# Patient Record
Sex: MALE | Race: BLACK OR AFRICAN AMERICAN | NOT HISPANIC OR LATINO | URBAN - METROPOLITAN AREA
[De-identification: names, ages, dates, MRNs, and addresses within clinical notes are randomized per-mention and may not be internally consistent; named-entity substitution may affect disease eponyms.]

---

## 2015-05-11 RX ORDER — AMLODIPINE BESYLATE 2.5 MG/1
1 TABLET ORAL
Qty: 0 | Refills: 0 | DISCHARGE
Start: 2015-05-11

## 2017-02-13 ENCOUNTER — INPATIENT (INPATIENT)
Facility: HOSPITAL | Age: 82
LOS: 2 days | Discharge: HOME HEALTH | End: 2017-02-16
Attending: HOSPITALIST | Admitting: HOSPITALIST
Payer: MEDICARE

## 2017-02-13 VITALS
OXYGEN SATURATION: 99 % | HEART RATE: 110 BPM | RESPIRATION RATE: 16 BRPM | DIASTOLIC BLOOD PRESSURE: 82 MMHG | TEMPERATURE: 98 F | SYSTOLIC BLOOD PRESSURE: 186 MMHG

## 2017-02-13 DIAGNOSIS — E16.2 HYPOGLYCEMIA, UNSPECIFIED: ICD-10-CM

## 2017-02-13 DIAGNOSIS — N17.9 ACUTE KIDNEY FAILURE, UNSPECIFIED: ICD-10-CM

## 2017-02-13 DIAGNOSIS — Z98.89 OTHER SPECIFIED POSTPROCEDURAL STATES: Chronic | ICD-10-CM

## 2017-02-13 DIAGNOSIS — I10 ESSENTIAL (PRIMARY) HYPERTENSION: ICD-10-CM

## 2017-02-13 DIAGNOSIS — E11.9 TYPE 2 DIABETES MELLITUS WITHOUT COMPLICATIONS: ICD-10-CM

## 2017-02-13 DIAGNOSIS — E78.00 PURE HYPERCHOLESTEROLEMIA, UNSPECIFIED: ICD-10-CM

## 2017-02-13 DIAGNOSIS — I25.10 ATHEROSCLEROTIC HEART DISEASE OF NATIVE CORONARY ARTERY WITHOUT ANGINA PECTORIS: ICD-10-CM

## 2017-02-13 DIAGNOSIS — C61 MALIGNANT NEOPLASM OF PROSTATE: ICD-10-CM

## 2017-02-13 DIAGNOSIS — Z41.8 ENCOUNTER FOR OTHER PROCEDURES FOR PURPOSES OTHER THAN REMEDYING HEALTH STATE: ICD-10-CM

## 2017-02-13 DIAGNOSIS — N18.3 CHRONIC KIDNEY DISEASE, STAGE 3 (MODERATE): ICD-10-CM

## 2017-02-13 LAB
ALBUMIN SERPL ELPH-MCNC: 4.4 G/DL — SIGNIFICANT CHANGE UP (ref 3.3–5)
ALP SERPL-CCNC: 76 U/L — SIGNIFICANT CHANGE UP (ref 40–120)
ALT FLD-CCNC: 18 U/L — SIGNIFICANT CHANGE UP (ref 4–41)
AST SERPL-CCNC: 31 U/L — SIGNIFICANT CHANGE UP (ref 4–40)
B-OH-BUTYR SERPL-SCNC: 0.1 MMOL/L — SIGNIFICANT CHANGE UP (ref 0–0.4)
BASE EXCESS BLDV CALC-SCNC: 0.6 MMOL/L — SIGNIFICANT CHANGE UP
BASOPHILS # BLD AUTO: 0.01 K/UL — SIGNIFICANT CHANGE UP (ref 0–0.2)
BASOPHILS NFR BLD AUTO: 0.3 % — SIGNIFICANT CHANGE UP (ref 0–2)
BILIRUB SERPL-MCNC: 0.7 MG/DL — SIGNIFICANT CHANGE UP (ref 0.2–1.2)
BLOOD GAS VENOUS - CREATININE: 1.5 MG/DL — HIGH (ref 0.5–1.3)
BUN SERPL-MCNC: 20 MG/DL — SIGNIFICANT CHANGE UP (ref 7–23)
CALCIUM SERPL-MCNC: 9.5 MG/DL — SIGNIFICANT CHANGE UP (ref 8.4–10.5)
CHLORIDE BLDV-SCNC: 107 MMOL/L — SIGNIFICANT CHANGE UP (ref 96–108)
CHLORIDE SERPL-SCNC: 101 MMOL/L — SIGNIFICANT CHANGE UP (ref 98–107)
CO2 SERPL-SCNC: 22 MMOL/L — SIGNIFICANT CHANGE UP (ref 22–31)
CREAT SERPL-MCNC: 1.57 MG/DL — HIGH (ref 0.5–1.3)
EOSINOPHIL # BLD AUTO: 0.03 K/UL — SIGNIFICANT CHANGE UP (ref 0–0.5)
EOSINOPHIL NFR BLD AUTO: 0.8 % — SIGNIFICANT CHANGE UP (ref 0–6)
GAS PNL BLDV: 136 MMOL/L — SIGNIFICANT CHANGE UP (ref 136–146)
GLUCOSE BLDV-MCNC: 117 — HIGH (ref 70–99)
GLUCOSE SERPL-MCNC: 103 MG/DL — HIGH (ref 70–99)
HCO3 BLDV-SCNC: 23 MMOL/L — SIGNIFICANT CHANGE UP (ref 20–27)
HCT VFR BLD CALC: 40 % — SIGNIFICANT CHANGE UP (ref 39–50)
HCT VFR BLDV CALC: 42.4 % — SIGNIFICANT CHANGE UP (ref 39–51)
HGB BLD-MCNC: 13.7 G/DL — SIGNIFICANT CHANGE UP (ref 13–17)
HGB BLDV-MCNC: 13.8 G/DL — SIGNIFICANT CHANGE UP (ref 13–17)
IMM GRANULOCYTES NFR BLD AUTO: 0.3 % — SIGNIFICANT CHANGE UP (ref 0–1.5)
LACTATE BLDV-MCNC: 2.3 MMOL/L — HIGH (ref 0.5–2)
LYMPHOCYTES # BLD AUTO: 0.56 K/UL — LOW (ref 1–3.3)
LYMPHOCYTES # BLD AUTO: 14 % — SIGNIFICANT CHANGE UP (ref 13–44)
MCHC RBC-ENTMCNC: 32.5 PG — SIGNIFICANT CHANGE UP (ref 27–34)
MCHC RBC-ENTMCNC: 34.3 % — SIGNIFICANT CHANGE UP (ref 32–36)
MCV RBC AUTO: 95 FL — SIGNIFICANT CHANGE UP (ref 80–100)
MONOCYTES # BLD AUTO: 0.34 K/UL — SIGNIFICANT CHANGE UP (ref 0–0.9)
MONOCYTES NFR BLD AUTO: 8.5 % — SIGNIFICANT CHANGE UP (ref 2–14)
NEUTROPHILS # BLD AUTO: 3.05 K/UL — SIGNIFICANT CHANGE UP (ref 1.8–7.4)
NEUTROPHILS NFR BLD AUTO: 76.1 % — SIGNIFICANT CHANGE UP (ref 43–77)
PCO2 BLDV: 48 MMHG — SIGNIFICANT CHANGE UP (ref 41–51)
PH BLDV: 7.34 PH — SIGNIFICANT CHANGE UP (ref 7.32–7.43)
PLATELET # BLD AUTO: 171 K/UL — SIGNIFICANT CHANGE UP (ref 150–400)
PMV BLD: 9.8 FL — SIGNIFICANT CHANGE UP (ref 7–13)
PO2 BLDV: 33 MMHG — LOW (ref 35–40)
POTASSIUM BLDV-SCNC: 4.6 MMOL/L — HIGH (ref 3.4–4.5)
POTASSIUM SERPL-MCNC: 4.2 MMOL/L — SIGNIFICANT CHANGE UP (ref 3.5–5.3)
POTASSIUM SERPL-SCNC: 4.2 MMOL/L — SIGNIFICANT CHANGE UP (ref 3.5–5.3)
PROT SERPL-MCNC: 8 G/DL — SIGNIFICANT CHANGE UP (ref 6–8.3)
RBC # BLD: 4.21 M/UL — SIGNIFICANT CHANGE UP (ref 4.2–5.8)
RBC # FLD: 13.5 % — SIGNIFICANT CHANGE UP (ref 10.3–14.5)
SAO2 % BLDV: 57.8 % — LOW (ref 60–85)
SODIUM SERPL-SCNC: 140 MMOL/L — SIGNIFICANT CHANGE UP (ref 135–145)
WBC # BLD: 4 K/UL — SIGNIFICANT CHANGE UP (ref 3.8–10.5)
WBC # FLD AUTO: 4 K/UL — SIGNIFICANT CHANGE UP (ref 3.8–10.5)

## 2017-02-13 PROCEDURE — 99223 1ST HOSP IP/OBS HIGH 75: CPT | Mod: AI,GC

## 2017-02-13 RX ORDER — METOPROLOL TARTRATE 50 MG
25 TABLET ORAL ONCE
Qty: 0 | Refills: 0 | Status: COMPLETED | OUTPATIENT
Start: 2017-02-13 | End: 2017-02-13

## 2017-02-13 RX ORDER — GABAPENTIN 400 MG/1
100 CAPSULE ORAL DAILY
Qty: 0 | Refills: 0 | Status: DISCONTINUED | OUTPATIENT
Start: 2017-02-13 | End: 2017-02-16

## 2017-02-13 RX ORDER — TAMSULOSIN HYDROCHLORIDE 0.4 MG/1
0.4 CAPSULE ORAL AT BEDTIME
Qty: 0 | Refills: 0 | Status: DISCONTINUED | OUTPATIENT
Start: 2017-02-13 | End: 2017-02-16

## 2017-02-13 RX ORDER — GLUCAGON INJECTION, SOLUTION 0.5 MG/.1ML
1 INJECTION, SOLUTION SUBCUTANEOUS ONCE
Qty: 0 | Refills: 0 | Status: DISCONTINUED | OUTPATIENT
Start: 2017-02-13 | End: 2017-02-16

## 2017-02-13 RX ORDER — METOPROLOL TARTRATE 50 MG
50 TABLET ORAL
Qty: 0 | Refills: 0 | Status: DISCONTINUED | OUTPATIENT
Start: 2017-02-13 | End: 2017-02-16

## 2017-02-13 RX ORDER — INSULIN GLARGINE 100 [IU]/ML
30 INJECTION, SOLUTION SUBCUTANEOUS AT BEDTIME
Qty: 0 | Refills: 0 | Status: DISCONTINUED | OUTPATIENT
Start: 2017-02-13 | End: 2017-02-13

## 2017-02-13 RX ORDER — FINASTERIDE 5 MG/1
5 TABLET, FILM COATED ORAL DAILY
Qty: 0 | Refills: 0 | Status: DISCONTINUED | OUTPATIENT
Start: 2017-02-13 | End: 2017-02-16

## 2017-02-13 RX ORDER — SIMVASTATIN 20 MG/1
20 TABLET, FILM COATED ORAL AT BEDTIME
Qty: 0 | Refills: 0 | Status: DISCONTINUED | OUTPATIENT
Start: 2017-02-13 | End: 2017-02-16

## 2017-02-13 RX ORDER — SODIUM CHLORIDE 9 MG/ML
500 INJECTION INTRAMUSCULAR; INTRAVENOUS; SUBCUTANEOUS ONCE
Qty: 0 | Refills: 0 | Status: COMPLETED | OUTPATIENT
Start: 2017-02-13 | End: 2017-02-13

## 2017-02-13 RX ORDER — DEXTROSE 50 % IN WATER 50 %
25 SYRINGE (ML) INTRAVENOUS ONCE
Qty: 0 | Refills: 0 | Status: DISCONTINUED | OUTPATIENT
Start: 2017-02-13 | End: 2017-02-16

## 2017-02-13 RX ORDER — METOPROLOL TARTRATE 50 MG
25 TABLET ORAL
Qty: 0 | Refills: 0 | Status: DISCONTINUED | OUTPATIENT
Start: 2017-02-13 | End: 2017-02-13

## 2017-02-13 RX ORDER — HEPARIN SODIUM 5000 [USP'U]/ML
5000 INJECTION INTRAVENOUS; SUBCUTANEOUS EVERY 8 HOURS
Qty: 0 | Refills: 0 | Status: DISCONTINUED | OUTPATIENT
Start: 2017-02-13 | End: 2017-02-16

## 2017-02-13 RX ORDER — SODIUM CHLORIDE 9 MG/ML
1000 INJECTION INTRAMUSCULAR; INTRAVENOUS; SUBCUTANEOUS
Qty: 0 | Refills: 0 | Status: DISCONTINUED | OUTPATIENT
Start: 2017-02-13 | End: 2017-02-16

## 2017-02-13 RX ORDER — CLOPIDOGREL BISULFATE 75 MG/1
75 TABLET, FILM COATED ORAL DAILY
Qty: 0 | Refills: 0 | Status: DISCONTINUED | OUTPATIENT
Start: 2017-02-13 | End: 2017-02-16

## 2017-02-13 RX ORDER — DEXTROSE 50 % IN WATER 50 %
12.5 SYRINGE (ML) INTRAVENOUS ONCE
Qty: 0 | Refills: 0 | Status: DISCONTINUED | OUTPATIENT
Start: 2017-02-13 | End: 2017-02-16

## 2017-02-13 RX ORDER — AMLODIPINE BESYLATE 2.5 MG/1
10 TABLET ORAL DAILY
Qty: 0 | Refills: 0 | Status: DISCONTINUED | OUTPATIENT
Start: 2017-02-13 | End: 2017-02-16

## 2017-02-13 RX ORDER — DEXTROSE 50 % IN WATER 50 %
1 SYRINGE (ML) INTRAVENOUS ONCE
Qty: 0 | Refills: 0 | Status: DISCONTINUED | OUTPATIENT
Start: 2017-02-13 | End: 2017-02-16

## 2017-02-13 RX ORDER — INSULIN LISPRO 100/ML
VIAL (ML) SUBCUTANEOUS
Qty: 0 | Refills: 0 | Status: DISCONTINUED | OUTPATIENT
Start: 2017-02-13 | End: 2017-02-16

## 2017-02-13 RX ORDER — INSULIN LISPRO 100/ML
VIAL (ML) SUBCUTANEOUS AT BEDTIME
Qty: 0 | Refills: 0 | Status: DISCONTINUED | OUTPATIENT
Start: 2017-02-13 | End: 2017-02-16

## 2017-02-13 RX ORDER — METOPROLOL TARTRATE 50 MG
50 TABLET ORAL
Qty: 0 | Refills: 0 | Status: DISCONTINUED | OUTPATIENT
Start: 2017-02-13 | End: 2017-02-13

## 2017-02-13 RX ORDER — SODIUM CHLORIDE 9 MG/ML
1000 INJECTION, SOLUTION INTRAVENOUS
Qty: 0 | Refills: 0 | Status: DISCONTINUED | OUTPATIENT
Start: 2017-02-13 | End: 2017-02-16

## 2017-02-13 RX ORDER — ASPIRIN/CALCIUM CARB/MAGNESIUM 324 MG
81 TABLET ORAL DAILY
Qty: 0 | Refills: 0 | Status: DISCONTINUED | OUTPATIENT
Start: 2017-02-13 | End: 2017-02-16

## 2017-02-13 RX ADMIN — Medication 2: at 17:19

## 2017-02-13 RX ADMIN — AMLODIPINE BESYLATE 10 MILLIGRAM(S): 2.5 TABLET ORAL at 14:44

## 2017-02-13 RX ADMIN — TAMSULOSIN HYDROCHLORIDE 0.4 MILLIGRAM(S): 0.4 CAPSULE ORAL at 21:21

## 2017-02-13 RX ADMIN — HEPARIN SODIUM 5000 UNIT(S): 5000 INJECTION INTRAVENOUS; SUBCUTANEOUS at 21:21

## 2017-02-13 RX ADMIN — Medication 25 MILLIGRAM(S): at 16:38

## 2017-02-13 RX ADMIN — SODIUM CHLORIDE 50 MILLILITER(S): 9 INJECTION INTRAMUSCULAR; INTRAVENOUS; SUBCUTANEOUS at 21:21

## 2017-02-13 RX ADMIN — SODIUM CHLORIDE 500 MILLILITER(S): 9 INJECTION INTRAMUSCULAR; INTRAVENOUS; SUBCUTANEOUS at 09:48

## 2017-02-13 RX ADMIN — Medication 50 MILLIGRAM(S): at 21:21

## 2017-02-13 RX ADMIN — SODIUM CHLORIDE 50 MILLILITER(S): 9 INJECTION INTRAMUSCULAR; INTRAVENOUS; SUBCUTANEOUS at 17:15

## 2017-02-13 RX ADMIN — SIMVASTATIN 20 MILLIGRAM(S): 20 TABLET, FILM COATED ORAL at 21:21

## 2017-02-13 NOTE — H&P ADULT. - ASSESSMENT
84M PMH CAD w/ stents x2 (circumflex and RCA), HTN, DM2, HLD, prostate CA s/p TURP/radiation p/w unresponsiveness as per pt's daughter 2/2 hypoglycemia in the setting of recent changes in diabetic medications.

## 2017-02-13 NOTE — ED PROVIDER NOTE - PMH
CAD (Coronary Artery Disease)    DM Type 2 (Diabetes Mellitus,    History of PTCA  7/09, stents to Cx and RCA  Hypercholesteremia    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  PC (Prostate Cancer)  treated surgically  Personal History of Hypertensi

## 2017-02-13 NOTE — H&P ADULT. - PROBLEM SELECTOR PLAN 1
Likely 2/2 polypharmacy as diabetic regimen recently altered as per patient's daughter.   - patient on home dose of Humalog pen 75/25 mix with 20U in AM and 30U in PM. Confirmed conversion with pharmacy, will start lantus 30U at bedtime while inpatient.  - check A1c in AM  - monitor FS, on ISS   - will contact pt's endocrinologist Dr. Soila Thornton [phone] with regards to recent changes in medication  - obtain diabetes nurse educator to clarify medications and proper methods of administration   - social work to identify HHA needs  - consistent carbohydrate diet Likely 2/2 polypharmacy as diabetic regimen recently altered as per patient's daughter and underlying CKD  - patient on home dose of Humalog pen 75/25 mix with 20U in AM and 30U in PM. Confirmed conversion with pharmacy, will hold off Long acting insulin, ISS and trend FS q4 for now  - check A1c in AM  - monitor FS, on ISS   - will contact pt's endocrinologist Dr. Soila Thornton [phone] with regards to recent changes in medication  - obtain diabetes nurse educator ensure proper methods of administration   - social work to identify HHA needs  - consistent carbohydrate diet

## 2017-02-13 NOTE — ED PROVIDER NOTE - ATTENDING CONTRIBUTION TO CARE
84m, pmxh htn, bph, dm2. on glimepride. last dose 36 h ago. recently started on insulin (does not have it with him). has had recurrent hypoglycemia since, has been found by family several times unconscious due to low FS. today, found unconscious, ems fs 20, given d50 and brought to ED. fs 101 in triage, pt feels back to baseline. notes not eating well the last few days. no c/p, sob, infectious symptoms, n/v/d. PMD Serge Alerte, endocrinologist Dr. JEANNIE Thornton. exam, vs wnl, nad. hs and lungs normal, abdo benign, legs normal, gcs 15, neuro non-focal. Given recurrent episodes of hypoglycemia, should be admitted for change in meds, close fs monitoring. labs sent.

## 2017-02-13 NOTE — ED PROVIDER NOTE - OBJECTIVE STATEMENT
85yo M with a hx of CAD, DM2, HTN, HLD p/w multiple episodes of hypoglycemia over the past 4d. Pt takes insulin (doesn't recall type/doses) and was started on a new regimen 4d ago, BID. He also takes glimepiride but last dose was yesterday morning. His wife states since starting the insulin he has been having frequent episodes of LOC or decreased consciousness 2/2 hypoglycemia. Sugars have ranged from  after juice. Today pt was found unconscious in bed in the AM. FS was 20. Received 1 amp D50 with EMS. Pt has not eaten yet. No recent illnesses, CP/SOB, abd pain, n/v/d. His wife reports some decreased PO intake since starting the insulin as well. No falls or trauma.

## 2017-02-13 NOTE — PROVIDER CONTACT NOTE (MEDICATION) - RECOMMENDATIONS
After reviewing pt's medications and PMH, I recommend considering an alternative regimen for this diabetic pharmacotherapy in order to future episodes of hypoglycemia

## 2017-02-13 NOTE — ED SUB INTERN NOTE - OBJECTIVE STATEMENT FT
83 yo man PMH DM c/b diabetic retinopathy), HTN, PAD, prostate cancer (sp XRT), CAD (s/p 2 stents 7/09), complex renal cyst, p/w unresponsiveness with glucose 20 this am. Pt was started on new insulin on Thursday and has been having recurrent hypoglycemia to 40s. He has not been eating well the last week but the glucose improved to 100s after taking orange juice and snacks. Pt last took glimepiride on 2/11/17. On Sunday morning (2/12), glucose was in the 230s and pt took insulin, causing sugars to drop to 40s by the evening. He drank some OJ to increase sugars close to 100 before going to bed. Morning of admission, he was unresponsive and glucose was 20, pt was given D50 and glucose increased to 106 in triage. Pt currently denies any dizziness, N/V, SOB, CP, vision changes. 85 yo man PMH DM c/b diabetic retinopathy), HTN, PAD, prostate cancer (sp XRT), CAD (s/p 2 stents 7/09), complex renal cyst, p/w unresponsiveness with glucose 20 this am. Pt was started on new insulin on Tuesday (Humalog 30U in am, 20 U in pm) and has been having recurrent hypoglycemia to 40s. He has not been eating well the last week but the glucose improved to 100s after taking orange juice and snacks. Pt last took glimepiride on 2/11/17. On Sunday morning (2/12), glucose was in the 230s and pt took insulin, causing sugars to drop to 40s by the evening. He drank some OJ to increase sugars close to 100 before going to bed. Morning of admission, he was unresponsive and glucose was 20, pt was given D50 and glucose increased to 106 in triage. Pt currently denies any dizziness, N/V, SOB, CP, vision changes.

## 2017-02-13 NOTE — H&P ADULT. - HISTORY OF PRESENT ILLNESS
84M PMH CAD w/ stents x2 (circumflex and RCA), HTN, DM2, HLD, prostate CA s/p TURP/radiation p/w unresponsiveness as per pt's daughter. Patient is poor historian and deferred questions to daughter Shavonne and wife at bedside. Daughter found patient unresponsive this AM laying in bed. Fingerstick was checked showing 20 s/p apple juice with minimal improvement prompting ED visit. As per daughter, patient has been having labile FS during the past week as his diabetes medication regimen has been changed recently by endocrinologist (Dr. Soila Thornotn). 84M PMH CAD w/ stents x2 (circumflex and RCA), HTN, DM2, HLD, prostate CA s/p TURP/radiation p/w unresponsiveness as per pt's daughter. Patient is poor historian and deferred questions to daughter Shavonne and wife at bedside. Daughter found patient unresponsive this AM laying in bed. Fingerstick was checked showing 20 s/p apple juice with minimal improvement prompting ED visit. As per daughter, patient has been having labile FS during the past week as his diabetes medication regimen has been changed recently by endocrinologist (Dr. Soila Thornton). Currently on Humalog 75/25 mix with 20U/30U and glimiperide. No fevers/chills/nausea/vomiting/sick contacts/lightheadedness or change in PO intake.     In ED:   T 98.4 HR 78 /80 RR 16 Sa 100 RA  Cr 1.57 (baseline ~1.4)   <-- 144 <-- 90   lactate 2.3    bolus 84M PMH CAD w/ stents x2 (circumflex and RCA), HTN, DM2, HLD, prostate CA s/p TURP/radiation p/w unresponsiveness as per pt's daughter. Patient is poor historian and deferred questions to daughter Shavonne and wife at bedside. Daughter found patient unresponsive this AM laying in bed. Fingerstick was checked showing 20 s/p apple juice with minimal improvement prompting ED visit. As per daughter, patient has been having labile FS during the past week as his diabetes medication regimen has been changed recently by endocrinologist (Dr. Soila Thornton). Currently on Humalog 75/25 mix with 20U/30U and glimiperide. No fevers/chills/nausea/vomiting/sick contacts/lightheadedness. Reported decrease in PO intake over the past week.     In ED:   T 98.4 HR 78 /80 RR 16 Sa 100 RA  Cr 1.57 (baseline ~1.4)   <-- 144 <-- 90   lactate 2.3    bolus 84M PMH CAD w/ stents x2 (circumflex and RCA), HTN, DM2, HLD, prostate CA s/p TURP/radiation p/w unresponsiveness as per pt's daughter. Patient is poor historian and deferred questions to daughter Shavonne and wife at bedside. Daughter found patient unresponsive this AM laying in bed. Fingerstick was checked showing 20 s/p apple juice with minimal improvement prompting ED visit. As per daughter, patient has been having labile FS during the past week as his diabetes medication regimen has been changed recently by endocrinologist (Dr. Soila Thornton). Patient was previously on Januvia 50mg daily and levemir 20U in AM/20U in PM. Currently on Humalog 75/25 mix with 20U/30U and glimiperide. No fevers/chills/nausea/vomiting/sick contacts/lightheadedness. Reported decrease in PO intake over the past week.     In ED:   T 98.4 HR 78 /80 RR 16 Sa 100 RA  Cr 1.57 (baseline ~1.4)   <-- 144 <-- 90   lactate 2.3    bolus

## 2017-02-13 NOTE — ED SUB INTERN NOTE - MEDICAL DECISION MAKING DETAILS
83 yo man p/w unresponsiveness 83 yo man with long standing DM (on insulin, new regimen started Tuesday)  p/w unresponsiveness and hypoglycemia to 20. This is likely due to continuing glimepiride, decreased PO intake, and new insulin regimen. Due to recurrent hypoglycemic episodes with 85 yo man with long standing DM (on insulin, new regimen started Tuesday)  p/w unresponsiveness and hypoglycemia to 20. This is likely due to continuing glimepiride, decreased PO intake, and new insulin regimen. Due to recurrent hypoglycemic episodes with unresponsiveness and pt and pt's wife difficulty managing symptoms, we have concern about regulating insulin regimen outpt. Pt will be admitted for glucose and establishing a 83 yo man with long standing DM (on insulin, new regimen started Tuesday)  p/w unresponsiveness and hypoglycemia to 20. This is likely due to continuing glimepiride, decreased PO intake, and new insulin regimen. Due to recurrent hypoglycemic episodes with unresponsiveness and pt and pt's wife difficulty managing symptoms, we have concern about regulating insulin regimen outpt. Pt will be admitted for glucose monitoring and regulation of insulin regimen.

## 2017-02-13 NOTE — H&P ADULT. - PROBLEM SELECTOR PLAN 2
- will check A1c in AM  - lantus 30U at bedtime, ISS  - monitor FS  - continue with gabapentin 100 daily for DM neuropathy, will renally dose as appropriate - will check A1c in AM  - ISS and trend FS  - continue with gabapentin 100 daily for DM neuropathy, will renally dose as appropriate

## 2017-02-13 NOTE — ED ADULT NURSE NOTE - OBJECTIVE STATEMENT
Patient received into room 22 AA&Ox3 (Brazilian-Creole is the primary language; however, pt. able to make needs known in English and primary RN speaks Brazilian-Creole) for hypoglycemia - FSBS 20 at home and pt. was 'not waking up' per family. Wife states that glass of orange juice was given and patient became less lethargic at that time. Pt. states that there was a recent change in his medication for diabetes and that his daughter - who has more information regarding the care for diabetes - will be arriving shortly to provide clarification. FSBS 101 in triage and most recent FSBS 90 - MD aware. VSS on RA. Patient denies chest pain, N/V, SOB, fever, chills, lethargy, AMS, dizziness, weakness at this time. 20g PIV in place to left hand (placed by EMS), labs drawn, attending MD to evaluate - will continue to monitor.    Pt arrives to ED from home via EMS.  Pt fs at home was 20.  Pt recently prescribed insulin on Thursday and since then has had 3 instances of low blood sugar.  Pt did not take any insulin yesterday.  Pt has Left hand 20g iv to Left hand placed by EMS.  Pt given 1 amp of D50.  fs in triage =101

## 2017-02-13 NOTE — ED ADULT NURSE NOTE - CHIEF COMPLAINT QUOTE
Pt arrives to ED from home via EMS.  Pt fs at home was 20.  Pt recently prescribed insulin on Thursday and since then has had 3 instances of low blood sugar.  Pt did not take any insulin yesterday.  Pt has Left hand 20g iv to Left hand placed by EMS.  Pt given 1 amp of D50.  fs in triage =101

## 2017-02-13 NOTE — H&P ADULT. - PROBLEM SELECTOR PLAN 3
- baseline Cr ~1.4 as per records  - reported history of decreased PO intake.   - will encourage PO hydration and supplement with IVF PRN  - will monitor Cr - baseline Cr ~1.4 as per records  - reported history of decreased PO intake.   - will encourage PO hydration and supplement with IVF PRN  - Avoid nephrotoxics and trend Cr

## 2017-02-13 NOTE — PROVIDER CONTACT NOTE (MEDICATION) - ACTION/TREATMENT ORDERED:
Spoke to provider who will address the issue and will work with endocrinologist to manipulate therapy.

## 2017-02-13 NOTE — PROVIDER CONTACT NOTE (MEDICATION) - BACKGROUND
High risk patient as determined by comorbidities (CAD, DM2, HTN, HLD), to be admitted to [facility] on high risk medications.

## 2017-02-13 NOTE — ED PROVIDER NOTE - MEDICAL DECISION MAKING DETAILS
Pt p/w multiple episodes of LOC 2/2 hypoglycemia after starting a new insulin regimen. Pt also takes sulfonylurea but denies taking it since yesterday. Currently at baseline after D50. Will speak to PMD, but given pt's cardiac hx, age and trauma risk will obtain basic labs and admit for DM management.

## 2017-02-13 NOTE — H&P ADULT. - PROBLEM SELECTOR PLAN 5
- s/p TURP and radiation   - no further interventions at this point - s/p TURP and radiation   - no further interventions at this point  - c/w flomax for BPH

## 2017-02-13 NOTE — ED PROVIDER NOTE - PSH
CAD (Coronary Artery Disease)    Diabetes Mellitus    Inferior MI    S/P TURP (status post transurethral resection of prostate)

## 2017-02-13 NOTE — ED PROVIDER NOTE - PROGRESS NOTE DETAILS
left message on Dr. Tsang's answering machine. left message x2 for Dr Tsang Called x3 with no answer. Will admit to hospitalist.

## 2017-02-13 NOTE — PROVIDER CONTACT NOTE (MEDICATION) - ASSESSMENT
As per the H&P, this is an 83 y/o M who p/w multiple episodes of hypoglycemia in the setting of presumed recent insulin changes. Interview with pt. and family reveals that he is compliant with his medications and OMR updated to reflect most recent changes in medications. Of note, the pt. is currently taking high dose amaryl (8mg total daily) in addition to humalog 75/25 (20 units in the morning and 30 units in the evening) which can account for his multiple episodes of hypoglycemia. After speaking with family, they state that he has tried metformin in the past but "didn't work". Given the risk of hypoglycemia with amaryl, alternative agents may need to be considered in addition to altering his insulin therapy to prevent future instances of hypoglycemia.

## 2017-02-13 NOTE — ED ADULT TRIAGE NOTE - CHIEF COMPLAINT QUOTE
Pt arrives to ED from home via EMS.  Pt fs at home was 20.  Pt recently prescribed insulin on Thursday and since then has had 3 instances of low blood sugar.  Pt did not take any insulin yesterday.  Pt has Left hand 20g iv to Left hand placed by EMS.  Pt given 1 amp of D50.  fs in triage = Pt arrives to ED from home via EMS.  Pt fs at home was 20.  Pt recently prescribed insulin on Thursday and since then has had 3 instances of low blood sugar.  Pt did not take any insulin yesterday.  Pt has Left hand 20g iv to Left hand placed by EMS.  Pt given 1 amp of D50.  fs in triage =101

## 2017-02-14 LAB
BUN SERPL-MCNC: 17 MG/DL — SIGNIFICANT CHANGE UP (ref 7–23)
CALCIUM SERPL-MCNC: 8.7 MG/DL — SIGNIFICANT CHANGE UP (ref 8.4–10.5)
CHLORIDE SERPL-SCNC: 100 MMOL/L — SIGNIFICANT CHANGE UP (ref 98–107)
CO2 SERPL-SCNC: 22 MMOL/L — SIGNIFICANT CHANGE UP (ref 22–31)
CREAT SERPL-MCNC: 1.26 MG/DL — SIGNIFICANT CHANGE UP (ref 0.5–1.3)
GLUCOSE SERPL-MCNC: 133 MG/DL — HIGH (ref 70–99)
HBA1C BLD-MCNC: 8.5 % — HIGH (ref 4–5.6)
MAGNESIUM SERPL-MCNC: 3.1 MG/DL — HIGH (ref 1.6–2.6)
PHOSPHATE SERPL-MCNC: 2.6 MG/DL — SIGNIFICANT CHANGE UP (ref 2.5–4.5)
POTASSIUM SERPL-MCNC: 4.4 MMOL/L — SIGNIFICANT CHANGE UP (ref 3.5–5.3)
POTASSIUM SERPL-SCNC: 4.4 MMOL/L — SIGNIFICANT CHANGE UP (ref 3.5–5.3)
SODIUM SERPL-SCNC: 137 MMOL/L — SIGNIFICANT CHANGE UP (ref 135–145)

## 2017-02-14 PROCEDURE — 99232 SBSQ HOSP IP/OBS MODERATE 35: CPT

## 2017-02-14 RX ORDER — INSULIN GLARGINE 100 [IU]/ML
10 INJECTION, SOLUTION SUBCUTANEOUS AT BEDTIME
Qty: 0 | Refills: 0 | Status: DISCONTINUED | OUTPATIENT
Start: 2017-02-14 | End: 2017-02-16

## 2017-02-14 RX ADMIN — Medication 50 MILLIGRAM(S): at 06:18

## 2017-02-14 RX ADMIN — INSULIN GLARGINE 10 UNIT(S): 100 INJECTION, SOLUTION SUBCUTANEOUS at 21:56

## 2017-02-14 RX ADMIN — CLOPIDOGREL BISULFATE 75 MILLIGRAM(S): 75 TABLET, FILM COATED ORAL at 12:05

## 2017-02-14 RX ADMIN — FINASTERIDE 5 MILLIGRAM(S): 5 TABLET, FILM COATED ORAL at 12:08

## 2017-02-14 RX ADMIN — HEPARIN SODIUM 5000 UNIT(S): 5000 INJECTION INTRAVENOUS; SUBCUTANEOUS at 06:18

## 2017-02-14 RX ADMIN — Medication 81 MILLIGRAM(S): at 12:05

## 2017-02-14 RX ADMIN — GABAPENTIN 100 MILLIGRAM(S): 400 CAPSULE ORAL at 12:05

## 2017-02-14 RX ADMIN — HEPARIN SODIUM 5000 UNIT(S): 5000 INJECTION INTRAVENOUS; SUBCUTANEOUS at 13:20

## 2017-02-14 RX ADMIN — Medication 50 MILLIGRAM(S): at 17:22

## 2017-02-14 RX ADMIN — SIMVASTATIN 20 MILLIGRAM(S): 20 TABLET, FILM COATED ORAL at 21:56

## 2017-02-14 RX ADMIN — Medication 1: at 12:05

## 2017-02-14 RX ADMIN — HEPARIN SODIUM 5000 UNIT(S): 5000 INJECTION INTRAVENOUS; SUBCUTANEOUS at 21:56

## 2017-02-14 RX ADMIN — AMLODIPINE BESYLATE 10 MILLIGRAM(S): 2.5 TABLET ORAL at 06:18

## 2017-02-14 RX ADMIN — TAMSULOSIN HYDROCHLORIDE 0.4 MILLIGRAM(S): 0.4 CAPSULE ORAL at 21:56

## 2017-02-14 RX ADMIN — Medication 3: at 17:22

## 2017-02-14 NOTE — PHYSICAL THERAPY INITIAL EVALUATION ADULT - PERTINENT HX OF CURRENT PROBLEM, REHAB EVAL
84M PMH CAD w/ stents x2 (circumflex and RCA), HTN, DM2, HLD, prostate CA s/p TURP/radiation p/w unresponsiveness as per pt's daughter. Patient is poor historian and deferred questions to daughter Shavonne and wife at bedside. Daughter found patient unresponsive this AM laying in bed. Fingerstick was checked showing 20 s/p apple juice with minimal improvement prompting ED visit.

## 2017-02-15 ENCOUNTER — TRANSCRIPTION ENCOUNTER (OUTPATIENT)
Age: 82
End: 2017-02-15

## 2017-02-15 LAB
BUN SERPL-MCNC: 25 MG/DL — HIGH (ref 7–23)
CALCIUM SERPL-MCNC: 8.8 MG/DL — SIGNIFICANT CHANGE UP (ref 8.4–10.5)
CHLORIDE SERPL-SCNC: 99 MMOL/L — SIGNIFICANT CHANGE UP (ref 98–107)
CO2 SERPL-SCNC: 22 MMOL/L — SIGNIFICANT CHANGE UP (ref 22–31)
CREAT SERPL-MCNC: 1.44 MG/DL — HIGH (ref 0.5–1.3)
GLUCOSE SERPL-MCNC: 83 MG/DL — SIGNIFICANT CHANGE UP (ref 70–99)
MAGNESIUM SERPL-MCNC: 1.9 MG/DL — SIGNIFICANT CHANGE UP (ref 1.6–2.6)
PHOSPHATE SERPL-MCNC: 3.3 MG/DL — SIGNIFICANT CHANGE UP (ref 2.5–4.5)
POTASSIUM SERPL-MCNC: 4.1 MMOL/L — SIGNIFICANT CHANGE UP (ref 3.5–5.3)
POTASSIUM SERPL-SCNC: 4.1 MMOL/L — SIGNIFICANT CHANGE UP (ref 3.5–5.3)
SODIUM SERPL-SCNC: 136 MMOL/L — SIGNIFICANT CHANGE UP (ref 135–145)

## 2017-02-15 PROCEDURE — 99232 SBSQ HOSP IP/OBS MODERATE 35: CPT | Mod: GC

## 2017-02-15 RX ORDER — INSULIN LISPRO 100 [IU]/ML
0 INJECTION, SUSPENSION SUBCUTANEOUS
Qty: 0 | Refills: 0 | COMMUNITY

## 2017-02-15 RX ADMIN — INSULIN GLARGINE 10 UNIT(S): 100 INJECTION, SOLUTION SUBCUTANEOUS at 21:00

## 2017-02-15 RX ADMIN — HEPARIN SODIUM 5000 UNIT(S): 5000 INJECTION INTRAVENOUS; SUBCUTANEOUS at 05:42

## 2017-02-15 RX ADMIN — GABAPENTIN 100 MILLIGRAM(S): 400 CAPSULE ORAL at 12:43

## 2017-02-15 RX ADMIN — AMLODIPINE BESYLATE 10 MILLIGRAM(S): 2.5 TABLET ORAL at 05:42

## 2017-02-15 RX ADMIN — CLOPIDOGREL BISULFATE 75 MILLIGRAM(S): 75 TABLET, FILM COATED ORAL at 12:43

## 2017-02-15 RX ADMIN — HEPARIN SODIUM 5000 UNIT(S): 5000 INJECTION INTRAVENOUS; SUBCUTANEOUS at 21:00

## 2017-02-15 RX ADMIN — HEPARIN SODIUM 5000 UNIT(S): 5000 INJECTION INTRAVENOUS; SUBCUTANEOUS at 14:18

## 2017-02-15 RX ADMIN — Medication 50 MILLIGRAM(S): at 05:42

## 2017-02-15 RX ADMIN — Medication: at 11:43

## 2017-02-15 RX ADMIN — Medication 81 MILLIGRAM(S): at 12:42

## 2017-02-15 RX ADMIN — TAMSULOSIN HYDROCHLORIDE 0.4 MILLIGRAM(S): 0.4 CAPSULE ORAL at 21:00

## 2017-02-15 RX ADMIN — Medication 50 MILLIGRAM(S): at 17:32

## 2017-02-15 RX ADMIN — Medication 2: at 17:10

## 2017-02-15 RX ADMIN — SIMVASTATIN 20 MILLIGRAM(S): 20 TABLET, FILM COATED ORAL at 21:00

## 2017-02-15 RX ADMIN — FINASTERIDE 5 MILLIGRAM(S): 5 TABLET, FILM COATED ORAL at 12:43

## 2017-02-15 NOTE — DISCHARGE NOTE ADULT - CARE PROVIDER_API CALL
Soila Thornton), EndocrinologyMetabDiabetes; Internal Medicine  [address] [place] [address]  Phone: [phone]  Fax: [phone]    Chet Tsang), Internal Medicine  [address] [place] [address]  Phone: [phone]  Fax: [phone]

## 2017-02-15 NOTE — DISCHARGE NOTE ADULT - MEDICATION SUMMARY - MEDICATIONS TO STOP TAKING
I will STOP taking the medications listed below when I get home from the hospital:    HumaLOG Mix 75/25 KwikPen subcutaneous suspension  --  20 units subcutaneous in the morning and 30 units in the evening

## 2017-02-15 NOTE — DISCHARGE NOTE ADULT - CARE PLAN
Principal Discharge DX:	Hypoglycemia  Goal:	Resolution  Instructions for follow-up, activity and diet:	Please continue Glimepiride 6mg in the morning and 2mg at bedtime only and follow-up with Dr. Thornton on 2/17/17 @ 8AM at the [place] office location ([address] [place] [address]). Please keep a fingerstick log until you see Dr. Thornton and call her directly if FS > 180 for further instructions.  Secondary Diagnosis:	DM2 (diabetes mellitus, type 2)  Instructions for follow-up, activity and diet:	Please continue glimepiride only upon discharge and follow-up with Dr. Thornton on Friday 2/17/17 at 8AM.  Secondary Diagnosis:	CAD (Coronary Artery Disease)  Instructions for follow-up, activity and diet:	Please continue with ASA and plavix and follow-up with your PCP/cardiologist within 2-4 weeks after discharge.  Secondary Diagnosis:	Essential hypertension  Instructions for follow-up, activity and diet:	Please continue with amlodipine 10mg and metoprolol 25 BID and follow-up with your PCP within 2-4 weeks after discharge.  Secondary Diagnosis:	Hypercholesteremia  Instructions for follow-up, activity and diet:	Please continue statin and follow-up with your PCP within 2-4 weeks after discharge.  Secondary Diagnosis:	PC (Prostate Cancer)  Instructions for follow-up, activity and diet:	Please continue with finasteride and flomax for BPH and follow-up with your urologist at next appointment. Principal Discharge DX:	Hypoglycemia  Goal:	Resolution  Instructions for follow-up, activity and diet:	Please continue Glimepiride 6mg in the morning and 2mg at bedtime only and follow-up with Dr. Thornton on 2/17/17 @ 8AM at the [place] office location ([address] [place] [address]). Please keep a fingerstick log until you see Dr. Thornton and call her directly if FS > 180 for further instructions.  Secondary Diagnosis:	DM2 (diabetes mellitus, type 2)  Instructions for follow-up, activity and diet:	Please continue glimepiride only upon discharge and follow-up with Dr. Thornton on Friday 2/17/17 at 8AM.  Secondary Diagnosis:	CAD (Coronary Artery Disease)  Instructions for follow-up, activity and diet:	Please continue with ASA and plavix and follow-up with your PCP/cardiologist within 2-4 weeks after discharge.  Secondary Diagnosis:	Essential hypertension  Instructions for follow-up, activity and diet:	Please continue with amlodipine 10mg and metoprolol 25 BID and follow-up with your PCP within 2-4 weeks after discharge.  Secondary Diagnosis:	Hypercholesteremia  Instructions for follow-up, activity and diet:	Please continue statin and follow-up with your PCP within 2-4 weeks after discharge.  Secondary Diagnosis:	PC (Prostate Cancer)  Instructions for follow-up, activity and diet:	Please continue with finasteride and flomax for BPH and follow-up with your urologist at next appointment. Principal Discharge DX:	Hypoglycemia  Goal:	Resolution  Instructions for follow-up, activity and diet:	Please continue Glimepiride 6mg in the morning and 2mg at bedtime only and follow-up with Dr. Thornton on 2/17/17 @ 8AM at the [place] office location ([address] [place] [address]). Please keep a fingerstick log until you see Dr. Thornton and call her directly if FS > 180 for further instructions.  Secondary Diagnosis:	DM2 (diabetes mellitus, type 2)  Instructions for follow-up, activity and diet:	Please continue glimepiride only upon discharge and follow-up with Dr. Thornton on Friday 2/17/17 at 8AM.  Secondary Diagnosis:	CAD (Coronary Artery Disease)  Instructions for follow-up, activity and diet:	Please continue with ASA and plavix and follow-up with your PCP/cardiologist within 2-4 weeks after discharge.  Secondary Diagnosis:	Essential hypertension  Instructions for follow-up, activity and diet:	Please continue with amlodipine 10mg and metoprolol 25 BID and follow-up with your PCP within 2-4 weeks after discharge.  Secondary Diagnosis:	Hypercholesteremia  Instructions for follow-up, activity and diet:	Please continue statin and follow-up with your PCP within 2-4 weeks after discharge.  Secondary Diagnosis:	PC (Prostate Cancer)  Instructions for follow-up, activity and diet:	Please continue with finasteride and flomax for BPH and follow-up with your urologist at next appointment. Principal Discharge DX:	Hypoglycemia  Goal:	Resolution  Instructions for follow-up, activity and diet:	Please continue Glimepiride 6mg in the morning and 2mg at bedtime only and follow-up with Dr. Thornton on 2/17/17 @ 8AM at the [place] office location ([address] [place] [address]). Please keep a fingerstick log until you see Dr. Thornton and call her directly if FS > 180 for further instructions.  Secondary Diagnosis:	DM2 (diabetes mellitus, type 2)  Instructions for follow-up, activity and diet:	Please continue glimepiride only upon discharge and follow-up with Dr. Thornton on Friday 2/17/17 at 8AM.  Secondary Diagnosis:	CAD (Coronary Artery Disease)  Instructions for follow-up, activity and diet:	Please continue with ASA and plavix and follow-up with your PCP/cardiologist within 2-4 weeks after discharge.  Secondary Diagnosis:	Essential hypertension  Instructions for follow-up, activity and diet:	Please continue with amlodipine 10mg and metoprolol 25 BID and follow-up with your PCP within 2-4 weeks after discharge.  Secondary Diagnosis:	Hypercholesteremia  Instructions for follow-up, activity and diet:	Please continue statin and follow-up with your PCP within 2-4 weeks after discharge.  Secondary Diagnosis:	PC (Prostate Cancer)  Instructions for follow-up, activity and diet:	Please continue with finasteride and flomax for BPH and follow-up with your urologist at next appointment.

## 2017-02-15 NOTE — DISCHARGE NOTE ADULT - CARE PROVIDERS DIRECT ADDRESSES
[email or web address]

## 2017-02-15 NOTE — DISCHARGE NOTE ADULT - PLAN OF CARE
Resolution Please continue Glimepiride 6mg in the morning and 2mg at bedtime only and follow-up with Dr. Thornton on 2/17/17 @ 8AM at the [place] office location ([address] [place] [address]). Please keep a fingerstick log until you see Dr. Thornton and call her directly if FS > 180 for further instructions. Please continue glimepiride only upon discharge and follow-up with Dr. Thornton on Friday 2/17/17 at 8AM. Please continue with ASA and plavix and follow-up with your PCP/cardiologist within 2-4 weeks after discharge. Please continue with amlodipine 10mg and metoprolol 25 BID and follow-up with your PCP within 2-4 weeks after discharge. Please continue statin and follow-up with your PCP within 2-4 weeks after discharge. Please continue with finasteride and flomax for BPH and follow-up with your urologist at next appointment.

## 2017-02-15 NOTE — DISCHARGE NOTE ADULT - HOSPITAL COURSE
84M PMH CAD w/ stents x2 (circumflex and RCA), HTN, DM2, HLD, prostate CA s/p TURP/radiation p/w unresponsiveness as per pt's daughter. Patient is poor historian and deferred questions to daughter Shavonne and wife at bedside. Daughter found patient unresponsive this AM laying in bed. Fingerstick was checked showing 20 s/p apple juice with minimal improvement prompting ED visit. As per daughter, patient has been having labile FS during the past week as his diabetes medication regimen has been changed recently by endocrinologist (Dr. Soila Thornton). Patient was previously on Januvia 50mg daily and levemir 20U in AM/20U in PM. Currently on Humalog 75/25 mix with 20U/30U and glimiperide. No fevers/chills/nausea/vomiting/sick contacts/lightheadedness. Reported decrease in PO intake over the past week.     In ED:   T 98.4 HR 78 /80 RR 16 Sa 100 RA  Cr 1.57 (baseline ~1.4)   <-- 144 <-- 90   lactate 2.3    bolus    Hospital course: Patient was admitted to general medicine floors for further management. Hypoglycemia improved on admission requiring 6U on insulin sliding scale with FS ranging from 100-240s. Patient's endocrinologist Dr. Thornton was contacted to update on hospitalization. 84M PMH CAD w/ stents x2 (circumflex and RCA), HTN, DM2, HLD, prostate CA s/p TURP/radiation p/w unresponsiveness as per pt's daughter. Patient is poor historian and deferred questions to daughter Shavonne and wife at bedside. Daughter found patient unresponsive this AM laying in bed. Fingerstick was checked showing 20 s/p apple juice with minimal improvement prompting ED visit. As per daughter, patient has been having labile FS during the past week as his diabetes medication regimen has been changed recently by endocrinologist (Dr. Soila Thornton). Patient was previously on Januvia 50mg daily and levemir 20U in AM/20U in PM. Currently on Humalog 75/25 mix with 20U/30U and glimiperide. No fevers/chills/nausea/vomiting/sick contacts/lightheadedness. Reported decrease in PO intake over the past week. In ED,  <-- 144 <-- 90.    Hospital course: Patient was admitted to general medicine floors for further management. Hypoglycemia improved on admission requiring a total of 8U on insulin sliding scale with FS ranging from 70-280s. Lantus 10U was given at bedtime on 2/14/17 with AM FS 71. Patient's endocrinologist Dr. Thornton was contacted during hospitalization and provided updates. t endocrinologist aware we will be discharging patient on glimepiride 6mg in AM and 2mg PM and will be holding off on injectables given labile response to lantus 10U at bedtime on 2/14. Patient and daughter (Shavonne) instructed to follow-up with Dr. Thornton on 2/17/17 @ 8AM at [facility] and to call Dr. Thornton with FS > 180. Patient medically stable upon discharge.    Fingerstick log while inpatient (2017):    Feb 14 @ 12AM: 143  Feb 14 @ 5AM: 133  Feb 14 @ 7AM: 122  Feb 14 @ noon: 192  Feb 14 @ 4PM: 289  Feb 14 @ 9PM:146, 2/14 lantus 10U at bedtime  Feb 15 @ 7AM: 71  Feb 15 @ 11AM: 226

## 2017-02-15 NOTE — DISCHARGE NOTE ADULT - HOME CARE AGENCY
[facility] [phone].  Nurse to visit on the day after discharge.  Other appropriate services to be arranged thereafter.

## 2017-02-15 NOTE — DISCHARGE NOTE ADULT - MEDICATION SUMMARY - MEDICATIONS TO TAKE
I will START or STAY ON the medications listed below when I get home from the hospital:    finasteride 5 mg oral tablet  -- 1 tab(s) by mouth once a day  -- Indication: For PC (Prostate Cancer)    Aspir 81 oral delayed release tablet  -- 1 tab(s) by mouth once a day  -- Indication: For CAD (Coronary Artery Disease)    Flomax 0.4 mg oral capsule  -- 1 cap(s) by mouth once a day  -- Indication: For BPH    gabapentin 100 mg oral capsule  -- 1 cap(s) by mouth once a day  -- Indication: For DM neuropathy    glimepiride 2 mg oral tablet  -- take 3 tablets in the morning and 1 tablet in the evening   -- Indication: For DM2 (diabetes mellitus, type 2)    simvastatin 20 mg oral tablet  -- 1 tab(s) by mouth once a day (at bedtime)  -- Indication: For HLD    Plavix 75 mg oral tablet  -- 1 tab(s) by mouth once a day  -- Indication: For CAD (Coronary Artery Disease)    metoprolol tartrate 25 mg oral tablet  -- 1 tab(s) by mouth 2 times a day  -- Indication: For CAD (Coronary Artery Disease)    Norvasc 10 mg oral tablet  -- 1 tab(s) by mouth once a day  -- Indication: For HTN    cilostazol 100 mg oral tablet  -- 1 tab(s) by mouth 2 times a day  -- Indication: For vascular dz I will START or STAY ON the medications listed below when I get home from the hospital:    finasteride 5 mg oral tablet  -- 1 tab(s) by mouth once a day  -- Indication: For PC (Prostate Cancer)    Aspir 81 oral delayed release tablet  -- 1 tab(s) by mouth once a day  -- Indication: For CAD (Coronary Artery Disease)    Flomax 0.4 mg oral capsule  -- 1 cap(s) by mouth once a day  -- Indication: For BPH    gabapentin 100 mg oral capsule  -- 1 cap(s) by mouth once a day  -- Indication: For DM neuropathy    glimepiride 2 mg oral tablet  -- take 3 tablets in the morning and 1 tablet in the evening   -- Indication: For DM2 (diabetes mellitus, type 2)    simvastatin 20 mg oral tablet  -- 1 tab(s) by mouth once a day (at bedtime)  -- Indication: For HLD    Plavix 75 mg oral tablet  -- 1 tab(s) by mouth once a day  -- Indication: For CAD (Coronary Artery Disease)    metoprolol tartrate 25 mg oral tablet  -- 1 tab(s) by mouth 2 times a day  -- Indication: For HTN    Norvasc 10 mg oral tablet  -- 1 tab(s) by mouth once a day  -- Indication: For HTN    cilostazol 100 mg oral tablet  -- 1 tab(s) by mouth 2 times a day  -- Indication: For CAD (Coronary Artery Disease)

## 2017-02-15 NOTE — DISCHARGE NOTE ADULT - SECONDARY DIAGNOSIS.
DM2 (diabetes mellitus, type 2) CAD (Coronary Artery Disease) Essential hypertension Hypercholesteremia PC (Prostate Cancer)

## 2017-02-15 NOTE — DISCHARGE NOTE ADULT - PATIENT PORTAL LINK FT
“You can access the FollowHealth Patient Portal, offered by [facility], by registering with the following website: [email or web address]”

## 2017-02-16 VITALS
SYSTOLIC BLOOD PRESSURE: 146 MMHG | DIASTOLIC BLOOD PRESSURE: 79 MMHG | HEART RATE: 70 BPM | TEMPERATURE: 98 F | OXYGEN SATURATION: 99 % | RESPIRATION RATE: 18 BRPM

## 2017-02-16 PROCEDURE — 99239 HOSP IP/OBS DSCHRG MGMT >30: CPT

## 2017-02-16 RX ADMIN — Medication 50 MILLIGRAM(S): at 05:24

## 2017-02-16 RX ADMIN — HEPARIN SODIUM 5000 UNIT(S): 5000 INJECTION INTRAVENOUS; SUBCUTANEOUS at 05:24

## 2017-02-16 RX ADMIN — AMLODIPINE BESYLATE 10 MILLIGRAM(S): 2.5 TABLET ORAL at 05:24

## 2017-07-23 ENCOUNTER — EMERGENCY (EMERGENCY)
Facility: HOSPITAL | Age: 82
LOS: 1 days | Discharge: HOME | End: 2017-07-23
Attending: EMERGENCY MEDICINE | Admitting: EMERGENCY MEDICINE
Payer: MEDICARE

## 2017-07-23 VITALS
RESPIRATION RATE: 20 BRPM | HEART RATE: 96 BPM | SYSTOLIC BLOOD PRESSURE: 183 MMHG | OXYGEN SATURATION: 99 % | TEMPERATURE: 98 F | DIASTOLIC BLOOD PRESSURE: 106 MMHG

## 2017-07-23 VITALS
RESPIRATION RATE: 16 BRPM | DIASTOLIC BLOOD PRESSURE: 90 MMHG | HEART RATE: 80 BPM | OXYGEN SATURATION: 100 % | TEMPERATURE: 99 F | SYSTOLIC BLOOD PRESSURE: 158 MMHG

## 2017-07-23 DIAGNOSIS — Z98.89 OTHER SPECIFIED POSTPROCEDURAL STATES: Chronic | ICD-10-CM

## 2017-07-23 LAB
ALBUMIN SERPL ELPH-MCNC: 4.7 G/DL — SIGNIFICANT CHANGE UP (ref 3.3–5)
ALP SERPL-CCNC: 79 U/L — SIGNIFICANT CHANGE UP (ref 40–120)
ALT FLD-CCNC: 23 U/L — SIGNIFICANT CHANGE UP (ref 4–41)
APPEARANCE UR: SIGNIFICANT CHANGE UP
AST SERPL-CCNC: 28 U/L — SIGNIFICANT CHANGE UP (ref 4–40)
BACTERIA # UR AUTO: SIGNIFICANT CHANGE UP
BASE EXCESS BLDV CALC-SCNC: 0 MMOL/L — SIGNIFICANT CHANGE UP
BASOPHILS # BLD AUTO: 0.02 K/UL — SIGNIFICANT CHANGE UP (ref 0–0.2)
BASOPHILS NFR BLD AUTO: 0.5 % — SIGNIFICANT CHANGE UP (ref 0–2)
BILIRUB SERPL-MCNC: 0.7 MG/DL — SIGNIFICANT CHANGE UP (ref 0.2–1.2)
BILIRUB UR-MCNC: NEGATIVE — SIGNIFICANT CHANGE UP
BLOOD GAS VENOUS - CREATININE: 1.98 MG/DL — HIGH (ref 0.5–1.3)
BLOOD UR QL VISUAL: HIGH
BUN SERPL-MCNC: 24 MG/DL — HIGH (ref 7–23)
CALCIUM SERPL-MCNC: 9.8 MG/DL — SIGNIFICANT CHANGE UP (ref 8.4–10.5)
CHLORIDE BLDV-SCNC: 107 MMOL/L — SIGNIFICANT CHANGE UP (ref 96–108)
CHLORIDE SERPL-SCNC: 100 MMOL/L — SIGNIFICANT CHANGE UP (ref 98–107)
CO2 SERPL-SCNC: 22 MMOL/L — SIGNIFICANT CHANGE UP (ref 22–31)
COLOR SPEC: YELLOW — SIGNIFICANT CHANGE UP
CREAT SERPL-MCNC: 1.96 MG/DL — HIGH (ref 0.5–1.3)
EOSINOPHIL # BLD AUTO: 0.05 K/UL — SIGNIFICANT CHANGE UP (ref 0–0.5)
EOSINOPHIL NFR BLD AUTO: 1.1 % — SIGNIFICANT CHANGE UP (ref 0–6)
GAS PNL BLDV: 140 MMOL/L — SIGNIFICANT CHANGE UP (ref 136–146)
GLUCOSE BLDV-MCNC: 180 — HIGH (ref 70–99)
GLUCOSE SERPL-MCNC: 172 MG/DL — HIGH (ref 70–99)
GLUCOSE UR-MCNC: 50 — SIGNIFICANT CHANGE UP
HCO3 BLDV-SCNC: 22 MMOL/L — SIGNIFICANT CHANGE UP (ref 20–27)
HCT VFR BLD CALC: 43.8 % — SIGNIFICANT CHANGE UP (ref 39–50)
HCT VFR BLDV CALC: 46.5 % — SIGNIFICANT CHANGE UP (ref 39–51)
HGB BLD-MCNC: 15 G/DL — SIGNIFICANT CHANGE UP (ref 13–17)
HGB BLDV-MCNC: 15.2 G/DL — SIGNIFICANT CHANGE UP (ref 13–17)
IMM GRANULOCYTES # BLD AUTO: 0.01 # — SIGNIFICANT CHANGE UP
IMM GRANULOCYTES NFR BLD AUTO: 0.2 % — SIGNIFICANT CHANGE UP (ref 0–1.5)
KETONES UR-MCNC: NEGATIVE — SIGNIFICANT CHANGE UP
LACTATE BLDV-MCNC: 3.3 MMOL/L — HIGH (ref 0.5–2)
LEUKOCYTE ESTERASE UR-ACNC: HIGH
LYMPHOCYTES # BLD AUTO: 0.68 K/UL — LOW (ref 1–3.3)
LYMPHOCYTES # BLD AUTO: 15.6 % — SIGNIFICANT CHANGE UP (ref 13–44)
MCHC RBC-ENTMCNC: 31.3 PG — SIGNIFICANT CHANGE UP (ref 27–34)
MCHC RBC-ENTMCNC: 34.2 % — SIGNIFICANT CHANGE UP (ref 32–36)
MCV RBC AUTO: 91.4 FL — SIGNIFICANT CHANGE UP (ref 80–100)
MONOCYTES # BLD AUTO: 0.26 K/UL — SIGNIFICANT CHANGE UP (ref 0–0.9)
MONOCYTES NFR BLD AUTO: 5.9 % — SIGNIFICANT CHANGE UP (ref 2–14)
MUCOUS THREADS # UR AUTO: SIGNIFICANT CHANGE UP
NEUTROPHILS # BLD AUTO: 3.35 K/UL — SIGNIFICANT CHANGE UP (ref 1.8–7.4)
NEUTROPHILS NFR BLD AUTO: 76.7 % — SIGNIFICANT CHANGE UP (ref 43–77)
NITRITE UR-MCNC: NEGATIVE — SIGNIFICANT CHANGE UP
NRBC # FLD: 0 — SIGNIFICANT CHANGE UP
PCO2 BLDV: 49 MMHG — SIGNIFICANT CHANGE UP (ref 41–51)
PH BLDV: 7.33 PH — SIGNIFICANT CHANGE UP (ref 7.32–7.43)
PH UR: 6.5 — SIGNIFICANT CHANGE UP (ref 4.6–8)
PLATELET # BLD AUTO: 169 K/UL — SIGNIFICANT CHANGE UP (ref 150–400)
PMV BLD: 9.3 FL — SIGNIFICANT CHANGE UP (ref 7–13)
PO2 BLDV: 24 MMHG — LOW (ref 35–40)
POTASSIUM BLDV-SCNC: 4.8 MMOL/L — HIGH (ref 3.4–4.5)
POTASSIUM SERPL-MCNC: 3.8 MMOL/L — SIGNIFICANT CHANGE UP (ref 3.5–5.3)
POTASSIUM SERPL-SCNC: 3.8 MMOL/L — SIGNIFICANT CHANGE UP (ref 3.5–5.3)
PROT SERPL-MCNC: 8.4 G/DL — HIGH (ref 6–8.3)
PROT UR-MCNC: 100 — SIGNIFICANT CHANGE UP
RBC # BLD: 4.79 M/UL — SIGNIFICANT CHANGE UP (ref 4.2–5.8)
RBC # FLD: 13 % — SIGNIFICANT CHANGE UP (ref 10.3–14.5)
RBC CASTS # UR COMP ASSIST: SIGNIFICANT CHANGE UP (ref 0–?)
SAO2 % BLDV: 33.9 % — LOW (ref 60–85)
SODIUM SERPL-SCNC: 140 MMOL/L — SIGNIFICANT CHANGE UP (ref 135–145)
SP GR SPEC: 1.01 — SIGNIFICANT CHANGE UP (ref 1–1.03)
UROBILINOGEN FLD QL: NORMAL E.U. — SIGNIFICANT CHANGE UP (ref 0.1–0.2)
WBC # BLD: 4.37 K/UL — SIGNIFICANT CHANGE UP (ref 3.8–10.5)
WBC # FLD AUTO: 4.37 K/UL — SIGNIFICANT CHANGE UP (ref 3.8–10.5)
WBC UR QL: >50 — HIGH (ref 0–?)

## 2017-07-23 PROCEDURE — 93010 ELECTROCARDIOGRAM REPORT: CPT | Mod: 59

## 2017-07-23 PROCEDURE — 71020: CPT | Mod: 26

## 2017-07-23 PROCEDURE — 99285 EMERGENCY DEPT VISIT HI MDM: CPT | Mod: 25,GC

## 2017-07-23 RX ORDER — CEPHALEXIN 500 MG
500 CAPSULE ORAL ONCE
Qty: 0 | Refills: 0 | Status: COMPLETED | OUTPATIENT
Start: 2017-07-23 | End: 2017-07-23

## 2017-07-23 RX ORDER — CEPHALEXIN 500 MG
1 CAPSULE ORAL
Qty: 14 | Refills: 0
Start: 2017-07-23 | End: 2017-07-30

## 2017-07-23 RX ORDER — SODIUM CHLORIDE 9 MG/ML
1000 INJECTION INTRAMUSCULAR; INTRAVENOUS; SUBCUTANEOUS ONCE
Qty: 0 | Refills: 0 | Status: COMPLETED | OUTPATIENT
Start: 2017-07-23 | End: 2017-07-23

## 2017-07-23 RX ADMIN — Medication 500 MILLIGRAM(S): at 22:56

## 2017-07-23 RX ADMIN — SODIUM CHLORIDE 1000 MILLILITER(S): 9 INJECTION INTRAMUSCULAR; INTRAVENOUS; SUBCUTANEOUS at 19:49

## 2017-07-23 NOTE — ED PROVIDER NOTE - CARE PLAN
Principal Discharge DX:	Hypoglycemia  Instructions for follow-up, activity and diet:	1) Follow up with your doctor in 1-2 days  2) Return to the ER for worsening or concerning symptoms, fevers, chills, low blood sugar, altered mental status or other concerns  3) Take keflex twice a day for 7 days  Secondary Diagnosis:	UTI (urinary tract infection)

## 2017-07-23 NOTE — ED ADULT TRIAGE NOTE - CHIEF COMPLAINT QUOTE
patient BIBEMS for AMS. as per daughter LNW was 230 PM. patient was found by EMS laying next to his bed with an AVPU of P. Unknown how long pt was down. no s/s of injury.  BGL from EMS was 64, was given one ampule of D50% with complete resolve of mental status. at triage pt is alert x 4, no arm drift no facial droop no slurred speech and no  weakness.  at triage.

## 2017-07-23 NOTE — ED PROVIDER NOTE - PHYSICAL EXAMINATION
VS:  unremarkable except hypertensive    GEN - NAD; well appearing; A+O x3   HEAD - NC/AT     ENT - PEERL, EOMI, mucous membranes dry , no discharge      NECK: Neck supple, non-tender without lymphadenopathy, no masses, no JVD. No C-spine ttp.  PULM - CTA b/l,  symmetric breath sounds  COR -  normal heart sounds    ABD - , ND, NT, soft, no guarding, no rebound, no masses    BACK - no CVA tenderness, nontender spine     EXTREMS - no edema, no deformity, warm and well perfused    SKIN - no rash or bruising      NEUROLOGIC - alert, CN 2-12 intact, sensation nl, motor 5/5 RUE/LUE/RLE/LLE.      IMP:  84M p/w hypoglycemia, likely as a result of sporadic PO intake while taking sulfonylurea and insulin.  Will check labs, urine, EKG, CXR to eval for occult causes, check labs to eval for renal dysfunction.  Rx IVF given dry and PO food.  If no abnormality found, would stop insulin for now, follow up with PMD.

## 2017-07-23 NOTE — ED ADULT NURSE NOTE - OBJECTIVE STATEMENT
BIBEMS due to AMS at home. DM type 2, does not remember eating after breakfast, last seen normal at 230pm by daughter. Went downstairs @630 to check on him and he was on the floor, responsive but speaking in word salad, no signs of injury noted, PERRLA. At present, patient alert/oriented x4, hungry, speaking clearly in full sentences. Denies all pain. Has no memory of the fall. Waiting to be seen by provider. 18g peripheral IV placed to R AC, 20g to L AC placed by EMS. Safety maintained, needs attended, will continue to monitor.

## 2017-07-23 NOTE — ED PROVIDER NOTE - MEDICAL DECISION MAKING DETAILS
83yo male with h/o DM with AMS in setting of hypoglycemia and decreased PO today, no signs of infection. will check labs, r/o UA, pna (though no resp or gu complaints), feed and dc with endo follow up

## 2017-07-23 NOTE — ED PROVIDER NOTE - ATTENDING CONTRIBUTION TO CARE
84M p/w found on the ground, incoherent at about 530pm and EMS was called, found to have FS of 63, given D50 and incoherence completely resolved by the time he arrived to ED.  USOH last 1-2 days, no recent medication changes.  Reports ate breakfast.  Denies dysphagia, reports hunger.  Pt has been on 15U humalog every AM 75/25.  Similar episode earlier this year following a medication change.  DA reports he eats sporadically, only ate breakfast.  Still takes glimepiride as well.  Currently no complaints  VS:  unremarkable except hypertensive    GEN - NAD; well appearing; A+O x3   HEAD - NC/AT     ENT - PEERL, EOMI, mucous membranes dry , no discharge      NECK: Neck supple, non-tender without lymphadenopathy, no masses, no JVD. No C-spine ttp.  PULM - CTA b/l,  symmetric breath sounds  COR -  normal heart sounds    ABD - , ND, NT, soft, no guarding, no rebound, no masses    BACK - no CVA tenderness, nontender spine     EXTREMS - no edema, no deformity, warm and well perfused    SKIN - no rash or bruising      NEUROLOGIC - alert, CN 2-12 intact, sensation nl, motor 5/5 RUE/LUE/RLE/LLE.      IMP:  84M p/w hypoglycemia, likely as a result of sporadic PO intake while taking sulfonylurea and insulin.  Will check labs, urine, EKG, CXR to eval for occult causes, check labs to eval for renal dysfunction.  Rx IVF given dry and PO food.  If no abnormality found, would stop insulin for now, follow up with PMD.

## 2017-07-23 NOTE — ED PROVIDER NOTE - PROGRESS NOTE DETAILS
Zeb: Pt still at baseline mental status, eating, FS 170s, +UA, will dc with 1 week keflex and pmd follow up

## 2017-07-23 NOTE — ED PROVIDER NOTE - PLAN OF CARE
1) Follow up with your doctor in 1-2 days  2) Return to the ER for worsening or concerning symptoms, fevers, chills, low blood sugar, altered mental status or other concerns  3) Take keflex twice a day for 7 days

## 2017-07-23 NOTE — ED PROVIDER NOTE - OBJECTIVE STATEMENT
84M p/w found on the ground, incoherent at about 530pm and EMS was called, found to have FS of 63, given D50 and incoherence completely resolved by the time he arrived to ED.  USOH last 1-2 days, no recent medication changes.  Reports ate breakfast.  Denies dysphagia, reports hunger.  Pt has been on 15U humalog every AM 75/25.  Similar episode earlier this year following a medication change.  DA reports he eats sporadically, only ate breakfast.  Still takes glimepiride as well.  Currently no complaints

## 2017-07-25 LAB
BACTERIA UR CULT: SIGNIFICANT CHANGE UP
SPECIMEN SOURCE: SIGNIFICANT CHANGE UP

## 2017-10-14 ENCOUNTER — EMERGENCY (EMERGENCY)
Facility: HOSPITAL | Age: 82
LOS: 1 days | Discharge: HOME | End: 2017-10-14
Attending: EMERGENCY MEDICINE | Admitting: EMERGENCY MEDICINE
Payer: MEDICARE

## 2017-10-14 VITALS
DIASTOLIC BLOOD PRESSURE: 84 MMHG | SYSTOLIC BLOOD PRESSURE: 141 MMHG | RESPIRATION RATE: 18 BRPM | TEMPERATURE: 98 F | HEART RATE: 88 BPM | OXYGEN SATURATION: 100 %

## 2017-10-14 DIAGNOSIS — Z98.89 OTHER SPECIFIED POSTPROCEDURAL STATES: Chronic | ICD-10-CM

## 2017-10-14 LAB
ALBUMIN SERPL ELPH-MCNC: 4.3 G/DL — SIGNIFICANT CHANGE UP (ref 3.3–5)
ALP SERPL-CCNC: 70 U/L — SIGNIFICANT CHANGE UP (ref 40–120)
ALT FLD-CCNC: 17 U/L — SIGNIFICANT CHANGE UP (ref 4–41)
AST SERPL-CCNC: 22 U/L — SIGNIFICANT CHANGE UP (ref 4–40)
BASOPHILS # BLD AUTO: 0.02 K/UL — SIGNIFICANT CHANGE UP (ref 0–0.2)
BASOPHILS NFR BLD AUTO: 0.4 % — SIGNIFICANT CHANGE UP (ref 0–2)
BILIRUB SERPL-MCNC: 0.7 MG/DL — SIGNIFICANT CHANGE UP (ref 0.2–1.2)
BUN SERPL-MCNC: 28 MG/DL — HIGH (ref 7–23)
CALCIUM SERPL-MCNC: 9.1 MG/DL — SIGNIFICANT CHANGE UP (ref 8.4–10.5)
CHLORIDE SERPL-SCNC: 108 MMOL/L — HIGH (ref 98–107)
CK MB BLD-MCNC: 3.24 NG/ML — SIGNIFICANT CHANGE UP (ref 1–6.6)
CK MB BLD-MCNC: SIGNIFICANT CHANGE UP (ref 0–2.5)
CK SERPL-CCNC: 99 U/L — SIGNIFICANT CHANGE UP (ref 30–200)
CO2 SERPL-SCNC: 24 MMOL/L — SIGNIFICANT CHANGE UP (ref 22–31)
CREAT SERPL-MCNC: 1.92 MG/DL — HIGH (ref 0.5–1.3)
EOSINOPHIL # BLD AUTO: 0.03 K/UL — SIGNIFICANT CHANGE UP (ref 0–0.5)
EOSINOPHIL NFR BLD AUTO: 0.6 % — SIGNIFICANT CHANGE UP (ref 0–6)
GLUCOSE SERPL-MCNC: 98 MG/DL — SIGNIFICANT CHANGE UP (ref 70–99)
HCT VFR BLD CALC: 39.5 % — SIGNIFICANT CHANGE UP (ref 39–50)
HGB BLD-MCNC: 13.8 G/DL — SIGNIFICANT CHANGE UP (ref 13–17)
IMM GRANULOCYTES # BLD AUTO: 0.01 # — SIGNIFICANT CHANGE UP
IMM GRANULOCYTES NFR BLD AUTO: 0.2 % — SIGNIFICANT CHANGE UP (ref 0–1.5)
LYMPHOCYTES # BLD AUTO: 0.57 K/UL — LOW (ref 1–3.3)
LYMPHOCYTES # BLD AUTO: 12.2 % — LOW (ref 13–44)
MCHC RBC-ENTMCNC: 31.9 PG — SIGNIFICANT CHANGE UP (ref 27–34)
MCHC RBC-ENTMCNC: 34.9 % — SIGNIFICANT CHANGE UP (ref 32–36)
MCV RBC AUTO: 91.4 FL — SIGNIFICANT CHANGE UP (ref 80–100)
MONOCYTES # BLD AUTO: 0.28 K/UL — SIGNIFICANT CHANGE UP (ref 0–0.9)
MONOCYTES NFR BLD AUTO: 6 % — SIGNIFICANT CHANGE UP (ref 2–14)
NEUTROPHILS # BLD AUTO: 3.75 K/UL — SIGNIFICANT CHANGE UP (ref 1.8–7.4)
NEUTROPHILS NFR BLD AUTO: 80.6 % — HIGH (ref 43–77)
NRBC # FLD: 0 — SIGNIFICANT CHANGE UP
PLATELET # BLD AUTO: 197 K/UL — SIGNIFICANT CHANGE UP (ref 150–400)
PMV BLD: 9.6 FL — SIGNIFICANT CHANGE UP (ref 7–13)
POTASSIUM SERPL-MCNC: 4.3 MMOL/L — SIGNIFICANT CHANGE UP (ref 3.5–5.3)
POTASSIUM SERPL-SCNC: 4.3 MMOL/L — SIGNIFICANT CHANGE UP (ref 3.5–5.3)
PROT SERPL-MCNC: 7.5 G/DL — SIGNIFICANT CHANGE UP (ref 6–8.3)
RBC # BLD: 4.32 M/UL — SIGNIFICANT CHANGE UP (ref 4.2–5.8)
RBC # FLD: 12.9 % — SIGNIFICANT CHANGE UP (ref 10.3–14.5)
SODIUM SERPL-SCNC: 147 MMOL/L — HIGH (ref 135–145)
TROPONIN T SERPL-MCNC: < 0.06 NG/ML — SIGNIFICANT CHANGE UP (ref 0–0.06)
WBC # BLD: 4.66 K/UL — SIGNIFICANT CHANGE UP (ref 3.8–10.5)
WBC # FLD AUTO: 4.66 K/UL — SIGNIFICANT CHANGE UP (ref 3.8–10.5)

## 2017-10-14 PROCEDURE — 99220: CPT | Mod: 25,GC

## 2017-10-14 RX ORDER — SODIUM CHLORIDE 9 MG/ML
1000 INJECTION, SOLUTION INTRAVENOUS
Qty: 0 | Refills: 0 | Status: DISCONTINUED | OUTPATIENT
Start: 2017-10-14 | End: 2017-10-18

## 2017-10-14 RX ORDER — DEXTROSE 50 % IN WATER 50 %
12.5 SYRINGE (ML) INTRAVENOUS ONCE
Qty: 0 | Refills: 0 | Status: DISCONTINUED | OUTPATIENT
Start: 2017-10-14 | End: 2017-10-18

## 2017-10-14 RX ORDER — DEXTROSE 50 % IN WATER 50 %
1 SYRINGE (ML) INTRAVENOUS ONCE
Qty: 0 | Refills: 0 | Status: DISCONTINUED | OUTPATIENT
Start: 2017-10-14 | End: 2017-10-18

## 2017-10-14 RX ORDER — GLUCAGON INJECTION, SOLUTION 0.5 MG/.1ML
1 INJECTION, SOLUTION SUBCUTANEOUS ONCE
Qty: 0 | Refills: 0 | Status: DISCONTINUED | OUTPATIENT
Start: 2017-10-14 | End: 2017-10-18

## 2017-10-14 RX ORDER — DEXTROSE 50 % IN WATER 50 %
25 SYRINGE (ML) INTRAVENOUS ONCE
Qty: 0 | Refills: 0 | Status: DISCONTINUED | OUTPATIENT
Start: 2017-10-14 | End: 2017-10-18

## 2017-10-14 NOTE — ED ADULT NURSE NOTE - OBJECTIVE STATEMENT
Received patient to room 3 alert and oriented with family at bedside. Pt has a 20 gauge IVL in place to left AV and was given D50 by EMS for BG of 20. Pt evaluated by MD. Labs drawn and sent and patient is waiting further orders, results and disposition.   JULIANNA Tenorio

## 2017-10-14 NOTE — ED ADULT TRIAGE NOTE - CHIEF COMPLAINT QUOTE
Pt found unresponsive by wife. Called EMS and FS 21. Pt given 1 amp d50 by EMS and repeat fs 184. Wife states that this happens often because pt takes insulin and then does not eat. . Pt A+Ox3 at this time.

## 2017-10-14 NOTE — ED PROVIDER NOTE - MEDICAL DECISION MAKING DETAILS
85M with episode of hypoglycemia, on sulfonylureas, now at baseline. check cbc, cmp, ekg. PO intake.

## 2017-10-14 NOTE — ED PROVIDER NOTE - OBJECTIVE STATEMENT
85M with hx of HTN and DM (on insulin and sulfonylurea) presents with hypoglycemia. patient  ws found on the floor at home by his wife, diaphoretic and unresponsive. EMS called, FS- 21. patient given an amp of D50 and became more responsive. rpt . Here, . Patient states he is feeling well. Felt fine this AM and took his morning meds. Denies any CP, SOB,.

## 2017-10-14 NOTE — ED PROVIDER NOTE - ATTENDING CONTRIBUTION TO CARE
Attending Note (Jason): patient brought in from home for episoide of unresponsiveness. hypoglycemia in field by ems, given glucose and patient's mental status improved. is on long acting medications.  asymptomatic at present. abd soft non tender. will check labs, likely cdu for serial FS given long acting medication.

## 2017-10-15 VITALS
RESPIRATION RATE: 16 BRPM | SYSTOLIC BLOOD PRESSURE: 118 MMHG | TEMPERATURE: 99 F | OXYGEN SATURATION: 97 % | HEART RATE: 64 BPM | DIASTOLIC BLOOD PRESSURE: 70 MMHG

## 2017-10-15 DIAGNOSIS — E11.649 TYPE 2 DIABETES MELLITUS WITH HYPOGLYCEMIA WITHOUT COMA: ICD-10-CM

## 2017-10-15 DIAGNOSIS — I10 ESSENTIAL (PRIMARY) HYPERTENSION: ICD-10-CM

## 2017-10-15 DIAGNOSIS — E78.00 PURE HYPERCHOLESTEROLEMIA, UNSPECIFIED: ICD-10-CM

## 2017-10-15 LAB
APPEARANCE UR: SIGNIFICANT CHANGE UP
BACTERIA # UR AUTO: HIGH
BILIRUB UR-MCNC: NEGATIVE — SIGNIFICANT CHANGE UP
BLOOD UR QL VISUAL: HIGH
CK MB BLD-MCNC: 3.27 NG/ML — SIGNIFICANT CHANGE UP (ref 1–6.6)
CK SERPL-CCNC: 162 U/L — SIGNIFICANT CHANGE UP (ref 30–200)
COLOR SPEC: YELLOW — SIGNIFICANT CHANGE UP
GLUCOSE UR-MCNC: 50 — SIGNIFICANT CHANGE UP
HBA1C BLD-MCNC: 8.9 % — HIGH (ref 4–5.6)
HYALINE CASTS # UR AUTO: SIGNIFICANT CHANGE UP (ref 0–?)
KETONES UR-MCNC: NEGATIVE — SIGNIFICANT CHANGE UP
LEUKOCYTE ESTERASE UR-ACNC: HIGH
MUCOUS THREADS # UR AUTO: SIGNIFICANT CHANGE UP
NITRITE UR-MCNC: NEGATIVE — SIGNIFICANT CHANGE UP
PH UR: 7 — SIGNIFICANT CHANGE UP (ref 4.6–8)
PROT UR-MCNC: 30 — SIGNIFICANT CHANGE UP
RBC CASTS # UR COMP ASSIST: SIGNIFICANT CHANGE UP (ref 0–?)
SP GR SPEC: 1.02 — SIGNIFICANT CHANGE UP (ref 1–1.03)
SQUAMOUS # UR AUTO: SIGNIFICANT CHANGE UP
TROPONIN T SERPL-MCNC: < 0.06 NG/ML — SIGNIFICANT CHANGE UP (ref 0–0.06)
UROBILINOGEN FLD QL: NORMAL E.U. — SIGNIFICANT CHANGE UP (ref 0.1–0.2)
WBC UR QL: >50 — HIGH (ref 0–?)

## 2017-10-15 PROCEDURE — 70450 CT HEAD/BRAIN W/O DYE: CPT | Mod: 26

## 2017-10-15 PROCEDURE — 93010 ELECTROCARDIOGRAM REPORT: CPT | Mod: 59

## 2017-10-15 PROCEDURE — 99284 EMERGENCY DEPT VISIT MOD MDM: CPT

## 2017-10-15 PROCEDURE — 99217: CPT | Mod: GC

## 2017-10-15 RX ORDER — CEFTRIAXONE 500 MG/1
INJECTION, POWDER, FOR SOLUTION INTRAMUSCULAR; INTRAVENOUS
Qty: 0 | Refills: 0 | Status: DISCONTINUED | OUTPATIENT
Start: 2017-10-15 | End: 2017-10-18

## 2017-10-15 RX ORDER — REPAGLINIDE 1 MG/1
1 TABLET ORAL
Qty: 84 | Refills: 0
Start: 2017-10-15 | End: 2017-11-12

## 2017-10-15 RX ORDER — METOPROLOL TARTRATE 50 MG
25 TABLET ORAL
Qty: 0 | Refills: 0 | Status: DISCONTINUED | OUTPATIENT
Start: 2017-10-15 | End: 2017-10-18

## 2017-10-15 RX ORDER — ENOXAPARIN SODIUM 100 MG/ML
10 INJECTION SUBCUTANEOUS
Qty: 3 | Refills: 0
Start: 2017-10-15

## 2017-10-15 RX ORDER — FINASTERIDE 5 MG/1
5 TABLET, FILM COATED ORAL DAILY
Qty: 0 | Refills: 0 | Status: DISCONTINUED | OUTPATIENT
Start: 2017-10-15 | End: 2017-10-18

## 2017-10-15 RX ORDER — AMLODIPINE BESYLATE 2.5 MG/1
10 TABLET ORAL DAILY
Qty: 0 | Refills: 0 | Status: DISCONTINUED | OUTPATIENT
Start: 2017-10-15 | End: 2017-10-18

## 2017-10-15 RX ORDER — GLUCAGON INJECTION, SOLUTION 0.5 MG/.1ML
1 INJECTION, SOLUTION SUBCUTANEOUS
Qty: 1 | Refills: 0
Start: 2017-10-15

## 2017-10-15 RX ORDER — CILOSTAZOL 100 MG/1
100 TABLET ORAL
Qty: 0 | Refills: 0 | Status: DISCONTINUED | OUTPATIENT
Start: 2017-10-15 | End: 2017-10-18

## 2017-10-15 RX ORDER — CEFTRIAXONE 500 MG/1
1 INJECTION, POWDER, FOR SOLUTION INTRAMUSCULAR; INTRAVENOUS ONCE
Qty: 0 | Refills: 0 | Status: COMPLETED | OUTPATIENT
Start: 2017-10-15 | End: 2017-10-15

## 2017-10-15 RX ORDER — CEFTRIAXONE 500 MG/1
1 INJECTION, POWDER, FOR SOLUTION INTRAMUSCULAR; INTRAVENOUS EVERY 24 HOURS
Qty: 0 | Refills: 0 | Status: DISCONTINUED | OUTPATIENT
Start: 2017-10-16 | End: 2017-10-18

## 2017-10-15 RX ORDER — CLOPIDOGREL BISULFATE 75 MG/1
75 TABLET, FILM COATED ORAL DAILY
Qty: 0 | Refills: 0 | Status: DISCONTINUED | OUTPATIENT
Start: 2017-10-15 | End: 2017-10-18

## 2017-10-15 RX ORDER — DEXTROSE 50 % IN WATER 50 %
25 SYRINGE (ML) INTRAVENOUS ONCE
Qty: 0 | Refills: 0 | Status: COMPLETED | OUTPATIENT
Start: 2017-10-15 | End: 2017-10-15

## 2017-10-15 RX ADMIN — Medication 25 MILLIGRAM(S): at 11:02

## 2017-10-15 RX ADMIN — CEFTRIAXONE 100 GRAM(S): 500 INJECTION, POWDER, FOR SOLUTION INTRAMUSCULAR; INTRAVENOUS at 11:01

## 2017-10-15 RX ADMIN — CLOPIDOGREL BISULFATE 75 MILLIGRAM(S): 75 TABLET, FILM COATED ORAL at 13:59

## 2017-10-15 RX ADMIN — AMLODIPINE BESYLATE 10 MILLIGRAM(S): 2.5 TABLET ORAL at 11:02

## 2017-10-15 RX ADMIN — Medication 25 MILLILITER(S): at 04:30

## 2017-10-15 RX ADMIN — FINASTERIDE 5 MILLIGRAM(S): 5 TABLET, FILM COATED ORAL at 13:59

## 2017-10-15 RX ADMIN — CILOSTAZOL 100 MILLIGRAM(S): 100 TABLET ORAL at 11:02

## 2017-10-15 NOTE — ED CDU PROVIDER NOTE - PLAN OF CARE
Follow up with your endocrinologist or you can follow up in the endocrine clinic [phone]. take lantus 10 units at bedtime, stop the glymiperide/amaryl and take prandin 1mg 3 times a day before a meal but only if you eat

## 2017-10-15 NOTE — ED CDU PROVIDER NOTE - MEDICAL DECISION MAKING DETAILS
Pt is an 84 y/o M nonsmoker PMhx HTN, DM, CAD s/p stents x 4, prostate ca (s/p TURP; no active ca) p/w hypoglycemia yesterday -- hypoglycemia 2/2 sulfonylurea, long acting insulin -- serial fingersticks, rpt cardiac enzyme

## 2017-10-15 NOTE — ED CDU PROVIDER NOTE - CHPI ED SYMPTOMS NEG
no vomiting/no chills/no decreased eating/drinking/no fever/no pain/no dizziness/no headache/no nausea/no back pain

## 2017-10-15 NOTE — ED CDU PROVIDER NOTE - OBJECTIVE STATEMENT
Pt is an 84 y/o M nonsmoker PMhx HTN, DM, CAD s/p stents x 4, prostate ca (s/p TURP; no active ca) p/w hypoglycemia yesterday.  Pt states yesterday at 2 PM his wife found him on the floor unresponsive.  Pt found pt to have FS of 21; pt was given amp of D50 after which pt became responsive and FS improved to 184.  Pt states he took is usual dose of basal insulin and glimepride in the morning, had breakfast, but pt did not have lunch.  Pt states he is not sure if he hit his head.  Pt denies any fevers, chills, nausea, vomiting, chest pain, SOB, palpitations, neck pain, back pain, chest pain, hip pain, difficulty walking, headache, dizziness.  Pt sent to CDU for serial fingersticks, observation, rpt cardiac enzyme.

## 2017-10-15 NOTE — CONSULT NOTE ADULT - SUBJECTIVE AND OBJECTIVE BOX
HPI: 85 y.o. male with h/o Type 2 DM, HTN, hyperlipidemia, CAD s/p stent X 4 , prostate ca s/p TURP presents to ER yesterday after being found by family unresponsive. Patient reports checks FS every morning and for the past week blood glucose levels have been low. FS have been 77 to 101. Does report hypoglycemia in the past. Patent states that he does skip meals sometimes. Yesterday he was in bed and found unresponsive at about 2 pm. Blood glucose was 21 and treated with 1 amp of D50.     Endocrine history:    Diagnosed with Type 2 DM about 15 to 20 years ago. Not aware of complications but does follow with optho and podiatry. At home patient takes ? Lantus 15 units every morning and glimepiride 2 mg 3 tabs in the am and 1 tab in the pm. Denies any complaints today. Reports feeling hungry. No polydipsia. Does get polyuria but attributes it to his prostate disease. No SOB or CP.       PAST MEDICAL & SURGICAL HISTORY:  History of PTCA: 7/09, stents to Cx and RCA  Old MI (Myocardial Infarction): 7/09  CAD (Coronary Artery Disease)  PC (Prostate Cancer): treated surgically  Hypercholesteremia  Hypertension  DM Type 2   S/P TURP (status post transurethral resection of prostate)  CAD (Coronary Artery Disease)  Inferior MI      FAMILY HISTORY:  Not aware of mother and father's medical history  Daughter has Type 2 DM      Social History:  No smoking  Admits to social ETOH use    Outpatient Medications:  glimepiride 2mg 3 tabs in the am and 1 tab in the pm  ? Lantus 15 units in the am  Norvasc  Metoprolol  Cilostazol  Simvastatin  Flomax  Plavix  gabapentin  finasteride      MEDICATIONS  (STANDING):  amLODIPine   Tablet 10 milliGRAM(s) Oral daily  cefTRIAXone   IVPB      cilostazol 100 milliGRAM(s) Oral two times a day  clopidogrel Tablet 75 milliGRAM(s) Oral daily  dextrose 5%. 1000 milliLiter(s) (50 mL/Hr) IV Continuous <Continuous>  dextrose 50% Injectable 12.5 Gram(s) IV Push once  dextrose 50% Injectable 25 Gram(s) IV Push once  dextrose 50% Injectable 25 Gram(s) IV Push once  finasteride 5 milliGRAM(s) Oral daily  metoprolol 25 milliGRAM(s) Oral two times a day    MEDICATIONS  (PRN):  dextrose Gel 1 Dose(s) Oral once PRN Blood Glucose LESS THAN 70 milliGRAM(s)/deciliter  glucagon  Injectable 1 milliGRAM(s) IntraMuscular once PRN Glucose LESS THAN 70 milligrams/deciliter      Allergies    No Known Allergies    Intolerances      Review of Systems:  Constitutional: No fever  Eyes: No blurry vision  Neuro: No tremors  HEENT: No pain  Cardiovascular: No chest pain, palpitations  Respiratory: No SOB, no cough  GI: No nausea, vomiting, abdominal pain  : No dysuria  Skin: no rash  Psych: no depression  Endocrine: some polyuria and no polydipsia  Hem/lymph: no swelling    ALL OTHER SYSTEMS REVIEWED AND NEGATIVE      PHYSICAL EXAM:  VITALS: T(C): 37.2 (10-15-17 @ 14:33)  T(F): 99 (10-15-17 @ 14:33), Max: 99 (10-15-17 @ 14:33)  HR: 64 (10-15-17 @ 14:33) (64 - 92)  BP: 118/70 (10-15-17 @ 14:33) (117/73 - 145/94)  RR:  (16 - 18)  SpO2:  (97% - 100%)  Wt(kg): --  GENERAL: NAD, well-groomed, well-developed  EYES: No proptosis, no lid lag, anicteric  HEENT:  Atraumatic, Normocephalic, moist mucous membranes  THYROID: Normal size, no palpable nodules  RESPIRATORY: Clear to auscultation bilaterally; No rales, rhonchi, wheezing  CARDIOVASCULAR: Regular rate and rhythm; No murmurs; no peripheral edema  GI: Soft, nontender, non distended, normal bowel sounds  SKIN: Dry, intact, No rashes or lesions  MUSCULOSKELETAL: Full range of motion, normal strength  PSYCH: Alert and oriented x 3, normal affect, normal mood  CUSHING'S SIGNS: no striae    CAPILLARY BLOOD GLUCOSE  129 (10-14 @ 17:23)                            13.8   4.66  )-----------( 197      ( 14 Oct 2017 18:20 )             39.5       10-14    147<H>  |  108<H>  |  28<H>  ----------------------------<  98  4.3   |  24  |  1.92<H>    EGFR if : 36  EGFR if non : 31    Ca    9.1      10-14    TPro  7.5  /  Alb  4.3  /  TBili  0.7  /  DBili  x   /  AST  22  /  ALT  17  /  AlkPhos  70  10-14      Thyroid Function Tests:      Hemoglobin A1C, Whole Blood: 8.9 % <H> [4.0 - 5.6] (10-14-17 @ 18:20)          Radiology:

## 2017-10-15 NOTE — ED CDU PROVIDER NOTE - ATTENDING CONTRIBUTION TO CARE
Attending Note (Jason): patient with hypoglycemia on long acting meds.  abd soft non tender. admit to cdu for serial fs.

## 2017-10-15 NOTE — ED CDU PROVIDER NOTE - PROGRESS NOTE DETAILS
CDU Att PN: Garret  Feeling well.  Tolerating PO.  Blood glucose dropped overnight, but now stable after D50.  Will continue to monitor, also to be seen by Endo to modify med regimen. I have personally performed a face to face evaluation of this patient including review of the history and focused physical exam.  I have discussed the case and reviewed the plan of care with the PA. erica cdu pa: endo consulted. lantus 10 units at bedtime, stop the glymiperide/amaryl and take prandin 1mg 3 times a day before a meal but only if you eat. and glucagon kit CDU Att DC Note: Garret  Pt feeling well, blood glucose stable. OUtpt f/u.   I have personally performed a face to face evaluation of this patient including review of the history and focused physical exam.  I have discussed the case and reviewed the plan of care with the PA.

## 2017-10-16 LAB — SPECIMEN SOURCE: SIGNIFICANT CHANGE UP

## 2017-10-17 LAB
-  AMIKACIN: SIGNIFICANT CHANGE UP
-  AMIKACIN: SIGNIFICANT CHANGE UP
-  AMPICILLIN/SULBACTAM: SIGNIFICANT CHANGE UP
-  AMPICILLIN/SULBACTAM: SIGNIFICANT CHANGE UP
-  AMPICILLIN: SIGNIFICANT CHANGE UP
-  AMPICILLIN: SIGNIFICANT CHANGE UP
-  AZTREONAM: SIGNIFICANT CHANGE UP
-  AZTREONAM: SIGNIFICANT CHANGE UP
-  CEFAZOLIN: SIGNIFICANT CHANGE UP
-  CEFAZOLIN: SIGNIFICANT CHANGE UP
-  CEFEPIME: SIGNIFICANT CHANGE UP
-  CEFEPIME: SIGNIFICANT CHANGE UP
-  CEFOXITIN: SIGNIFICANT CHANGE UP
-  CEFOXITIN: SIGNIFICANT CHANGE UP
-  CEFTAZIDIME: SIGNIFICANT CHANGE UP
-  CEFTAZIDIME: SIGNIFICANT CHANGE UP
-  CEFTRIAXONE: SIGNIFICANT CHANGE UP
-  CEFTRIAXONE: SIGNIFICANT CHANGE UP
-  CIPROFLOXACIN: SIGNIFICANT CHANGE UP
-  CIPROFLOXACIN: SIGNIFICANT CHANGE UP
-  ERTAPENEM: SIGNIFICANT CHANGE UP
-  ERTAPENEM: SIGNIFICANT CHANGE UP
-  GENTAMICIN: SIGNIFICANT CHANGE UP
-  GENTAMICIN: SIGNIFICANT CHANGE UP
-  IMIPENEM: SIGNIFICANT CHANGE UP
-  IMIPENEM: SIGNIFICANT CHANGE UP
-  LEVOFLOXACIN: SIGNIFICANT CHANGE UP
-  LEVOFLOXACIN: SIGNIFICANT CHANGE UP
-  MEROPENEM: SIGNIFICANT CHANGE UP
-  MEROPENEM: SIGNIFICANT CHANGE UP
-  NITROFURANTOIN: SIGNIFICANT CHANGE UP
-  NITROFURANTOIN: SIGNIFICANT CHANGE UP
-  PIPERACILLIN/TAZOBACTAM: SIGNIFICANT CHANGE UP
-  PIPERACILLIN/TAZOBACTAM: SIGNIFICANT CHANGE UP
-  TIGECYCLINE: SIGNIFICANT CHANGE UP
-  TIGECYCLINE: SIGNIFICANT CHANGE UP
-  TOBRAMYCIN: SIGNIFICANT CHANGE UP
-  TOBRAMYCIN: SIGNIFICANT CHANGE UP
-  TRIMETHOPRIM/SULFAMETHOXAZOLE: SIGNIFICANT CHANGE UP
-  TRIMETHOPRIM/SULFAMETHOXAZOLE: SIGNIFICANT CHANGE UP
BACTERIA UR CULT: SIGNIFICANT CHANGE UP
METHOD TYPE: SIGNIFICANT CHANGE UP
METHOD TYPE: SIGNIFICANT CHANGE UP
ORGANISM # SPEC MICROSCOPIC CNT: SIGNIFICANT CHANGE UP

## 2017-10-17 NOTE — ED POST DISCHARGE NOTE - RESULT SUMMARY
UCX: Gram negative akil > 100,000 prelim. Admin Team to follow results to final. No antibiotic listed in ED provider note or prescription writer at time of discharge.

## 2017-10-18 RX ORDER — MOXIFLOXACIN HYDROCHLORIDE TABLETS, 400 MG 400 MG/1
1 TABLET, FILM COATED ORAL
Qty: 20 | Refills: 0
Start: 2017-10-18 | End: 2017-10-28

## 2019-01-26 NOTE — ED ADULT NURSE NOTE - PMH
DISPLAY PLAN FREE TEXT CAD (Coronary Artery Disease)    DM Type 2 (Diabetes Mellitus,    History of PTCA  7/09, stents to Cx and RCA  Hypercholesteremia    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  PC (Prostate Cancer)  treated surgically  Personal History of Hypertensi

## 2019-12-20 NOTE — CONSULT NOTE ADULT - PROBLEM SELECTOR RECOMMENDATION 9
-Blood glucose levels are now stable. Recommend discontinuing glimepiride (long-acting sulfonylurea) given h/o CKD. Recommend starting Prandin 1 mg before each meal and can titrate up dose if needed based on blood glucose levels. Will decrease Lantus to 10 units every morning.   - Recommend close outpatient follow up and should have glucagon kit at home
Abdomen soft, non-tender, no guarding.

## 2020-12-21 NOTE — ED SUB INTERN NOTE - CROS ED GI ALL NEG
Results for RENÉ FLOREZ (MRN [id number]) as of 12/21/2020 17:53   Ref. Range 12/21/2020 16:50   POCT Glucose Latest Ref Range: 70 - 110 mg/dL 435 (H)       Pt continues to refuse the second blood glucose check for dinner.    - - -

## 2021-04-18 ENCOUNTER — EMERGENCY (EMERGENCY)
Facility: HOSPITAL | Age: 86
LOS: 1 days | Discharge: HOME | End: 2021-04-18
Attending: STUDENT IN AN ORGANIZED HEALTH CARE EDUCATION/TRAINING PROGRAM | Admitting: STUDENT IN AN ORGANIZED HEALTH CARE EDUCATION/TRAINING PROGRAM
Payer: MEDICARE

## 2021-04-18 VITALS
HEIGHT: 73 IN | TEMPERATURE: 97 F | SYSTOLIC BLOOD PRESSURE: 161 MMHG | DIASTOLIC BLOOD PRESSURE: 92 MMHG | RESPIRATION RATE: 16 BRPM | OXYGEN SATURATION: 100 % | HEART RATE: 90 BPM

## 2021-04-18 VITALS
HEART RATE: 97 BPM | DIASTOLIC BLOOD PRESSURE: 107 MMHG | SYSTOLIC BLOOD PRESSURE: 184 MMHG | RESPIRATION RATE: 16 BRPM | TEMPERATURE: 98 F | OXYGEN SATURATION: 100 %

## 2021-04-18 DIAGNOSIS — Z98.89 OTHER SPECIFIED POSTPROCEDURAL STATES: Chronic | ICD-10-CM

## 2021-04-18 LAB
ALBUMIN SERPL ELPH-MCNC: 4.3 G/DL — SIGNIFICANT CHANGE UP (ref 3.3–5)
ALP SERPL-CCNC: 107 U/L — SIGNIFICANT CHANGE UP (ref 40–120)
ALT FLD-CCNC: 12 U/L — SIGNIFICANT CHANGE UP (ref 4–41)
ANION GAP SERPL CALC-SCNC: 14 MMOL/L — SIGNIFICANT CHANGE UP (ref 7–14)
APPEARANCE UR: CLEAR — SIGNIFICANT CHANGE UP
AST SERPL-CCNC: 18 U/L — SIGNIFICANT CHANGE UP (ref 4–40)
B-OH-BUTYR SERPL-SCNC: 0.3 MMOL/L — SIGNIFICANT CHANGE UP (ref 0–0.4)
BASOPHILS # BLD AUTO: 0.02 K/UL — SIGNIFICANT CHANGE UP (ref 0–0.2)
BASOPHILS NFR BLD AUTO: 0.4 % — SIGNIFICANT CHANGE UP (ref 0–2)
BILIRUB SERPL-MCNC: 0.9 MG/DL — SIGNIFICANT CHANGE UP (ref 0.2–1.2)
BILIRUB UR-MCNC: NEGATIVE — SIGNIFICANT CHANGE UP
BLOOD GAS VENOUS COMPREHENSIVE RESULT: SIGNIFICANT CHANGE UP
BUN SERPL-MCNC: 25 MG/DL — HIGH (ref 7–23)
CALCIUM SERPL-MCNC: 10.1 MG/DL — SIGNIFICANT CHANGE UP (ref 8.4–10.5)
CHLORIDE SERPL-SCNC: 97 MMOL/L — LOW (ref 98–107)
CO2 SERPL-SCNC: 26 MMOL/L — SIGNIFICANT CHANGE UP (ref 22–31)
COLOR SPEC: SIGNIFICANT CHANGE UP
CREAT SERPL-MCNC: 1.89 MG/DL — HIGH (ref 0.5–1.3)
DIFF PNL FLD: ABNORMAL
EOSINOPHIL # BLD AUTO: 0.09 K/UL — SIGNIFICANT CHANGE UP (ref 0–0.5)
EOSINOPHIL NFR BLD AUTO: 1.8 % — SIGNIFICANT CHANGE UP (ref 0–6)
GLUCOSE SERPL-MCNC: 485 MG/DL — CRITICAL HIGH (ref 70–99)
GLUCOSE UR QL: ABNORMAL
HCT VFR BLD CALC: 38.8 % — LOW (ref 39–50)
HGB BLD-MCNC: 13 G/DL — SIGNIFICANT CHANGE UP (ref 13–17)
IANC: 2.93 K/UL — SIGNIFICANT CHANGE UP (ref 1.5–8.5)
IMM GRANULOCYTES NFR BLD AUTO: 0.4 % — SIGNIFICANT CHANGE UP (ref 0–1.5)
KETONES UR-MCNC: NEGATIVE — SIGNIFICANT CHANGE UP
LEUKOCYTE ESTERASE UR-ACNC: ABNORMAL
LYMPHOCYTES # BLD AUTO: 1.35 K/UL — SIGNIFICANT CHANGE UP (ref 1–3.3)
LYMPHOCYTES # BLD AUTO: 27.6 % — SIGNIFICANT CHANGE UP (ref 13–44)
MAGNESIUM SERPL-MCNC: 2 MG/DL — SIGNIFICANT CHANGE UP (ref 1.6–2.6)
MCHC RBC-ENTMCNC: 31.3 PG — SIGNIFICANT CHANGE UP (ref 27–34)
MCHC RBC-ENTMCNC: 33.5 GM/DL — SIGNIFICANT CHANGE UP (ref 32–36)
MCV RBC AUTO: 93.3 FL — SIGNIFICANT CHANGE UP (ref 80–100)
MONOCYTES # BLD AUTO: 0.48 K/UL — SIGNIFICANT CHANGE UP (ref 0–0.9)
MONOCYTES NFR BLD AUTO: 9.8 % — SIGNIFICANT CHANGE UP (ref 2–14)
NEUTROPHILS # BLD AUTO: 2.93 K/UL — SIGNIFICANT CHANGE UP (ref 1.8–7.4)
NEUTROPHILS NFR BLD AUTO: 60 % — SIGNIFICANT CHANGE UP (ref 43–77)
NITRITE UR-MCNC: POSITIVE
NRBC # BLD: 0 /100 WBCS — SIGNIFICANT CHANGE UP
NRBC # FLD: 0 K/UL — SIGNIFICANT CHANGE UP
PH UR: 6.5 — SIGNIFICANT CHANGE UP (ref 5–8)
PLATELET # BLD AUTO: 144 K/UL — LOW (ref 150–400)
POTASSIUM SERPL-MCNC: 4.2 MMOL/L — SIGNIFICANT CHANGE UP (ref 3.5–5.3)
POTASSIUM SERPL-SCNC: 4.2 MMOL/L — SIGNIFICANT CHANGE UP (ref 3.5–5.3)
PROT SERPL-MCNC: 7.6 G/DL — SIGNIFICANT CHANGE UP (ref 6–8.3)
PROT UR-MCNC: ABNORMAL
RBC # BLD: 4.16 M/UL — LOW (ref 4.2–5.8)
RBC # FLD: 11.8 % — SIGNIFICANT CHANGE UP (ref 10.3–14.5)
SARS-COV-2 RNA SPEC QL NAA+PROBE: SIGNIFICANT CHANGE UP
SODIUM SERPL-SCNC: 137 MMOL/L — SIGNIFICANT CHANGE UP (ref 135–145)
SP GR SPEC: 1.02 — SIGNIFICANT CHANGE UP (ref 1.01–1.02)
TSH SERPL-MCNC: 1.63 UIU/ML — SIGNIFICANT CHANGE UP (ref 0.27–4.2)
UROBILINOGEN FLD QL: SIGNIFICANT CHANGE UP
WBC # BLD: 4.89 K/UL — SIGNIFICANT CHANGE UP (ref 3.8–10.5)
WBC # FLD AUTO: 4.89 K/UL — SIGNIFICANT CHANGE UP (ref 3.8–10.5)

## 2021-04-18 PROCEDURE — 99284 EMERGENCY DEPT VISIT MOD MDM: CPT | Mod: CS,GC

## 2021-04-18 RX ORDER — CEFTRIAXONE 500 MG/1
1000 INJECTION, POWDER, FOR SOLUTION INTRAMUSCULAR; INTRAVENOUS ONCE
Refills: 0 | Status: COMPLETED | OUTPATIENT
Start: 2021-04-18 | End: 2021-04-18

## 2021-04-18 RX ORDER — SODIUM CHLORIDE 9 MG/ML
1000 INJECTION INTRAMUSCULAR; INTRAVENOUS; SUBCUTANEOUS ONCE
Refills: 0 | Status: COMPLETED | OUTPATIENT
Start: 2021-04-18 | End: 2021-04-18

## 2021-04-18 RX ORDER — CEPHALEXIN 500 MG
1 CAPSULE ORAL
Qty: 14 | Refills: 0
Start: 2021-04-18 | End: 2021-04-24

## 2021-04-18 RX ORDER — INSULIN HUMAN 100 [IU]/ML
4 INJECTION, SOLUTION SUBCUTANEOUS ONCE
Refills: 0 | Status: COMPLETED | OUTPATIENT
Start: 2021-04-18 | End: 2021-04-18

## 2021-04-18 RX ADMIN — SODIUM CHLORIDE 1000 MILLILITER(S): 9 INJECTION INTRAMUSCULAR; INTRAVENOUS; SUBCUTANEOUS at 12:35

## 2021-04-18 RX ADMIN — INSULIN HUMAN 4 UNIT(S): 100 INJECTION, SOLUTION SUBCUTANEOUS at 15:43

## 2021-04-18 RX ADMIN — CEFTRIAXONE 100 MILLIGRAM(S): 500 INJECTION, POWDER, FOR SOLUTION INTRAMUSCULAR; INTRAVENOUS at 16:58

## 2021-04-18 NOTE — ED PROVIDER NOTE - PATIENT PORTAL LINK FT
You can access the FollowMyHealth Patient Portal offered by [facility] by registering at the following website: [email or web address]. By joining [facility]’s FollowMyHealth portal, you will also be able to view your health information using other applications (apps) compatible with our system.

## 2021-04-18 NOTE — ED ADULT NURSE NOTE - NS ED NOTE  TALK SOMEONE YN
GERD DIET      Gastroesophageal Reflux disease, commonly referred to as GERD, occurs when the contents of the stomach come into contact with the lining of the esophagus. When this occurs, the acidic content of the stomach causes irritation and burning to the lining of the esophagus. This is commonly associated with heartburn, chronic cough, belching, bloating, or pain in the back of the breastbone.     Specific dietary modifications can be implemented to prevent these symptoms from occurring. Foods such as: garlic, onion, high fat foods, chocolate, caffeine, peppermint, spearmint, and alcohol should be avoided. These foods lower the pressure of the lower esophageal sphincter (LES), causing the acidic contents of the stomach to reflux into the esophagus.     Some foods can cause further irritation when heartburn currently exists. These foods, including: citrus fruits and juices, tomatoes, and peppers and should be avoided.     Additionally, it is important to limit the amount of food eaten at one time. Small, frequent meals are generally better tolerated than three large meals. Large meals tend to force the acidic contents of the stomach up into the esophagus, resulting in reflux. Smaller, lower fat meals are not in the stomach long enough to cause irritation.     Following these dietary modifications can:   Significantly decrease GERD symptoms.   Heal the esophagus from injury.   Manage and prevent complications of acid reflux.   Keep symptoms in remission.      Reference:    CURRY Lantigua (2002) Tell Me What To Eat If I Have Acid Reflux.  [place], [place]:  [employer] [facility] [employer]                                SAMPLE MENU FOR GERD DIET: 1600 calories/day    Breakfast Snack Lunch Snack Supper   Whole grain cereal--1 cup  Skim milk--1/2 cup  Banana--1   Whole wheat toast--2 slices  Margarine--1 tsp. Medium apple Vegetable Soup--1 cup  Saltines--4  Lean beef leigh--3 oz.  Whole wheat roll--1  Reduced calorie sim--1  tbsp.  Mustard--1 tbsp. Pretzels--2 oz. Grilled chicken breast--4 oz  Green salad--4 oz  Vinegar/Oil dressing--1 tbsp.  Herbed brown rice--1/2 cup  Broccoli--1/2 cup  Celery sticks--1/2 cup   Oatmeal--1 cup  Raisins--6  Whole grain English muffin--2 halves  Jam--1 tbsp.  Applesauce--1/2 cup  Decaffeinated coffee/tea--1 cup Andre crackers--4  Smoked turkey--3 oz.  Whole grain darlene--1  Low fat sim--1 tbsp.  Lettuce--1/2 cup  Low fat cottage cheese--1/2 cup  Peaches--1 cup Low-fat frozen yogurt--1/2 cup Broiled salmon--3 oz.  Boiled red potatoes--1/2 cup  Steamed cauliflower--1 cup  Apricots--1 cup  Skim milk--1 cup   Bagel--1  Low-fat cream cheese or Peanut butter--1 tbsp  Banana--1  Skim milk--1 cup Red or Green Grapes--20 Whole grain tortilla--1  Tuna salad--3 oz.  Cucumber slices--1/2 cup  Lettuce--1/2 cup  Low-fat mozzarella cheese--1 oz. Papaya--1 cup Baked pork chops--3 oz.  Applesauce--1/2 cup  Baked potato--1 med.  Margarine--1 tbsp.  Green beans--1/2 cup                 GERD DIET: 1800 calories/day    Breakfast Snack Lunch Snack Supper   Whole grain cereal--1 cup  Skim milk--1/2 cup  Banana--1   Whole wheat toast--2 slices  Margarine--1 tsp. Medium apple Vegetable Soup--1 cup  Saltines--4  Lean beef leigh--3 oz.  Whole wheat roll--1  Reduced calorie sim--1 tbsp.  Mustard--1 tbsp.  Baby carrots--1/2 cup Pretzels--4 oz. Grilled chicken breast--4 oz  Green salad--4 oz  Vinegar/Oil dressing--1 tbsp.  Brown rice--1/2 cup  Broccoli--1/2 cup  Celery sticks--1/2 cup   Oatmeal--1 cup  Raisins--6  Whole grain English muffin--2 halves  Jam--1 tbsp.  Applesauce--1/2 cup  Decaffeinated coffee/tea--1 cup Andre crackers--4  Skim milk--1 cup  Smoked turkey--3 oz.  Whole grain darlene--1  Low fat sim--1 tbsp.  Lettuce--1/2 cup  Low fat cottage cheese--1/2 cup  Peaches--1 cup Low-fat frozen yogurt--1/2 cup  Granola--1 oz. Broiled salmon--3 oz.  Boiled red potatoes--1/2 cup  Steamed cauliflower--1 cup  Apricots--1 cup  Skim  milk--1 cup  Strawberries--1/2 cup   Bagel--1  Low-fat cream cheese or Peanut butter--1 tbsp  Banana--1  Skim milk--1 cup Red or Green Grapes--20 Whole grain tortilla--1  Tuna salad--3 oz.  Cucumber slices--1/2 cup  Lettuce--1/2 cup  Low-fat mozzarella cheese--1 oz.  Papaya--1 cup Corn tortilla chips--2 oz. Baked pork chops--3 oz.  Applesauce--1/2 cup  Baked potato--1 medium  Margarine--1 tbsp.  Green beans--1/2 cup  Whole wheat roll--1           No

## 2021-04-18 NOTE — ED ADULT NURSE REASSESSMENT NOTE - NS ED NURSE REASSESS COMMENT FT1
Pt at baseline mental status, appears comfortable. Breathing even and unlabored. MD Boudreaux made aware of Pt BP. No further intervention at this time as per MD. Pt denies any physical complaints. IV Abx running as per eMAR. Pt to be discharged.

## 2021-04-18 NOTE — ED PROVIDER NOTE - PROGRESS NOTE DETAILS
Tonny Boudreaux MD: Saw pt with Dr. Echols. Pt lab not showing DKA. UA + for UTI. Pt well appearing and asymptomatic. Pt is ambulatory and tolerating PO. Spoke with pt about return precautions. Pt agrees to follow up with their PCP. Pt ready for discharge sent keflex to pharmacy

## 2021-04-18 NOTE — ED ADULT NURSE NOTE - CHIEF COMPLAINT QUOTE
Sent from MD for hyperglycemia. As per daughter, pt. was seen at his PCP a few days ago after "feeling off". Blood work showed glucose to be in the 700s. States he's compliant with meds.  in triage.   Shavonne (daughter) [phone]

## 2021-04-18 NOTE — ED PROVIDER NOTE - OBJECTIVE STATEMENT
89 yo male with PMH HTN, DM, CAD with stent, h/o prostate ca (s/p TURP; no active ca) presents to ED for evaluation of hyperglycemia. pt reports he went to see his PMD 2 days ago, had bloodwork done, results came back today and PMD found his blood glucose to be in the 700s and recommended that patient be brought directly to emergency room for evaluation. Pt reports that he is completely asymptomatic. Denies fevers, chills, nausea, abd pain, chest pain, shortnes of breath, cough, congestion, urinary symptoms.   I spoke with daughter Shavonne who confirms this is what happened. States patient has been well appearing and asymptomatic, including this morning but she decided to bring him to the ER on the recommendation of PMD. States that he did not take his morning medications today. States he has glucometer at home and has medications, does not need any prescriptions.

## 2021-04-18 NOTE — ED PROVIDER NOTE - NSFOLLOWUPINSTRUCTIONS_ED_ALL_ED_FT
1) Please follow-up with your primary care doctor in the next 2-3 days.  Please call tomorrow for an appointment.  If you cannot follow-up with your primary care doctor please return to the ED for any urgent issues.  2) You were given a copy of the tests performed today.  Please bring the results with you and review them with your primary care doctor.  3) If you have any worsening of symptoms or any other concerns please return to the ED immediately. Such as but not limited to uncontrolled pain, intractable vomiting, or fever > 100.4, blood in stool or black stools.  4) Please continue taking your home medications as directed.

## 2021-04-18 NOTE — ED PROVIDER NOTE - CARE PLAN
Principal Discharge DX:	Hyperglycemia   Principal Discharge DX:	Hyperglycemia  Secondary Diagnosis:	UTI (urinary tract infection)

## 2021-04-18 NOTE — ED PROVIDER NOTE - CLINICAL SUMMARY MEDICAL DECISION MAKING FREE TEXT BOX
87 yo male for evaluation of incidental finding of hyperglycemia on recent outpatient labs - labs, ekg, urinalysis, hydrate, reassess

## 2021-04-18 NOTE — ED ADULT NURSE NOTE - OBJECTIVE STATEMENT
Alvarado RN: Patient is a 88-year-old male, alert and oriented X2-3, ambulatory with a cane at baseline, sent in by his MD for further evaluation of hyperglycemia. Pt with a Phx of PTCA, MI, CAD, prostate CA, HLD, HTN, DM II. Pt is poor historian, unable to elaborate on medical history. Pt offers no medical complaints at present. Denies chest pain, shortness of breath, n/v/d, fevers, chills or any other symptoms. Pt . Pt says he lives with family, takes medications on his own, says he is compliant. Respirations are even and unlabored, chest rise equal b/l. Pt daughter Shavonne, phone # is [phone]. Skin intact. Red slip non-skid socks placed on patient for safety. Bed set in lowest position. Side rails X 2. Safety implemented. Call bell with in reach. 20 G to L AC. Labs drawn and sent. covid sent. MD Gift at bedside for evaluation. Report to primary RN Romy. Will continue to monitor.

## 2021-06-16 ENCOUNTER — APPOINTMENT (OUTPATIENT)
Dept: VASCULAR SURGERY | Facility: CLINIC | Age: 86
End: 2021-06-16
Payer: MEDICARE

## 2021-06-16 VITALS — SYSTOLIC BLOOD PRESSURE: 139 MMHG | HEART RATE: 59 BPM | DIASTOLIC BLOOD PRESSURE: 76 MMHG

## 2021-06-16 PROCEDURE — 93880 EXTRACRANIAL BILAT STUDY: CPT

## 2021-06-16 PROCEDURE — 93923 UPR/LXTR ART STDY 3+ LVLS: CPT

## 2021-06-16 PROCEDURE — 99203 OFFICE O/P NEW LOW 30 MIN: CPT

## 2021-06-16 NOTE — ASSESSMENT
[FreeTextEntry1] : 87 yo male with history of dm, cad s/p pci, htn, hld presents for evaluation of left lower extremity wounds since hitting his leg on a door about a month ago. \par \par manohar/pvr shows significant arterial disease of the left lower extremity with manohar of 0.51 and dampened distal waveforms \par \par carotid duplex shows 16-49% stenosis of the right ica and 50-60% stenosis of the left ica \par \par \par will arrange for angiogram of the left lower extremity given manohar of 0.5 in a diabetic with multiple lower extremity ulcers and rest pain

## 2021-06-16 NOTE — CONSULT LETTER
[Dear  ___] : Dear  [unfilled], [Consult Letter:] : I had the pleasure of evaluating your patient, [unfilled]. [Please see my note below.] : Please see my note below. [Sincerely,] : Sincerely, [FreeTextEntry3] : Shanti Kumar PA-C\par [facility] [place]\par

## 2021-06-16 NOTE — PHYSICAL EXAM
[JVD] : no jugular venous distention  [2+] : left 2+ [1+] : right 1+ [0] : left 0 [Ankle Swelling (On Exam)] : not present [Varicose Veins Of Lower Extremities] : not present [] : not present [Alert] : alert [Calm] : calm [de-identified] : appears well  [de-identified] : 1 ulcer with overlying eschar over the left lateral malleolus, left medial malleolus small dime sized ulcer all dry without any drainage

## 2021-06-16 NOTE — HISTORY OF PRESENT ILLNESS
[FreeTextEntry1] : 87 yo male with history of dm, cad s/p pci, htn, hld presents for evaluation of left lower extremity wounds since hitting his leg on a door about a month ago.  per discussion with pt daughter he was prescribed antibiotics with some improvement in erythema but no improvement in the wounds.  pt complaints of pain in the lower extremity and difficulty ambulating

## 2021-06-18 DIAGNOSIS — Z01.818 ENCOUNTER FOR OTHER PREPROCEDURAL EXAMINATION: ICD-10-CM

## 2021-06-22 ENCOUNTER — APPOINTMENT (OUTPATIENT)
Dept: DISASTER EMERGENCY | Facility: CLINIC | Age: 86
End: 2021-06-22

## 2021-06-22 LAB
ANION GAP SERPL CALC-SCNC: 12 MMOL/L
APTT BLD: 28.9 SEC
BASOPHILS # BLD AUTO: 0.02 K/UL
BASOPHILS NFR BLD AUTO: 0.4 %
BUN SERPL-MCNC: 28 MG/DL
CALCIUM SERPL-MCNC: 9.8 MG/DL
CHLORIDE SERPL-SCNC: 101 MMOL/L
CO2 SERPL-SCNC: 25 MMOL/L
CREAT SERPL-MCNC: 1.76 MG/DL
EOSINOPHIL # BLD AUTO: 0.13 K/UL
EOSINOPHIL NFR BLD AUTO: 2.5 %
GLUCOSE SERPL-MCNC: 306 MG/DL
HCT VFR BLD CALC: 37.8 %
HGB BLD-MCNC: 12.5 G/DL
IMM GRANULOCYTES NFR BLD AUTO: 0 %
INR PPP: 0.94 RATIO
LYMPHOCYTES # BLD AUTO: 1.82 K/UL
LYMPHOCYTES NFR BLD AUTO: 35.5 %
MAN DIFF?: NORMAL
MCHC RBC-ENTMCNC: 31.3 PG
MCHC RBC-ENTMCNC: 33.1 GM/DL
MCV RBC AUTO: 94.5 FL
MONOCYTES # BLD AUTO: 0.48 K/UL
MONOCYTES NFR BLD AUTO: 9.4 %
NEUTROPHILS # BLD AUTO: 2.67 K/UL
NEUTROPHILS NFR BLD AUTO: 52.2 %
PLATELET # BLD AUTO: 164 K/UL
POTASSIUM SERPL-SCNC: 4.6 MMOL/L
PT BLD: 11.1 SEC
RBC # BLD: 4 M/UL
RBC # FLD: 12.3 %
SODIUM SERPL-SCNC: 137 MMOL/L
WBC # FLD AUTO: 5.12 K/UL

## 2021-06-23 LAB — SARS-COV-2 N GENE NPH QL NAA+PROBE: NOT DETECTED

## 2021-06-24 PROBLEM — I73.9 PAD (PERIPHERAL ARTERY DISEASE): Status: ACTIVE | Noted: 2021-06-16

## 2021-06-25 ENCOUNTER — LABORATORY RESULT (OUTPATIENT)
Age: 86
End: 2021-06-25

## 2021-06-25 ENCOUNTER — RESULT REVIEW (OUTPATIENT)
Age: 86
End: 2021-06-25

## 2021-06-25 ENCOUNTER — APPOINTMENT (OUTPATIENT)
Dept: ENDOVASCULAR SURGERY | Facility: CLINIC | Age: 86
End: 2021-06-25
Payer: MEDICARE

## 2021-06-25 VITALS
SYSTOLIC BLOOD PRESSURE: 203 MMHG | DIASTOLIC BLOOD PRESSURE: 97 MMHG | RESPIRATION RATE: 16 BRPM | WEIGHT: 120 LBS | HEIGHT: 65 IN | OXYGEN SATURATION: 99 % | TEMPERATURE: 97.4 F | HEART RATE: 66 BPM | BODY MASS INDEX: 19.99 KG/M2

## 2021-06-25 DIAGNOSIS — I73.9 PERIPHERAL VASCULAR DISEASE, UNSPECIFIED: ICD-10-CM

## 2021-06-25 PROCEDURE — 37229Z: CUSTOM | Mod: 59,LT

## 2021-06-25 PROCEDURE — 37197Z: CUSTOM | Mod: 59,LT

## 2021-06-25 PROCEDURE — 75625 CONTRAST EXAM ABDOMINL AORTA: CPT | Mod: 59

## 2021-06-25 PROCEDURE — 37227Z: CUSTOM | Mod: LT

## 2021-06-25 RX ORDER — INSULIN LISPRO 100 [IU]/ML
100 INJECTION, SOLUTION INTRAVENOUS; SUBCUTANEOUS
Refills: 0 | Status: ACTIVE | COMMUNITY

## 2021-06-25 RX ORDER — GABAPENTIN 100 MG/1
100 CAPSULE ORAL
Refills: 0 | Status: ACTIVE | COMMUNITY

## 2021-06-25 RX ORDER — OMEPRAZOLE 20 MG/1
20 CAPSULE, DELAYED RELEASE ORAL
Refills: 0 | Status: ACTIVE | COMMUNITY

## 2021-06-25 RX ORDER — LEVOTHYROXINE SODIUM 0.05 MG/1
50 TABLET ORAL
Refills: 0 | Status: ACTIVE | COMMUNITY

## 2021-06-25 RX ORDER — REPAGLINIDE 2 MG/1
2 TABLET ORAL
Refills: 0 | Status: ACTIVE | COMMUNITY

## 2021-06-25 RX ORDER — BICALUTAMIDE 50 MG/1
50 TABLET ORAL
Refills: 0 | Status: ACTIVE | COMMUNITY

## 2021-06-25 NOTE — HISTORY OF PRESENT ILLNESS
[FreeTextEntry1] : accompanied by daughter Shavonne [phone]\par ambulates with a cane\par covid not detected 6/22/2021\par Cr 1.76 6/22/2021\par plavix/aspirin given at 810\par BS reports 305, no insulin this morning [FreeTextEntry5] : 7pm 6/24/2021 [FreeTextEntry6] : Dr. Denis

## 2021-06-25 NOTE — PROCEDURE
[D/C IV on discharge] : D/C IV on discharge [Resume diet] : resume diet [FreeTextEntry1] : aortogram, left leg angiogram/athrectomy/stents

## 2021-06-25 NOTE — ASSESSMENT
[FreeTextEntry1] : \par \par manohar/pvr shows significant arterial disease of the left lower extremity with manohar of 0.51 and dampened distal waveforms \par plan for angiogram of the left lower extremity given manohar of 0.5 in a diabetic with multiple lower extremity ulcers and rest pain

## 2021-06-25 NOTE — PHYSICAL EXAM
[2+] : left 2+ [1+] : right 1+ [0] : left 0 [Alert] : alert [Calm] : calm [JVD] : no jugular venous distention  [Ankle Swelling (On Exam)] : not present [Varicose Veins Of Lower Extremities] : not present [] : not present [de-identified] : appears well  [de-identified] : 1 ulcer with overlying eschar over the left lateral malleolus, left medial malleolus small dime sized ulcer all dry without any drainage

## 2021-07-13 ENCOUNTER — APPOINTMENT (OUTPATIENT)
Dept: VASCULAR SURGERY | Facility: CLINIC | Age: 86
End: 2021-07-13
Payer: MEDICARE

## 2021-07-13 PROCEDURE — 93926 LOWER EXTREMITY STUDY: CPT

## 2021-07-13 PROCEDURE — 99213 OFFICE O/P EST LOW 20 MIN: CPT

## 2021-07-15 NOTE — HISTORY OF PRESENT ILLNESS
[FreeTextEntry1] : 89 yo male with history of dm, cad s/p pci, htn, hld presents for evaluation of left lower extremity wounds since hitting his leg on a door about a month ago.  per discussion with pt daughter he was prescribed antibiotics with some improvement in erythema but no improvement in the wounds.  pt complaints of pain in the lower extremity and difficulty ambulating patient recently underwent left lower extremity angiogram with angioplasty and stenting.  He is here for follow-up.

## 2021-07-15 NOTE — PHYSICAL EXAM
[JVD] : no jugular venous distention  [2+] : left 2+ [1+] : right 1+ [0] : left 0 [Ankle Swelling (On Exam)] : not present [Varicose Veins Of Lower Extremities] : not present [] : not present [Alert] : alert [Calm] : calm [de-identified] : appears well  [de-identified] : 1 ulcer with overlying eschar over the left lateral malleolus, left medial malleolus small dime sized ulcer all dry without any drainage

## 2021-08-10 ENCOUNTER — APPOINTMENT (OUTPATIENT)
Dept: VASCULAR SURGERY | Facility: CLINIC | Age: 86
End: 2021-08-10
Payer: MEDICARE

## 2021-08-10 PROCEDURE — 93923 UPR/LXTR ART STDY 3+ LVLS: CPT

## 2021-08-10 PROCEDURE — 99213 OFFICE O/P EST LOW 20 MIN: CPT

## 2021-08-10 NOTE — PHYSICAL EXAM
[JVD] : no jugular venous distention  [2+] : left 2+ [1+] : right 1+ [0] : left 0 [Ankle Swelling (On Exam)] : not present [Varicose Veins Of Lower Extremities] : not present [] : not present [Alert] : alert [Calm] : calm [de-identified] : appears well  [de-identified] : 1 ulcer with overlying eschar over the left lateral malleolus, left medial malleolus small dime sized ulcer all dry without any drainage

## 2021-08-10 NOTE — ASSESSMENT
[FreeTextEntry1] : 87 yo male with history of dm, cad s/p pci, htn, hld presents for evaluation of left lower extremity wounds since hitting his leg on a door about a month ago. \par \par Patient is status post left leg angiogram with angioplasty and stenting of left superficial femoral artery with recanalization of the tibioperoneal trunk.  Duplex today shows patent stents.\par The patient underwent arterial Dopplers which show improvement in the tracings of the left lower extremity.  At this time no intervention is necessary.  Patient will follow-up in 3 months.

## 2021-11-02 NOTE — ED ADULT NURSE NOTE - NS ED NOTE ABUSE SUSPICION NEGLECT YN
Patient: Amarjit Khan    Procedure Summary     Date: 11/02/21 Room / Location: [place] [address] / SURGERY [facility]    Anesthesia Start: 0946 Anesthesia Stop: 1042    Procedure: REMOVAL NEUROSTIMULATOR, PERMANENT, SPINAL CORD (N/A Back) Diagnosis: (POST-LAMINECTOMY SYNDROME)    Surgeons: Margarito Goode M.D. Responsible Provider: Jay Gonzalez M.D.    Anesthesia Type: general ASA Status: 3          Final Anesthesia Type: general  Last vitals  BP   Blood Pressure : 131/71    Temp   36.5 °C (97.7 °F)    Pulse   67   Resp   18    SpO2   94 %      Anesthesia Post Evaluation    Patient location during evaluation: PACU  Patient participation: complete - patient participated  Level of consciousness: awake and alert  Pain score: 4    Airway patency: patent  Anesthetic complications: no  Cardiovascular status: hemodynamically stable  Respiratory status: acceptable  Hydration status: euvolemic    PONV: none          No complications documented.     Nurse Pain Score: 3 (NPRS)        
no

## 2021-11-09 ENCOUNTER — APPOINTMENT (OUTPATIENT)
Dept: VASCULAR SURGERY | Facility: CLINIC | Age: 86
End: 2021-11-09

## 2022-05-22 ENCOUNTER — INPATIENT (INPATIENT)
Facility: HOSPITAL | Age: 87
LOS: 4 days | Discharge: NURSING, REHAB OR LONG-TERM CARE | End: 2022-05-27
Attending: INTERNAL MEDICINE | Admitting: INTERNAL MEDICINE
Payer: MEDICARE

## 2022-05-22 VITALS
WEIGHT: 110.01 LBS | HEART RATE: 68 BPM | SYSTOLIC BLOOD PRESSURE: 118 MMHG | DIASTOLIC BLOOD PRESSURE: 79 MMHG | OXYGEN SATURATION: 99 % | RESPIRATION RATE: 18 BRPM | TEMPERATURE: 98 F

## 2022-05-22 DIAGNOSIS — Z98.89 OTHER SPECIFIED POSTPROCEDURAL STATES: Chronic | ICD-10-CM

## 2022-05-22 LAB
ALBUMIN SERPL ELPH-MCNC: 3.1 G/DL — LOW (ref 3.3–5)
ALP SERPL-CCNC: 76 U/L — SIGNIFICANT CHANGE UP (ref 40–120)
ALT FLD-CCNC: 18 U/L — SIGNIFICANT CHANGE UP (ref 12–78)
ANION GAP SERPL CALC-SCNC: 9 MMOL/L — SIGNIFICANT CHANGE UP (ref 5–17)
APPEARANCE UR: CLEAR — SIGNIFICANT CHANGE UP
APTT BLD: 28.3 SEC — SIGNIFICANT CHANGE UP (ref 27.5–35.5)
AST SERPL-CCNC: 15 U/L — SIGNIFICANT CHANGE UP (ref 15–37)
BACTERIA # UR AUTO: ABNORMAL
BASOPHILS # BLD AUTO: 0.02 K/UL — SIGNIFICANT CHANGE UP (ref 0–0.2)
BASOPHILS NFR BLD AUTO: 0.2 % — SIGNIFICANT CHANGE UP (ref 0–2)
BILIRUB SERPL-MCNC: 1.7 MG/DL — HIGH (ref 0.2–1.2)
BILIRUB UR-MCNC: NEGATIVE — SIGNIFICANT CHANGE UP
BUN SERPL-MCNC: 34 MG/DL — HIGH (ref 7–23)
CALCIUM SERPL-MCNC: 9 MG/DL — SIGNIFICANT CHANGE UP (ref 8.5–10.1)
CHLORIDE SERPL-SCNC: 102 MMOL/L — SIGNIFICANT CHANGE UP (ref 96–108)
CK MB BLD-MCNC: 5.7 % — HIGH (ref 0–3.5)
CK MB CFR SERPL CALC: 5 NG/ML — HIGH (ref 0.5–3.6)
CK SERPL-CCNC: 87 U/L — SIGNIFICANT CHANGE UP (ref 26–308)
CO2 SERPL-SCNC: 25 MMOL/L — SIGNIFICANT CHANGE UP (ref 22–31)
COLOR SPEC: YELLOW — SIGNIFICANT CHANGE UP
CREAT SERPL-MCNC: 3.25 MG/DL — HIGH (ref 0.5–1.3)
DIFF PNL FLD: ABNORMAL
EGFR: 17 ML/MIN/1.73M2 — LOW
EOSINOPHIL # BLD AUTO: 0 K/UL — SIGNIFICANT CHANGE UP (ref 0–0.5)
EOSINOPHIL NFR BLD AUTO: 0 % — SIGNIFICANT CHANGE UP (ref 0–6)
FLUAV AG NPH QL: SIGNIFICANT CHANGE UP
FLUBV AG NPH QL: SIGNIFICANT CHANGE UP
GLUCOSE BLDC GLUCOMTR-MCNC: 235 MG/DL — HIGH (ref 70–99)
GLUCOSE SERPL-MCNC: 295 MG/DL — HIGH (ref 70–99)
GLUCOSE UR QL: 100 MG/DL
HCT VFR BLD CALC: 38 % — LOW (ref 39–50)
HGB BLD-MCNC: 12.9 G/DL — LOW (ref 13–17)
IMM GRANULOCYTES NFR BLD AUTO: 0.5 % — SIGNIFICANT CHANGE UP (ref 0–1.5)
INR BLD: 1.12 RATIO — SIGNIFICANT CHANGE UP (ref 0.88–1.16)
KETONES UR-MCNC: NEGATIVE — SIGNIFICANT CHANGE UP
LACTATE SERPL-SCNC: 1.8 MMOL/L — SIGNIFICANT CHANGE UP (ref 0.7–2)
LEUKOCYTE ESTERASE UR-ACNC: ABNORMAL
LYMPHOCYTES # BLD AUTO: 0.87 K/UL — LOW (ref 1–3.3)
LYMPHOCYTES # BLD AUTO: 8.1 % — LOW (ref 13–44)
MCHC RBC-ENTMCNC: 31.2 PG — SIGNIFICANT CHANGE UP (ref 27–34)
MCHC RBC-ENTMCNC: 33.9 G/DL — SIGNIFICANT CHANGE UP (ref 32–36)
MCV RBC AUTO: 92 FL — SIGNIFICANT CHANGE UP (ref 80–100)
MONOCYTES # BLD AUTO: 0.67 K/UL — SIGNIFICANT CHANGE UP (ref 0–0.9)
MONOCYTES NFR BLD AUTO: 6.2 % — SIGNIFICANT CHANGE UP (ref 2–14)
NEUTROPHILS # BLD AUTO: 9.12 K/UL — HIGH (ref 1.8–7.4)
NEUTROPHILS NFR BLD AUTO: 85 % — HIGH (ref 43–77)
NITRITE UR-MCNC: POSITIVE
NRBC # BLD: 0 /100 WBCS — SIGNIFICANT CHANGE UP (ref 0–0)
PH UR: 6 — SIGNIFICANT CHANGE UP (ref 5–8)
PLATELET # BLD AUTO: 144 K/UL — LOW (ref 150–400)
POTASSIUM SERPL-MCNC: 4.7 MMOL/L — SIGNIFICANT CHANGE UP (ref 3.5–5.3)
POTASSIUM SERPL-SCNC: 4.7 MMOL/L — SIGNIFICANT CHANGE UP (ref 3.5–5.3)
PROT SERPL-MCNC: 6.6 GM/DL — SIGNIFICANT CHANGE UP (ref 6–8.3)
PROT UR-MCNC: 100 MG/DL
PROTHROM AB SERPL-ACNC: 13.3 SEC — SIGNIFICANT CHANGE UP (ref 10.5–13.4)
RBC # BLD: 4.13 M/UL — LOW (ref 4.2–5.8)
RBC # FLD: 13.6 % — SIGNIFICANT CHANGE UP (ref 10.3–14.5)
RBC CASTS # UR COMP ASSIST: ABNORMAL /HPF (ref 0–4)
SARS-COV-2 RNA SPEC QL NAA+PROBE: SIGNIFICANT CHANGE UP
SODIUM SERPL-SCNC: 136 MMOL/L — SIGNIFICANT CHANGE UP (ref 135–145)
SP GR SPEC: 1.01 — SIGNIFICANT CHANGE UP (ref 1.01–1.02)
TROPONIN I, HIGH SENSITIVITY RESULT: 26.5 NG/L — SIGNIFICANT CHANGE UP
UROBILINOGEN FLD QL: 1 MG/DL
WBC # BLD: 10.73 K/UL — HIGH (ref 3.8–10.5)
WBC # FLD AUTO: 10.73 K/UL — HIGH (ref 3.8–10.5)
WBC UR QL: ABNORMAL

## 2022-05-22 PROCEDURE — 93010 ELECTROCARDIOGRAM REPORT: CPT

## 2022-05-22 PROCEDURE — 99285 EMERGENCY DEPT VISIT HI MDM: CPT

## 2022-05-22 PROCEDURE — 99222 1ST HOSP IP/OBS MODERATE 55: CPT

## 2022-05-22 PROCEDURE — 71045 X-RAY EXAM CHEST 1 VIEW: CPT | Mod: 26

## 2022-05-22 PROCEDURE — 70450 CT HEAD/BRAIN W/O DYE: CPT | Mod: 26,MA

## 2022-05-22 RX ORDER — SODIUM CHLORIDE 9 MG/ML
1000 INJECTION INTRAMUSCULAR; INTRAVENOUS; SUBCUTANEOUS ONCE
Refills: 0 | Status: COMPLETED | OUTPATIENT
Start: 2022-05-22 | End: 2022-05-22

## 2022-05-22 RX ORDER — CEFTRIAXONE 500 MG/1
1000 INJECTION, POWDER, FOR SOLUTION INTRAMUSCULAR; INTRAVENOUS ONCE
Refills: 0 | Status: COMPLETED | OUTPATIENT
Start: 2022-05-22 | End: 2022-05-22

## 2022-05-22 RX ORDER — SODIUM CHLORIDE 9 MG/ML
1000 INJECTION INTRAMUSCULAR; INTRAVENOUS; SUBCUTANEOUS ONCE
Refills: 0 | Status: DISCONTINUED | OUTPATIENT
Start: 2022-05-22 | End: 2022-05-26

## 2022-05-22 RX ADMIN — SODIUM CHLORIDE 1000 MILLILITER(S): 9 INJECTION INTRAMUSCULAR; INTRAVENOUS; SUBCUTANEOUS at 23:18

## 2022-05-22 RX ADMIN — SODIUM CHLORIDE 1000 MILLILITER(S): 9 INJECTION INTRAMUSCULAR; INTRAVENOUS; SUBCUTANEOUS at 20:23

## 2022-05-22 RX ADMIN — CEFTRIAXONE 100 MILLIGRAM(S): 500 INJECTION, POWDER, FOR SOLUTION INTRAMUSCULAR; INTRAVENOUS at 22:56

## 2022-05-22 RX ADMIN — CEFTRIAXONE 1000 MILLIGRAM(S): 500 INJECTION, POWDER, FOR SOLUTION INTRAMUSCULAR; INTRAVENOUS at 23:18

## 2022-05-22 NOTE — ED ADULT NURSE NOTE - OBJECTIVE STATEMENT
pt is 89 y/o male c/c of AMS, onset last night, "woke up fine this morning" as per nicola Marvin at bedside. BIBA. as per daughter Shavonne states pt is mostly independent as baseline, ambulates with walker. as per EMS, on the field pt was SOB, desatting on RA, hypotensive and bradycardic to 30s HR. Pt presently AAOX3, received with NRB 15L on. fall with harm precautions maintained. PMH diabetes II, HTN, cardiac stents, prostate cancer, HDL, BPH.

## 2022-05-22 NOTE — ED PROVIDER NOTE - OBJECTIVE STATEMENT
89 year old male with h/o HTN, DM, prostate cancer, HLD and BPH presents today biba accompanied with his daughter who reports that he has not been himself all day, pt is slow at baseline and uses a walker, but today appears slower than usual, not getting out of bed, initially thought his blood sugar was normal, but found to be normal, pt did eat soup in the morning, when he attempted to eat lunch pt did vomit, appeared very weak and as if he was going to pass out, as per EMS pt was sob, found to have abnormal vitals in the field 80/50, HR-31, O2 sat- 34% (-) fevers (-) diarrhea (-) chest pain (-) sob +poor po intake (-) leg edema, in route pt did receive ivf and placed on NRB, vitals stable upon arrival to the ER

## 2022-05-22 NOTE — ED PROVIDER NOTE - PROGRESS NOTE DETAILS
MARYLU Tucker, Attending: sign out from Sumeet. In brief- plan for admission for cystitis/MARILYN (baseline Scr 1.7-1.8) and weakness/confusion. Rest of workup reassuring. Given abx/crystalloid. Stable for floor. Daughter aware of plan. Spoke to Dr. Mike.

## 2022-05-22 NOTE — ED ADULT TRIAGE NOTE - CHIEF COMPLAINT QUOTE
BIBA as per patients daughter, patient had episode of AMS< alert, but not responding, around 12-1. Found by EMS satting 34% on RA, 31HR and BP 80/50. Satting 99% on non-rebreather. Per daughter patient back to baseline. BIBA as per patients daughter, patient had episode of AMS< alert, but not responding, around 12-1. Found by EMS satting 34% on RA, 31HR and BP 80/50. In triage pt Satting 99% on non-rebreather. Per daughter patient back to baseline.

## 2022-05-22 NOTE — ED PROVIDER NOTE - ENMT, MLM
15 yo female brought in by paramedics after concern about alcohol intoxication. She was found passed out next to a building. She is with a woman who was not related to her, this woman became concerned when she saw her on the ground. The woman reports that she is former [employer] and that this patient is an [employer] girl currently on [employer]. Patient gives her name, admits to drinking alcohol, was not trying to hurt herself. Cannot provide significant history beyond that. We are reaching out to find to the patient's family, we do have a name and phone number and staff is try to make contact with them. This is a new problem, persistent, severe, unknown duration, associated with third alcohol consumption reportedly.                   No family history on file. No past surgical history on file. Review of Systems   Unable to perform ROS: Acuity of condition        Physical Exam  Constitutional:       General: She is not in acute distress. Appearance: She is well-developed. HENT:      Head: Normocephalic and atraumatic. Eyes:      Conjunctiva/sclera: Conjunctivae normal.      Pupils: Pupils are equal, round, and reactive to light. Neck:      Thyroid: No thyromegaly. Cardiovascular:      Rate and Rhythm: Normal rate and regular rhythm. Pulmonary:      Effort: Pulmonary effort is normal. No respiratory distress. Breath sounds: Normal breath sounds. Abdominal:      General: There is no distension. Palpations: Abdomen is soft. Tenderness: There is no abdominal tenderness. There is no guarding or rebound. Musculoskeletal:         General: No tenderness. Normal range of motion. Cervical back: Normal range of motion. Skin:     General: Skin is warm and dry. Findings: No erythema. Neurological:      Mental Status: She is alert. Cranial Nerves: No cranial nerve deficit.       Coordination: Coordination normal.   Psychiatric:      Comments: Intermittently agitated and then sleeping          Procedures     MDM     ED Course as of Jun 30 0308   Sun Jun 27, 2021 0249 Patient's father is now at the bedside, her sister is also at the bedside. Father would like to know what the alcohol level is and if there are any other drugs in her system. [SO]   [id number] Additional information obtained that the patient was at a party, she did not actually pass out or lose consciousness, is brought by someone who is known to the family as reported by the sister.    [SO]   3446 Patient father did not want imaging of the head. Alcohol level came back at 128. In no distress and has been resting comfortably.    [SO]      ED Course User Index  [SO] Sam Appiah DO        ED Course as of Jun 30 0309   Sun Jun 27, 2021 0249 Patient's father is now at the bedside, her sister is also at the bedside. Father would like to know what the alcohol level is and if there are any other drugs in her system. [SO]   [id number] Additional information obtained that the patient was at a party, she did not actually pass out or lose consciousness, is brought by someone who is known to the family as reported by the sister.    [SO]   0138 Patient father did not want imaging of the head. Alcohol level came back at 128. In no distress and has been resting comfortably.    [SO]      ED Course User Index  [SO] Sam Appiah, DO       --------------------------------------------- PAST HISTORY ---------------------------------------------  Past Medical History:  has no past medical history on file. Past Surgical History:  has no past surgical history on file. Social History:      Family History: family history is not on file. The patients home medications have been reviewed. Allergies: Patient has no known allergies.     -------------------------------------------------- RESULTS -------------------------------------------------  Labs:  Results for orders placed or performed during the hospital encounter of 06/27/21   CBC auto differential   Result Value Ref Range    WBC 8.6 4.5 - 11.5 E9/L    RBC 4.18 3.50 - 5.50 E12/L    Hemoglobin 12.7 11.5 - 15.5 g/dL    Hematocrit 36.6 34.0 - 48.0 %    MCV 87.6 80.0 - 99.9 fL    MCH 30.4 26.0 - 35.0 pg    MCHC 34.7 (H) 32.0 - 34.5 %    RDW 12.3 11.5 - 15.0 fL    Platelets 347 668 - 808 E9/L    MPV 10.6 7.0 - 12.0 fL    Neutrophils % 76.2 43.0 - 80.0 %    Immature Granulocytes % 0.3 0.0 - 5.0 %    Lymphocytes % 16.7 (L) 20.0 - 42.0 %    Monocytes % 6.1 2.0 - 12.0 %    Eosinophils % 0.2 0.0 - 6.0 %    Basophils % 0.5 0.0 - 2.0 %    Neutrophils Absolute 6.56 1.80 - 7.30 E9/L    Immature Granulocytes # 0.03 E9/L    Lymphocytes Absolute 1.44 (L) 1.50 - 4.00 E9/L    Monocytes Absolute 0.53 0.10 - 0.95 E9/L    Eosinophils Absolute 0.02 (L) 0.05 - 0.50 E9/L    Basophils Absolute 0.04 0.00 - 0.20 E9/L   Comprehensive metabolic panel   Result Value Ref Range    Sodium 141 132 - 146 mmol/L    Potassium 4.6 3.5 - 5.0 mmol/L    Chloride 104 98 - 107 mmol/L    CO2 25 22 - 29 mmol/L    Anion Gap 12 7 - 16 mmol/L    Glucose 110 55 - 110 mg/dL    BUN 11 5 - 18 mg/dL    CREATININE 0.8 0.4 - 1.2 mg/dL    GFR Non-African American >60 >=60 mL/min/1.73    GFR African American >60     Calcium 9.1 8.6 - 10.2 mg/dL    Total Protein 7.1 6.4 - 8.3 g/dL    Albumin 4.6 (H) 3.2 - 4.5 g/dL    Total Bilirubin 0.6 0.0 - 1.2 mg/dL    Alkaline Phosphatase 69 35 - 104 U/L    ALT 16 0 - 32 U/L    AST 20 0 - 31 U/L   HCG, Quantitative, Pregnancy   Result Value Ref Range    hCG Quant <0.1 <10 mIU/mL   Serum Drug Screen   Result Value Ref Range    Ethanol Lvl 128 (HH) mg/dL    Acetaminophen Level <5.0 (L) 10.0 - 83.0 mcg/mL    Salicylate, Serum <3.1 0.0 - 30.0 mg/dL    TCA Scrn NEGATIVE Cutoff:300 ng/mL   Urine Drug Screen   Result Value Ref Range    Amphetamine Screen, Urine NOT DETECTED Negative <1000 ng/mL    Barbiturate Screen, Ur NOT DETECTED Negative < 200 ng/mL    Benzodiazepine Screen, Urine NOT DETECTED Negative < 200 ng/mL Cannabinoid Scrn, Ur NOT DETECTED Negative < 50ng/mL    Cocaine Metabolite Screen, Urine NOT DETECTED Negative < 300 ng/mL    Opiate Scrn, Ur NOT DETECTED Negative < 300ng/mL    PCP Screen, Urine NOT DETECTED Negative < 25 ng/mL    Methadone Screen, Urine NOT DETECTED Negative <300 ng/mL    Oxycodone Urine NOT DETECTED Negative <100 ng/mL    FENTANYL SCREEN, URINE NOT DETECTED Negative <1 ng/mL    Drug Screen Comment: see below    POC Pregnancy Urine Qual   Result Value Ref Range    HCG, Urine, POC Negative Negative    Lot Number [id number]     Positive QC Pass/Fail Pass     Negative QC Pass/Fail Pass        Radiology:  No orders to display       ------------------------- NURSING NOTES AND VITALS REVIEWED ---------------------------  Date / Time Roomed:  6/27/2021 12:50 AM  ED Bed Assignment:  [place] [id number]    The nursing notes within the ED encounter and vital signs as below have been reviewed. /75   Pulse 85   Temp 97.4 °F (36.3 °C) (Oral)   Resp 20   SpO2 99%   Oxygen Saturation Interpretation: Normal      ------------------------------------------ PROGRESS NOTES ------------------------------------------  I have spoken with the father and discussed todays results, in addition to providing specific details for the plan of care and counseling regarding the diagnosis and prognosis. Their questions are answered at this time and they are agreeable with the plan. I discussed at length with them reasons for immediate return here for re evaluation. They will followup with primary care by calling their office tomorrow. --------------------------------- ADDITIONAL PROVIDER NOTES ---------------------------------  At this time the patient is without objective evidence of an acute process requiring hospitalization or inpatient management. They have remained hemodynamically stable throughout their entire ED visit and are stable for discharge with outpatient follow-up.      The plan has been discussed in detail and they are aware of the specific conditions for emergent return, as well as the importance of follow-up. There are no discharge medications for this patient. Diagnosis:  1. Acute alcoholic intoxication without complication ([facility]        Disposition:  Patient's disposition: Discharge to home  Patient's condition is stable.             Sandy Castañeda DO  06/30/21 1986 Airway patent

## 2022-05-22 NOTE — ED ADULT NURSE NOTE - CHIEF COMPLAINT QUOTE
BIBA as per patients daughter, patient had episode of AMS< alert, but not responding, around 12-1. Found by EMS satting 34% on RA, 31HR and BP 80/50. Satting 99% on non-rebreather. Per daughter patient back to baseline.

## 2022-05-22 NOTE — ED ADULT NURSE NOTE - NSICDXPASTMEDICALHX_GEN_ALL_CORE_FT
PAST MEDICAL HISTORY:  BPH without urinary obstruction     High cholesterol     HTN (hypertension)     Prostate CA     Type 2 diabetes mellitus

## 2022-05-22 NOTE — ED PROVIDER NOTE - CONSTITUTIONAL, MLM
normal... Well appearing, awake, alert, oriented to person, place, time/situation and in no apparent distress, able to follow commands, nonlethargic appearing, not on oxygen

## 2022-05-23 DIAGNOSIS — E78.5 HYPERLIPIDEMIA, UNSPECIFIED: ICD-10-CM

## 2022-05-23 DIAGNOSIS — G93.41 METABOLIC ENCEPHALOPATHY: ICD-10-CM

## 2022-05-23 DIAGNOSIS — E03.9 HYPOTHYROIDISM, UNSPECIFIED: ICD-10-CM

## 2022-05-23 DIAGNOSIS — E11.9 TYPE 2 DIABETES MELLITUS WITHOUT COMPLICATIONS: ICD-10-CM

## 2022-05-23 DIAGNOSIS — N40.0 BENIGN PROSTATIC HYPERPLASIA WITHOUT LOWER URINARY TRACT SYMPTOMS: ICD-10-CM

## 2022-05-23 DIAGNOSIS — N17.9 ACUTE KIDNEY FAILURE, UNSPECIFIED: ICD-10-CM

## 2022-05-23 DIAGNOSIS — I73.9 PERIPHERAL VASCULAR DISEASE, UNSPECIFIED: ICD-10-CM

## 2022-05-23 DIAGNOSIS — N39.0 URINARY TRACT INFECTION, SITE NOT SPECIFIED: ICD-10-CM

## 2022-05-23 DIAGNOSIS — Z29.9 ENCOUNTER FOR PROPHYLACTIC MEASURES, UNSPECIFIED: ICD-10-CM

## 2022-05-23 LAB
A1C WITH ESTIMATED AVERAGE GLUCOSE RESULT: 11 % — HIGH (ref 4–5.6)
ANION GAP SERPL CALC-SCNC: 8 MMOL/L — SIGNIFICANT CHANGE UP (ref 5–17)
BUN SERPL-MCNC: 33 MG/DL — HIGH (ref 7–23)
CALCIUM SERPL-MCNC: 9.1 MG/DL — SIGNIFICANT CHANGE UP (ref 8.5–10.1)
CHLORIDE SERPL-SCNC: 105 MMOL/L — SIGNIFICANT CHANGE UP (ref 96–108)
CO2 SERPL-SCNC: 24 MMOL/L — SIGNIFICANT CHANGE UP (ref 22–31)
CREAT SERPL-MCNC: 2.36 MG/DL — HIGH (ref 0.5–1.3)
EGFR: 26 ML/MIN/1.73M2 — LOW
ESTIMATED AVERAGE GLUCOSE: 269 MG/DL — HIGH (ref 68–114)
GLUCOSE BLDC GLUCOMTR-MCNC: 110 MG/DL — HIGH (ref 70–99)
GLUCOSE BLDC GLUCOMTR-MCNC: 135 MG/DL — HIGH (ref 70–99)
GLUCOSE BLDC GLUCOMTR-MCNC: 145 MG/DL — HIGH (ref 70–99)
GLUCOSE BLDC GLUCOMTR-MCNC: 176 MG/DL — HIGH (ref 70–99)
GLUCOSE BLDC GLUCOMTR-MCNC: 214 MG/DL — HIGH (ref 70–99)
GLUCOSE BLDC GLUCOMTR-MCNC: 52 MG/DL — CRITICAL LOW (ref 70–99)
GLUCOSE BLDC GLUCOMTR-MCNC: 58 MG/DL — LOW (ref 70–99)
GLUCOSE SERPL-MCNC: 64 MG/DL — LOW (ref 70–99)
POTASSIUM SERPL-MCNC: 4.1 MMOL/L — SIGNIFICANT CHANGE UP (ref 3.5–5.3)
POTASSIUM SERPL-SCNC: 4.1 MMOL/L — SIGNIFICANT CHANGE UP (ref 3.5–5.3)
PSA FLD-MCNC: 1.94 NG/ML — SIGNIFICANT CHANGE UP (ref 0–4)
SODIUM SERPL-SCNC: 137 MMOL/L — SIGNIFICANT CHANGE UP (ref 135–145)

## 2022-05-23 PROCEDURE — 99233 SBSQ HOSP IP/OBS HIGH 50: CPT

## 2022-05-23 RX ORDER — INSULIN LISPRO 100/ML
VIAL (ML) SUBCUTANEOUS
Refills: 0 | Status: DISCONTINUED | OUTPATIENT
Start: 2022-05-23 | End: 2022-05-23

## 2022-05-23 RX ORDER — SODIUM CHLORIDE 9 MG/ML
1000 INJECTION, SOLUTION INTRAVENOUS
Refills: 0 | Status: DISCONTINUED | OUTPATIENT
Start: 2022-05-23 | End: 2022-05-25

## 2022-05-23 RX ORDER — CEFTRIAXONE 500 MG/1
1000 INJECTION, POWDER, FOR SOLUTION INTRAMUSCULAR; INTRAVENOUS EVERY 24 HOURS
Refills: 0 | Status: COMPLETED | OUTPATIENT
Start: 2022-05-23 | End: 2022-05-25

## 2022-05-23 RX ORDER — DEXTROSE 50 % IN WATER 50 %
25 SYRINGE (ML) INTRAVENOUS ONCE
Refills: 0 | Status: DISCONTINUED | OUTPATIENT
Start: 2022-05-23 | End: 2022-05-25

## 2022-05-23 RX ORDER — DEXTROSE 50 % IN WATER 50 %
25 SYRINGE (ML) INTRAVENOUS ONCE
Refills: 0 | Status: COMPLETED | OUTPATIENT
Start: 2022-05-23 | End: 2022-05-23

## 2022-05-23 RX ORDER — GLUCAGON INJECTION, SOLUTION 0.5 MG/.1ML
1 INJECTION, SOLUTION SUBCUTANEOUS ONCE
Refills: 0 | Status: DISCONTINUED | OUTPATIENT
Start: 2022-05-23 | End: 2022-05-25

## 2022-05-23 RX ORDER — HEPARIN SODIUM 5000 [USP'U]/ML
5000 INJECTION INTRAVENOUS; SUBCUTANEOUS EVERY 12 HOURS
Refills: 0 | Status: DISCONTINUED | OUTPATIENT
Start: 2022-05-23 | End: 2022-05-27

## 2022-05-23 RX ORDER — INSULIN LISPRO 100/ML
VIAL (ML) SUBCUTANEOUS
Refills: 0 | Status: DISCONTINUED | OUTPATIENT
Start: 2022-05-23 | End: 2022-05-25

## 2022-05-23 RX ORDER — DEXTROSE 50 % IN WATER 50 %
15 SYRINGE (ML) INTRAVENOUS ONCE
Refills: 0 | Status: DISCONTINUED | OUTPATIENT
Start: 2022-05-23 | End: 2022-05-25

## 2022-05-23 RX ORDER — TAMSULOSIN HYDROCHLORIDE 0.4 MG/1
0.4 CAPSULE ORAL AT BEDTIME
Refills: 0 | Status: DISCONTINUED | OUTPATIENT
Start: 2022-05-23 | End: 2022-05-27

## 2022-05-23 RX ORDER — DEXTROSE 50 % IN WATER 50 %
12.5 SYRINGE (ML) INTRAVENOUS ONCE
Refills: 0 | Status: COMPLETED | OUTPATIENT
Start: 2022-05-23 | End: 2022-05-23

## 2022-05-23 RX ORDER — DEXTROSE 50 % IN WATER 50 %
12.5 SYRINGE (ML) INTRAVENOUS ONCE
Refills: 0 | Status: DISCONTINUED | OUTPATIENT
Start: 2022-05-23 | End: 2022-05-25

## 2022-05-23 RX ORDER — ATORVASTATIN CALCIUM 80 MG/1
20 TABLET, FILM COATED ORAL AT BEDTIME
Refills: 0 | Status: DISCONTINUED | OUTPATIENT
Start: 2022-05-23 | End: 2022-05-27

## 2022-05-23 RX ORDER — LEVOTHYROXINE SODIUM 125 MCG
50 TABLET ORAL DAILY
Refills: 0 | Status: DISCONTINUED | OUTPATIENT
Start: 2022-05-23 | End: 2022-05-27

## 2022-05-23 RX ORDER — CILOSTAZOL 100 MG/1
100 TABLET ORAL
Refills: 0 | Status: DISCONTINUED | OUTPATIENT
Start: 2022-05-23 | End: 2022-05-27

## 2022-05-23 RX ORDER — FINASTERIDE 5 MG/1
5 TABLET, FILM COATED ORAL DAILY
Refills: 0 | Status: DISCONTINUED | OUTPATIENT
Start: 2022-05-23 | End: 2022-05-27

## 2022-05-23 RX ORDER — SODIUM CHLORIDE 9 MG/ML
500 INJECTION, SOLUTION INTRAVENOUS
Refills: 0 | Status: COMPLETED | OUTPATIENT
Start: 2022-05-23 | End: 2022-05-23

## 2022-05-23 RX ORDER — ASPIRIN/CALCIUM CARB/MAGNESIUM 324 MG
81 TABLET ORAL DAILY
Refills: 0 | Status: DISCONTINUED | OUTPATIENT
Start: 2022-05-23 | End: 2022-05-27

## 2022-05-23 RX ADMIN — CILOSTAZOL 100 MILLIGRAM(S): 100 TABLET ORAL at 06:25

## 2022-05-23 RX ADMIN — SODIUM CHLORIDE 100 MILLILITER(S): 9 INJECTION, SOLUTION INTRAVENOUS at 02:13

## 2022-05-23 RX ADMIN — FINASTERIDE 5 MILLIGRAM(S): 5 TABLET, FILM COATED ORAL at 11:22

## 2022-05-23 RX ADMIN — Medication 50 MICROGRAM(S): at 06:25

## 2022-05-23 RX ADMIN — HEPARIN SODIUM 5000 UNIT(S): 5000 INJECTION INTRAVENOUS; SUBCUTANEOUS at 17:16

## 2022-05-23 RX ADMIN — ATORVASTATIN CALCIUM 20 MILLIGRAM(S): 80 TABLET, FILM COATED ORAL at 21:10

## 2022-05-23 RX ADMIN — CEFTRIAXONE 100 MILLIGRAM(S): 500 INJECTION, POWDER, FOR SOLUTION INTRAMUSCULAR; INTRAVENOUS at 21:11

## 2022-05-23 RX ADMIN — HEPARIN SODIUM 5000 UNIT(S): 5000 INJECTION INTRAVENOUS; SUBCUTANEOUS at 06:25

## 2022-05-23 RX ADMIN — Medication 81 MILLIGRAM(S): at 11:22

## 2022-05-23 RX ADMIN — CILOSTAZOL 100 MILLIGRAM(S): 100 TABLET ORAL at 17:16

## 2022-05-23 RX ADMIN — Medication 1: at 11:22

## 2022-05-23 RX ADMIN — Medication 12.5 GRAM(S): at 08:03

## 2022-05-23 RX ADMIN — TAMSULOSIN HYDROCHLORIDE 0.4 MILLIGRAM(S): 0.4 CAPSULE ORAL at 21:10

## 2022-05-23 NOTE — PHYSICAL THERAPY INITIAL EVALUATION ADULT - PASSIVE RANGE OF MOTION EXAMINATION, REHAB EVAL
+b/l foot drop/bilateral upper extremity Passive ROM was WFL (within functional limits)/bilateral lower extremity Passive ROM was WFL (within functional limits)

## 2022-05-23 NOTE — H&P ADULT - NSHPPHYSICALEXAM_GEN_ALL_CORE
Physical exam:  General: elderly frail male in no acute distress, resting comfortably  Head:  Atraumatic, Normocephalic  Eyes: EOMI, PERRLA, clear sclera  Neck: Supple, thyroid nontender, non enlarged  Cardio: S1/S2 +ve, regular rate and rhythm, no M/G/R  Resp: clear to ausculation bilaterally, no rales or wheezes  GI: abdomen soft, nontender, non distended, no guarding, BS +ve x 4  Ext: no significant pedal edema  Neuro: CN 2-12 intact, no significant motor or sensory deficits.  Skin: No rashes or lesions

## 2022-05-23 NOTE — PROGRESS NOTE ADULT - ASSESSMENT
HPI: Patient is an 89M with a PMH of HTN, DM, CAD s/p stent, h/o prostate ca s/p TURP, who presents to the ED for AMS.  Patient is a limited historian, daughter - Maegan - at the bedside to provide history.  Patient noted to be less responsive and increasingly disoriented earlier today.  Also had poor PO intake, prompting daughter to bring him to the hospital.  Patient reportedly hypotensive in the field, responded to IV fluids with EMS.  Patient currently has no complaints, back to baseline mental status as per daughter.    Tachycardic in ED to 118, labs show leukocytosis and elevated creatinine.       Problem/Plan - 1:  ·  Problem: Acute Metabolic encephalopathy with UTI.   ·  Plan: confusion improved. cont iv ceftriaxone and follow final culture results  cont IVF.     Problem/Plan - 2:  ·  Problem: UTI (urinary tract infection).   ·  Plan: cont antibiotic.     Problem/Plan - 3:  ·  Problem: MARILYN (acute kidney injury).   ·  Plan: Likely pre-renal. Improving but still not at baseline. cont IVF. Trend creatinine.     Problem/Plan - 4:  ·  Problem: BPH without urinary obstruction.   ·  Plan: on finasteride and tamsulosin however is s/p turp      Problem/Plan - 5:  ·  Problem: Hypothyroidism.   ·  Plan: synthroid.    Problem/Plan - 6:  ·  Problem: Diabetes mellitus type 2. At risk of hypoglycemia.    ·  Plan: cont correctional insulin. Follow finger sticks.     Problem/Plan - 7:  ·  Problem: Hyperlipidemia.   ·  Plan: Cont lipitor.     Problem/Plan - 8:  ·  Problem: PAD (peripheral artery disease).   ·  Plan: cilostazol.    Problem/Plan - 9:  ·  Problem: Preventive measure.   ·  Plan: VTE prop: heparin sc q12 hrs.    PT eval.  HPI: Patient is an 89M with a PMH of HTN, DM, CAD s/p stent, h/o prostate ca s/p TURP, who presents to the ED for AMS.  Patient is a limited historian, daughter - Maegan - at the bedside to provide history.  Patient noted to be less responsive and increasingly disoriented earlier today.  Also had poor PO intake, prompting daughter to bring him to the hospital.  Patient reportedly hypotensive in the field, responded to IV fluids with EMS.  Patient currently has no complaints, back to baseline mental status as per daughter.    Tachycardic in ED to 118, labs show leukocytosis and elevated creatinine.       Problem/Plan - 1:  ·  Problem: Acute Metabolic encephalopathy with UTI.   ·  Plan: confusion improved. cont iv ceftriaxone and follow final culture results  cont IVF.     Problem/Plan - 2:  ·  Problem: UTI (urinary tract infection).   ·  Plan: cont antibiotic.     Problem/Plan - 3:  ·  Problem: MARILYN (acute kidney injury).   ·  Plan: Likely pre-renal. Improving but still not at baseline. cont IVF. Trend creatinine.     Problem/Plan - 4:  ·  Problem: BPH without urinary obstruction.   ·  Plan: on finasteride and tamsulosin however is s/p turp      Problem/Plan - 5:  ·  Problem: Hypothyroidism.   ·  Plan: synthroid.    Problem/Plan - 6:  ·  Problem: Diabetes mellitus type 2. At risk of hypoglycemia.    ·  Plan: cont correctional insulin. Follow finger sticks. No oral meds.     Problem/Plan - 7:  ·  Problem: Hyperlipidemia.   ·  Plan: Cont lipitor.     Problem/Plan - 8:  ·  Problem: PAD (peripheral artery disease).   ·  Plan: cilostazol.    Problem/Plan - 9:  ·  Problem: Preventive measure.   ·  Plan: VTE prop: heparin sc q12 hrs.    PT eval.

## 2022-05-23 NOTE — PROVIDER CONTACT NOTE (HYPOGLYCEMIA EVENT) - NS PROVIDER CONTACT BACKGROUND-HYPO
Age: 89y    Gender: Male    POCT Blood Glucose:  135 mg/dL (05-23-22 @ 08:14)  58 mg/dL (05-23-22 @ 07:32)  52 mg/dL (05-23-22 @ 07:28)  110 mg/dL (05-23-22 @ 02:11)  235 mg/dL (05-22-22 @ 15:45)      eMAR:  dextrose 50% Injectable   12.5 Gram(s) IV Push (05-23-22 @ 08:03)    levothyroxine   50 MICROGram(s) Oral (05-23-22 @ 06:25)

## 2022-05-23 NOTE — PHYSICAL THERAPY INITIAL EVALUATION ADULT - GAIT TRAINING, PT EVAL
Pt will be able to ambulate using assistive device up to 200 ft or more, be able to negotiate 4 steps safely observing proper gait, posture and prevent falls.

## 2022-05-23 NOTE — H&P ADULT - ASSESSMENT
Patient is an 89M with a PMH of HTN, DM, CAD s/p stent, h/o prostate ca s/p TURP, who presents to the ED for AMS.  Patient is a limited historian, daughter - Maegan - at the bedside to provide history.  Patient noted to be less responsive and increasingly disoriented earlier today.  Also had poor PO intake, prompting daughter to bring him to the hospital.  Patient reportedly hypotensive in the field, responded to IV fluids with EMS.  Patient currently has no complaints, back to baseline mental status as per daughter.    Tachycardic in ED to 118, labs show leukocytosis and elevated creatinine.  Will admit to med surg.     IMPROVE VTE Individual Risk Assessment          RISK                                                          Points  [  ] Previous VTE                                                3  [  ] Thrombophilia                                             2  [  ] Lower limb paralysis                                    2        (unable to hold up >15 seconds)    [  ] Current Cancer                                             2         (within 6 months)  [  ] Immobilization > 24 hrs                              1  [  ] ICU/CCU stay > 24 hours                            1  [  ] Age > 60                                                    1    IMPROVE VTE Score - 1

## 2022-05-23 NOTE — H&P ADULT - PROBLEM SELECTOR PLAN 1
confusion, noted to have UTI on UA  back to baseline MS as per daughter  Started on ceftriaxone in ED.  Continue for now  Deescalate antibiotic when cultures return  PT bella

## 2022-05-23 NOTE — PHYSICAL THERAPY INITIAL EVALUATION ADULT - GENERAL OBSERVATIONS, REHAB EVAL
Patient received supine NAD. A x O name partial to place +time. Able to follow 1 step commands. +B/l foot drop . wearing diaper. +healed wounds on B LE

## 2022-05-23 NOTE — H&P ADULT - NSHPREVIEWOFSYSTEMS_GEN_ALL_CORE
Constitutional: no fever, chills, night sweats  Ears: no hearing changes or ear pain,   Nose: no nasal congestion, sinus pain, or rhinorrhea  Cardio: no chest pain, orthopnea, edema, or palpitations  Resp: no dyspnea, cough, wheezing  GI: no nausea, vomiting, diarrhea, constipation, hematochezia, or melena  : no dysuria, urinary frequency, hematuria  MSK: no back pain, neck pain  Skin: no rash, pruritis   Neuro: weakness positive.  No dizziness, lightheadedness, syncope   Heme/Lymph: no bruising or bleeding

## 2022-05-23 NOTE — H&P ADULT - HISTORY OF PRESENT ILLNESS
Patient is an 89M with a PMH of HTN, DM, CAD s/p stent, h/o prostate ca s/p TURP, who presents to the ED for AMS.  Patient is a limited historian, daughter - Maegan - at the bedside to provide history.  Patient noted to be less responsive and increasingly disoriented earlier today.  Also had poor PO intake, prompting daughter to bring him to the hospital.  Patient reportedly hypotensive in the field, responded to IV fluids with EMS.  Patient currently has no complaints, back to baseline mental status as per daughter.    Tachycardic in ED to 118, labs show leukocytosis and elevated creatinine.  Will admit to med surg.

## 2022-05-23 NOTE — PHYSICAL THERAPY INITIAL EVALUATION ADULT - CRITERIA FOR SKILLED THERAPEUTIC INTERVENTIONS
impairments found/functional limitations in following categories/therapy frequency/predicted duration of therapy intervention/anticipated discharge recommendation

## 2022-05-23 NOTE — H&P ADULT - NSHPLABSRESULTS_GEN_ALL_CORE
Recent Vitals  T(C): 36.7 (22 @ 00:28), Max: 36.7 (22 @ 00:28)  HR: 99 (22 @ 00:28) (68 - 118)  BP: 132/76 (22 @ 00:28) (108/57 - 132/76)  RR: 18 (22 @ 00:28) (16 - 18)  SpO2: 99% (22 @ 00:28) (99% - 100%)                        12.9   10.73 )-----------( 144      ( 22 May 2022 17:38 )             38.0         136  |  102  |  34<H>  ----------------------------<  295<H>  4.7   |  25  |  3.25<H>    Ca    9.0      22 May 2022 17:38    TPro  6.6  /  Alb  3.1<L>  /  TBili  1.7<H>  /  DBili  x   /  AST  15  /  ALT  18  /  AlkPhos  76      PT/INR - ( 22 May 2022 17:38 )   PT: 13.3 sec;   INR: 1.12 ratio         PTT - ( 22 May 2022 17:38 )  PTT:28.3 sec  LIVER FUNCTIONS - ( 22 May 2022 17:38 )  Alb: 3.1 g/dL / Pro: 6.6 gm/dL / ALK PHOS: 76 U/L / ALT: 18 U/L / AST: 15 U/L / GGT: x           Urinalysis Basic - ( 22 May 2022 21:39 )    Color: Yellow / Appearance: Clear / S.015 / pH: x  Gluc: x / Ketone: Negative  / Bili: Negative / Urobili: 1 mg/dL   Blood: x / Protein: 100 mg/dL / Nitrite: Positive   Leuk Esterase: Moderate / RBC: 3-5 /HPF / WBC 11-25   Sq Epi: x / Non Sq Epi: x / Bacteria: Many        Home Medications:  Aspirin Enteric Coated 81 mg oral delayed release tablet: 1 tab(s) orally once a day (22 May 2022 16:11)  atorvastatin 20 mg oral tablet: 1 tab(s) orally once a day (22 May 2022 16:11)  ciclopirox 0.77% topical cream: Apply topically to affected area 2 times a day (22 May 2022 16:11)  cilostazol 100 mg oral tablet: 1 tab(s) orally 2 times a day (22 May 2022 16:11)  finasteride 5 mg oral tablet: 1 tab(s) orally once a day (22 May 2022 16:11)  gabapentin 100 mg oral capsule:  (22 May 2022 16:11)  HumaLOG KwikPen 100 units/mL injectable solution:  (22 May 2022 16:11)  latanoprost 0.005% ophthalmic solution:  (22 May 2022 16:11)  levothyroxine 50 mcg (0.05 mg) oral tablet: 1 tab(s) orally once a day (22 May 2022 16:11)  lisinopril 10 mg oral tablet: 1 tab(s) orally once a day (22 May 2022 16:11)  Lyrica 100 mg oral capsule:  (22 May 2022 16:11)  metFORMIN 500 mg oral tablet:  (22 May 2022 16:11)  omeprazole 20 mg oral delayed release capsule: 1 cap(s) orally once a day (22 May 2022 16:11)  repaglinide 2 mg oral tablet:  (22 May 2022 16:11)  tamsulosin 0.4 mg oral capsule: 1 cap(s) orally once a day (22 May 2022 16:11)

## 2022-05-23 NOTE — PHYSICAL THERAPY INITIAL EVALUATION ADULT - ADDITIONAL COMMENTS
Lives with wife and 2 dtrs johnnie PH with 4 steps to enter with RHR. Pt is never left alone. He wears diapers 24 hrs/day.

## 2022-05-23 NOTE — H&P ADULT - PROBLEM/PLAN-4
Endoscopic Ultrasound (EUS)    An endoscopic ultrasound (EUS) is a test to look at the inside of your gastrointestinal (GI) tract. It's commonly used to look for cancers or growths in the esophagus, stomach, pancreas, liver, and rectum. It can help to stage cancer (see how advanced a cancer is). EUS may also be used to help diagnose certain diseases or to drain cysts or abscesses.  What is EUS?  EUS shows both ultrasound images and live video of the GI tract. During the test, a flexible tube called an endoscope (scope) is used. At the end of the scope is a tiny video camera and light. The video camera sends live images to a monitor. The scope also contains a very small ultrasound device. This uses sound waves to create images and send them to a monitor.  A needle is passed through the scope. The needle can be used take a small sample of tissue for testing. This is called a biopsy. The needle can be used to take a sample of fluid. This is called fine-needle aspiration (FNA).  Risks and possible complications of EUS  Risks and possible complications include the following:  · Bleeding  · Infection  · A perforation (hole) in the digestive tract   · Risks of sedation or anesthesia   Before the test  Be prepared prior to the test:  · Tell your healthcare provider what medicine you take. This includes vitamins, herbs, and over-the-counter medicine. It also includes any blood thinners, such as warfarin, clopidogrel, ibuprofen, or daily aspirin. Ask your healthcare provider if you need to stop taking some or all of them before the test.  · You may be prescribed antibiotics to take before or after the test. This depends on the area being studied and what is done during the test. These medicines help prevent infection.  · Carefully follow the instructions for preparing for the test to make sure results are accurate. Instructions may include:  ¨ If youre having an EUS of the upper GI tract (esophagus, stomach, duodenum,  pancreas, liver):  § Do not eat or drink for 6 hours before the test.  ¨ If youre having an EUS of the lower GI tract (rectum):  § Before the test, do bowel prep as instructed to clean your rectum of stool. This may involve a clear liquid diet and using a laxative (liquid or pills) the night before the test. Or it may mean doing one or more enemas the morning of the test.  § Do not eat or drink for 6 hours before the test.  · Be sure to arrive on time at the facility. Bring your identification and health insurance card. Leave valuables at home. If you have them, bring X-rays or other test results with you.  Let the healthcare provider know  For your safety, tell the healthcare provider if you:  · Take insulin. Your dose may need to be changed on the day of your test.  · Are allergic to latex.  · Have any other allergies.  · Are taking blood thinners.   During the test  An endoscopic ultrasound usually takes place in a hospital. The procedure itself may take 1 to 2 hours. You will likely go home soon afterward. During the test:  · You lie on your left side on an exam table.  · An intravenous (IV) line will be put into a vein in your arm or hand. This line supplies fluids and medicines. To keep you comfortable during the test, you will be given a sedative medicine. This medicine prevents discomfort and will make you sleepy.  · If you are having an EUS of the upper GI tract, local anesthetic may be sprayed in your throat. This will help you be more comfortable as the healthcare provider inserts the scope. The healthcare provider then gently puts the flexible scope into your mouth or nose and down your throat.  · If youre having an EUS of the lower GI tract, the healthcare provider gently puts the flexible scope into your anus.  · During the test, the scope sends live video and ultrasound images from inside your body to nearby monitors. These are used to examine your GI tract. Specialized procedures, such as drainage,  are done as needed.  · The healthcare provider may discuss the results with you soon after the test. Biopsy results take several  days.  · In most cases, you can go home within a few hours of the test. When you leave the facility, have an adult family member or friend drive you, even if you don't feel that sleepy.  After the test  Here is what to expect after the test:  · You may feel tired from the sedative. This should wear off by the end of the day.  · If you had an upper digestive endoscopy, your throat may feel sore for a day or two. Over-the-counter sore throat lozenges and spray should help.  · You can eat and drink normally as soon as the test is done.  When to call the healthcare provider  Call your healthcare provider if you notice any of the following:  · Fever of 100.4°F (38.0°C) or higher, or as advised by your healthcare provider  · Shortness of breath  · Vomiting blood, blood in stool, or black stools  · Coughing or hoarse voice that wont go away   Date Last Reviewed: 7/1/2016  © 7519-3244 [employer]. [address] [place] [id number]. All rights reserved. This information is not intended as a substitute for professional medical care. Always follow your healthcare professional's instructions.         DISPLAY PLAN FREE TEXT ATTENDING NOTE: 74 y/o F PMH HTN, fibromyalgia, herniated discs and CAD s/p stents/bypass presents to the ED with worsening neck pain since Friday. Pt states that she sees pain management for injections in her lower back and was trying to get in contact with them for injections in her neck but was unable to be seen in the office. Pt doubled her pain medications as instructed by pain management but states that she has had no improvement in symptoms and cannot wait for an appointment. No recent injuries, trauma or falls. On exam: NCAT. PERRLA, EOMI. OP clear. Neck with no crepitus. (+) L para-lumbar TTP. Lungs CTAB. RRR, S1S2 noted. Radial pulses 2+ and equal, pedal pulses 2+ and equal. Abdomen soft, NT/ND, no rebound or guarding. FROM x4 extremities. No focal neuro deficits. Plan: Pain control, EKG and reassess. CP: Patient reports feeling much improved. Patient not complaining of chest pain, shortness of breath, abdominal pain, nausea, vomiting, arm pain. No significant EKG changes from previous. Unlikely ACS. History of disc herniations. Patient has a pain management appointment upcoming in 1 week for injection for neck pain that has persisted for 1 month. Given strict return precautions and follow up with PMD and pain management as scheduled. Patient verbalized understanding and is agreeable to plan.

## 2022-05-23 NOTE — PATIENT PROFILE ADULT - FALL HARM RISK - HARM RISK INTERVENTIONS

## 2022-05-24 LAB
ANION GAP SERPL CALC-SCNC: 8 MMOL/L — SIGNIFICANT CHANGE UP (ref 5–17)
BUN SERPL-MCNC: 32 MG/DL — HIGH (ref 7–23)
CALCIUM SERPL-MCNC: 9 MG/DL — SIGNIFICANT CHANGE UP (ref 8.5–10.1)
CHLORIDE SERPL-SCNC: 101 MMOL/L — SIGNIFICANT CHANGE UP (ref 96–108)
CO2 SERPL-SCNC: 25 MMOL/L — SIGNIFICANT CHANGE UP (ref 22–31)
CREAT SERPL-MCNC: 1.94 MG/DL — HIGH (ref 0.5–1.3)
EGFR: 32 ML/MIN/1.73M2 — LOW
GLUCOSE BLDC GLUCOMTR-MCNC: 139 MG/DL — HIGH (ref 70–99)
GLUCOSE BLDC GLUCOMTR-MCNC: 277 MG/DL — HIGH (ref 70–99)
GLUCOSE BLDC GLUCOMTR-MCNC: 281 MG/DL — HIGH (ref 70–99)
GLUCOSE BLDC GLUCOMTR-MCNC: 330 MG/DL — HIGH (ref 70–99)
GLUCOSE SERPL-MCNC: 145 MG/DL — HIGH (ref 70–99)
HCT VFR BLD CALC: 34 % — LOW (ref 39–50)
HGB BLD-MCNC: 11.4 G/DL — LOW (ref 13–17)
MCHC RBC-ENTMCNC: 30.3 PG — SIGNIFICANT CHANGE UP (ref 27–34)
MCHC RBC-ENTMCNC: 33.5 G/DL — SIGNIFICANT CHANGE UP (ref 32–36)
MCV RBC AUTO: 90.4 FL — SIGNIFICANT CHANGE UP (ref 80–100)
NRBC # BLD: 0 /100 WBCS — SIGNIFICANT CHANGE UP (ref 0–0)
PLATELET # BLD AUTO: 141 K/UL — LOW (ref 150–400)
POTASSIUM SERPL-MCNC: 4.2 MMOL/L — SIGNIFICANT CHANGE UP (ref 3.5–5.3)
POTASSIUM SERPL-SCNC: 4.2 MMOL/L — SIGNIFICANT CHANGE UP (ref 3.5–5.3)
RBC # BLD: 3.76 M/UL — LOW (ref 4.2–5.8)
RBC # FLD: 13.4 % — SIGNIFICANT CHANGE UP (ref 10.3–14.5)
SODIUM SERPL-SCNC: 134 MMOL/L — LOW (ref 135–145)
WBC # BLD: 4.9 K/UL — SIGNIFICANT CHANGE UP (ref 3.8–10.5)
WBC # FLD AUTO: 4.9 K/UL — SIGNIFICANT CHANGE UP (ref 3.8–10.5)

## 2022-05-24 PROCEDURE — 99232 SBSQ HOSP IP/OBS MODERATE 35: CPT

## 2022-05-24 RX ADMIN — CEFTRIAXONE 100 MILLIGRAM(S): 500 INJECTION, POWDER, FOR SOLUTION INTRAMUSCULAR; INTRAVENOUS at 21:35

## 2022-05-24 RX ADMIN — FINASTERIDE 5 MILLIGRAM(S): 5 TABLET, FILM COATED ORAL at 11:37

## 2022-05-24 RX ADMIN — Medication 3: at 11:35

## 2022-05-24 RX ADMIN — ATORVASTATIN CALCIUM 20 MILLIGRAM(S): 80 TABLET, FILM COATED ORAL at 21:35

## 2022-05-24 RX ADMIN — Medication 81 MILLIGRAM(S): at 11:37

## 2022-05-24 RX ADMIN — TAMSULOSIN HYDROCHLORIDE 0.4 MILLIGRAM(S): 0.4 CAPSULE ORAL at 21:35

## 2022-05-24 RX ADMIN — CILOSTAZOL 100 MILLIGRAM(S): 100 TABLET ORAL at 17:22

## 2022-05-24 RX ADMIN — Medication 50 MICROGRAM(S): at 05:06

## 2022-05-24 RX ADMIN — Medication 4: at 16:09

## 2022-05-24 RX ADMIN — HEPARIN SODIUM 5000 UNIT(S): 5000 INJECTION INTRAVENOUS; SUBCUTANEOUS at 17:21

## 2022-05-24 RX ADMIN — HEPARIN SODIUM 5000 UNIT(S): 5000 INJECTION INTRAVENOUS; SUBCUTANEOUS at 05:06

## 2022-05-24 RX ADMIN — CILOSTAZOL 100 MILLIGRAM(S): 100 TABLET ORAL at 05:06

## 2022-05-24 NOTE — PROGRESS NOTE ADULT - ASSESSMENT
HPI: Patient is an 89M with a PMH of HTN, DM, CAD s/p stent, h/o prostate ca s/p TURP, who presents to the ED for AMS.  Patient is a limited historian, daughter - Maegan - at the bedside to provide history.  Patient noted to be less responsive and increasingly disoriented earlier today.  Also had poor PO intake, prompting daughter to bring him to the hospital.  Patient reportedly hypotensive in the field, responded to IV fluids with EMS.  Patient currently has no complaints, back to baseline mental status as per daughter.    Tachycardic in ED to 118, labs show leukocytosis and elevated creatinine.       Problem/Plan - 1:  ·  Problem: Acute Metabolic encephalopathy with GNR UTI.   ·  Plan: confusion improved. cont iv ceftriaxone x 3 days.  Follow identification and sensitivity.     Problem/Plan - 2:  ·  Problem: UTI (urinary tract infection).   ·  Plan: cont antibiotic.     Problem/Plan - 3:  ·  Problem: MARILYN (acute kidney injury).   ·  Plan: Likely pre-renal. Improving slowly. Follow BMP.     Problem/Plan - 4:  ·  Problem: BPH without urinary obstruction.   ·  Plan: on finasteride and tamsulosin however is s/p turp      Problem/Plan - 5:  ·  Problem: Hypothyroidism.   ·  Plan: synthroid.    Problem/Plan - 6:  ·  Problem: Diabetes mellitus type 2. At risk of hypoglycemia.    ·  Plan: cont correctional insulin. Follow finger sticks. No oral meds.     Problem/Plan - 7:  ·  Problem: Hyperlipidemia.   ·  Plan: Cont lipitor.     Problem/Plan - 8:  ·  Problem: PAD (peripheral artery disease).   ·  Plan: cilostazol.    Problem/Plan - 9:  ·  Problem: Preventive measure.   ·  Plan: VTE prop: heparin sc q12 hrs.    PT eval>>[facility]  Full code.   HPI: Patient is an 89M with a PMH of HTN, DM, CAD s/p stent, h/o prostate ca s/p TURP, who presents to the ED for AMS.  Patient is a limited historian, daughter - Maegan - at the bedside to provide history.  Patient noted to be less responsive and increasingly disoriented earlier today.  Also had poor PO intake, prompting daughter to bring him to the hospital.  Patient reportedly hypotensive in the field, responded to IV fluids with EMS.  Patient currently has no complaints, back to baseline mental status as per daughter.    Tachycardic in ED to 118, labs show leukocytosis and elevated creatinine.       Problem/Plan - 1:  ·  Problem: Acute Metabolic encephalopathy with GNR UTI.   ·  Plan: confusion improved. cont iv ceftriaxone x 3 days.  Follow identification and sensitivity.     Problem/Plan - 2:  ·  Problem: UTI (urinary tract infection).   ·  Plan: cont antibiotic.     Problem/Plan - 3:  ·  Problem: MARILYN (acute kidney injury).   ·  Plan: Likely pre-renal. Improving slowly. Follow BMP.     Problem/Plan - 4:  ·  Problem: BPH without urinary obstruction.   ·  Plan: on finasteride and tamsulosin however is s/p turp      Problem/Plan - 5:  ·  Problem: Hypothyroidism.   ·  Plan: synthroid.    Problem/Plan - 6:  ·  Problem: Diabetes mellitus type 2. At risk of hypoglycemia.  Labile  ·  Plan: cont correctional insulin. Follow finger sticks. No oral meds.     Problem/Plan - 7:  ·  Problem: Hyperlipidemia.   ·  Plan: Cont lipitor.     Problem/Plan - 8:  ·  Problem: PAD (peripheral artery disease).   ·  Plan: cilostazol.    Problem/Plan - 9:  ·  Problem: Preventive measure.   ·  Plan: VTE prop: heparin sc q12 hrs.    PT eval>>[facility]  Full code.   HPI: Patient is an 89M with a PMH of HTN, DM, CAD s/p stent, h/o prostate ca s/p TURP, who presents to the ED for AMS.  Patient is a limited historian, daughter - Maegan - at the bedside to provide history.  Patient noted to be less responsive and increasingly disoriented earlier today.  Also had poor PO intake, prompting daughter to bring him to the hospital.  Patient reportedly hypotensive in the field, responded to IV fluids with EMS.  Patient currently has no complaints, back to baseline mental status as per daughter.    Tachycardic in ED to 118, labs show leukocytosis and elevated creatinine.       Problem/Plan - 1:  ·  Problem: Acute Metabolic encephalopathy with GNR UTI.   ·  Plan: confusion improved. cont iv ceftriaxone x 3 days.  Follow identification and sensitivity.     Problem/Plan - 2:  ·  Problem: UTI (urinary tract infection).   ·  Plan: cont antibiotic.     Problem/Plan - 3:  ·  Problem: MARILYN (acute kidney injury).   ·  Plan: Likely pre-renal. Improving slowly. Follow BMP.     Problem/Plan - 4:  ·  Problem: BPH without urinary obstruction.   ·  Plan: on finasteride and tamsulosin however is s/p turp      Problem/Plan - 5:  ·  Problem: Hypothyroidism.   ·  Plan: synthroid.    Problem/Plan - 6:  ·  Problem: Diabetes mellitus type 2. At risk of hypoglycemia.  Labile  ·  Plan: cont correctional insulin. Follow finger sticks. No oral meds.     Problem/Plan - 7:  ·  Problem: Hyperlipidemia.   ·  Plan: Cont lipitor.     Problem/Plan - 8:  ·  Problem: PAD (peripheral artery disease).   ·  Plan: cilostazol.    Problem/Plan - 9:  ·  Problem: Preventive measure.   ·  Plan: VTE prop: heparin sc q12 hrs.    Anemia. no active gross bleeding. Trend  Thrombocytopenia. Trend. (low suspicion of any HIT at this point)    PT eval>>[facility]  Full code.

## 2022-05-25 LAB
-  AMIKACIN: SIGNIFICANT CHANGE UP
-  AMOXICILLIN/CLAVULANIC ACID: SIGNIFICANT CHANGE UP
-  AMPICILLIN/SULBACTAM: SIGNIFICANT CHANGE UP
-  AMPICILLIN: SIGNIFICANT CHANGE UP
-  AZTREONAM: SIGNIFICANT CHANGE UP
-  CEFAZOLIN: SIGNIFICANT CHANGE UP
-  CEFEPIME: SIGNIFICANT CHANGE UP
-  CEFOXITIN: SIGNIFICANT CHANGE UP
-  CEFTRIAXONE: SIGNIFICANT CHANGE UP
-  CIPROFLOXACIN: SIGNIFICANT CHANGE UP
-  ERTAPENEM: SIGNIFICANT CHANGE UP
-  GENTAMICIN: SIGNIFICANT CHANGE UP
-  IMIPENEM: SIGNIFICANT CHANGE UP
-  LEVOFLOXACIN: SIGNIFICANT CHANGE UP
-  MEROPENEM: SIGNIFICANT CHANGE UP
-  NITROFURANTOIN: SIGNIFICANT CHANGE UP
-  PIPERACILLIN/TAZOBACTAM: SIGNIFICANT CHANGE UP
-  TIGECYCLINE: SIGNIFICANT CHANGE UP
-  TOBRAMYCIN: SIGNIFICANT CHANGE UP
-  TRIMETHOPRIM/SULFAMETHOXAZOLE: SIGNIFICANT CHANGE UP
ANION GAP SERPL CALC-SCNC: 8 MMOL/L — SIGNIFICANT CHANGE UP (ref 5–17)
BUN SERPL-MCNC: 31 MG/DL — HIGH (ref 7–23)
CALCIUM SERPL-MCNC: 9.1 MG/DL — SIGNIFICANT CHANGE UP (ref 8.5–10.1)
CHLORIDE SERPL-SCNC: 105 MMOL/L — SIGNIFICANT CHANGE UP (ref 96–108)
CO2 SERPL-SCNC: 23 MMOL/L — SIGNIFICANT CHANGE UP (ref 22–31)
CREAT SERPL-MCNC: 1.81 MG/DL — HIGH (ref 0.5–1.3)
CULTURE RESULTS: SIGNIFICANT CHANGE UP
EGFR: 35 ML/MIN/1.73M2 — LOW
GLUCOSE BLDC GLUCOMTR-MCNC: 166 MG/DL — HIGH (ref 70–99)
GLUCOSE SERPL-MCNC: 197 MG/DL — HIGH (ref 70–99)
HCT VFR BLD CALC: 33.1 % — LOW (ref 39–50)
HGB BLD-MCNC: 11.2 G/DL — LOW (ref 13–17)
MCHC RBC-ENTMCNC: 30.4 PG — SIGNIFICANT CHANGE UP (ref 27–34)
MCHC RBC-ENTMCNC: 33.8 G/DL — SIGNIFICANT CHANGE UP (ref 32–36)
MCV RBC AUTO: 89.9 FL — SIGNIFICANT CHANGE UP (ref 80–100)
METHOD TYPE: SIGNIFICANT CHANGE UP
NRBC # BLD: 0 /100 WBCS — SIGNIFICANT CHANGE UP (ref 0–0)
ORGANISM # SPEC MICROSCOPIC CNT: SIGNIFICANT CHANGE UP
ORGANISM # SPEC MICROSCOPIC CNT: SIGNIFICANT CHANGE UP
PLATELET # BLD AUTO: 138 K/UL — LOW (ref 150–400)
POTASSIUM SERPL-MCNC: 4.2 MMOL/L — SIGNIFICANT CHANGE UP (ref 3.5–5.3)
POTASSIUM SERPL-SCNC: 4.2 MMOL/L — SIGNIFICANT CHANGE UP (ref 3.5–5.3)
RBC # BLD: 3.68 M/UL — LOW (ref 4.2–5.8)
RBC # FLD: 13.2 % — SIGNIFICANT CHANGE UP (ref 10.3–14.5)
SODIUM SERPL-SCNC: 136 MMOL/L — SIGNIFICANT CHANGE UP (ref 135–145)
SPECIMEN SOURCE: SIGNIFICANT CHANGE UP
WBC # BLD: 4.05 K/UL — SIGNIFICANT CHANGE UP (ref 3.8–10.5)
WBC # FLD AUTO: 4.05 K/UL — SIGNIFICANT CHANGE UP (ref 3.8–10.5)

## 2022-05-25 PROCEDURE — 99233 SBSQ HOSP IP/OBS HIGH 50: CPT

## 2022-05-25 RX ORDER — DEXTROSE 50 % IN WATER 50 %
12.5 SYRINGE (ML) INTRAVENOUS ONCE
Refills: 0 | Status: DISCONTINUED | OUTPATIENT
Start: 2022-05-25 | End: 2022-05-27

## 2022-05-25 RX ORDER — INSULIN GLARGINE 100 [IU]/ML
10 INJECTION, SOLUTION SUBCUTANEOUS AT BEDTIME
Refills: 0 | Status: DISCONTINUED | OUTPATIENT
Start: 2022-05-25 | End: 2022-05-27

## 2022-05-25 RX ORDER — DEXTROSE 50 % IN WATER 50 %
25 SYRINGE (ML) INTRAVENOUS ONCE
Refills: 0 | Status: DISCONTINUED | OUTPATIENT
Start: 2022-05-25 | End: 2022-05-27

## 2022-05-25 RX ORDER — SODIUM CHLORIDE 9 MG/ML
1000 INJECTION, SOLUTION INTRAVENOUS
Refills: 0 | Status: DISCONTINUED | OUTPATIENT
Start: 2022-05-25 | End: 2022-05-27

## 2022-05-25 RX ORDER — DEXTROSE 50 % IN WATER 50 %
15 SYRINGE (ML) INTRAVENOUS ONCE
Refills: 0 | Status: DISCONTINUED | OUTPATIENT
Start: 2022-05-25 | End: 2022-05-27

## 2022-05-25 RX ORDER — INSULIN LISPRO 100/ML
VIAL (ML) SUBCUTANEOUS AT BEDTIME
Refills: 0 | Status: DISCONTINUED | OUTPATIENT
Start: 2022-05-25 | End: 2022-05-27

## 2022-05-25 RX ORDER — GLUCAGON INJECTION, SOLUTION 0.5 MG/.1ML
1 INJECTION, SOLUTION SUBCUTANEOUS ONCE
Refills: 0 | Status: DISCONTINUED | OUTPATIENT
Start: 2022-05-25 | End: 2022-05-27

## 2022-05-25 RX ORDER — INSULIN LISPRO 100/ML
VIAL (ML) SUBCUTANEOUS
Refills: 0 | Status: DISCONTINUED | OUTPATIENT
Start: 2022-05-25 | End: 2022-05-27

## 2022-05-25 RX ADMIN — Medication 81 MILLIGRAM(S): at 11:06

## 2022-05-25 RX ADMIN — TAMSULOSIN HYDROCHLORIDE 0.4 MILLIGRAM(S): 0.4 CAPSULE ORAL at 21:13

## 2022-05-25 RX ADMIN — HEPARIN SODIUM 5000 UNIT(S): 5000 INJECTION INTRAVENOUS; SUBCUTANEOUS at 17:23

## 2022-05-25 RX ADMIN — Medication 6: at 16:18

## 2022-05-25 RX ADMIN — HEPARIN SODIUM 5000 UNIT(S): 5000 INJECTION INTRAVENOUS; SUBCUTANEOUS at 05:20

## 2022-05-25 RX ADMIN — FINASTERIDE 5 MILLIGRAM(S): 5 TABLET, FILM COATED ORAL at 11:06

## 2022-05-25 RX ADMIN — CILOSTAZOL 100 MILLIGRAM(S): 100 TABLET ORAL at 05:19

## 2022-05-25 RX ADMIN — CILOSTAZOL 100 MILLIGRAM(S): 100 TABLET ORAL at 17:23

## 2022-05-25 RX ADMIN — Medication 3: at 11:06

## 2022-05-25 RX ADMIN — Medication 1: at 07:27

## 2022-05-25 RX ADMIN — Medication 50 MICROGRAM(S): at 05:19

## 2022-05-25 RX ADMIN — ATORVASTATIN CALCIUM 20 MILLIGRAM(S): 80 TABLET, FILM COATED ORAL at 21:13

## 2022-05-25 RX ADMIN — CEFTRIAXONE 100 MILLIGRAM(S): 500 INJECTION, POWDER, FOR SOLUTION INTRAMUSCULAR; INTRAVENOUS at 21:13

## 2022-05-25 NOTE — DIETITIAN INITIAL EVALUATION ADULT - OTHER CALCULATIONS
Ht (cm): 170.2cm  Wt (kg): 52.5kg (05/23 dosing weight)  BMI: 18.1    IBW: 67.1kg +/- 10%  %IBW: 78%  UBW: 54.5kg +/- 10%  %UBW: 97% Ht (cm): 170.2cm as per daughter  Wt (kg): 52.5kg (05/23 dosing weight)  BMI: 18.1    IBW: 67.1kg +/- 10%  %IBW: 78%  UBW: 54.5kg +/- 10%  %UBW: 97%

## 2022-05-25 NOTE — PROGRESS NOTE ADULT - ASSESSMENT
88 y/o M with PMH of HTN. DM, CAD s/p stent, hx prostate ca sp TURP p/w AMS, lethargy, confusion and poor oral intake, was hypotensive in the field  Responded to IVF    sepsis sec to UTI/ Acute metabolic encephalopathy  MS improved- back to baseline  IV CTX       Problem/Plan - 1:  ·  Problem: Acute Metabolic encephalopathy with GNR UTI.   ·  Plan: confusion improved. cont iv ceftriaxone x 3 days.  Follow identification and sensitivity.     Problem/Plan - 2:  ·  Problem: UTI (urinary tract infection).   ·  Plan: cont antibiotic.     Problem/Plan - 3:  ·  Problem: MARILYN (acute kidney injury).   ·  Plan: Likely pre-renal. Improving slowly. Follow BMP.     Problem/Plan - 4:  ·  Problem: BPH without urinary obstruction.   ·  Plan: on finasteride and tamsulosin however is s/p turp      Problem/Plan - 5:  ·  Problem: Hypothyroidism.   ·  Plan: synthroid.    Problem/Plan - 6:  ·  Problem: Diabetes mellitus type 2. At risk of hypoglycemia.  Labile  ·  Plan: cont correctional insulin. Follow finger sticks. No oral meds.     Problem/Plan - 7:  ·  Problem: Hyperlipidemia.   ·  Plan: Cont lipitor.     Problem/Plan - 8:  ·  Problem: PAD (peripheral artery disease).   ·  Plan: cilostazol.    Problem/Plan - 9:  ·  Problem: Preventive measure.   ·  Plan: VTE prop: heparin sc q12 hrs.    Anemia. no active gross bleeding. Trend  Thrombocytopenia. Trend. (low suspicion of any HIT at this point)    PT eval>>[facility]  Full code.   88 y/o M with PMH of HTN. DM, CAD s/p stent, hx prostate ca sp TURP p/w AMS, lethargy, confusion and poor oral intake, was hypotensive in the field  Responded to IVF    1- sepsis sec to UTI/ Acute metabolic encephalopathy  MS improved- back to baseline  IV CTX  day 3- UCX Klebsiella Pneum- will switch to PO abx upon dc.   PSA WNL    2- MARILYN  Creat slowly down trending    3- Uncontrolled dm2  monitor fsbs/ISS- add lantus to optimize BG control    4- hx prostate ca s/p TURP- on flomax/ finasteride    5- PLT improved    cont sq heparin for dvt ppx    d/w pt and daughter by bed side  d/w - stable for dc to [facility]- pending [facility] acceptance and will need auth from insurance - Likely DC tomorrow if pt remains stable and  auth obtained.

## 2022-05-25 NOTE — DIETITIAN INITIAL EVALUATION ADULT - PERTINENT MEDS FT
MEDICATIONS  (STANDING):  aspirin enteric coated 81 milliGRAM(s) Oral daily  atorvastatin 20 milliGRAM(s) Oral at bedtime  cefTRIAXone   IVPB 1000 milliGRAM(s) IV Intermittent every 24 hours  cilostazol 100 milliGRAM(s) Oral two times a day  dextrose 5%. 1000 milliLiter(s) (50 mL/Hr) IV Continuous <Continuous>  dextrose 5%. 1000 milliLiter(s) (100 mL/Hr) IV Continuous <Continuous>  dextrose 50% Injectable 25 Gram(s) IV Push once  dextrose 50% Injectable 12.5 Gram(s) IV Push once  dextrose 50% Injectable 25 Gram(s) IV Push once  finasteride 5 milliGRAM(s) Oral daily  glucagon  Injectable 1 milliGRAM(s) IntraMuscular once  heparin   Injectable 5000 Unit(s) SubCutaneous every 12 hours  insulin lispro (ADMELOG) corrective regimen sliding scale   SubCutaneous three times a day before meals  levothyroxine 50 MICROGram(s) Oral daily  sodium chloride 0.9% Bolus 1000 milliLiter(s) IV Bolus once  tamsulosin 0.4 milliGRAM(s) Oral at bedtime    MEDICATIONS  (PRN):  dextrose Oral Gel 15 Gram(s) Oral once PRN Blood Glucose LESS THAN 70 milliGRAM(s)/deciliter

## 2022-05-25 NOTE — DIETITIAN NUTRITION RISK NOTIFICATION - TREATMENT: THE FOLLOWING DIET HAS BEEN RECOMMENDED
Diet, Consistent Carbohydrate w/Evening Snack:   Supplement Feeding Modality:  Oral  Glucerna Shake Cans or Servings Per Day:  1       Frequency:  Daily (05-25-22 @ 14:26) [Pending Verification By Attending]  Diet, Consistent Carbohydrate w/Evening Snack (05-23-22 @ 00:39) [Active]

## 2022-05-25 NOTE — DIETITIAN INITIAL EVALUATION ADULT - PERTINENT LABORATORY DATA
05-25    136  |  105  |  31<H>  ----------------------------<  197<H>  4.2   |  23  |  1.81<H>    Ca    9.1      25 May 2022 09:42    POCT Blood Glucose.: 254 mg/dL (05-25-22 @ 10:40)  A1C with Estimated Average Glucose Result: 11.0 % (05-23-22 @ 08:34)

## 2022-05-25 NOTE — DIETITIAN INITIAL EVALUATION ADULT - LITERATURE/VIDEOS GIVEN
MEal Planning with the Plate Method handout and Diabetes and Nutrition handout left at bedside- daughter and pt aware

## 2022-05-25 NOTE — DIETITIAN INITIAL EVALUATION ADULT - OTHER INFO
Pt awake and alert at time of visit; able to answer some questions but asked to contact daughter for further information. Spoke to daughter Maegan via phone ([phone]). Daughter reports she does shopping/cooking for pt PTA. States pt with fair appetite and PO intake PTA- states that is his baseline. Reports pt consumes 2 small meals per day despite encouragement to consume more. Reports pt has no diet restrictions at home.   Daughter and pt report pt with good appetite and PO intake during LOS. Pt denies difficulty chewing/swallowing. Daughter reports weight stable;  pounds. Current admission dosing weight is 115.8 pounds (05/23).   Pt with T2DM; per H&P pt takes repaglinide, Metformin, Humalog PTA. Per pt profile pt checks Finger Sticks before meals and before bedtime PTA. HbA1c 11.0% indicates poor blood glucose management.  Daughter states pt non-adherent to diabetes diet at home. Left nutrition information at pt's bedside. Encouraged follow-up with outpatient endocrinologist or primary care. Made aware RD remains available.

## 2022-05-26 ENCOUNTER — TRANSCRIPTION ENCOUNTER (OUTPATIENT)
Age: 87
End: 2022-05-26

## 2022-05-26 LAB
ANION GAP SERPL CALC-SCNC: 8 MMOL/L — SIGNIFICANT CHANGE UP (ref 5–17)
BUN SERPL-MCNC: 31 MG/DL — HIGH (ref 7–23)
CALCIUM SERPL-MCNC: 8.9 MG/DL — SIGNIFICANT CHANGE UP (ref 8.5–10.1)
CHLORIDE SERPL-SCNC: 105 MMOL/L — SIGNIFICANT CHANGE UP (ref 96–108)
CO2 SERPL-SCNC: 23 MMOL/L — SIGNIFICANT CHANGE UP (ref 22–31)
CREAT SERPL-MCNC: 1.64 MG/DL — HIGH (ref 0.5–1.3)
EGFR: 40 ML/MIN/1.73M2 — LOW
FLUAV AG NPH QL: SIGNIFICANT CHANGE UP
FLUBV AG NPH QL: SIGNIFICANT CHANGE UP
GLUCOSE BLDC GLUCOMTR-MCNC: 186 MG/DL — HIGH (ref 70–99)
GLUCOSE BLDC GLUCOMTR-MCNC: 196 MG/DL — HIGH (ref 70–99)
GLUCOSE BLDC GLUCOMTR-MCNC: 329 MG/DL — HIGH (ref 70–99)
GLUCOSE SERPL-MCNC: 106 MG/DL — HIGH (ref 70–99)
POTASSIUM SERPL-MCNC: 4.4 MMOL/L — SIGNIFICANT CHANGE UP (ref 3.5–5.3)
POTASSIUM SERPL-SCNC: 4.4 MMOL/L — SIGNIFICANT CHANGE UP (ref 3.5–5.3)
SARS-COV-2 RNA SPEC QL NAA+PROBE: SIGNIFICANT CHANGE UP
SODIUM SERPL-SCNC: 136 MMOL/L — SIGNIFICANT CHANGE UP (ref 135–145)

## 2022-05-26 PROCEDURE — 99233 SBSQ HOSP IP/OBS HIGH 50: CPT

## 2022-05-26 PROCEDURE — 93010 ELECTROCARDIOGRAM REPORT: CPT

## 2022-05-26 RX ORDER — FINASTERIDE 5 MG/1
1 TABLET, FILM COATED ORAL
Qty: 0 | Refills: 0 | DISCHARGE

## 2022-05-26 RX ORDER — SODIUM CHLORIDE 9 MG/ML
1000 INJECTION INTRAMUSCULAR; INTRAVENOUS; SUBCUTANEOUS
Refills: 0 | Status: DISCONTINUED | OUTPATIENT
Start: 2022-05-26 | End: 2022-05-27

## 2022-05-26 RX ORDER — CEFDINIR 250 MG/5ML
1 POWDER, FOR SUSPENSION ORAL
Qty: 28 | Refills: 0
Start: 2022-05-26 | End: 2022-06-08

## 2022-05-26 RX ORDER — ATORVASTATIN CALCIUM 80 MG/1
1 TABLET, FILM COATED ORAL
Qty: 0 | Refills: 0 | DISCHARGE

## 2022-05-26 RX ORDER — ASPIRIN/CALCIUM CARB/MAGNESIUM 324 MG
1 TABLET ORAL
Qty: 0 | Refills: 0 | DISCHARGE
Start: 2022-05-26

## 2022-05-26 RX ORDER — CEFTRIAXONE 500 MG/1
1000 INJECTION, POWDER, FOR SOLUTION INTRAMUSCULAR; INTRAVENOUS EVERY 24 HOURS
Refills: 0 | Status: DISCONTINUED | OUTPATIENT
Start: 2022-05-27 | End: 2022-05-27

## 2022-05-26 RX ORDER — TAMSULOSIN HYDROCHLORIDE 0.4 MG/1
1 CAPSULE ORAL
Qty: 0 | Refills: 0 | DISCHARGE

## 2022-05-26 RX ORDER — INSULIN GLARGINE 100 [IU]/ML
10 INJECTION, SOLUTION SUBCUTANEOUS
Qty: 0 | Refills: 0 | DISCHARGE
Start: 2022-05-26

## 2022-05-26 RX ORDER — GABAPENTIN 400 MG/1
0 CAPSULE ORAL
Qty: 0 | Refills: 0 | DISCHARGE

## 2022-05-26 RX ORDER — INSULIN LISPRO 100/ML
0 VIAL (ML) SUBCUTANEOUS
Qty: 0 | Refills: 0 | DISCHARGE

## 2022-05-26 RX ORDER — ATORVASTATIN CALCIUM 80 MG/1
1 TABLET, FILM COATED ORAL
Qty: 0 | Refills: 0 | DISCHARGE
Start: 2022-05-26

## 2022-05-26 RX ORDER — LISINOPRIL 2.5 MG/1
1 TABLET ORAL
Qty: 0 | Refills: 0 | DISCHARGE

## 2022-05-26 RX ORDER — TAMSULOSIN HYDROCHLORIDE 0.4 MG/1
1 CAPSULE ORAL
Qty: 0 | Refills: 0 | DISCHARGE
Start: 2022-05-26

## 2022-05-26 RX ORDER — ASPIRIN/CALCIUM CARB/MAGNESIUM 324 MG
1 TABLET ORAL
Qty: 0 | Refills: 0 | DISCHARGE

## 2022-05-26 RX ORDER — CILOSTAZOL 100 MG/1
1 TABLET ORAL
Qty: 0 | Refills: 0 | DISCHARGE
Start: 2022-05-26

## 2022-05-26 RX ORDER — METOPROLOL TARTRATE 50 MG
1 TABLET ORAL
Qty: 0 | Refills: 0 | DISCHARGE

## 2022-05-26 RX ORDER — CICLOPIROX OLAMINE 7.7 MG/G
1 CREAM TOPICAL
Qty: 0 | Refills: 0 | DISCHARGE

## 2022-05-26 RX ORDER — LEVOTHYROXINE SODIUM 125 MCG
1 TABLET ORAL
Qty: 0 | Refills: 0 | DISCHARGE
Start: 2022-05-26

## 2022-05-26 RX ORDER — CILOSTAZOL 100 MG/1
1 TABLET ORAL
Qty: 0 | Refills: 0 | DISCHARGE

## 2022-05-26 RX ORDER — GABAPENTIN 400 MG/1
1 CAPSULE ORAL
Qty: 0 | Refills: 0 | DISCHARGE

## 2022-05-26 RX ORDER — FINASTERIDE 5 MG/1
1 TABLET, FILM COATED ORAL
Qty: 0 | Refills: 0 | DISCHARGE
Start: 2022-05-26

## 2022-05-26 RX ORDER — GLIMEPIRIDE 1 MG
1 TABLET ORAL
Qty: 0 | Refills: 0 | DISCHARGE

## 2022-05-26 RX ORDER — METFORMIN HYDROCHLORIDE 850 MG/1
0 TABLET ORAL
Qty: 0 | Refills: 0 | DISCHARGE

## 2022-05-26 RX ORDER — REPAGLINIDE 1 MG/1
0 TABLET ORAL
Qty: 0 | Refills: 0 | DISCHARGE

## 2022-05-26 RX ORDER — CEFTRIAXONE 500 MG/1
1000 INJECTION, POWDER, FOR SOLUTION INTRAMUSCULAR; INTRAVENOUS ONCE
Refills: 0 | Status: COMPLETED | OUTPATIENT
Start: 2022-05-26 | End: 2022-05-26

## 2022-05-26 RX ORDER — CEFTRIAXONE 500 MG/1
INJECTION, POWDER, FOR SOLUTION INTRAMUSCULAR; INTRAVENOUS
Refills: 0 | Status: DISCONTINUED | OUTPATIENT
Start: 2022-05-26 | End: 2022-05-27

## 2022-05-26 RX ORDER — LEVOTHYROXINE SODIUM 125 MCG
1 TABLET ORAL
Qty: 0 | Refills: 0 | DISCHARGE

## 2022-05-26 RX ADMIN — SODIUM CHLORIDE 75 MILLILITER(S): 9 INJECTION INTRAMUSCULAR; INTRAVENOUS; SUBCUTANEOUS at 11:15

## 2022-05-26 RX ADMIN — Medication 81 MILLIGRAM(S): at 11:15

## 2022-05-26 RX ADMIN — FINASTERIDE 5 MILLIGRAM(S): 5 TABLET, FILM COATED ORAL at 11:15

## 2022-05-26 RX ADMIN — CILOSTAZOL 100 MILLIGRAM(S): 100 TABLET ORAL at 17:41

## 2022-05-26 RX ADMIN — Medication 50 MICROGRAM(S): at 05:30

## 2022-05-26 RX ADMIN — HEPARIN SODIUM 5000 UNIT(S): 5000 INJECTION INTRAVENOUS; SUBCUTANEOUS at 05:31

## 2022-05-26 RX ADMIN — Medication 8: at 11:15

## 2022-05-26 RX ADMIN — CILOSTAZOL 100 MILLIGRAM(S): 100 TABLET ORAL at 05:32

## 2022-05-26 RX ADMIN — CEFTRIAXONE 100 MILLIGRAM(S): 500 INJECTION, POWDER, FOR SOLUTION INTRAMUSCULAR; INTRAVENOUS at 13:38

## 2022-05-26 RX ADMIN — Medication 2: at 17:07

## 2022-05-26 RX ADMIN — ATORVASTATIN CALCIUM 20 MILLIGRAM(S): 80 TABLET, FILM COATED ORAL at 21:46

## 2022-05-26 RX ADMIN — INSULIN GLARGINE 10 UNIT(S): 100 INJECTION, SOLUTION SUBCUTANEOUS at 21:46

## 2022-05-26 RX ADMIN — HEPARIN SODIUM 5000 UNIT(S): 5000 INJECTION INTRAVENOUS; SUBCUTANEOUS at 19:17

## 2022-05-26 RX ADMIN — SODIUM CHLORIDE 75 MILLILITER(S): 9 INJECTION INTRAMUSCULAR; INTRAVENOUS; SUBCUTANEOUS at 21:46

## 2022-05-26 RX ADMIN — TAMSULOSIN HYDROCHLORIDE 0.4 MILLIGRAM(S): 0.4 CAPSULE ORAL at 21:46

## 2022-05-26 NOTE — DISCHARGE NOTE PROVIDER - DETAILS OF MALNUTRITION DIAGNOSIS/DIAGNOSES
This patient has been assessed with a concern for Malnutrition and was treated during this hospitalization for the following Nutrition diagnosis/diagnoses:     -  05/25/2022: Severe protein-calorie malnutrition   -  05/25/2022: Underweight (BMI < 19)

## 2022-05-26 NOTE — DISCHARGE NOTE PROVIDER - CARE PROVIDER_API CALL
Soila Thornton  ENDOCRINOLOGY/METAB/DIABETES  [address] [place] [address]  Phone: [phone]  Fax: [phone]  Follow Up Time:     Chet Tsang  INTERNAL MEDICINE  [address] [place] [address]  Phone: [phone]  Fax: [phone]  Follow Up Time:

## 2022-05-26 NOTE — DISCHARGE NOTE PROVIDER - NSDCCPCAREPLAN_GEN_ALL_CORE_FT
PRINCIPAL DISCHARGE DIAGNOSIS  Diagnosis: UTI (urinary tract infection)  Assessment and Plan of Treatment:       SECONDARY DISCHARGE DIAGNOSES  Diagnosis: Sepsis, unspecified organism  Assessment and Plan of Treatment:     Diagnosis: MARILYN (acute kidney injury)  Assessment and Plan of Treatment:     Diagnosis: Severe protein-calorie malnutrition  Assessment and Plan of Treatment:     Diagnosis: Metabolic encephalopathy  Assessment and Plan of Treatment:     Diagnosis: Diabetes mellitus  Assessment and Plan of Treatment:     Diagnosis: BPH without urinary obstruction  Assessment and Plan of Treatment:     Diagnosis: Hypothyroidism  Assessment and Plan of Treatment:

## 2022-05-26 NOTE — DISCHARGE NOTE PROVIDER - HOSPITAL COURSE
88 y/o M with PMH of HTN. DM, CAD s/p stent, hx prostate ca sp TURP p/w AMS, lethargy, confusion and poor oral intake, was hypotensive in the field  Responded to IVF    sepsis sec to UTI/ Acute metabolic encephalopathy  MS improved- back to baseline  IV CTX  day 3- UCX Klebsiella Pneum- will switch to PO abx Cefdinir upon dc.   PSA WNL  MARILYN- Creat slowly down trending  dm2 w/hyperglycemia- monitor fsbs/ISS- add lantus to optimize BG control  Severe protein-calorie malnutrition and Underweight (BMI < 19).  Diet Consistent Carbohydrate w/Evening Snack-  Supplement Feeding Modality:  Oral  Glucerna Shake Cans or Servings Per Day:  1       Frequency:  Daily  hx prostate ca s/p TURP- on flomax/ finasteride  Mild dehydration likely poor Po intake and ongoing uti (hypotensive and tachycardic)- encourage po fluids  HLD- cont on Lipitor  BPH- c/w flomax/proscar  Hypothyroidism- c/w synthroid  PLT improved    cont sq heparin for dvt ppx    d/w pt and daughter

## 2022-05-26 NOTE — DISCHARGE NOTE NURSING/CASE MANAGEMENT/SOCIAL WORK - PATIENT PORTAL LINK FT
You can access the FollowMyHealth Patient Portal offered by [facility] by registering at the following website: [email or web address]. By joining [employer]’s FollowMyHealth portal, you will also be able to view your health information using other applications (apps) compatible with our system.

## 2022-05-26 NOTE — DISCHARGE NOTE PROVIDER - NSDCMRMEDTOKEN_GEN_ALL_CORE_FT
aspirin 81 mg oral delayed release tablet: 1 tab(s) orally once a day  atorvastatin 20 mg oral tablet: 1 tab(s) orally once a day (at bedtime)  cefdinir 300 mg oral capsule: 1 cap(s) orally every 12 hours   cilostazol 100 mg oral tablet: 1 tab(s) orally 2 times a day  finasteride 5 mg oral tablet: 1 tab(s) orally once a day  HumaLOG KwikPen 100 units/mL injectable solution: injectable 3 times a day - 250  2 Unit(s) if Glucose 251 - 300  4 Unit(s) if Glucose 301 - 350  6 Unit(s) if Glucose 351 - 400  8 Unit(s) if Glucose Greater Than 400  insulin glargine 100 units/mL subcutaneous solution: 10 unit(s) subcutaneous once a day (at bedtime)  latanoprost 0.005% ophthalmic solution:   levothyroxine 50 mcg (0.05 mg) oral tablet: 1 tab(s) orally once a day  omeprazole 20 mg oral delayed release capsule: 1 cap(s) orally once a day  tamsulosin 0.4 mg oral capsule: 1 cap(s) orally once a day (at bedtime)

## 2022-05-26 NOTE — PROGRESS NOTE ADULT - ASSESSMENT
89 y/o M with PMH of HTN. DM, CAD s/p stent, hx prostate ca sp TURP p/w AMS, lethargy, confusion and poor oral intake, was hypotensive in the field  Responded to IVF    sepsis sec to UTI/ Acute metabolic encephalopathy  MS improved- back to baseline  IV CTX  day 3- UCX Klebsiella Pneum- will switch to PO abx Cefdinir upon dc.   PSA WNL  MARILYN- Creat slowly down trending  dm2 w/hyperglycemia- monitor fsbs/ISS- add lantus to optimize BG control  Severe protein-calorie malnutrition and Underweight (BMI < 19).  Diet Consistent Carbohydrate w/Evening Snack-  Supplement Feeding Modality:  Oral  Glucerna Shake Cans or Servings Per Day:  1       Frequency:  Daily  hx prostate ca s/p TURP- on flomax/ finasteride  Mild dehydration likely poor Po intake and ongoing uti (hypotensive and tachycardic)- will give gentle hydration  HLD- cont on Lipitor  BPH- c/w flomax/proscar  Hypothyroidism- c/w synthroid  PLT improved    cont sq heparin for dvt ppx    d/w pt and daughter   d/w - stable for dc to [facility]- pending [facility] acceptance and will need auth from insurance

## 2022-05-27 VITALS
HEART RATE: 102 BPM | RESPIRATION RATE: 18 BRPM | TEMPERATURE: 98 F | SYSTOLIC BLOOD PRESSURE: 153 MMHG | DIASTOLIC BLOOD PRESSURE: 79 MMHG | OXYGEN SATURATION: 97 %

## 2022-05-27 LAB
ANION GAP SERPL CALC-SCNC: 8 MMOL/L — SIGNIFICANT CHANGE UP (ref 5–17)
BUN SERPL-MCNC: 24 MG/DL — HIGH (ref 7–23)
CALCIUM SERPL-MCNC: 8.6 MG/DL — SIGNIFICANT CHANGE UP (ref 8.5–10.1)
CHLORIDE SERPL-SCNC: 107 MMOL/L — SIGNIFICANT CHANGE UP (ref 96–108)
CO2 SERPL-SCNC: 22 MMOL/L — SIGNIFICANT CHANGE UP (ref 22–31)
CREAT SERPL-MCNC: 1.43 MG/DL — HIGH (ref 0.5–1.3)
CULTURE RESULTS: SIGNIFICANT CHANGE UP
CULTURE RESULTS: SIGNIFICANT CHANGE UP
EGFR: 47 ML/MIN/1.73M2 — LOW
GLUCOSE BLDC GLUCOMTR-MCNC: 110 MG/DL — HIGH (ref 70–99)
GLUCOSE BLDC GLUCOMTR-MCNC: 225 MG/DL — HIGH (ref 70–99)
GLUCOSE BLDC GLUCOMTR-MCNC: 291 MG/DL — HIGH (ref 70–99)
GLUCOSE SERPL-MCNC: 85 MG/DL — SIGNIFICANT CHANGE UP (ref 70–99)
HCT VFR BLD CALC: 33.3 % — LOW (ref 39–50)
HGB BLD-MCNC: 11.3 G/DL — LOW (ref 13–17)
MCHC RBC-ENTMCNC: 30.3 PG — SIGNIFICANT CHANGE UP (ref 27–34)
MCHC RBC-ENTMCNC: 33.9 G/DL — SIGNIFICANT CHANGE UP (ref 32–36)
MCV RBC AUTO: 89.3 FL — SIGNIFICANT CHANGE UP (ref 80–100)
NRBC # BLD: 0 /100 WBCS — SIGNIFICANT CHANGE UP (ref 0–0)
PLATELET # BLD AUTO: 160 K/UL — SIGNIFICANT CHANGE UP (ref 150–400)
POTASSIUM SERPL-MCNC: 3.9 MMOL/L — SIGNIFICANT CHANGE UP (ref 3.5–5.3)
POTASSIUM SERPL-SCNC: 3.9 MMOL/L — SIGNIFICANT CHANGE UP (ref 3.5–5.3)
RBC # BLD: 3.73 M/UL — LOW (ref 4.2–5.8)
RBC # FLD: 13 % — SIGNIFICANT CHANGE UP (ref 10.3–14.5)
SODIUM SERPL-SCNC: 137 MMOL/L — SIGNIFICANT CHANGE UP (ref 135–145)
SPECIMEN SOURCE: SIGNIFICANT CHANGE UP
SPECIMEN SOURCE: SIGNIFICANT CHANGE UP
WBC # BLD: 3.55 K/UL — LOW (ref 3.8–10.5)
WBC # FLD AUTO: 3.55 K/UL — LOW (ref 3.8–10.5)

## 2022-05-27 PROCEDURE — 99239 HOSP IP/OBS DSCHRG MGMT >30: CPT

## 2022-05-27 RX ORDER — SODIUM CHLORIDE 9 MG/ML
1000 INJECTION INTRAMUSCULAR; INTRAVENOUS; SUBCUTANEOUS ONCE
Refills: 0 | Status: COMPLETED | OUTPATIENT
Start: 2022-05-27 | End: 2022-05-27

## 2022-05-27 RX ADMIN — SODIUM CHLORIDE 1000 MILLILITER(S): 9 INJECTION INTRAMUSCULAR; INTRAVENOUS; SUBCUTANEOUS at 13:03

## 2022-05-27 RX ADMIN — HEPARIN SODIUM 5000 UNIT(S): 5000 INJECTION INTRAVENOUS; SUBCUTANEOUS at 05:31

## 2022-05-27 RX ADMIN — CILOSTAZOL 100 MILLIGRAM(S): 100 TABLET ORAL at 05:31

## 2022-05-27 RX ADMIN — FINASTERIDE 5 MILLIGRAM(S): 5 TABLET, FILM COATED ORAL at 11:15

## 2022-05-27 RX ADMIN — CEFTRIAXONE 100 MILLIGRAM(S): 500 INJECTION, POWDER, FOR SOLUTION INTRAMUSCULAR; INTRAVENOUS at 11:15

## 2022-05-27 RX ADMIN — Medication 4: at 11:16

## 2022-05-27 RX ADMIN — Medication 50 MICROGRAM(S): at 05:31

## 2022-05-27 RX ADMIN — Medication 6: at 15:51

## 2022-05-27 RX ADMIN — Medication 81 MILLIGRAM(S): at 11:15

## 2022-05-27 NOTE — PROGRESS NOTE ADULT - NUTRITIONAL ASSESSMENT
This patient has been assessed with a concern for Malnutrition and has been determined to have a diagnosis/diagnoses of Severe protein-calorie malnutrition and Underweight (BMI < 19).    This patient is being managed with:   Diet Consistent Carbohydrate w/Evening Snack-  Supplement Feeding Modality:  Oral  Glucerna Shake Cans or Servings Per Day:  1       Frequency:  Daily  Entered: May 25 2022  2:26PM    
This patient has been assessed with a concern for Malnutrition and has been determined to have a diagnosis/diagnoses of Severe protein-calorie malnutrition and Underweight (BMI < 19).    This patient is being managed with:   Diet Consistent Carbohydrate w/Evening Snack-  Supplement Feeding Modality:  Oral  Glucerna Shake Cans or Servings Per Day:  1       Frequency:  Daily  Entered: May 25 2022  2:26PM

## 2022-05-27 NOTE — PROGRESS NOTE ADULT - SUBJECTIVE AND OBJECTIVE BOX
CHIEF COMPLAINT/INTERVAL HISTORY:    Patient is a 89y old  Male who presents with a chief complaint of CYSTITIS, MARILYN     (25 May 2022 14:00)      HPI:  Patient is an 89M with a PMH of HTN, DM, CAD s/p stent, h/o prostate ca s/p TURP, who presents to the ED for AMS.  Patient is a limited historian, daughter - Maegan - at the bedside to provide history.  Patient noted to be less responsive and increasingly disoriented earlier today.  Also had poor PO intake, prompting daughter to bring him to the hospital.  Patient reportedly hypotensive in the field, responded to IV fluids with EMS.  Patient currently has no complaints, back to baseline mental status as per daughter.    Tachycardic in ED to 118, labs show leukocytosis and elevated creatinine.  Will admit to med surg.    (23 May 2022 00:32)      SUBJECTIVE & OBJECTIVE: Pt seen and examined at bedside. offers no complaints  daughter by bed side  denies CP, SOB, dysuria, pain    Vital Signs Last 24 Hrs  T(C): 36.8 (25 May 2022 11:06), Max: 36.8 (25 May 2022 11:06)  T(F): 98.2 (25 May 2022 11:06), Max: 98.2 (25 May 2022 11:06)  HR: 104 (25 May 2022 11:06) (101 - 107)  BP: 99/62 (25 May 2022 11:06) (99/62 - 173/91)  BP(mean): --  ABP: --  ABP(mean): --  RR: 17 (25 May 2022 11:06) (17 - 18)  SpO2: 98% (25 May 2022 11:06) (96% - 99%)        MEDICATIONS  (STANDING):  aspirin enteric coated 81 milliGRAM(s) Oral daily  atorvastatin 20 milliGRAM(s) Oral at bedtime  cefTRIAXone   IVPB 1000 milliGRAM(s) IV Intermittent every 24 hours  cilostazol 100 milliGRAM(s) Oral two times a day  dextrose 5%. 1000 milliLiter(s) (50 mL/Hr) IV Continuous <Continuous>  dextrose 5%. 1000 milliLiter(s) (100 mL/Hr) IV Continuous <Continuous>  dextrose 50% Injectable 25 Gram(s) IV Push once  dextrose 50% Injectable 12.5 Gram(s) IV Push once  dextrose 50% Injectable 25 Gram(s) IV Push once  finasteride 5 milliGRAM(s) Oral daily  glucagon  Injectable 1 milliGRAM(s) IntraMuscular once  heparin   Injectable 5000 Unit(s) SubCutaneous every 12 hours  insulin lispro (ADMELOG) corrective regimen sliding scale   SubCutaneous three times a day before meals  levothyroxine 50 MICROGram(s) Oral daily  sodium chloride 0.9% Bolus 1000 milliLiter(s) IV Bolus once  tamsulosin 0.4 milliGRAM(s) Oral at bedtime    MEDICATIONS  (PRN):  dextrose Oral Gel 15 Gram(s) Oral once PRN Blood Glucose LESS THAN 70 milliGRAM(s)/deciliter        PHYSICAL EXAM:    GENERAL: NAD, elderly, frail  HEAD:  Atraumatic, Normocephalic  EYES: EOMI, PERRLA, conjunctiva and sclera clear  ENMT: Moist mucous membranes  NECK: Supple  NERVOUS SYSTEM: alert and awake, follows commands, normal speech  CHEST/LUNG: Clear to auscultation bilaterally; No rales, rhonchi, wheezing, or rubs  HEART: S1, S2  ABDOMEN: Soft, Nontender,  Bowel sounds present  EXTREMITIES: no cyanosis, or edema    LABS:                        11.2   4.05  )-----------( 138      ( 25 May 2022 07:52 )             33.1     05-25    136  |  105  |  31<H>  ----------------------------<  197<H>  4.2   |  23  |  1.81<H>    Ca    9.1      25 May 2022 09:42            CAPILLARY BLOOD GLUCOSE      POCT Blood Glucose.: 254 mg/dL (25 May 2022 10:40)  POCT Blood Glucose.: 166 mg/dL (25 May 2022 07:13)  POCT Blood Glucose.: 277 mg/dL (24 May 2022 21:02)  POCT Blood Glucose.: 330 mg/dL (24 May 2022 15:29)  
CHIEF COMPLAINT: Follow up of UTI, MARILYN and labile DM type 2  Confusion improved.   no report of any dysuria.   no chest pain or sob or n/v reported      PHYSICAL EXAM:    GENERAL: Elderly and pleasant. No acute distress   CHEST/LUNG: Clear to ausculation bilaterally, no wheezing, no crackles   HEART: Regular rate and rhythm; No murmurs, rubs  ABDOMEN: Soft, Nontender, Nondistended; Bowel sounds present  EXTREMITIES:  Moving all four extremities spontaneously, No clubbing, cyanosis, or edema  NERVOUS SYSTEM:  Grossly non focal.  Psychiatry: AAO x 3, mood is appropriate       OBJECTIVE DATA:     Vital Signs Last 24 Hrs  T(C): 36.5 (24 May 2022 11:19), Max: 36.9 (24 May 2022 05:16)  T(F): 97.7 (24 May 2022 11:19), Max: 98.4 (24 May 2022 05:16)  HR: 102 (24 May 2022 11:19) (99 - 113)  BP: 115/74 (24 May 2022 11:19) (104/61 - 163/93)  BP(mean): --  RR: 18 (24 May 2022 11:19) (18 - 18)  SpO2: 95% (24 May 2022 11:19) (94% - 97%)           Daily     Daily   LABS:                        11.4   4.90  )-----------( 141      ( 24 May 2022 07:03 )             34.0             05-24    134<L>  |  101  |  32<H>  ----------------------------<  145<H>  4.2   |  25  |  1.94<H>    Ca    9.0      24 May 2022 07:03    TPro  6.6  /  Alb  3.1<L>  /  TBili  1.7<H>  /  DBili  x   /  AST  15  /  ALT  18  /  AlkPhos  76  05-22              PT/INR - ( 22 May 2022 17:38 )   PT: 13.3 sec;   INR: 1.12 ratio         PTT - ( 22 May 2022 17:38 )  PTT:28.3 sec  Urinalysis Basic - ( 22 May 2022 21:39 )    Color: Yellow / Appearance: Clear / S.015 / pH: x  Gluc: x / Ketone: Negative  / Bili: Negative / Urobili: 1 mg/dL   Blood: x / Protein: 100 mg/dL / Nitrite: Positive   Leuk Esterase: Moderate / RBC: 3-5 /HPF / WBC 11-25   Sq Epi: x / Non Sq Epi: x / Bacteria: Many       CARDIAC MARKERS ( 22 May 2022 17:38 )  x     / x     / 87 U/L / x     / 5.0 ng/mL      CAPILLARY BLOOD GLUCOSE      POCT Blood Glucose.: 281 mg/dL (24 May 2022 11:00)      Culture - Urine (collected )  Source: Clean Catch Clean Catch (Midstream)  Preliminary Report ():    >100,000 CFU/ml Gram Negative Rods    Culture - Blood (collected )  Source: .Blood Blood-Peripheral  Preliminary Report ():    No growth to date.    Culture - Blood (collected )  Source: .Blood Blood-Peripheral  Preliminary Report ():    No growth to date.      MEDICATIONS  (STANDING):  aspirin enteric coated 81 milliGRAM(s) Oral daily  atorvastatin 20 milliGRAM(s) Oral at bedtime  cefTRIAXone   IVPB 1000 milliGRAM(s) IV Intermittent every 24 hours  cilostazol 100 milliGRAM(s) Oral two times a day  dextrose 5%. 1000 milliLiter(s) (50 mL/Hr) IV Continuous <Continuous>  dextrose 5%. 1000 milliLiter(s) (100 mL/Hr) IV Continuous <Continuous>  dextrose 50% Injectable 25 Gram(s) IV Push once  dextrose 50% Injectable 12.5 Gram(s) IV Push once  dextrose 50% Injectable 25 Gram(s) IV Push once  finasteride 5 milliGRAM(s) Oral daily  glucagon  Injectable 1 milliGRAM(s) IntraMuscular once  heparin   Injectable 5000 Unit(s) SubCutaneous every 12 hours  insulin lispro (ADMELOG) corrective regimen sliding scale   SubCutaneous three times a day before meals  levothyroxine 50 MICROGram(s) Oral daily  sodium chloride 0.9% Bolus 1000 milliLiter(s) IV Bolus once  tamsulosin 0.4 milliGRAM(s) Oral at bedtime    MEDICATIONS  (PRN):  dextrose Oral Gel 15 Gram(s) Oral once PRN Blood Glucose LESS THAN 70 milliGRAM(s)/deciliter      
CHIEF COMPLAINT: Follow up of UTI, MARILYN and labile DM type 2  confusion is better.   no report of any dysuria.   no chest pain or sob or n/v reported      PHYSICAL EXAM:    GENERAL: Elderly and pleasant. No acute distress   CHEST/LUNG: Clear to ausculation bilaterally, no wheezing, no crackles   HEART: Regular rate and rhythm; No murmurs, rubs  ABDOMEN: Soft, Nontender, Nondistended; Bowel sounds present  EXTREMITIES:  Moving all four extremities spontaneously, No clubbing, cyanosis, or edema  NERVOUS SYSTEM:  Grossly non focal.  Psychiatry: AAO x 3, mood is appropriate       OBJECTIVE DATA:   Vital Signs Last 24 Hrs  T(C): 36.5 (23 May 2022 11:11), Max: 36.9 (23 May 2022 02:00)  T(F): 97.7 (23 May 2022 11:11), Max: 98.4 (23 May 2022 02:00)  HR: 97 (23 May 2022 11:11) (68 - 118)  BP: 123/80 (23 May 2022 11:11) (107/63 - 132/76)  BP(mean): --  RR: 17 (23 May 2022 11:11) (14 - 18)  SpO2: 98% (23 May 2022 11:11) (96% - 100%)           Daily     Daily   LABS:                        12.9   10.73 )-----------( 144      ( 22 May 2022 17:38 )             38.0             -    137  |  105  |  33<H>  ----------------------------<  64<L>  4.1   |  24  |  2.36<H>    Ca    9.1      23 May 2022 06:40    TPro  6.6  /  Alb  3.1<L>  /  TBili  1.7<H>  /  DBili  x   /  AST  15  /  ALT  18  /  AlkPhos  76  05-22              PT/INR - ( 22 May 2022 17:38 )   PT: 13.3 sec;   INR: 1.12 ratio         PTT - ( 22 May 2022 17:38 )  PTT:28.3 sec  Urinalysis Basic - ( 22 May 2022 21:39 )    Color: Yellow / Appearance: Clear / S.015 / pH: x  Gluc: x / Ketone: Negative  / Bili: Negative / Urobili: 1 mg/dL   Blood: x / Protein: 100 mg/dL / Nitrite: Positive   Leuk Esterase: Moderate / RBC: 3-5 /HPF / WBC 11-25   Sq Epi: x / Non Sq Epi: x / Bacteria: Many        CARDIAC MARKERS ( 22 May 2022 17:38 )  x     / x     / 87 U/L / x     / 5.0 ng/mL      CAPILLARY BLOOD GLUCOSE      POCT Blood Glucose.: 176 mg/dL (23 May 2022 10:55)        MEDICATIONS  (STANDING):  aspirin enteric coated 81 milliGRAM(s) Oral daily  atorvastatin 20 milliGRAM(s) Oral at bedtime  cefTRIAXone   IVPB 1000 milliGRAM(s) IV Intermittent every 24 hours  cilostazol 100 milliGRAM(s) Oral two times a day  dextrose 5%. 1000 milliLiter(s) (50 mL/Hr) IV Continuous <Continuous>  dextrose 5%. 1000 milliLiter(s) (100 mL/Hr) IV Continuous <Continuous>  dextrose 50% Injectable 25 Gram(s) IV Push once  dextrose 50% Injectable 12.5 Gram(s) IV Push once  dextrose 50% Injectable 25 Gram(s) IV Push once  finasteride 5 milliGRAM(s) Oral daily  glucagon  Injectable 1 milliGRAM(s) IntraMuscular once  heparin   Injectable 5000 Unit(s) SubCutaneous every 12 hours  insulin lispro (ADMELOG) corrective regimen sliding scale   SubCutaneous three times a day before meals  levothyroxine 50 MICROGram(s) Oral daily  sodium chloride 0.9% Bolus 1000 milliLiter(s) IV Bolus once  tamsulosin 0.4 milliGRAM(s) Oral at bedtime    MEDICATIONS  (PRN):  dextrose Oral Gel 15 Gram(s) Oral once PRN Blood Glucose LESS THAN 70 milliGRAM(s)/deciliter      
Patient is a 89y old  Male who presents with a chief complaint of uti, ams (25 May 2022 14:25)      OVERNIGHT EVENTS:  Patients seen and examined at bedside this morning. No acute events overnight.    REVIEW OF SYSTEMS: denies chest pain/SOB, diaphoresis, no F/C, cough, dizziness, headache, blurry vision, nausea, vomiting, abdominal pain. All others review of systems negative     MEDICATIONS  (STANDING):  aspirin enteric coated 81 milliGRAM(s) Oral daily  atorvastatin 20 milliGRAM(s) Oral at bedtime  cilostazol 100 milliGRAM(s) Oral two times a day  dextrose 5%. 1000 milliLiter(s) (50 mL/Hr) IV Continuous <Continuous>  dextrose 5%. 1000 milliLiter(s) (100 mL/Hr) IV Continuous <Continuous>  dextrose 50% Injectable 25 Gram(s) IV Push once  dextrose 50% Injectable 12.5 Gram(s) IV Push once  dextrose 50% Injectable 25 Gram(s) IV Push once  finasteride 5 milliGRAM(s) Oral daily  glucagon  Injectable 1 milliGRAM(s) IntraMuscular once  heparin   Injectable 5000 Unit(s) SubCutaneous every 12 hours  insulin glargine Injectable (LANTUS) 10 Unit(s) SubCutaneous at bedtime  insulin lispro (ADMELOG) corrective regimen sliding scale   SubCutaneous three times a day before meals  insulin lispro (ADMELOG) corrective regimen sliding scale   SubCutaneous at bedtime  levothyroxine 50 MICROGram(s) Oral daily  sodium chloride 0.9% Bolus 1000 milliLiter(s) IV Bolus once  tamsulosin 0.4 milliGRAM(s) Oral at bedtime    MEDICATIONS  (PRN):  dextrose Oral Gel 15 Gram(s) Oral once PRN Blood Glucose LESS THAN 70 milliGRAM(s)/deciliter      Allergies    No Known Allergies    Intolerances        T(F): 98.1 (05-26-22 @ 05:25), Max: 98.2 (05-25-22 @ 11:06)  HR: 107 (05-26-22 @ 05:25) (104 - 111)  BP: 137/91 (05-26-22 @ 05:25) (97/65 - 138/92)  RR: 16 (05-26-22 @ 05:25) (16 - 18)  SpO2: 98% (05-26-22 @ 05:25) (98% - 98%)  Wt(kg): --    PHYSICAL EXAM:  GENERAL: NAD  HEAD:  Atraumatic, Normocephalic  EYES: PERRLA, conjunctiva and sclera clear  ENMT: Moist mucous membranes  NECK: Supple, No JVD, Normal thyroid  NERVOUS SYSTEM:  Alert & Awake  CHEST/LUNG: Clear to percussion bilaterally;   HEART: Regular rate and rhythm;   ABDOMEN: Soft, Nontender, Nondistended; Bowel sounds present  EXTREMITIES:  no edema BL LE  SKIN: moist    LABS:                        11.2   4.05  )-----------( 138      ( 25 May 2022 07:52 )             33.1     05-26    136  |  105  |  31<H>  ----------------------------<  106<H>  4.4   |  23  |  1.64<H>    Ca    8.9      26 May 2022 07:58          Cultures;   CAPILLARY BLOOD GLUCOSE      POCT Blood Glucose.: 329 mg/dL (26 May 2022 10:41)  POCT Blood Glucose.: 106 mg/dL (26 May 2022 07:23)  POCT Blood Glucose.: 101 mg/dL (25 May 2022 20:53)  POCT Blood Glucose.: 283 mg/dL (25 May 2022 15:33)    Lipid panel:           RADIOLOGY & ADDITIONAL TESTS:    Imaging Personally Reviewed:  [x ] YES      Consultant(s) Notes Reviewed:  [x ] YES     Care Discussed with [x ] Consultants [X ] Patient [ ] Family  [x ]    [x ]  Other; RN
Patient is a 89y old  Male who presents with a chief complaint of uti, ams (26 May 2022 15:19)      OVERNIGHT EVENTS:  Patients seen and examined at bedside this morning. No acute events overnight.    REVIEW OF SYSTEMS: denies chest pain/SOB, diaphoresis, no F/C, cough, dizziness, headache, blurry vision, nausea, vomiting, abdominal pain. All others review of systems negative     MEDICATIONS  (STANDING):  aspirin enteric coated 81 milliGRAM(s) Oral daily  atorvastatin 20 milliGRAM(s) Oral at bedtime  cefTRIAXone   IVPB      cefTRIAXone   IVPB 1000 milliGRAM(s) IV Intermittent every 24 hours  cilostazol 100 milliGRAM(s) Oral two times a day  dextrose 5%. 1000 milliLiter(s) (50 mL/Hr) IV Continuous <Continuous>  dextrose 5%. 1000 milliLiter(s) (100 mL/Hr) IV Continuous <Continuous>  dextrose 50% Injectable 25 Gram(s) IV Push once  dextrose 50% Injectable 12.5 Gram(s) IV Push once  dextrose 50% Injectable 25 Gram(s) IV Push once  finasteride 5 milliGRAM(s) Oral daily  glucagon  Injectable 1 milliGRAM(s) IntraMuscular once  heparin   Injectable 5000 Unit(s) SubCutaneous every 12 hours  insulin glargine Injectable (LANTUS) 10 Unit(s) SubCutaneous at bedtime  insulin lispro (ADMELOG) corrective regimen sliding scale   SubCutaneous three times a day before meals  insulin lispro (ADMELOG) corrective regimen sliding scale   SubCutaneous at bedtime  levothyroxine 50 MICROGram(s) Oral daily  sodium chloride 0.9% Bolus 1000 milliLiter(s) IV Bolus once  sodium chloride 0.9%. 1000 milliLiter(s) (75 mL/Hr) IV Continuous <Continuous>  tamsulosin 0.4 milliGRAM(s) Oral at bedtime    MEDICATIONS  (PRN):  dextrose Oral Gel 15 Gram(s) Oral once PRN Blood Glucose LESS THAN 70 milliGRAM(s)/deciliter      Allergies    No Known Allergies    Intolerances        T(F): 97.7 (05-27-22 @ 10:40), Max: 98 (05-26-22 @ 23:55)  HR: 102 (05-27-22 @ 10:40) (102 - 111)  BP: 119/74 (05-27-22 @ 10:40) (119/74 - 171/80)  RR: 18 (05-27-22 @ 10:40) (18 - 18)  SpO2: 97% (05-27-22 @ 10:40) (97% - 99%)  Wt(kg): --    PHYSICAL EXAM:  GENERAL: NAD  HEAD:  Atraumatic, Normocephalic  EYES: PERRLA, conjunctiva and sclera clear  ENMT: Moist mucous membranes  NECK: Supple, No JVD, Normal thyroid  NERVOUS SYSTEM:  Alert & Awake  CHEST/LUNG: Clear to percussion bilaterally;   HEART: Regular rate and rhythm;   ABDOMEN: Soft, Nontender, Nondistended; Bowel sounds present  EXTREMITIES:  no edema BL LE  SKIN: moist    LABS:                        11.3   3.55  )-----------( 160      ( 27 May 2022 06:32 )             33.3     05-27    137  |  107  |  24<H>  ----------------------------<  85  3.9   |  22  |  1.43<H>    Ca    8.6      27 May 2022 06:32          Cultures;   CAPILLARY BLOOD GLUCOSE      POCT Blood Glucose.: 225 mg/dL (27 May 2022 11:02)  POCT Blood Glucose.: 110 mg/dL (27 May 2022 07:41)  POCT Blood Glucose.: 186 mg/dL (26 May 2022 21:39)  POCT Blood Glucose.: 196 mg/dL (26 May 2022 16:12)    Lipid panel:           RADIOLOGY & ADDITIONAL TESTS:    Imaging Personally Reviewed:  [x ] YES      Consultant(s) Notes Reviewed:  [x ] YES     Care Discussed with [x ] Consultants [X ] Patient [ ] Family  [x ]    [x ]  Other; RN

## 2022-05-30 PROCEDURE — 99309 SBSQ NF CARE MODERATE MDM 30: CPT

## 2023-01-02 ENCOUNTER — INPATIENT (INPATIENT)
Facility: HOSPITAL | Age: 88
LOS: 11 days | Discharge: NURSING, REHAB OR LONG-TERM CARE | End: 2023-01-14
Attending: HOSPITALIST | Admitting: HOSPITALIST
Payer: MEDICARE

## 2023-01-02 VITALS
WEIGHT: 139.99 LBS | DIASTOLIC BLOOD PRESSURE: 84 MMHG | RESPIRATION RATE: 18 BRPM | OXYGEN SATURATION: 97 % | TEMPERATURE: 98 F | HEART RATE: 60 BPM | SYSTOLIC BLOOD PRESSURE: 176 MMHG

## 2023-01-02 DIAGNOSIS — Z98.89 OTHER SPECIFIED POSTPROCEDURAL STATES: Chronic | ICD-10-CM

## 2023-01-02 PROBLEM — E78.00 PURE HYPERCHOLESTEROLEMIA, UNSPECIFIED: Chronic | Status: ACTIVE | Noted: 2022-05-22

## 2023-01-02 PROBLEM — N40.0 BENIGN PROSTATIC HYPERPLASIA WITHOUT LOWER URINARY TRACT SYMPTOMS: Chronic | Status: ACTIVE | Noted: 2022-05-22

## 2023-01-02 PROBLEM — E11.9 TYPE 2 DIABETES MELLITUS WITHOUT COMPLICATIONS: Chronic | Status: ACTIVE | Noted: 2022-05-22

## 2023-01-02 PROBLEM — C61 MALIGNANT NEOPLASM OF PROSTATE: Chronic | Status: ACTIVE | Noted: 2022-05-22

## 2023-01-02 PROBLEM — I10 ESSENTIAL (PRIMARY) HYPERTENSION: Chronic | Status: ACTIVE | Noted: 2022-05-22

## 2023-01-02 LAB
ALBUMIN SERPL ELPH-MCNC: 3.6 G/DL — SIGNIFICANT CHANGE UP (ref 3.3–5)
ALP SERPL-CCNC: 99 U/L — SIGNIFICANT CHANGE UP (ref 40–120)
ALT FLD-CCNC: 23 U/L — SIGNIFICANT CHANGE UP (ref 12–78)
ANION GAP SERPL CALC-SCNC: 5 MMOL/L — SIGNIFICANT CHANGE UP (ref 5–17)
APTT BLD: 31.6 SEC — SIGNIFICANT CHANGE UP (ref 27.5–35.5)
AST SERPL-CCNC: 29 U/L — SIGNIFICANT CHANGE UP (ref 15–37)
BASOPHILS # BLD AUTO: 0.01 K/UL — SIGNIFICANT CHANGE UP (ref 0–0.2)
BASOPHILS NFR BLD AUTO: 0.2 % — SIGNIFICANT CHANGE UP (ref 0–2)
BILIRUB SERPL-MCNC: 0.9 MG/DL — SIGNIFICANT CHANGE UP (ref 0.2–1.2)
BUN SERPL-MCNC: 30 MG/DL — HIGH (ref 7–23)
CALCIUM SERPL-MCNC: 9.5 MG/DL — SIGNIFICANT CHANGE UP (ref 8.5–10.1)
CHLORIDE SERPL-SCNC: 104 MMOL/L — SIGNIFICANT CHANGE UP (ref 96–108)
CO2 SERPL-SCNC: 28 MMOL/L — SIGNIFICANT CHANGE UP (ref 22–31)
CREAT SERPL-MCNC: 1.68 MG/DL — HIGH (ref 0.5–1.3)
EGFR: 38 ML/MIN/1.73M2 — LOW
EOSINOPHIL # BLD AUTO: 0.06 K/UL — SIGNIFICANT CHANGE UP (ref 0–0.5)
EOSINOPHIL NFR BLD AUTO: 1.2 % — SIGNIFICANT CHANGE UP (ref 0–6)
FLUAV AG NPH QL: SIGNIFICANT CHANGE UP
FLUBV AG NPH QL: SIGNIFICANT CHANGE UP
GLUCOSE BLDC GLUCOMTR-MCNC: 101 MG/DL — HIGH (ref 70–99)
GLUCOSE BLDC GLUCOMTR-MCNC: 178 MG/DL — HIGH (ref 70–99)
GLUCOSE BLDC GLUCOMTR-MCNC: 214 MG/DL — HIGH (ref 70–99)
GLUCOSE SERPL-MCNC: 130 MG/DL — HIGH (ref 70–99)
HCT VFR BLD CALC: 40.8 % — SIGNIFICANT CHANGE UP (ref 39–50)
HGB BLD-MCNC: 13.4 G/DL — SIGNIFICANT CHANGE UP (ref 13–17)
IMM GRANULOCYTES NFR BLD AUTO: 0.4 % — SIGNIFICANT CHANGE UP (ref 0–0.9)
INR BLD: 1.06 RATIO — SIGNIFICANT CHANGE UP (ref 0.88–1.16)
LYMPHOCYTES # BLD AUTO: 1.16 K/UL — SIGNIFICANT CHANGE UP (ref 1–3.3)
LYMPHOCYTES # BLD AUTO: 23.1 % — SIGNIFICANT CHANGE UP (ref 13–44)
MCHC RBC-ENTMCNC: 31.1 PG — SIGNIFICANT CHANGE UP (ref 27–34)
MCHC RBC-ENTMCNC: 32.8 G/DL — SIGNIFICANT CHANGE UP (ref 32–36)
MCV RBC AUTO: 94.7 FL — SIGNIFICANT CHANGE UP (ref 80–100)
MONOCYTES # BLD AUTO: 0.46 K/UL — SIGNIFICANT CHANGE UP (ref 0–0.9)
MONOCYTES NFR BLD AUTO: 9.2 % — SIGNIFICANT CHANGE UP (ref 2–14)
NEUTROPHILS # BLD AUTO: 3.31 K/UL — SIGNIFICANT CHANGE UP (ref 1.8–7.4)
NEUTROPHILS NFR BLD AUTO: 65.9 % — SIGNIFICANT CHANGE UP (ref 43–77)
NRBC # BLD: 0 /100 WBCS — SIGNIFICANT CHANGE UP (ref 0–0)
PLATELET # BLD AUTO: 150 K/UL — SIGNIFICANT CHANGE UP (ref 150–400)
POTASSIUM SERPL-MCNC: 4.6 MMOL/L — SIGNIFICANT CHANGE UP (ref 3.5–5.3)
POTASSIUM SERPL-SCNC: 4.6 MMOL/L — SIGNIFICANT CHANGE UP (ref 3.5–5.3)
PROT SERPL-MCNC: 7.6 GM/DL — SIGNIFICANT CHANGE UP (ref 6–8.3)
PROTHROM AB SERPL-ACNC: 12.6 SEC — SIGNIFICANT CHANGE UP (ref 10.5–13.4)
RBC # BLD: 4.31 M/UL — SIGNIFICANT CHANGE UP (ref 4.2–5.8)
RBC # FLD: 12.7 % — SIGNIFICANT CHANGE UP (ref 10.3–14.5)
SARS-COV-2 RNA SPEC QL NAA+PROBE: SIGNIFICANT CHANGE UP
SODIUM SERPL-SCNC: 137 MMOL/L — SIGNIFICANT CHANGE UP (ref 135–145)
TROPONIN I, HIGH SENSITIVITY RESULT: 26.9 NG/L — SIGNIFICANT CHANGE UP
WBC # BLD: 5.02 K/UL — SIGNIFICANT CHANGE UP (ref 3.8–10.5)
WBC # FLD AUTO: 5.02 K/UL — SIGNIFICANT CHANGE UP (ref 3.8–10.5)

## 2023-01-02 PROCEDURE — 0042T: CPT | Mod: MA

## 2023-01-02 PROCEDURE — 99291 CRITICAL CARE FIRST HOUR: CPT

## 2023-01-02 PROCEDURE — 70496 CT ANGIOGRAPHY HEAD: CPT | Mod: 26,MA

## 2023-01-02 PROCEDURE — 70498 CT ANGIOGRAPHY NECK: CPT | Mod: 26,MA

## 2023-01-02 PROCEDURE — 71045 X-RAY EXAM CHEST 1 VIEW: CPT | Mod: 26

## 2023-01-02 PROCEDURE — 93010 ELECTROCARDIOGRAM REPORT: CPT

## 2023-01-02 RX ORDER — INSULIN LISPRO 100/ML
VIAL (ML) SUBCUTANEOUS
Refills: 0 | Status: DISCONTINUED | OUTPATIENT
Start: 2023-01-02 | End: 2023-01-14

## 2023-01-02 RX ORDER — ATORVASTATIN CALCIUM 80 MG/1
80 TABLET, FILM COATED ORAL AT BEDTIME
Refills: 0 | Status: DISCONTINUED | OUTPATIENT
Start: 2023-01-02 | End: 2023-01-14

## 2023-01-02 RX ORDER — LATANOPROST 0.05 MG/ML
1 SOLUTION/ DROPS OPHTHALMIC; TOPICAL AT BEDTIME
Refills: 0 | Status: DISCONTINUED | OUTPATIENT
Start: 2023-01-02 | End: 2023-01-14

## 2023-01-02 RX ORDER — ASPIRIN/CALCIUM CARB/MAGNESIUM 324 MG
81 TABLET ORAL DAILY
Refills: 0 | Status: DISCONTINUED | OUTPATIENT
Start: 2023-01-02 | End: 2023-01-14

## 2023-01-02 RX ORDER — DEXTROSE 50 % IN WATER 50 %
15 SYRINGE (ML) INTRAVENOUS ONCE
Refills: 0 | Status: DISCONTINUED | OUTPATIENT
Start: 2023-01-02 | End: 2023-01-14

## 2023-01-02 RX ORDER — PANTOPRAZOLE SODIUM 20 MG/1
40 TABLET, DELAYED RELEASE ORAL
Refills: 0 | Status: DISCONTINUED | OUTPATIENT
Start: 2023-01-02 | End: 2023-01-14

## 2023-01-02 RX ORDER — DEXTROSE 50 % IN WATER 50 %
25 SYRINGE (ML) INTRAVENOUS ONCE
Refills: 0 | Status: DISCONTINUED | OUTPATIENT
Start: 2023-01-02 | End: 2023-01-14

## 2023-01-02 RX ORDER — DEXTROSE 50 % IN WATER 50 %
12.5 SYRINGE (ML) INTRAVENOUS ONCE
Refills: 0 | Status: DISCONTINUED | OUTPATIENT
Start: 2023-01-02 | End: 2023-01-14

## 2023-01-02 RX ORDER — LEVOTHYROXINE SODIUM 125 MCG
50 TABLET ORAL DAILY
Refills: 0 | Status: DISCONTINUED | OUTPATIENT
Start: 2023-01-02 | End: 2023-01-03

## 2023-01-02 RX ORDER — SODIUM CHLORIDE 9 MG/ML
1000 INJECTION, SOLUTION INTRAVENOUS
Refills: 0 | Status: DISCONTINUED | OUTPATIENT
Start: 2023-01-02 | End: 2023-01-14

## 2023-01-02 RX ORDER — CILOSTAZOL 100 MG/1
100 TABLET ORAL
Refills: 0 | Status: DISCONTINUED | OUTPATIENT
Start: 2023-01-02 | End: 2023-01-14

## 2023-01-02 RX ORDER — FINASTERIDE 5 MG/1
5 TABLET, FILM COATED ORAL DAILY
Refills: 0 | Status: DISCONTINUED | OUTPATIENT
Start: 2023-01-02 | End: 2023-01-14

## 2023-01-02 RX ORDER — INSULIN GLARGINE 100 [IU]/ML
12 INJECTION, SOLUTION SUBCUTANEOUS AT BEDTIME
Refills: 0 | Status: DISCONTINUED | OUTPATIENT
Start: 2023-01-02 | End: 2023-01-03

## 2023-01-02 RX ORDER — HEPARIN SODIUM 5000 [USP'U]/ML
5000 INJECTION INTRAVENOUS; SUBCUTANEOUS EVERY 12 HOURS
Refills: 0 | Status: DISCONTINUED | OUTPATIENT
Start: 2023-01-02 | End: 2023-01-13

## 2023-01-02 RX ORDER — ATORVASTATIN CALCIUM 80 MG/1
40 TABLET, FILM COATED ORAL AT BEDTIME
Refills: 0 | Status: DISCONTINUED | OUTPATIENT
Start: 2023-01-02 | End: 2023-01-02

## 2023-01-02 RX ORDER — TAMSULOSIN HYDROCHLORIDE 0.4 MG/1
0.4 CAPSULE ORAL AT BEDTIME
Refills: 0 | Status: DISCONTINUED | OUTPATIENT
Start: 2023-01-02 | End: 2023-01-14

## 2023-01-02 RX ORDER — GLUCAGON INJECTION, SOLUTION 0.5 MG/.1ML
1 INJECTION, SOLUTION SUBCUTANEOUS ONCE
Refills: 0 | Status: DISCONTINUED | OUTPATIENT
Start: 2023-01-02 | End: 2023-01-14

## 2023-01-02 RX ADMIN — INSULIN GLARGINE 12 UNIT(S): 100 INJECTION, SOLUTION SUBCUTANEOUS at 21:46

## 2023-01-02 RX ADMIN — TAMSULOSIN HYDROCHLORIDE 0.4 MILLIGRAM(S): 0.4 CAPSULE ORAL at 21:46

## 2023-01-02 RX ADMIN — ATORVASTATIN CALCIUM 80 MILLIGRAM(S): 80 TABLET, FILM COATED ORAL at 21:46

## 2023-01-02 NOTE — H&P ADULT - HISTORY OF PRESENT ILLNESS
89-year-old gentleman PMH of hypertension, diabetes, CAD with last stent 2009, prostate cancer status post TURP who presents to the ED because of concern for a CVA.  Patient went to bed about 9 PM yesterday which was a last known normal.  He woke up this morning, and was noted to have garbled speech, left facial droop, and left pronator drift at 11 AM.  Patient denies any headache, chest pain, shortness of breath, fever, abdominal pain.  No history of stroke in the past.

## 2023-01-02 NOTE — H&P ADULT - NSHPPHYSICALEXAM_GEN_ALL_CORE
PHYSICAL EXAMINATION:  Vital Signs Last 24 Hrs  T(C): 36.6 (02 Jan 2023 15:33), Max: 36.6 (02 Jan 2023 15:33)  T(F): 97.8 (02 Jan 2023 15:33), Max: 97.8 (02 Jan 2023 15:33)  HR: 60 (02 Jan 2023 15:33) (60 - 60)  BP: 176/84 (02 Jan 2023 15:33) (176/84 - 176/84)  BP(mean): --  RR: 18 (02 Jan 2023 15:33) (18 - 18)  SpO2: 97% (02 Jan 2023 15:33) (97% - 97%)    Parameters below as of 02 Jan 2023 15:33  Patient On (Oxygen Delivery Method): room air      CAPILLARY BLOOD GLUCOSE      POCT Blood Glucose.: 178 mg/dL (02 Jan 2023 17:49)  POCT Blood Glucose.: 101 mg/dL (02 Jan 2023 15:39)      GENERAL: NAD,   ENMT: mucous membranes moist  NECK: supple, No JVD  CHEST/LUNG: clear to auscultation bilaterally; no rales, rhonchi, or wheezing b/l  HEART: normal S1, S2  ABDOMEN: BS+, soft, ND, NT   EXTREMITIES:  pulses palpable; no clubbing, cyanosis, or edema b/l LEs  NEURO: awake, alert, interactive; moves all extremities PHYSICAL EXAMINATION:  Vital Signs Last 24 Hrs  T(C): 36.6 (02 Jan 2023 15:33), Max: 36.6 (02 Jan 2023 15:33)  T(F): 97.8 (02 Jan 2023 15:33), Max: 97.8 (02 Jan 2023 15:33)  HR: 60 (02 Jan 2023 15:33) (60 - 60)  BP: 176/84 (02 Jan 2023 15:33) (176/84 - 176/84)  BP(mean): --  RR: 18 (02 Jan 2023 15:33) (18 - 18)  SpO2: 97% (02 Jan 2023 15:33) (97% - 97%)    Parameters below as of 02 Jan 2023 15:33  Patient On (Oxygen Delivery Method): room air      CAPILLARY BLOOD GLUCOSE      POCT Blood Glucose.: 178 mg/dL (02 Jan 2023 17:49)  POCT Blood Glucose.: 101 mg/dL (02 Jan 2023 15:39)      GENERAL: NAD, seen in ER, daughter at bedside, follow all commands, speech fluent, mild left central facial.   ENMT: mucous membranes moist  NECK: supple, No JVD  CHEST/LUNG: clear to auscultation bilaterally; no rales, rhonchi, or wheezing b/l  HEART: normal S1, S2  ABDOMEN: BS+, soft, ND, NT   EXTREMITIES:  pulses palpable; no clubbing, cyanosis, or edema b/l LEs  NEURO: awake, alert, interactive; moves all extremities PHYSICAL EXAMINATION:  Vital Signs Last 24 Hrs  T(C): 36.6 (02 Jan 2023 15:33), Max: 36.6 (02 Jan 2023 15:33)  T(F): 97.8 (02 Jan 2023 15:33), Max: 97.8 (02 Jan 2023 15:33)  HR: 60 (02 Jan 2023 15:33) (60 - 60)  BP: 176/84 (02 Jan 2023 15:33) (176/84 - 176/84)  BP(mean): --  RR: 18 (02 Jan 2023 15:33) (18 - 18)  SpO2: 97% (02 Jan 2023 15:33) (97% - 97%)    Parameters below as of 02 Jan 2023 15:33  Patient On (Oxygen Delivery Method): room air      CAPILLARY BLOOD GLUCOSE      POCT Blood Glucose.: 178 mg/dL (02 Jan 2023 17:49)  POCT Blood Glucose.: 101 mg/dL (02 Jan 2023 15:39)      GENERAL: NAD, seen in ER, daughter at bedside, follow all commands, speech fluent, mild left central facial.   MP full 5/5 UE and LE.   ENMT: mucous membranes moist  NECK: supple, No JVD  CHEST/LUNG: clear to auscultation bilaterally; no rales, rhonchi, or wheezing b/l  HEART: normal S1, S2  ABDOMEN: BS+, soft, ND, NT   EXTREMITIES:  pulses palpable; no clubbing, cyanosis, or edema b/l LEs  NEURO: awake, alert, interactive; moves all extremities

## 2023-01-02 NOTE — ED PROVIDER NOTE - OBJECTIVE STATEMENT
Patient is a 89-year-old gentleman with a past medical history of hypertension, diabetes, CAD, prostate cancer status post TURP who presents to the ED because of concern for a CVA.  Patient went to bed about 9 PM yesterday which was a last known normal.  He woke up this morning, and was noted to have garbled speech, left facial droop, and left pronator drift at 11 AM.  Patient denies any headache, chest pain, shortness of breath, fever, abdominal pain.  No history of stroke in the past.

## 2023-01-02 NOTE — ED ADULT NURSE NOTE - OBJECTIVE STATEMENT
as per pt's daughter, "this morning around 10a.m. he was having a hard time speaking and eating he was eating very slowly and seemed like he was slurring his speech" pt is A&0x3, pt c/o feeling tired and hungry, CT scan done, EKG done , awaiting results at this time, daughter at bedside

## 2023-01-02 NOTE — ED ADULT TRIAGE NOTE - CHIEF COMPLAINT QUOTE
pt BIB EMS with c/o left sided facial droop, aphasia, LKN 9:30 PM 1/1/022as per daughter states he woke up this AM and deficits were noted at around 1100AM

## 2023-01-02 NOTE — ED PROVIDER NOTE - PROGRESS NOTE DETAILS
Dr. Mallory of telestroke consulted, advised no intervention need at this time. Dr. Cee of neuro consulted, and pt admitted for further care.

## 2023-01-02 NOTE — ED ADULT NURSE NOTE - NSICDXPASTMEDICALHX_GEN_ALL_CORE_FT
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

## 2023-01-02 NOTE — ED ADULT NURSE NOTE - NSICDXPASTSURGICALHX_GEN_ALL_CORE_FT
PAST SURGICAL HISTORY:  CAD (Coronary Artery Disease)     Diabetes Mellitus     Inferior MI     No significant past surgical history     S/P TURP (status post transurethral resection of prostate)

## 2023-01-02 NOTE — ED PROVIDER NOTE - CLINICAL SUMMARY MEDICAL DECISION MAKING FREE TEXT BOX
Ddx: CVA, out of TPA window, NIHSS 4/ ro ich  Plan: Cbc, cmp, CT head, ct angio, troponin, ecg, cxr, neurology consult, admit
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
## 2023-01-02 NOTE — H&P ADULT - NSHPLABSRESULTS_GEN_ALL_CORE
LABS:                        13.4   5.02  )-----------( 150      ( 02 Jan 2023 16:06 )             40.8     01-02    137  |  104  |  30<H>  ----------------------------<  130<H>  4.6   |  28  |  1.68<H>    Ca    9.5      02 Jan 2023 16:06    TPro  7.6  /  Alb  3.6  /  TBili  0.9  /  DBili  x   /  AST  29  /  ALT  23  /  AlkPhos  99  01-02    PT/INR - ( 02 Jan 2023 16:06 )   PT: 12.6 sec;   INR: 1.06 ratio         PTT - ( 02 Jan 2023 16:06 )  PTT:31.6 sec        RADIOLOGY & ADDITIONAL TESTS:

## 2023-01-02 NOTE — H&P ADULT - ASSESSMENT
89-year-old gentleman PMH of hypertension, diabetes, CAD with last stent 2009, prostate cancer status post TURP who presents to the ED because of concern for a CVA.  Patient went to bed about 9 PM yesterday which was a last known normal.  He woke up this morning, and was noted to have garbled speech, left facial droop, and left pronator drift at 11 AM.  Patient denies any headache, chest pain, shortness of breath, fever, abdominal pain.  No history of stroke in the past.    Plan:  89-year-old gentleman PMH of hypertension, diabetes, CAD with last stent 2009, prostate cancer status post TURP who presents to the ED because of concern for a CVA.  Patient went to bed about 9 PM yesterday which was a last known normal.  He woke up this morning, and was noted to have garbled speech, left facial droop, and left pronator drift at 11 AM.  Patient denies any headache, chest pain, shortness of breath, fever, abdominal pain.  No history of stroke in the past.      Plan: Admit to tele for likely CVA. CT head notes old left lacunar infarct, no bleed, no acute infarct on CT. Has left ICA stenosis. Brain MRI non-contrast ordered. PT   and Swallow eval requested. NIHSS is 1 for partial left facial weakness, no more pronator drift on left side. Lipid panel in AM.     Continue home meds: Increase Lipitor to 80 mg as left ICA stenosis noted, ASA, Pletal, Synthyroid, Proacr, Flomax all at home dose and glaucoma eye drops.      DM: Lantus 12 units, A1C in AM, SSC.

## 2023-01-03 LAB
A1C WITH ESTIMATED AVERAGE GLUCOSE RESULT: 7.6 % — HIGH (ref 4–5.6)
CHOLEST SERPL-MCNC: 144 MG/DL — SIGNIFICANT CHANGE UP
ESTIMATED AVERAGE GLUCOSE: 171 MG/DL — HIGH (ref 68–114)
GLUCOSE BLDC GLUCOMTR-MCNC: 66 MG/DL — LOW (ref 70–99)
GLUCOSE BLDC GLUCOMTR-MCNC: 75 MG/DL — SIGNIFICANT CHANGE UP (ref 70–99)
GLUCOSE BLDC GLUCOMTR-MCNC: 97 MG/DL — SIGNIFICANT CHANGE UP (ref 70–99)
HDLC SERPL-MCNC: 57 MG/DL — SIGNIFICANT CHANGE UP
LIPID PNL WITH DIRECT LDL SERPL: 75 MG/DL — SIGNIFICANT CHANGE UP
NON HDL CHOLESTEROL: 87 MG/DL — SIGNIFICANT CHANGE UP
TRIGL SERPL-MCNC: 58 MG/DL — SIGNIFICANT CHANGE UP
TSH SERPL-MCNC: 4.96 UIU/ML — HIGH (ref 0.36–3.74)

## 2023-01-03 PROCEDURE — 99232 SBSQ HOSP IP/OBS MODERATE 35: CPT

## 2023-01-03 PROCEDURE — 99222 1ST HOSP IP/OBS MODERATE 55: CPT

## 2023-01-03 PROCEDURE — 70551 MRI BRAIN STEM W/O DYE: CPT | Mod: 26

## 2023-01-03 RX ORDER — LEVOTHYROXINE SODIUM 125 MCG
75 TABLET ORAL DAILY
Refills: 0 | Status: DISCONTINUED | OUTPATIENT
Start: 2023-01-03 | End: 2023-01-14

## 2023-01-03 RX ORDER — SODIUM CHLORIDE 9 MG/ML
1000 INJECTION, SOLUTION INTRAVENOUS
Refills: 0 | Status: DISCONTINUED | OUTPATIENT
Start: 2023-01-03 | End: 2023-01-04

## 2023-01-03 RX ADMIN — Medication 81 MILLIGRAM(S): at 13:18

## 2023-01-03 RX ADMIN — LATANOPROST 1 DROP(S): 0.05 SOLUTION/ DROPS OPHTHALMIC; TOPICAL at 01:07

## 2023-01-03 RX ADMIN — TAMSULOSIN HYDROCHLORIDE 0.4 MILLIGRAM(S): 0.4 CAPSULE ORAL at 23:05

## 2023-01-03 RX ADMIN — HEPARIN SODIUM 5000 UNIT(S): 5000 INJECTION INTRAVENOUS; SUBCUTANEOUS at 06:18

## 2023-01-03 RX ADMIN — LATANOPROST 1 DROP(S): 0.05 SOLUTION/ DROPS OPHTHALMIC; TOPICAL at 23:03

## 2023-01-03 RX ADMIN — Medication 50 MICROGRAM(S): at 06:18

## 2023-01-03 RX ADMIN — PANTOPRAZOLE SODIUM 40 MILLIGRAM(S): 20 TABLET, DELAYED RELEASE ORAL at 06:18

## 2023-01-03 RX ADMIN — CILOSTAZOL 100 MILLIGRAM(S): 100 TABLET ORAL at 18:39

## 2023-01-03 RX ADMIN — CILOSTAZOL 100 MILLIGRAM(S): 100 TABLET ORAL at 06:18

## 2023-01-03 RX ADMIN — FINASTERIDE 5 MILLIGRAM(S): 5 TABLET, FILM COATED ORAL at 13:22

## 2023-01-03 RX ADMIN — HEPARIN SODIUM 5000 UNIT(S): 5000 INJECTION INTRAVENOUS; SUBCUTANEOUS at 18:39

## 2023-01-03 RX ADMIN — ATORVASTATIN CALCIUM 80 MILLIGRAM(S): 80 TABLET, FILM COATED ORAL at 23:03

## 2023-01-03 NOTE — PHYSICAL THERAPY INITIAL EVALUATION ADULT - PERTINENT HX OF CURRENT PROBLEM, REHAB EVAL
As per H&P, "89-year-old gentleman PMH of hypertension, diabetes, CAD with last stent 2009, prostate cancer status post TURP who presents to the ED because of concern for a CVA." As per H&P, "89-year-old gentleman PMH of hypertension, diabetes, CAD with last stent 2009, prostate cancer status post TURP who presents to the ED because of concern for a CVA."   CT Brain : Moderate volume loss, microvascular disease, no acute hemorrhage   or midline shift. Old lacunar infarct left thalamus.

## 2023-01-03 NOTE — PHYSICAL THERAPY INITIAL EVALUATION ADULT - STRENGTHENING, PT EVAL
Pt will be able to improve muscle strength in all 4 extremities to a 4/5 to improve gait in 4 weeks.

## 2023-01-03 NOTE — PROGRESS NOTE ADULT - SUBJECTIVE AND OBJECTIVE BOX
Patient: MARIANA WRIGHT [id number] 89y Male                            Hospitalist Attending Note    No complaints.  Reports feeling at baseline.     ____________________PHYSICAL EXAM:  GENERAL:  NAD Alert and Oriented x 2 to person, place, speech fluent.   HEENT: NCAT  CARDIOVASCULAR:  S1, S2  LUNGS: CTAB  ABDOMEN:  soft, (-) tenderness, (-) distension, (+) bowel sounds, (-) guarding, (-) rebound (-) rigidity  EXTREMITIES:  no cyanosis / clubbing / edema.   NEURO: strength symmetric, sensation grossly intact.   ____________________     VITALS:  Vital Signs Last 24 Hrs  T(C): 36.2 (03 Jan 2023 16:35), Max: 37 (03 Jan 2023 05:21)  T(F): 97.2 (03 Jan 2023 16:35), Max: 98.6 (03 Jan 2023 05:21)  HR: 100 (03 Jan 2023 16:35) (67 - 100)  BP: 152/92 (03 Jan 2023 16:35) (130/78 - 196/100)  BP(mean): --  RR: 14 (03 Jan 2023 16:35) (14 - 18)  SpO2: 99% (03 Jan 2023 16:35) (98% - 100%)    Parameters below as of 03 Jan 2023 15:40  Patient On (Oxygen Delivery Method): room air     Daily     Daily   CAPILLARY BLOOD GLUCOSE      POCT Blood Glucose.: 75 mg/dL (03 Jan 2023 13:17)  POCT Blood Glucose.: 97 mg/dL (03 Jan 2023 11:02)  POCT Blood Glucose.: 66 mg/dL (03 Jan 2023 09:53)  POCT Blood Glucose.: 214 mg/dL (02 Jan 2023 21:42)  POCT Blood Glucose.: 178 mg/dL (02 Jan 2023 17:49)    I&O's Summary      HISTORY:  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      History of PTCA  7/09, stents to Cx and RCA      Prostate CA      Type 2 diabetes mellitus      HTN (hypertension)      BPH without urinary obstruction      High cholesterol      Inferior MI      Diabetes Mellitus      CAD (Coronary Artery Disease)      S/P TURP (status post transurethral resection of prostate)      No significant past surgical history      Allergies    No Known Allergies    Intolerances       LABS:                        13.4   5.02  )-----------( 150      ( 02 Jan 2023 16:06 )             40.8     01-02    137  |  104  |  30<H>  ----------------------------<  130<H>  4.6   |  28  |  1.68<H>    Ca    9.5      02 Jan 2023 16:06    TPro  7.6  /  Alb  3.6  /  TBili  0.9  /  DBili  x   /  AST  29  /  ALT  23  /  AlkPhos  99  01-02    PT/INR - ( 02 Jan 2023 16:06 )   PT: 12.6 sec;   INR: 1.06 ratio         PTT - ( 02 Jan 2023 16:06 )  PTT:31.6 sec  LIVER FUNCTIONS - ( 02 Jan 2023 16:06 )  Alb: 3.6 g/dL / Pro: 7.6 gm/dL / ALK PHOS: 99 U/L / ALT: 23 U/L / AST: 29 U/L / GGT: x                     MEDICATIONS:  MEDICATIONS  (STANDING):  aspirin enteric coated 81 milliGRAM(s) Oral daily  atorvastatin 80 milliGRAM(s) Oral at bedtime  cilostazol 100 milliGRAM(s) Oral two times a day  dextrose 5%. 1000 milliLiter(s) (100 mL/Hr) IV Continuous <Continuous>  dextrose 5%. 1000 milliLiter(s) (50 mL/Hr) IV Continuous <Continuous>  dextrose 50% Injectable 25 Gram(s) IV Push once  dextrose 50% Injectable 12.5 Gram(s) IV Push once  dextrose 50% Injectable 25 Gram(s) IV Push once  finasteride 5 milliGRAM(s) Oral daily  glucagon  Injectable 1 milliGRAM(s) IntraMuscular once  heparin   Injectable 5000 Unit(s) SubCutaneous every 12 hours  insulin glargine Injectable (LANTUS) 12 Unit(s) SubCutaneous at bedtime  insulin lispro (ADMELOG) corrective regimen sliding scale   SubCutaneous three times a day before meals  latanoprost 0.005% Ophthalmic Solution 1 Drop(s) Both EYES at bedtime  levothyroxine 75 MICROGram(s) Oral daily  pantoprazole    Tablet 40 milliGRAM(s) Oral before breakfast  tamsulosin 0.4 milliGRAM(s) Oral at bedtime    MEDICATIONS  (PRN):  dextrose Oral Gel 15 Gram(s) Oral once PRN Blood Glucose LESS THAN 70 milliGRAM(s)/deciliter                             Patient: MARIANA WRIGHT [id number] 89y Male                            Hospitalist Attending Note    No complaints.  Reports feeling at baseline.    ____________________PHYSICAL EXAM:  GENERAL:  NAD Alert and Oriented x 2 to person, place, speech fluent.   HEENT: NCAT  CARDIOVASCULAR:  S1, S2  LUNGS: CTAB  ABDOMEN:  soft, (-) tenderness, (-) distension, (+) bowel sounds, (-) guarding, (-) rebound (-) rigidity  EXTREMITIES:  no cyanosis / clubbing / edema.   NEURO: Mild L facial droop.  LUE weakness.    ____________________     VITALS:  Vital Signs Last 24 Hrs  T(C): 36.2 (03 Jan 2023 16:35), Max: 37 (03 Jan 2023 05:21)  T(F): 97.2 (03 Jan 2023 16:35), Max: 98.6 (03 Jan 2023 05:21)  HR: 100 (03 Jan 2023 16:35) (67 - 100)  BP: 152/92 (03 Jan 2023 16:35) (130/78 - 196/100)  BP(mean): --  RR: 14 (03 Jan 2023 16:35) (14 - 18)  SpO2: 99% (03 Jan 2023 16:35) (98% - 100%)    Parameters below as of 03 Jan 2023 15:40  Patient On (Oxygen Delivery Method): room air     Daily     Daily   CAPILLARY BLOOD GLUCOSE      POCT Blood Glucose.: 75 mg/dL (03 Jan 2023 13:17)  POCT Blood Glucose.: 97 mg/dL (03 Jan 2023 11:02)  POCT Blood Glucose.: 66 mg/dL (03 Jan 2023 09:53)  POCT Blood Glucose.: 214 mg/dL (02 Jan 2023 21:42)  POCT Blood Glucose.: 178 mg/dL (02 Jan 2023 17:49)    I&O's Summary      HISTORY:  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      History of PTCA  7/09, stents to Cx and RCA      Prostate CA      Type 2 diabetes mellitus      HTN (hypertension)      BPH without urinary obstruction      High cholesterol      Inferior MI      Diabetes Mellitus      CAD (Coronary Artery Disease)      S/P TURP (status post transurethral resection of prostate)      No significant past surgical history      Allergies    No Known Allergies    Intolerances       LABS:                        13.4   5.02  )-----------( 150      ( 02 Jan 2023 16:06 )             40.8     01-02    137  |  104  |  30<H>  ----------------------------<  130<H>  4.6   |  28  |  1.68<H>    Ca    9.5      02 Jan 2023 16:06    TPro  7.6  /  Alb  3.6  /  TBili  0.9  /  DBili  x   /  AST  29  /  ALT  23  /  AlkPhos  99  01-02    PT/INR - ( 02 Jan 2023 16:06 )   PT: 12.6 sec;   INR: 1.06 ratio         PTT - ( 02 Jan 2023 16:06 )  PTT:31.6 sec  LIVER FUNCTIONS - ( 02 Jan 2023 16:06 )  Alb: 3.6 g/dL / Pro: 7.6 gm/dL / ALK PHOS: 99 U/L / ALT: 23 U/L / AST: 29 U/L / GGT: x                     MEDICATIONS:  MEDICATIONS  (STANDING):  aspirin enteric coated 81 milliGRAM(s) Oral daily  atorvastatin 80 milliGRAM(s) Oral at bedtime  cilostazol 100 milliGRAM(s) Oral two times a day  dextrose 5%. 1000 milliLiter(s) (100 mL/Hr) IV Continuous <Continuous>  dextrose 5%. 1000 milliLiter(s) (50 mL/Hr) IV Continuous <Continuous>  dextrose 50% Injectable 25 Gram(s) IV Push once  dextrose 50% Injectable 12.5 Gram(s) IV Push once  dextrose 50% Injectable 25 Gram(s) IV Push once  finasteride 5 milliGRAM(s) Oral daily  glucagon  Injectable 1 milliGRAM(s) IntraMuscular once  heparin   Injectable 5000 Unit(s) SubCutaneous every 12 hours  insulin glargine Injectable (LANTUS) 12 Unit(s) SubCutaneous at bedtime  insulin lispro (ADMELOG) corrective regimen sliding scale   SubCutaneous three times a day before meals  latanoprost 0.005% Ophthalmic Solution 1 Drop(s) Both EYES at bedtime  levothyroxine 75 MICROGram(s) Oral daily  pantoprazole    Tablet 40 milliGRAM(s) Oral before breakfast  tamsulosin 0.4 milliGRAM(s) Oral at bedtime    MEDICATIONS  (PRN):  dextrose Oral Gel 15 Gram(s) Oral once PRN Blood Glucose LESS THAN 70 milliGRAM(s)/deciliter

## 2023-01-03 NOTE — ED ADULT NURSE REASSESSMENT NOTE - NS ED NURSE REASSESS COMMENT FT1
Covering RN: Patient fs done and nutrition given. v/s wnl. No concern voiced.
spoke with Shavonne (daughter) patient able to go to MRI. Blood glucose sensor taken off by daughter. MRI sheet in chart
spoke with provider Kristie Mondragon. patient FS 66 patient given food and repeated FS 97. patient alert. on cardiac monitor
unable to get in contact with Shavonne Stratton.  patient unable to complete MRI screening. Attempted with  Antonino Id # [id number]

## 2023-01-03 NOTE — PROGRESS NOTE ADULT - ASSESSMENT
89-year-old gentleman PMH of hypertension, diabetes, CAD with last stent 2009, prostate cancer status post TURP who presents to the ED because of concern for a CVA.  Patient went to bed about 9 PM 1 day PTA, which was a last known normal.  He woke up and was noted to have garbled speech, left facial droop, and left pronator drift at 11 AM.      # Suspected CVA - CT head notes old left lacunar infarct, no bleed, no acute infarct on CT. Has left ICA stenosis. Brain MRI non-contrast ordered. PT and Swallow eval requested. NIHSS is 1 for partial left facial weakness, no more pronator drift on left side. Continue ASA, Statin.  Seen by Neuro.    # Diabetes Type II - monitor fingersticks.  Insulin coverage for hyperglycemia. FS was low.  Will hold Lantus.    # Hypothyroidism - TSH elevated.  Synthroid increased from 50mcg -> 75 mcg.  # Prostate Ca - supportive care.  # CAD - continue ASA, Statin.   # Glaucoma - vision baseline.  Continue eyedrops.  # Inpatient DVT prophylaxis - subcutaneous Heparin

## 2023-01-03 NOTE — PHYSICAL THERAPY INITIAL EVALUATION ADULT - MANUAL MUSCLE TESTING RESULTS, REHAB EVAL
RUE, RLE- 3/5, LUE, LLE- 3/5/grossly assessed due to RUE, RLE- 3/5, LUE, LLE- 3+/5/grossly assessed due to

## 2023-01-03 NOTE — PHYSICAL THERAPY INITIAL EVALUATION ADULT - ADDITIONAL COMMENTS
As per pt, he lives with his spouse in a house with 20 steps to enter with b/l rails, He was independent in ambulation using RW indoors, however spouse assist for all ADLs,

## 2023-01-03 NOTE — CONSULT NOTE ADULT - SUBJECTIVE AND OBJECTIVE BOX
Patient is a 89y old  Male who presents with a chief complaint of Left sided facial weakness (02 Jan 2023 18:44)      CC: left facial droop    HPI:  89-year-old gentleman PMH of hypertension, diabetes, CAD with last stent 2009, prostate cancer status post TURP who presents to the ED because of concern for a CVA.  Patient went to bed about 9 PM yesterday which was a last known normal.  He woke up this morning, and was noted to have garbled speech, left facial droop, and left pronator drift at 11 AM.  Patient denies any headache, chest pain, shortness of breath, fever, abdominal pain.  No history of stroke in the past. (02 Jan 2023 18:44)     Head CT: old chronic lacunes in left thalamus and right BG, no acute pathology  CTA head/neck: severe stenosis of left ICA at origin    A1C = 7.6    PAST MEDICAL & SURGICAL HISTORY:  DM Type 2 (Diabetes Mellitus,  Hypertension  Hypercholesteremia  PC (Prostate Cancer)  treated surgically  CAD (Coronary Artery Disease)  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  History of PTCA  7/09, stents to Cx and RCA  Prostate CA  BPH without urinary obstruction  Inferior MI  S/P TURP (status post transurethral resection of prostate)    FH: HTN (hypertension)    Social Hx:  Nonsmoker, no drug or alcohol use    MEDICATIONS  (STANDING):  aspirin enteric coated 81 milliGRAM(s) Oral daily  atorvastatin 80 milliGRAM(s) Oral at bedtime  cilostazol 100 milliGRAM(s) Oral two times a day  dextrose 5%. 1000 milliLiter(s) (100 mL/Hr) IV Continuous <Continuous>  dextrose 5%. 1000 milliLiter(s) (50 mL/Hr) IV Continuous <Continuous>  dextrose 50% Injectable 25 Gram(s) IV Push once  dextrose 50% Injectable 12.5 Gram(s) IV Push once  dextrose 50% Injectable 25 Gram(s) IV Push once  finasteride 5 milliGRAM(s) Oral daily  glucagon  Injectable 1 milliGRAM(s) IntraMuscular once  heparin   Injectable 5000 Unit(s) SubCutaneous every 12 hours  insulin glargine Injectable (LANTUS) 12 Unit(s) SubCutaneous at bedtime  insulin lispro (ADMELOG) corrective regimen sliding scale   SubCutaneous three times a day before meals  latanoprost 0.005% Ophthalmic Solution 1 Drop(s) Both EYES at bedtime  levothyroxine 50 MICROGram(s) Oral daily  pantoprazole    Tablet 40 milliGRAM(s) Oral before breakfast  tamsulosin 0.4 milliGRAM(s) Oral at bedtime     Allergies  No Known Allergies    ROS: Pertinent positives in HPI, all other ROS were reviewed and are negative.      Vital Signs Last 24 Hrs  T(C): 36.2 (03 Jan 2023 16:35), Max: 37 (03 Jan 2023 05:21)  T(F): 97.2 (03 Jan 2023 16:35), Max: 98.6 (03 Jan 2023 05:21)  HR: 100 (03 Jan 2023 16:35) (67 - 100)  BP: 152/92 (03 Jan 2023 16:35) (130/78 - 196/100)  BP(mean): --  RR: 14 (03 Jan 2023 16:35) (14 - 18)  SpO2: 99% (03 Jan 2023 16:35) (98% - 100%)    Parameters below as of 03 Jan 2023 15:40  Patient On (Oxygen Delivery Method): room air    Neurological exam:  HF: A x O x 3. Appropriately interactive, normal affect. Speech fluent, No Aphasia or paraphasic errors. Naming /repetition intact   CN: BERTO, EOMI, VFF, facial sensation normal, +left central facial paresis, +dysarthria, tongue midline, Palate moves equally, SCM equal bilaterally  Motor: +left pronator drift, 4/5 left hand , 5/5 in RUE and proximally b/l, 4+/5 left hip flexion, 5/5 distally in LEs   Sens: Impaired to LT/vibration sense in toes b/l  Reflexes: Symmetric. BJ 1+, BR 1+, KJ 1+, AJ 0, mute toes b/l  Coord:  No FNFA, dysmetria    NIHSS: 5      Labs:   01-02    137  |  104  |  30<H>  ----------------------------<  130<H>  4.6   |  28  |  1.68<H>    Ca    9.5      02 Jan 2023 16:06    TPro  7.6  /  Alb  3.6  /  TBili  0.9  /  DBili  x   /  AST  29  /  ALT  23  /  AlkPhos  99  01-02 01-03 Chol 144 LDL -- HDL 57 Trig 58                          13.4   5.02  )-----------( 150      ( 02 Jan 2023 16:06 )             40.8         A: 89 yo man with suspected acute ischemic stroke presenting with left hemiparesis.    Recommend:  1. MRI brain  2. TTE  3. Telemetry monitoring  4. Check lipid profile, Intensive statin therapy, target LDL < 70  5. Aspirin 81mg daily  6. Permissive HTN up to 220/110 x 24 hours, then gradually optimize BP control  7. Optimize glucose control  8. PT/OT, speech therapy  9. Stroke education    Miky Ridley MD  Neurology Attending Physician               Patient is a 89y old  Male who presents with a chief complaint of Left sided facial weakness (02 Jan 2023 18:44)      CC: left facial droop    HPI:  89-year-old gentleman PMH of hypertension, diabetes, CAD with last stent 2009, prostate cancer status post TURP who presents to the ED because of concern for a CVA.  Patient went to bed about 9 PM yesterday which was a last known normal.  He woke up this morning, and was noted to have garbled speech, left facial droop, and left pronator drift at 11 AM.  Patient denies any headache, chest pain, shortness of breath, fever, abdominal pain.  No history of stroke in the past. (02 Jan 2023 18:44)     Head CT: old chronic lacunes in left thalamus and right BG, no acute pathology  CTA head/neck: severe stenosis of left ICA at origin    A1C = 7.6    PAST MEDICAL & SURGICAL HISTORY:  DM Type 2 (Diabetes Mellitus,  Hypertension  Hypercholesteremia  PC (Prostate Cancer)  treated surgically  CAD (Coronary Artery Disease)  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  History of PTCA  7/09, stents to Cx and RCA  Prostate CA  BPH without urinary obstruction  Inferior MI  S/P TURP (status post transurethral resection of prostate)    FH: HTN (hypertension)    Social Hx:  Nonsmoker, no drug or alcohol use    MEDICATIONS  (STANDING):  aspirin enteric coated 81 milliGRAM(s) Oral daily  atorvastatin 80 milliGRAM(s) Oral at bedtime  cilostazol 100 milliGRAM(s) Oral two times a day  dextrose 5%. 1000 milliLiter(s) (100 mL/Hr) IV Continuous <Continuous>  dextrose 5%. 1000 milliLiter(s) (50 mL/Hr) IV Continuous <Continuous>  dextrose 50% Injectable 25 Gram(s) IV Push once  dextrose 50% Injectable 12.5 Gram(s) IV Push once  dextrose 50% Injectable 25 Gram(s) IV Push once  finasteride 5 milliGRAM(s) Oral daily  glucagon  Injectable 1 milliGRAM(s) IntraMuscular once  heparin   Injectable 5000 Unit(s) SubCutaneous every 12 hours  insulin glargine Injectable (LANTUS) 12 Unit(s) SubCutaneous at bedtime  insulin lispro (ADMELOG) corrective regimen sliding scale   SubCutaneous three times a day before meals  latanoprost 0.005% Ophthalmic Solution 1 Drop(s) Both EYES at bedtime  levothyroxine 50 MICROGram(s) Oral daily  pantoprazole    Tablet 40 milliGRAM(s) Oral before breakfast  tamsulosin 0.4 milliGRAM(s) Oral at bedtime     Allergies  No Known Allergies    ROS: Pertinent positives in HPI, all other ROS were reviewed and are negative.      Vital Signs Last 24 Hrs  T(C): 36.2 (03 Jan 2023 16:35), Max: 37 (03 Jan 2023 05:21)  T(F): 97.2 (03 Jan 2023 16:35), Max: 98.6 (03 Jan 2023 05:21)  HR: 100 (03 Jan 2023 16:35) (67 - 100)  BP: 152/92 (03 Jan 2023 16:35) (130/78 - 196/100)  BP(mean): --  RR: 14 (03 Jan 2023 16:35) (14 - 18)  SpO2: 99% (03 Jan 2023 16:35) (98% - 100%)    Parameters below as of 03 Jan 2023 15:40  Patient On (Oxygen Delivery Method): room air    Neurological exam:  HF: A x O x 3. Appropriately interactive, normal affect. Speech fluent, No Aphasia or paraphasic errors. Naming /repetition intact   CN: BERTO, EOMI, VFF, facial sensation normal, +left central facial paresis, +dysarthria, tongue midline, Palate moves equally, SCM equal bilaterally  Motor: +left pronator drift, 4/5 left hand , 5/5 in RUE and proximally b/l, 4+/5 left hip flexion, 5/5 distally in LEs   Sens: Impaired to LT/vibration sense in toes b/l  Reflexes: Symmetric. BJ 1+, BR 1+, KJ 1+, AJ 0, mute toes b/l  Coord:  No FNFA, dysmetria    NIHSS: 5      Labs:   01-02    137  |  104  |  30<H>  ----------------------------<  130<H>  4.6   |  28  |  1.68<H>    Ca    9.5      02 Jan 2023 16:06    TPro  7.6  /  Alb  3.6  /  TBili  0.9  /  DBili  x   /  AST  29  /  ALT  23  /  AlkPhos  99  01-02 01-03 Chol 144 LDL -- HDL 57 Trig 58                          13.4   5.02  )-----------( 150      ( 02 Jan 2023 16:06 )             40.8         A: 89 yo man with suspected acute ischemic stroke presenting with left hemiparesis (likely subcortical).    Recommend:  1. MRI brain  2. TTE  3. Telemetry monitoring  4. Check lipid profile, Intensive statin therapy, target LDL < 70  5. Aspirin 81mg daily  6. Permissive HTN up to 220/110 x 24 hours, then gradually optimize BP control  7. Optimize glucose control  8. PT/OT, speech therapy  9. Stroke education    Miky Ridley MD  Neurology Attending Physician               Patient is a 89y old  Male who presents with a chief complaint of Left sided facial weakness (02 Jan 2023 18:44)      CC: left facial droop    HPI:  89-year-old gentleman PMH of hypertension, diabetes, CAD with last stent 2009, prostate cancer status post TURP who presents to the ED because of concern for a CVA.  Patient went to bed about 9 PM yesterday which was a last known normal.  He woke up this morning, and was noted to have garbled speech, left facial droop, and left pronator drift at 11 AM.  Patient denies any headache, chest pain, shortness of breath, fever, abdominal pain.  No history of stroke in the past. (02 Jan 2023 18:44)     Head CT: old chronic lacunes in left thalamus and right BG, no acute pathology  CTA head/neck: severe stenosis of left ICA at origin    A1C = 7.6    PAST MEDICAL & SURGICAL HISTORY:  DM Type 2 (Diabetes Mellitus,  Hypertension  Hypercholesteremia  PC (Prostate Cancer)  treated surgically  CAD (Coronary Artery Disease)  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  History of PTCA  7/09, stents to Cx and RCA  Prostate CA  BPH without urinary obstruction  Inferior MI  S/P TURP (status post transurethral resection of prostate)    FH: HTN (hypertension)    Social Hx:  Nonsmoker, no drug or alcohol use    MEDICATIONS  (STANDING):  aspirin enteric coated 81 milliGRAM(s) Oral daily  atorvastatin 80 milliGRAM(s) Oral at bedtime  cilostazol 100 milliGRAM(s) Oral two times a day  dextrose 5%. 1000 milliLiter(s) (100 mL/Hr) IV Continuous <Continuous>  dextrose 5%. 1000 milliLiter(s) (50 mL/Hr) IV Continuous <Continuous>  dextrose 50% Injectable 25 Gram(s) IV Push once  dextrose 50% Injectable 12.5 Gram(s) IV Push once  dextrose 50% Injectable 25 Gram(s) IV Push once  finasteride 5 milliGRAM(s) Oral daily  glucagon  Injectable 1 milliGRAM(s) IntraMuscular once  heparin   Injectable 5000 Unit(s) SubCutaneous every 12 hours  insulin glargine Injectable (LANTUS) 12 Unit(s) SubCutaneous at bedtime  insulin lispro (ADMELOG) corrective regimen sliding scale   SubCutaneous three times a day before meals  latanoprost 0.005% Ophthalmic Solution 1 Drop(s) Both EYES at bedtime  levothyroxine 50 MICROGram(s) Oral daily  pantoprazole    Tablet 40 milliGRAM(s) Oral before breakfast  tamsulosin 0.4 milliGRAM(s) Oral at bedtime     Allergies  No Known Allergies    ROS: Pertinent positives in HPI, all other ROS were reviewed and are negative.      Vital Signs Last 24 Hrs  T(C): 36.2 (03 Jan 2023 16:35), Max: 37 (03 Jan 2023 05:21)  T(F): 97.2 (03 Jan 2023 16:35), Max: 98.6 (03 Jan 2023 05:21)  HR: 100 (03 Jan 2023 16:35) (67 - 100)  BP: 152/92 (03 Jan 2023 16:35) (130/78 - 196/100)  BP(mean): --  RR: 14 (03 Jan 2023 16:35) (14 - 18)  SpO2: 99% (03 Jan 2023 16:35) (98% - 100%)    Parameters below as of 03 Jan 2023 15:40  Patient On (Oxygen Delivery Method): room air    Neurological exam:  HF: A x O x 3. Appropriately interactive, normal affect. Speech fluent, No Aphasia or paraphasic errors. Naming /repetition intact   CN: BERTO, EOMI, VFF, facial sensation normal, +left central facial paresis, +dysarthria, tongue midline, Palate moves equally, SCM equal bilaterally  Motor: +left pronator drift, 4/5 left hand , 5/5 in RUE and proximally b/l, 4+/5 left hip flexion, 5/5 distally in LEs   Sens: Impaired to LT/vibration sense in toes b/l  Reflexes: Symmetric. BJ 1+, BR 1+, KJ 1+, AJ 0, mute toes b/l  Coord:  No FNFA, dysmetria    NIHSS: 5      Labs:   01-02    137  |  104  |  30<H>  ----------------------------<  130<H>  4.6   |  28  |  1.68<H>    Ca    9.5      02 Jan 2023 16:06    TPro  7.6  /  Alb  3.6  /  TBili  0.9  /  DBili  x   /  AST  29  /  ALT  23  /  AlkPhos  99  01-02 01-03 Chol 144 LDL -- HDL 57 Trig 58                          13.4   5.02  )-----------( 150      ( 02 Jan 2023 16:06 )             40.8         A: 89 yo man with suspected acute ischemic stroke presenting with left hemiparesis (likely subcortical).    Recommend:  1. MRI brain  2. TTE  3. Telemetry monitoring  4. Check lipid profile, Intensive statin therapy, target LDL < 70  5. Aspirin 81mg daily  6. Permissive HTN up to 220/110 x 24 hours, then gradually optimize BP control  7. Optimize glucose control  8. PT/OT, speech therapy  9. Left ICA stenosis: carotid US, vascular surgery consult (could be done as outpatient as it is unrelated to current presentation)  9. Stroke education    Miky Ridley MD  Neurology Attending Physician

## 2023-01-03 NOTE — PHYSICAL THERAPY INITIAL EVALUATION ADULT - TRANSFER SAFETY CONCERNS NOTED: SIT/STAND, REHAB EVAL
decreased safety awareness/losing balance/decreased proprioception/decreased sequencing ability/decreased weight-shifting ability

## 2023-01-03 NOTE — PHYSICAL THERAPY INITIAL EVALUATION ADULT - BALANCE TRAINING, PT EVAL
Pt will be able to improve static and dynamic standing to good minus with supervision to improve gait in 4 weeks.

## 2023-01-03 NOTE — PHYSICAL THERAPY INITIAL EVALUATION ADULT - CRITERIA FOR SKILLED THERAPEUTIC INTERVENTIONS
EARL/impairments found/anticipated equipment needs at discharge/anticipated discharge recommendation

## 2023-01-03 NOTE — PHYSICAL THERAPY INITIAL EVALUATION ADULT - TRANSFER TRAINING, PT EVAL
Pt will be able to functionally transfer from sit to stand with a rolling walker with supervision in 4 weeks. Pt will be able to functional transfers with a rolling walker with supervision in 4 weeks.

## 2023-01-03 NOTE — PATIENT PROFILE ADULT - FALL HARM RISK - HARM RISK INTERVENTIONS
Communicate Risk of Fall with Harm to all staff/Reinforce activity limits and safety measures with patient and family/Tailored Fall Risk Interventions/Visual Cue: Yellow wristband and red socks/Bed in lowest position, wheels locked, appropriate side rails in place/Call bell, personal items and telephone in reach/Instruct patient to call for assistance before getting out of bed or chair/Non-slip footwear when patient is out of bed/Seth to call system/Physically safe environment - no spills, clutter or unnecessary equipment/Purposeful Proactive Rounding/Room/bathroom lighting operational, light cord in reach Assistance with ambulation/Assistance OOB with selected safe patient handling equipment/Communicate Risk of Fall with Harm to all staff/Discuss with provider need for PT consult/Monitor gait and stability/Reinforce activity limits and safety measures with patient and family/Tailored Fall Risk Interventions/Visual Cue: Yellow wristband and red socks/Bed in lowest position, wheels locked, appropriate side rails in place/Call bell, personal items and telephone in reach/Instruct patient to call for assistance before getting out of bed or chair/Non-slip footwear when patient is out of bed/[employer] to call system/Physically safe environment - no spills, clutter or unnecessary equipment/Purposeful Proactive Rounding/Room/bathroom lighting operational, light cord in reach

## 2023-01-03 NOTE — PHYSICAL THERAPY INITIAL EVALUATION ADULT - ACTIVE RANGE OF MOTION EXAMINATION, REHAB EVAL
aleena. upper extremity Active ROM was WNL (within normal limits)/bilateral lower extremity Active ROM was WNL (within normal limits) bilateral  lower extremity Active ROM was WFL (within functional limits)

## 2023-01-04 LAB
ANION GAP SERPL CALC-SCNC: 8 MMOL/L — SIGNIFICANT CHANGE UP (ref 5–17)
BUN SERPL-MCNC: 41 MG/DL — HIGH (ref 7–23)
CALCIUM SERPL-MCNC: 8.7 MG/DL — SIGNIFICANT CHANGE UP (ref 8.5–10.1)
CHLORIDE SERPL-SCNC: 106 MMOL/L — SIGNIFICANT CHANGE UP (ref 96–108)
CO2 SERPL-SCNC: 22 MMOL/L — SIGNIFICANT CHANGE UP (ref 22–31)
CREAT SERPL-MCNC: 2.24 MG/DL — HIGH (ref 0.5–1.3)
EGFR: 27 ML/MIN/1.73M2 — LOW
GLUCOSE BLDC GLUCOMTR-MCNC: 125 MG/DL — HIGH (ref 70–99)
GLUCOSE BLDC GLUCOMTR-MCNC: 151 MG/DL — HIGH (ref 70–99)
GLUCOSE BLDC GLUCOMTR-MCNC: 241 MG/DL — HIGH (ref 70–99)
GLUCOSE BLDC GLUCOMTR-MCNC: 57 MG/DL — LOW (ref 70–99)
GLUCOSE SERPL-MCNC: 146 MG/DL — HIGH (ref 70–99)
HCT VFR BLD CALC: 35.5 % — LOW (ref 39–50)
HGB BLD-MCNC: 11.6 G/DL — LOW (ref 13–17)
MCHC RBC-ENTMCNC: 31.4 PG — SIGNIFICANT CHANGE UP (ref 27–34)
MCHC RBC-ENTMCNC: 32.7 G/DL — SIGNIFICANT CHANGE UP (ref 32–36)
MCV RBC AUTO: 96.2 FL — SIGNIFICANT CHANGE UP (ref 80–100)
NRBC # BLD: 0 /100 WBCS — SIGNIFICANT CHANGE UP (ref 0–0)
PLATELET # BLD AUTO: 130 K/UL — LOW (ref 150–400)
POTASSIUM SERPL-MCNC: 3.9 MMOL/L — SIGNIFICANT CHANGE UP (ref 3.5–5.3)
POTASSIUM SERPL-SCNC: 3.9 MMOL/L — SIGNIFICANT CHANGE UP (ref 3.5–5.3)
RBC # BLD: 3.69 M/UL — LOW (ref 4.2–5.8)
RBC # FLD: 12.9 % — SIGNIFICANT CHANGE UP (ref 10.3–14.5)
SODIUM SERPL-SCNC: 136 MMOL/L — SIGNIFICANT CHANGE UP (ref 135–145)
WBC # BLD: 5.97 K/UL — SIGNIFICANT CHANGE UP (ref 3.8–10.5)
WBC # FLD AUTO: 5.97 K/UL — SIGNIFICANT CHANGE UP (ref 3.8–10.5)

## 2023-01-04 PROCEDURE — 99232 SBSQ HOSP IP/OBS MODERATE 35: CPT

## 2023-01-04 RX ORDER — DEXTROSE 50 % IN WATER 50 %
12.5 SYRINGE (ML) INTRAVENOUS ONCE
Refills: 0 | Status: DISCONTINUED | OUTPATIENT
Start: 2023-01-04 | End: 2023-01-14

## 2023-01-04 RX ORDER — SODIUM CHLORIDE 9 MG/ML
1000 INJECTION INTRAMUSCULAR; INTRAVENOUS; SUBCUTANEOUS
Refills: 0 | Status: DISCONTINUED | OUTPATIENT
Start: 2023-01-04 | End: 2023-01-05

## 2023-01-04 RX ADMIN — CILOSTAZOL 100 MILLIGRAM(S): 100 TABLET ORAL at 06:51

## 2023-01-04 RX ADMIN — Medication 2: at 08:15

## 2023-01-04 RX ADMIN — FINASTERIDE 5 MILLIGRAM(S): 5 TABLET, FILM COATED ORAL at 12:39

## 2023-01-04 RX ADMIN — Medication 81 MILLIGRAM(S): at 12:38

## 2023-01-04 RX ADMIN — Medication 75 MICROGRAM(S): at 06:51

## 2023-01-04 RX ADMIN — PANTOPRAZOLE SODIUM 40 MILLIGRAM(S): 20 TABLET, DELAYED RELEASE ORAL at 06:53

## 2023-01-04 RX ADMIN — HEPARIN SODIUM 5000 UNIT(S): 5000 INJECTION INTRAVENOUS; SUBCUTANEOUS at 18:31

## 2023-01-04 RX ADMIN — SODIUM CHLORIDE 75 MILLILITER(S): 9 INJECTION INTRAMUSCULAR; INTRAVENOUS; SUBCUTANEOUS at 18:31

## 2023-01-04 RX ADMIN — CILOSTAZOL 100 MILLIGRAM(S): 100 TABLET ORAL at 19:37

## 2023-01-04 RX ADMIN — HEPARIN SODIUM 5000 UNIT(S): 5000 INJECTION INTRAVENOUS; SUBCUTANEOUS at 06:50

## 2023-01-04 RX ADMIN — Medication 4: at 11:59

## 2023-01-04 NOTE — PROGRESS NOTE ADULT - ASSESSMENT
89-year-old gentleman PMH of hypertension, diabetes, CAD with last stent 2009, prostate cancer status post TURP who presents to the ED because of concern for a CVA.  Patient went to bed about 9 PM 1 day PTA, which was a last known normal.  He woke up and was noted to have garbled speech, left facial droop, and left pronator drift.   CTA COW:  Moderate stenosis proximal right inferior M2 branch of the right MCA. Moderate focal stenosis proximal P2 branch of right PCA. Left A1 segment may be severely stenotic versus aneurysm adjacent to the   anterior communicating artery and left A2 segment origin. Narrow caliber at the intracranial origin of the left vertebral artery.  CTA NECK: Calcified plaque with severe left-sided stenosis at  ICA origin by NASCET criteria.  MRI Brain:  Acute infarct in the right medulla and nick.    # Acute R medulla and nick CVA - MRI Brain:  Acute infarct in the right medulla and nick.  Mild to moderate R MCA / PCA stenosis noted on CTA.  L A1 stenosis / aneurysm also noted.  Continue ASA, Statin.  TTE.  Neurology followup.  PT / OT.  [facility] placement - daughter opting for [facility].   # MARILYN - Cr now 2.2.  IVF NS.  Repeat BMP in am.   # Diabetes Type II - monitor fingersticks.  Insulin coverage for hyperglycemia. FS was low.  Lantus stopped.    # Hypothyroidism - TSH elevated.  Synthroid increased from 50mcg -> 75 mcg.  # Prostate Ca - supportive care.  # CAD - continue ASA, Statin.   # Glaucoma - vision baseline.  Continue eyedrops.  # Inpatient DVT prophylaxis - subcutaneous Heparin     D/w Daughter Shavonne [phone]  [facility] placement - daughter opting for [facility].

## 2023-01-04 NOTE — SWALLOW BEDSIDE ASSESSMENT ADULT - SWALLOW EVAL: RECOMMENDED FEEDING/EATING TECHNIQUES
allow for swallow between intakes/check mouth frequently for oral residue/pocketing/crush medication (when feasible)/maintain upright posture during/after eating for 30 mins/oral hygiene/small sips/bites

## 2023-01-04 NOTE — SWALLOW BEDSIDE ASSESSMENT ADULT - SLP GENERAL OBSERVATIONS
pt seen in [facility] alert and oriented self/. pt responded to simple questions for assessment, although decreased verbal outpt he verbalized wants and was able to follow one step directions.

## 2023-01-04 NOTE — PROGRESS NOTE ADULT - SUBJECTIVE AND OBJECTIVE BOX
Patient: MARIANA WRIGHT [id number] 89y Male                            Hospitalist Attending Note    No complaints.  Reports feeling at baseline.    ____________________PHYSICAL EXAM:  GENERAL:  NAD Alert and Oriented x 2 to person, place, speech fluent.   HEENT: NCAT  CARDIOVASCULAR:  S1, S2  LUNGS: CTAB  ABDOMEN:  soft, (-) tenderness, (-) distension, (+) bowel sounds, (-) guarding, (-) rebound (-) rigidity  EXTREMITIES:  no cyanosis / clubbing / edema.   NEURO: Mild L facial droop.  LUE weakness.    ____________________    VITALS:  Vital Signs Last 24 Hrs  T(C): 36.3 (04 Jan 2023 10:51), Max: 37 (04 Jan 2023 05:00)  T(F): 97.4 (04 Jan 2023 10:51), Max: 98.6 (04 Jan 2023 05:00)  HR: 78 (04 Jan 2023 10:51) (78 - 101)  BP: 95/48 (04 Jan 2023 10:51) (93/57 - 155/83)  BP(mean): 97 (03 Jan 2023 21:37) (97 - 97)  RR: 16 (04 Jan 2023 10:51) (14 - 19)  SpO2: 98% (04 Jan 2023 10:51) (96% - 99%)    Parameters below as of 04 Jan 2023 05:00  Patient On (Oxygen Delivery Method): room air     Daily     Daily   CAPILLARY BLOOD GLUCOSE      POCT Blood Glucose.: 241 mg/dL (04 Jan 2023 11:18)  POCT Blood Glucose.: 151 mg/dL (04 Jan 2023 07:49)    I&O's Summary      LABS:                        11.6   5.97  )-----------( 130      ( 04 Jan 2023 06:35 )             35.5     01-04    136  |  106  |  41<H>  ----------------------------<  146<H>  3.9   |  22  |  2.24<H>    Ca    8.7      04 Jan 2023 06:35                    MEDICATIONS:  aspirin enteric coated 81 milliGRAM(s) Oral daily  atorvastatin 80 milliGRAM(s) Oral at bedtime  cilostazol 100 milliGRAM(s) Oral two times a day  dextrose 5% + sodium chloride 0.9%. 1000 milliLiter(s) IV Continuous <Continuous>  dextrose 5%. 1000 milliLiter(s) IV Continuous <Continuous>  dextrose 5%. 1000 milliLiter(s) IV Continuous <Continuous>  dextrose 50% Injectable 25 Gram(s) IV Push once  dextrose 50% Injectable 12.5 Gram(s) IV Push once  dextrose 50% Injectable 25 Gram(s) IV Push once  dextrose Oral Gel 15 Gram(s) Oral once PRN  finasteride 5 milliGRAM(s) Oral daily  glucagon  Injectable 1 milliGRAM(s) IntraMuscular once  heparin   Injectable 5000 Unit(s) SubCutaneous every 12 hours  insulin lispro (ADMELOG) corrective regimen sliding scale   SubCutaneous three times a day before meals  latanoprost 0.005% Ophthalmic Solution 1 Drop(s) Both EYES at bedtime  levothyroxine 75 MICROGram(s) Oral daily  pantoprazole    Tablet 40 milliGRAM(s) Oral before breakfast  tamsulosin 0.4 milliGRAM(s) Oral at bedtime

## 2023-01-04 NOTE — SWALLOW BEDSIDE ASSESSMENT ADULT - H & P REVIEW
"89-year-old gentleman PMH of hypertension, diabetes, CAD with last stent 2009, prostate cancer status post TURP who presents to the ED because of concern for a CVA.  Patient went to bed about 9 PM yesterday which was a last known normal.  He woke up this morning, and was noted to have garbled speech, left facial droop, and left pronator drift at 11 AM.  Patient denies any headache, chest pain, shortness of breath, fever, abdominal pain.  No history of stroke in the past"/yes

## 2023-01-04 NOTE — SWALLOW BEDSIDE ASSESSMENT ADULT - COMMENTS
MRI head no contrast 1/3/2023 IMPRESSION: Acute infarct in the right medulla and nick residue cleared with liquid wash

## 2023-01-04 NOTE — SWALLOW BEDSIDE ASSESSMENT ADULT - ORAL PHASE
Delayed oral transit time/Stasis in lateral sulci Decreased anterior-posterior movement of the bolus/Delayed oral transit time/Lingual stasis

## 2023-01-04 NOTE — SWALLOW BEDSIDE ASSESSMENT ADULT - SWALLOW EVAL: DIAGNOSIS
oropharyngeal dysphagia marked by reduced ROM/strength of articulators, labial droop on the left, decreased labial seal/stripping utensil, decreased mastication bolus formation for soft solid causing lingual residue, slow oral transport posterior, intermittent residue in left sulcus, suspected delay in swallow trigger and +hyolaryngeal elevation to palpation. no clinical signs of laryngeal penetration or aspiration noted with textures trialed

## 2023-01-04 NOTE — SWALLOW BEDSIDE ASSESSMENT ADULT - ASR SWALLOW LINGUAL MOBILITY
tongue deviated to the left/impaired protrusion/impaired left lateral movement/impaired right lateral movement

## 2023-01-05 LAB
ANION GAP SERPL CALC-SCNC: 9 MMOL/L — SIGNIFICANT CHANGE UP (ref 5–17)
BUN SERPL-MCNC: 44 MG/DL — HIGH (ref 7–23)
CALCIUM SERPL-MCNC: 8.2 MG/DL — LOW (ref 8.5–10.1)
CHLORIDE SERPL-SCNC: 110 MMOL/L — HIGH (ref 96–108)
CO2 SERPL-SCNC: 22 MMOL/L — SIGNIFICANT CHANGE UP (ref 22–31)
CREAT SERPL-MCNC: 2.9 MG/DL — HIGH (ref 0.5–1.3)
EGFR: 20 ML/MIN/1.73M2 — LOW
GLUCOSE BLDC GLUCOMTR-MCNC: 105 MG/DL — HIGH (ref 70–99)
GLUCOSE BLDC GLUCOMTR-MCNC: 134 MG/DL — HIGH (ref 70–99)
GLUCOSE BLDC GLUCOMTR-MCNC: 187 MG/DL — HIGH (ref 70–99)
GLUCOSE BLDC GLUCOMTR-MCNC: 228 MG/DL — HIGH (ref 70–99)
GLUCOSE SERPL-MCNC: 137 MG/DL — HIGH (ref 70–99)
POTASSIUM SERPL-MCNC: 3.7 MMOL/L — SIGNIFICANT CHANGE UP (ref 3.5–5.3)
POTASSIUM SERPL-SCNC: 3.7 MMOL/L — SIGNIFICANT CHANGE UP (ref 3.5–5.3)
SODIUM SERPL-SCNC: 141 MMOL/L — SIGNIFICANT CHANGE UP (ref 135–145)

## 2023-01-05 PROCEDURE — 99232 SBSQ HOSP IP/OBS MODERATE 35: CPT

## 2023-01-05 RX ORDER — SODIUM CHLORIDE 9 MG/ML
1000 INJECTION INTRAMUSCULAR; INTRAVENOUS; SUBCUTANEOUS
Refills: 0 | Status: DISCONTINUED | OUTPATIENT
Start: 2023-01-05 | End: 2023-01-09

## 2023-01-05 RX ADMIN — FINASTERIDE 5 MILLIGRAM(S): 5 TABLET, FILM COATED ORAL at 15:34

## 2023-01-05 RX ADMIN — ATORVASTATIN CALCIUM 80 MILLIGRAM(S): 80 TABLET, FILM COATED ORAL at 21:16

## 2023-01-05 RX ADMIN — LATANOPROST 1 DROP(S): 0.05 SOLUTION/ DROPS OPHTHALMIC; TOPICAL at 22:45

## 2023-01-05 RX ADMIN — Medication 2: at 08:04

## 2023-01-05 RX ADMIN — Medication 4: at 11:36

## 2023-01-05 RX ADMIN — ATORVASTATIN CALCIUM 80 MILLIGRAM(S): 80 TABLET, FILM COATED ORAL at 00:39

## 2023-01-05 RX ADMIN — TAMSULOSIN HYDROCHLORIDE 0.4 MILLIGRAM(S): 0.4 CAPSULE ORAL at 00:39

## 2023-01-05 RX ADMIN — PANTOPRAZOLE SODIUM 40 MILLIGRAM(S): 20 TABLET, DELAYED RELEASE ORAL at 08:06

## 2023-01-05 RX ADMIN — Medication 81 MILLIGRAM(S): at 11:37

## 2023-01-05 RX ADMIN — Medication 75 MICROGRAM(S): at 06:37

## 2023-01-05 RX ADMIN — CILOSTAZOL 100 MILLIGRAM(S): 100 TABLET ORAL at 19:07

## 2023-01-05 RX ADMIN — TAMSULOSIN HYDROCHLORIDE 0.4 MILLIGRAM(S): 0.4 CAPSULE ORAL at 21:16

## 2023-01-05 RX ADMIN — CILOSTAZOL 100 MILLIGRAM(S): 100 TABLET ORAL at 06:36

## 2023-01-05 RX ADMIN — HEPARIN SODIUM 5000 UNIT(S): 5000 INJECTION INTRAVENOUS; SUBCUTANEOUS at 19:07

## 2023-01-05 RX ADMIN — HEPARIN SODIUM 5000 UNIT(S): 5000 INJECTION INTRAVENOUS; SUBCUTANEOUS at 06:36

## 2023-01-05 NOTE — PROGRESS NOTE ADULT - ASSESSMENT
89-year-old gentleman PMH of hypertension, diabetes, CAD with last stent 2009, prostate cancer status post TURP who presents to the ED because of concern for a CVA.  Patient went to bed about 9 PM 1 day PTA, which was a last known normal.  He woke up and was noted to have garbled speech, left facial droop, and left pronator drift.   CTA COW:  Moderate stenosis proximal right inferior M2 branch of the right MCA. Moderate focal stenosis proximal P2 branch of right PCA. Left A1 segment may be severely stenotic versus aneurysm adjacent to the   anterior communicating artery and left A2 segment origin. Narrow caliber at the intracranial origin of the left vertebral artery.  CTA NECK: Calcified plaque with severe left-sided stenosis at  ICA origin by NASCET criteria.  MRI Brain:  Acute infarct in the right medulla and nick.    # Acute R medulla and nick CVA - MRI Brain:  Acute infarct in the right medulla and nick, evolution of L sided weakness.  Mild to moderate R MCA / PCA stenosis noted on CTA.  L A1 stenosis / aneurysm also noted.  Continue ASA, Statin.  TTE.  Neurology followup d/w Dr. Ridley.  PT / OT.  Anticipate EAR[facility].    # MARILYN, suspect contrast induced nephropathy - Cr now 2.9.  Escalate IVF.  Monitor BMP.  Renal input.  # Diabetes Type II - monitor fingersticks.  Insulin coverage for hyperglycemia. FS was low.  Lantus stopped.    # Hypothyroidism - TSH elevated.  Synthroid increased from 50mcg -> 75 mcg.  # Prostate Ca - supportive care.  # CAD - continue ASA, Statin.   # Glaucoma - vision baseline.  Continue eyedrops.  # Inpatient DVT prophylaxis - subcutaneous Heparin     Plan of care d/w daughter Shavonne, opting for [facility] placement at [facility].   D/w Daughter Shavonne [phone]  [facility] placement - daughter opting for [facility].

## 2023-01-05 NOTE — PROGRESS NOTE ADULT - SUBJECTIVE AND OBJECTIVE BOX
HPI:    Left arm weakness worse than yesterday    MRI brain shows an acute ischemic stroke in right upper medulla and lower nick    Vital Signs Last 24 Hrs  T(C): 36.5 (05 Jan 2023 17:15), Max: 37.2 (05 Jan 2023 05:20)  T(F): 97.7 (05 Jan 2023 17:15), Max: 98.9 (05 Jan 2023 05:20)  HR: 82 (05 Jan 2023 17:15) (79 - 85)  BP: 172/92 (05 Jan 2023 17:15) (102/64 - 172/92)  BP(mean): --  RR: 18 (05 Jan 2023 17:15) (17 - 18)  SpO2: 100% (05 Jan 2023 17:15) (96% - 100%)    MEDICATIONS  (STANDING):  aspirin enteric coated 81 milliGRAM(s) Oral daily  atorvastatin 80 milliGRAM(s) Oral at bedtime  cilostazol 100 milliGRAM(s) Oral two times a day  dextrose 5%. 1000 milliLiter(s) (100 mL/Hr) IV Continuous <Continuous>  dextrose 5%. 1000 milliLiter(s) (50 mL/Hr) IV Continuous <Continuous>  dextrose 50% Injectable 25 Gram(s) IV Push once  dextrose 50% Injectable 12.5 Gram(s) IV Push once  dextrose 50% Injectable 25 Gram(s) IV Push once  dextrose 50% Injectable 12.5 Gram(s) IV Push once  finasteride 5 milliGRAM(s) Oral daily  glucagon  Injectable 1 milliGRAM(s) IntraMuscular once  heparin   Injectable 5000 Unit(s) SubCutaneous every 12 hours  insulin lispro (ADMELOG) corrective regimen sliding scale   SubCutaneous three times a day before meals  latanoprost 0.005% Ophthalmic Solution 1 Drop(s) Both EYES at bedtime  levothyroxine 75 MICROGram(s) Oral daily  pantoprazole    Tablet 40 milliGRAM(s) Oral before breakfast  sodium chloride 0.9%. 1000 milliLiter(s) (100 mL/Hr) IV Continuous <Continuous>  tamsulosin 0.4 milliGRAM(s) Oral at bedtime    MEDICATIONS  (PRN):  dextrose Oral Gel 15 Gram(s) Oral once PRN Blood Glucose LESS THAN 70 milliGRAM(s)/deciliter      ROS: pertinent positives in HPI, all other ROS were reviewed and are negative     Neurological exam:  HF: A x O x 3. Appropriately interactive, normal affect. Speech fluent, No Aphasia or paraphasic errors. Naming /repetition intact   CN: BERTO, EOMI, VFF, facial sensation normal, +left central facial paresis, +dysarthria, tongue midline, Palate moves equally, SCM equal bilaterally  Motor: 2/5 LUE, 5/5 in RUE and proximally b/l, 4+/5 left hip flexion, 5/5 distally in LEs   Sens: Impaired to LT/vibration sense in toes b/l  Reflexes: Symmetric. BJ 1+, BR 1+, KJ 1+, AJ 0, mute toes b/l  Coord:  No FNFA, dysmetria    NIHSS: 7      LABS:                         11.6   5.97  )-----------( 130      ( 04 Jan 2023 06:35 )             35.5     01-05    141  |  110<H>  |  44<H>  ----------------------------<  137<H>  3.7   |  22  |  2.90<H>    Ca    8.2<L>      05 Jan 2023 09:17        01-03 Chol 144 LDL -- HDL 57 Trig 58      A: 89 yo man with acute ischemic stroke in right upper medulla / lower nick.  Worsened LUE weakness likely related to edema from acute ischemia.    Recommend:  1. TTE  2. Telemetry monitoring  3. Check lipid profile, Intensive statin therapy, target LDL < 70  4. Aspirin 81mg daily  5. Permissive HTN up to 220/110 x 24 hours, then gradually optimize BP control  6. Optimize glucose control  7. PT/OT, speech therapy  8. Left ICA stenosis: carotid US, vascular surgery consult (as outpatient as it is unrelated to current presentation)  9. Stroke education    Miky Ridley MD  Neurology Attending Physician

## 2023-01-05 NOTE — PROGRESS NOTE ADULT - SUBJECTIVE AND OBJECTIVE BOX
Patient: MARIANA WRIGHT [id number] 89y Male                            Hospitalist Attending Note    Daughter Shavonne at bedside.  States pt's mental status at baseline, but more L sided weakness.     ____________________PHYSICAL EXAM:  GENERAL:  NAD Alert, cooperative.  Slurred speech   HEENT: NCAT  CARDIOVASCULAR:  S1, S2  LUNGS: CTAB  ABDOMEN:  soft, (-) tenderness, (-) distension, (+) bowel sounds, (-) guarding, (-) rebound (-) rigidity  EXTREMITIES:  no cyanosis / clubbing / edema.   NEURO: L facial droop.  LUE strength 1/5, LLE strength 3/5.    ____________________    VITALS:  Vital Signs Last 24 Hrs  T(C): 36.4 (05 Jan 2023 11:59), Max: 37.2 (05 Jan 2023 05:20)  T(F): 97.5 (05 Jan 2023 11:59), Max: 98.9 (05 Jan 2023 05:20)  HR: 81 (05 Jan 2023 11:59) (79 - 86)  BP: 103/61 (05 Jan 2023 11:59) (102/64 - 154/76)  BP(mean): --  RR: 17 (05 Jan 2023 11:59) (17 - 18)  SpO2: 99% (05 Jan 2023 11:59) (96% - 99%)    Parameters below as of 04 Jan 2023 17:38  Patient On (Oxygen Delivery Method): room air     Daily     Daily   CAPILLARY BLOOD GLUCOSE      POCT Blood Glucose.: 105 mg/dL (05 Jan 2023 16:53)  POCT Blood Glucose.: 228 mg/dL (05 Jan 2023 11:35)  POCT Blood Glucose.: 134 mg/dL (05 Jan 2023 08:38)  POCT Blood Glucose.: 187 mg/dL (05 Jan 2023 08:03)  POCT Blood Glucose.: 125 mg/dL (04 Jan 2023 17:47)    I&O's Summary    05 Jan 2023 07:01  -  05 Jan 2023 17:35  --------------------------------------------------------  IN: 120 mL / OUT: 0 mL / NET: 120 mL        LABS:                        11.6   5.97  )-----------( 130      ( 04 Jan 2023 06:35 )             35.5     01-05    141  |  110<H>  |  44<H>  ----------------------------<  137<H>  3.7   |  22  |  2.90<H>    Ca    8.2<L>      05 Jan 2023 09:17                    MEDICATIONS:  aspirin enteric coated 81 milliGRAM(s) Oral daily  atorvastatin 80 milliGRAM(s) Oral at bedtime  cilostazol 100 milliGRAM(s) Oral two times a day  dextrose 5%. 1000 milliLiter(s) IV Continuous <Continuous>  dextrose 5%. 1000 milliLiter(s) IV Continuous <Continuous>  dextrose 50% Injectable 25 Gram(s) IV Push once  dextrose 50% Injectable 12.5 Gram(s) IV Push once  dextrose 50% Injectable 25 Gram(s) IV Push once  dextrose 50% Injectable 12.5 Gram(s) IV Push once  dextrose Oral Gel 15 Gram(s) Oral once PRN  finasteride 5 milliGRAM(s) Oral daily  glucagon  Injectable 1 milliGRAM(s) IntraMuscular once  heparin   Injectable 5000 Unit(s) SubCutaneous every 12 hours  insulin lispro (ADMELOG) corrective regimen sliding scale   SubCutaneous three times a day before meals  latanoprost 0.005% Ophthalmic Solution 1 Drop(s) Both EYES at bedtime  levothyroxine 75 MICROGram(s) Oral daily  pantoprazole    Tablet 40 milliGRAM(s) Oral before breakfast  sodium chloride 0.9%. 1000 milliLiter(s) IV Continuous <Continuous>  tamsulosin 0.4 milliGRAM(s) Oral at bedtime

## 2023-01-06 LAB
ANION GAP SERPL CALC-SCNC: 8 MMOL/L — SIGNIFICANT CHANGE UP (ref 5–17)
BUN SERPL-MCNC: 43 MG/DL — HIGH (ref 7–23)
CALCIUM SERPL-MCNC: 8.2 MG/DL — LOW (ref 8.5–10.1)
CHLORIDE SERPL-SCNC: 108 MMOL/L — SIGNIFICANT CHANGE UP (ref 96–108)
CO2 SERPL-SCNC: 22 MMOL/L — SIGNIFICANT CHANGE UP (ref 22–31)
CREAT SERPL-MCNC: 2.79 MG/DL — HIGH (ref 0.5–1.3)
EGFR: 21 ML/MIN/1.73M2 — LOW
GLUCOSE BLDC GLUCOMTR-MCNC: 182 MG/DL — HIGH (ref 70–99)
GLUCOSE BLDC GLUCOMTR-MCNC: 197 MG/DL — HIGH (ref 70–99)
GLUCOSE BLDC GLUCOMTR-MCNC: 213 MG/DL — HIGH (ref 70–99)
GLUCOSE SERPL-MCNC: 192 MG/DL — HIGH (ref 70–99)
HCT VFR BLD CALC: 32.3 % — LOW (ref 39–50)
HGB BLD-MCNC: 10.7 G/DL — LOW (ref 13–17)
MCHC RBC-ENTMCNC: 31 PG — SIGNIFICANT CHANGE UP (ref 27–34)
MCHC RBC-ENTMCNC: 33.1 G/DL — SIGNIFICANT CHANGE UP (ref 32–36)
MCV RBC AUTO: 93.6 FL — SIGNIFICANT CHANGE UP (ref 80–100)
NRBC # BLD: 0 /100 WBCS — SIGNIFICANT CHANGE UP (ref 0–0)
PLATELET # BLD AUTO: 129 K/UL — LOW (ref 150–400)
POTASSIUM SERPL-MCNC: 4 MMOL/L — SIGNIFICANT CHANGE UP (ref 3.5–5.3)
POTASSIUM SERPL-SCNC: 4 MMOL/L — SIGNIFICANT CHANGE UP (ref 3.5–5.3)
RBC # BLD: 3.45 M/UL — LOW (ref 4.2–5.8)
RBC # FLD: 13.1 % — SIGNIFICANT CHANGE UP (ref 10.3–14.5)
SODIUM SERPL-SCNC: 138 MMOL/L — SIGNIFICANT CHANGE UP (ref 135–145)
WBC # BLD: 5.25 K/UL — SIGNIFICANT CHANGE UP (ref 3.8–10.5)
WBC # FLD AUTO: 5.25 K/UL — SIGNIFICANT CHANGE UP (ref 3.8–10.5)

## 2023-01-06 PROCEDURE — 99232 SBSQ HOSP IP/OBS MODERATE 35: CPT

## 2023-01-06 RX ADMIN — Medication 4: at 12:11

## 2023-01-06 RX ADMIN — CILOSTAZOL 100 MILLIGRAM(S): 100 TABLET ORAL at 18:17

## 2023-01-06 RX ADMIN — Medication 2: at 08:07

## 2023-01-06 RX ADMIN — Medication 81 MILLIGRAM(S): at 12:10

## 2023-01-06 RX ADMIN — FINASTERIDE 5 MILLIGRAM(S): 5 TABLET, FILM COATED ORAL at 12:10

## 2023-01-06 RX ADMIN — HEPARIN SODIUM 5000 UNIT(S): 5000 INJECTION INTRAVENOUS; SUBCUTANEOUS at 18:17

## 2023-01-06 RX ADMIN — SODIUM CHLORIDE 100 MILLILITER(S): 9 INJECTION INTRAMUSCULAR; INTRAVENOUS; SUBCUTANEOUS at 06:01

## 2023-01-06 RX ADMIN — CILOSTAZOL 100 MILLIGRAM(S): 100 TABLET ORAL at 05:19

## 2023-01-06 RX ADMIN — PANTOPRAZOLE SODIUM 40 MILLIGRAM(S): 20 TABLET, DELAYED RELEASE ORAL at 05:10

## 2023-01-06 RX ADMIN — LATANOPROST 1 DROP(S): 0.05 SOLUTION/ DROPS OPHTHALMIC; TOPICAL at 21:40

## 2023-01-06 RX ADMIN — HEPARIN SODIUM 5000 UNIT(S): 5000 INJECTION INTRAVENOUS; SUBCUTANEOUS at 05:10

## 2023-01-06 RX ADMIN — ATORVASTATIN CALCIUM 80 MILLIGRAM(S): 80 TABLET, FILM COATED ORAL at 21:40

## 2023-01-06 RX ADMIN — Medication 75 MICROGRAM(S): at 05:10

## 2023-01-06 RX ADMIN — TAMSULOSIN HYDROCHLORIDE 0.4 MILLIGRAM(S): 0.4 CAPSULE ORAL at 21:40

## 2023-01-06 NOTE — OCCUPATIONAL THERAPY INITIAL EVALUATION ADULT - PERTINENT HX OF CURRENT PROBLEM, REHAB EVAL
Patient is a 89-year-old gentleman PMH of hypertension, diabetes, CAD with last stent 2009, prostate cancer status post TURP who presents to the ED because of concern for a CVA.  Patient went to bed about 9 PM yesterday which was a last known normal.  He woke up this morning, and was noted to have garbled speech, left facial droop, and left pronator drift at 11 AM.  Patient denies any headache, chest pain, shortness of breath, fever, abdominal pain.  No history of stroke in the past. MRI indicate acute infarct R medulla and nick.

## 2023-01-06 NOTE — OCCUPATIONAL THERAPY INITIAL EVALUATION ADULT - GENERAL OBSERVATIONS, REHAB EVAL
Chart reviewed and event noted to date. Patient encountered supine in bed, NAD, A&Ox2, +hardin cath, +cardiac monitor, +IV heplock. Left sided weakness 2/2 acute infarct R medulla and nick. PT Autumn bedside. PT Autumn documented BP.

## 2023-01-06 NOTE — PROGRESS NOTE ADULT - SUBJECTIVE AND OBJECTIVE BOX
Patient: MARIANA WRIGHT [id number] 89y Male                            Hospitalist Attending Note    Denies complaints.     ____________________PHYSICAL EXAM:  GENERAL:  NAD Alert, cooperative.  Slurred speech   HEENT: NCAT  CARDIOVASCULAR:  S1, S2  LUNGS: CTAB  ABDOMEN:  soft, (-) tenderness, (-) distension, (+) bowel sounds, (-) guarding, (-) rebound (-) rigidity  EXTREMITIES:  no cyanosis / clubbing / edema.   NEURO: L facial droop.  LUE strength 1/5, LLE strength 3/5.    ____________________    VITALS:  Vital Signs Last 24 Hrs  T(C): 36.7 (06 Jan 2023 16:39), Max: 37.7 (06 Jan 2023 00:09)  T(F): 98.1 (06 Jan 2023 16:39), Max: 99.9 (06 Jan 2023 00:09)  HR: 98 (06 Jan 2023 16:39) (89 - 98)  BP: 151/81 (06 Jan 2023 16:39) (91/57 - 151/81)  BP(mean): --  RR: 18 (06 Jan 2023 16:39) (18 - 19)  SpO2: 100% (06 Jan 2023 16:39) (95% - 100%)     Daily     Daily   CAPILLARY BLOOD GLUCOSE      POCT Blood Glucose.: 197 mg/dL (06 Jan 2023 16:49)  POCT Blood Glucose.: 213 mg/dL (06 Jan 2023 12:08)  POCT Blood Glucose.: 182 mg/dL (06 Jan 2023 07:50)    I&O's Summary    05 Jan 2023 07:01  -  06 Jan 2023 07:00  --------------------------------------------------------  IN: 1370 mL / OUT: 0 mL / NET: 1370 mL        LABS:                        10.7   5.25  )-----------( 129      ( 06 Jan 2023 06:00 )             32.3     01-06    138  |  108  |  43<H>  ----------------------------<  192<H>  4.0   |  22  |  2.79<H>    Ca    8.2<L>      06 Jan 2023 06:00                    MEDICATIONS:  aspirin enteric coated 81 milliGRAM(s) Oral daily  atorvastatin 80 milliGRAM(s) Oral at bedtime  cilostazol 100 milliGRAM(s) Oral two times a day  dextrose 5%. 1000 milliLiter(s) IV Continuous <Continuous>  dextrose 5%. 1000 milliLiter(s) IV Continuous <Continuous>  dextrose 50% Injectable 12.5 Gram(s) IV Push once  dextrose 50% Injectable 25 Gram(s) IV Push once  dextrose 50% Injectable 25 Gram(s) IV Push once  dextrose 50% Injectable 12.5 Gram(s) IV Push once  dextrose Oral Gel 15 Gram(s) Oral once PRN  finasteride 5 milliGRAM(s) Oral daily  glucagon  Injectable 1 milliGRAM(s) IntraMuscular once  heparin   Injectable 5000 Unit(s) SubCutaneous every 12 hours  insulin lispro (ADMELOG) corrective regimen sliding scale   SubCutaneous three times a day before meals  latanoprost 0.005% Ophthalmic Solution 1 Drop(s) Both EYES at bedtime  levothyroxine 75 MICROGram(s) Oral daily  pantoprazole    Tablet 40 milliGRAM(s) Oral before breakfast  sodium chloride 0.9%. 1000 milliLiter(s) IV Continuous <Continuous>  tamsulosin 0.4 milliGRAM(s) Oral at bedtime

## 2023-01-06 NOTE — PROGRESS NOTE ADULT - ASSESSMENT
89-year-old gentleman PMH of hypertension, diabetes, CAD with last stent 2009, prostate cancer status post TURP who presents to the ED because of concern for a CVA.  Patient went to bed about 9 PM 1 day PTA, which was a last known normal.  He woke up and was noted to have garbled speech, left facial droop, and left pronator drift.   CTA COW:  Moderate stenosis proximal right inferior M2 branch of the right MCA. Moderate focal stenosis proximal P2 branch of right PCA. Left A1 segment may be severely stenotic versus aneurysm adjacent to the   anterior communicating artery and left A2 segment origin. Narrow caliber at the intracranial origin of the left vertebral artery.  CTA NECK: Calcified plaque with severe left-sided stenosis at  ICA origin by NASCET criteria.  MRI Brain:  Acute infarct in the right medulla and nick.    # Acute R medulla and nick CVA - MRI Brain:  Acute infarct in the right medulla and nick, evolution of L sided weakness.  Mild to moderate R MCA / PCA stenosis noted on CTA.  L A1 stenosis / aneurysm also noted.  Continue ASA, Statin.  TTE.  Neurology followup d/w Dr. Ridley.  PT / OT.  Anticipate EARL.    # L ICA stenosis - Noted on CTA neck.  Outpatient vascular followup.    # MARILYN, suspect contrast induced nephropathy - Cr jonna to 2.9, now 2.6.  IVF.  Monitor BMP.  Renal input.    # Diabetes Type II - monitor fingersticks.  Insulin coverage for hyperglycemia. FS was low.  Lantus stopped.    # Hypothyroidism - TSH elevated.  Synthroid increased from 50mcg -> 75 mcg.  # Prostate Ca - supportive care.  # CAD - continue ASA, Statin.   # Glaucoma - vision baseline.  Continue eyedrops.  # Inpatient DVT prophylaxis - subcutaneous Heparin     Plan of care d/w daughter Shavonne [phone]  [facility] placement - daughter opting for [facility].

## 2023-01-06 NOTE — OCCUPATIONAL THERAPY INITIAL EVALUATION ADULT - TRANSFER SAFETY CONCERNS NOTED: SIT/STAND, REHAB EVAL
decreased balance during turns/losing balance/decreased proprioception/decreased sequencing ability/stepping too close to front of assistive device/decreased weight-shifting ability

## 2023-01-06 NOTE — OCCUPATIONAL THERAPY INITIAL EVALUATION ADULT - ADDITIONAL COMMENTS
Patient reports that he lives with his spouse in a house with >10 steps to enter with handrails and was  independent with ADLs and functional mobility task using a rolling walker.

## 2023-01-06 NOTE — CONSULT NOTE ADULT - SUBJECTIVE AND OBJECTIVE BOX
Patient chart reviewed, full consult to follow.     Thank you for the courtesy of this consultation. [facility] NEPHROLOGY SERVICES, [facility]  NEPHROLOGY AND HYPERTENSION  [address]  [address]  [place] [address]  [phone]    MD SELENE BRUSH MD YELENA ROSENBERG, MD BINNY KOSHY, MD CHRISTOPHER CAPUTO, MD EDWARD BOVER, MD      Information from chart:  "Patient is a 89y old  Male who presents with a chief complaint of Left sided facial weakness (06 Jan 2023 18:09)    HPI:  89-year-old gentleman PMH of hypertension, diabetes, CAD with last stent 2009, prostate cancer status post TURP who presents to the ED because of concern for a CVA.  Patient went to bed about 9 PM yesterday which was a last known normal.  He woke up this morning, and was noted to have garbled speech, left facial droop, and left pronator drift at 11 AM.  Patient denies any headache, chest pain, shortness of breath, fever, abdominal pain.  No history of stroke in the past. 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      History of PTCA  7/09, stents to Cx and RCA      Prostate CA      Type 2 diabetes mellitus      HTN (hypertension)      BPH without urinary obstruction      High cholesterol      Inferior MI      Diabetes Mellitus      CAD (Coronary Artery Disease)      S/P TURP (status post transurethral resection of prostate)      No significant past surgical history        FAMILY HISTORY:  No pertinent family history in first degree relatives    FH: HTN (hypertension)      Allergies    No Known Allergies    Intolerances      Home Medications:  aspirin 81 mg oral delayed release tablet: 1 tab(s) orally once a day (26 May 2022 15:22)  atorvastatin 20 mg oral tablet: 1 tab(s) orally once a day (at bedtime) (26 May 2022 15:22)  cilostazol 100 mg oral tablet: 1 tab(s) orally 2 times a day (26 May 2022 15:22)  finasteride 5 mg oral tablet: 1 tab(s) orally once a day (26 May 2022 15:22)  HumaLOG KwikPen 100 units/mL injectable solution: injectable 3 times a day - 250  2 Unit(s) if Glucose 251 - 300  4 Unit(s) if Glucose 301 - 350  6 Unit(s) if Glucose 351 - 400  8 Unit(s) if Glucose Greater Than 400 (26 May 2022 15:25)  insulin glargine 100 units/mL subcutaneous solution: 10 unit(s) subcutaneous once a day (at bedtime) (26 May 2022 15:22)  latanoprost 0.005% ophthalmic solution:  (23 May 2022 15:11)  levothyroxine 50 mcg (0.05 mg) oral tablet: 1 tab(s) orally once a day (26 May 2022 15:22)  omeprazole 20 mg oral delayed release capsule: 1 cap(s) orally once a day (23 May 2022 15:11)  tamsulosin 0.4 mg oral capsule: 1 cap(s) orally once a day (at bedtime) (26 May 2022 15:22)    MEDICATIONS  (STANDING):  aspirin enteric coated 81 milliGRAM(s) Oral daily  atorvastatin 80 milliGRAM(s) Oral at bedtime  cilostazol 100 milliGRAM(s) Oral two times a day  dextrose 5%. 1000 milliLiter(s) (100 mL/Hr) IV Continuous <Continuous>  dextrose 5%. 1000 milliLiter(s) (50 mL/Hr) IV Continuous <Continuous>  dextrose 50% Injectable 25 Gram(s) IV Push once  dextrose 50% Injectable 12.5 Gram(s) IV Push once  dextrose 50% Injectable 25 Gram(s) IV Push once  dextrose 50% Injectable 12.5 Gram(s) IV Push once  finasteride 5 milliGRAM(s) Oral daily  glucagon  Injectable 1 milliGRAM(s) IntraMuscular once  heparin   Injectable 5000 Unit(s) SubCutaneous every 12 hours  insulin lispro (ADMELOG) corrective regimen sliding scale   SubCutaneous three times a day before meals  latanoprost 0.005% Ophthalmic Solution 1 Drop(s) Both EYES at bedtime  levothyroxine 75 MICROGram(s) Oral daily  pantoprazole    Tablet 40 milliGRAM(s) Oral before breakfast  sodium chloride 0.9%. 1000 milliLiter(s) (100 mL/Hr) IV Continuous <Continuous>  tamsulosin 0.4 milliGRAM(s) Oral at bedtime    MEDICATIONS  (PRN):  dextrose Oral Gel 15 Gram(s) Oral once PRN Blood Glucose LESS THAN 70 milliGRAM(s)/deciliter    Vital Signs Last 24 Hrs  T(C): 36.7 (06 Jan 2023 16:39), Max: 37.7 (06 Jan 2023 00:09)  T(F): 98.1 (06 Jan 2023 16:39), Max: 99.9 (06 Jan 2023 00:09)  HR: 104 (06 Jan 2023 17:46) (89 - 104)  BP: 178/70 (06 Jan 2023 17:46) (91/57 - 178/70)  BP(mean): --  RR: 18 (06 Jan 2023 16:39) (18 - 19)  SpO2: 99% (06 Jan 2023 17:46) (95% - 100%)        Daily     Daily     01-05-23 @ 07:01  -  01-06-23 @ 07:00  --------------------------------------------------------  IN: 1370 mL / OUT: 0 mL / NET: 1370 mL      CAPILLARY BLOOD GLUCOSE      POCT Blood Glucose.: 197 mg/dL (06 Jan 2023 16:49)  POCT Blood Glucose.: 213 mg/dL (06 Jan 2023 12:08)  POCT Blood Glucose.: 182 mg/dL (06 Jan 2023 07:50)    PHYSICAL EXAM:      T(C): 36.7 (01-06-23 @ 16:39), Max: 37.7 (01-06-23 @ 00:09)  HR: 104 (01-06-23 @ 17:46) (89 - 104)  BP: 178/70 (01-06-23 @ 17:46) (91/57 - 178/70)  RR: 18 (01-06-23 @ 16:39) (18 - 19)  SpO2: 99% (01-06-23 @ 17:46) (95% - 100%)  Wt(kg): --  Lungs clear  Heart S1S2  Abd soft NT ND  Extremities:   tr edema              01-06    138  |  108  |  43<H>  ----------------------------<  192<H>  4.0   |  22  |  2.79<H>    Ca    8.2<L>      06 Jan 2023 06:00                            10.7   5.25  )-----------( 129      ( 06 Jan 2023 06:00 )             32.3     Creatinine Trend: 2.79<--, 2.90<--, 2.24<--, 1.68<--          Assessment   MARILYN pre renal azotemia, contrast effect;      Plan    IVF challenge;   Renal imaging   Will follow;     Eris Navarro MD

## 2023-01-06 NOTE — OCCUPATIONAL THERAPY INITIAL EVALUATION ADULT - BED MOBILITY TRAINING, PT EVAL
Patient will perform bed mobility independently within using the least restrictive device in 4 weeks

## 2023-01-06 NOTE — OCCUPATIONAL THERAPY INITIAL EVALUATION ADULT - RANGE OF MOTION, PT EVAL
Patient will decrease increase AROM in left shoulder by 10 degrees in order to perform UB/LB dressing with supervision within 5 weeks

## 2023-01-07 LAB
ANION GAP SERPL CALC-SCNC: 9 MMOL/L — SIGNIFICANT CHANGE UP (ref 5–17)
BUN SERPL-MCNC: 39 MG/DL — HIGH (ref 7–23)
CALCIUM SERPL-MCNC: 8.2 MG/DL — LOW (ref 8.5–10.1)
CHLORIDE SERPL-SCNC: 109 MMOL/L — HIGH (ref 96–108)
CO2 SERPL-SCNC: 20 MMOL/L — LOW (ref 22–31)
CREAT SERPL-MCNC: 2.27 MG/DL — HIGH (ref 0.5–1.3)
EGFR: 27 ML/MIN/1.73M2 — LOW
GLUCOSE BLDC GLUCOMTR-MCNC: 146 MG/DL — HIGH (ref 70–99)
GLUCOSE BLDC GLUCOMTR-MCNC: 169 MG/DL — HIGH (ref 70–99)
GLUCOSE BLDC GLUCOMTR-MCNC: 171 MG/DL — HIGH (ref 70–99)
GLUCOSE BLDC GLUCOMTR-MCNC: 215 MG/DL — HIGH (ref 70–99)
GLUCOSE SERPL-MCNC: 169 MG/DL — HIGH (ref 70–99)
POTASSIUM SERPL-MCNC: 3.7 MMOL/L — SIGNIFICANT CHANGE UP (ref 3.5–5.3)
POTASSIUM SERPL-SCNC: 3.7 MMOL/L — SIGNIFICANT CHANGE UP (ref 3.5–5.3)
SODIUM SERPL-SCNC: 138 MMOL/L — SIGNIFICANT CHANGE UP (ref 135–145)

## 2023-01-07 PROCEDURE — 99232 SBSQ HOSP IP/OBS MODERATE 35: CPT

## 2023-01-07 RX ADMIN — Medication 2: at 08:22

## 2023-01-07 RX ADMIN — HEPARIN SODIUM 5000 UNIT(S): 5000 INJECTION INTRAVENOUS; SUBCUTANEOUS at 05:45

## 2023-01-07 RX ADMIN — ATORVASTATIN CALCIUM 80 MILLIGRAM(S): 80 TABLET, FILM COATED ORAL at 21:35

## 2023-01-07 RX ADMIN — TAMSULOSIN HYDROCHLORIDE 0.4 MILLIGRAM(S): 0.4 CAPSULE ORAL at 21:36

## 2023-01-07 RX ADMIN — CILOSTAZOL 100 MILLIGRAM(S): 100 TABLET ORAL at 17:13

## 2023-01-07 RX ADMIN — SODIUM CHLORIDE 100 MILLILITER(S): 9 INJECTION INTRAMUSCULAR; INTRAVENOUS; SUBCUTANEOUS at 08:22

## 2023-01-07 RX ADMIN — PANTOPRAZOLE SODIUM 40 MILLIGRAM(S): 20 TABLET, DELAYED RELEASE ORAL at 06:59

## 2023-01-07 RX ADMIN — Medication 4: at 12:08

## 2023-01-07 RX ADMIN — HEPARIN SODIUM 5000 UNIT(S): 5000 INJECTION INTRAVENOUS; SUBCUTANEOUS at 17:13

## 2023-01-07 RX ADMIN — Medication 81 MILLIGRAM(S): at 11:49

## 2023-01-07 RX ADMIN — LATANOPROST 1 DROP(S): 0.05 SOLUTION/ DROPS OPHTHALMIC; TOPICAL at 21:35

## 2023-01-07 RX ADMIN — CILOSTAZOL 100 MILLIGRAM(S): 100 TABLET ORAL at 05:46

## 2023-01-07 RX ADMIN — SODIUM CHLORIDE 100 MILLILITER(S): 9 INJECTION INTRAMUSCULAR; INTRAVENOUS; SUBCUTANEOUS at 21:36

## 2023-01-07 RX ADMIN — Medication 75 MICROGRAM(S): at 05:45

## 2023-01-07 RX ADMIN — FINASTERIDE 5 MILLIGRAM(S): 5 TABLET, FILM COATED ORAL at 11:50

## 2023-01-07 NOTE — PROGRESS NOTE ADULT - SUBJECTIVE AND OBJECTIVE BOX
HPI:  89-year-old gentleman PMH of hypertension, diabetes, CAD with last stent 2009, prostate cancer status post TURP who presents to the ED because of concern for a CVA.  Patient went to bed about 9 PM yesterday which was a last known normal.  He woke up this morning, and was noted to have garbled speech, left facial droop, and left pronator drift at 11 AM.  Patient denies any headache, chest pain, shortness of breath, fever, abdominal pain.  No history of stroke in the past. 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 1000 milliLiter(s) IV Continuous <Continuous>  dextrose 5%. 1000 milliLiter(s) IV Continuous <Continuous>  dextrose 50% Injectable 12.5 Gram(s) IV Push once  dextrose 50% Injectable 25 Gram(s) IV Push once  dextrose 50% Injectable 25 Gram(s) IV Push once  dextrose 50% Injectable 12.5 Gram(s) IV Push once  dextrose Oral Gel 15 Gram(s) Oral once PRN  finasteride 5 milliGRAM(s) Oral daily  glucagon  Injectable 1 milliGRAM(s) IntraMuscular once  heparin   Injectable 5000 Unit(s) SubCutaneous every 12 hours  insulin lispro (ADMELOG) corrective regimen sliding scale   SubCutaneous three times a day before meals  latanoprost 0.005% Ophthalmic Solution 1 Drop(s) Both EYES at bedtime  levothyroxine 75 MICROGram(s) Oral daily  pantoprazole    Tablet 40 milliGRAM(s) Oral before breakfast  sodium chloride 0.9%. 1000 milliLiter(s) IV Continuous <Continuous>  tamsulosin 0.4 milliGRAM(s) Oral at bedtime      LABS:                          10.7   5.25  )-----------( 129      ( 06 Jan 2023 06:00 )             32.3     01-07    138  |  109<H>  |  39<H>  ----------------------------<  169<H>  3.7   |  20<L>  |  2.27<H>    Ca    8.2<L>      07 Jan 2023 07:19      CAPILLARY BLOOD GLUCOSE      POCT Blood Glucose.: 146 mg/dL (07 Jan 2023 16:45)  POCT Blood Glucose.: 215 mg/dL (07 Jan 2023 12:02)  POCT Blood Glucose.: 169 mg/dL (07 Jan 2023 08:18)        I&O's Summary    06 Jan 2023 07:01  -  07 Jan 2023 07:00  --------------------------------------------------------  IN: 100 mL / OUT: 0 mL / NET: 100 mL    07 Jan 2023 07:01  -  07 Jan 2023 20:19  --------------------------------------------------------  IN: 1480 mL / OUT: 0 mL / NET: 1480 mL      Vital Signs Last 24 Hrs  T(C): 36.4 (07 Jan 2023 16:25), Max: 37 (06 Jan 2023 23:29)  T(F): 97.5 (07 Jan 2023 16:25), Max: 98.6 (06 Jan 2023 23:29)  HR: 102 (07 Jan 2023 16:25) (102 - 107)  BP: 145/74 (07 Jan 2023 16:25) (100/63 - 145/85)  BP(mean): --  RR: 18 (07 Jan 2023 16:25) (16 - 18)  SpO2: 98% (07 Jan 2023 16:25) (94% - 100%)    Parameters below as of 07 Jan 2023 16:25  Patient On (Oxygen Delivery Method): room air          MRI brain shows an 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 Appropriately interactive, normal affect. Speech fluent, No Aphasia or paraphasic errors. Naming /repetition intact   CN: BERTO, EOMI, VFF, facial sensation normal, +left central facial paresis with some right peripheral right facial +dysarthria, tongue midline, Palate moves equally, SCM equal bilaterally  Motor: 2/5 LUE, 5/5 in RUE and proximally b/l, 4+/5 left hip flexion, 5/5 distally in LEs   Sens: Impaired to LT/vibration sense in toes b/l  Reflexes: Symmetric. BJ 1+, BR 1+, KJ 1+, AJ 0, mute toes b/l  Coord:  No FNFA, dysmetria    NIHSS: 7

## 2023-01-07 NOTE — PROGRESS NOTE ADULT - ASSESSMENT
89-year-old gentleman PMH of hypertension, diabetes, CAD with last stent 2009, prostate cancer status post TURP who presents to the ED because of concern for a CVA.  Patient went to bed about 9 PM 1 day PTA, which was a last known normal.  He woke up and was noted to have garbled speech, left facial droop, and left pronator drift.  MRI Brain:  Acute infarct in the right medulla and nick, evolution of L sided weakness.  Mild to moderate R MCA / PCA stenosis noted on CTA.  L A1 stenosis / aneurysm also noted.  Continue ASA, Statin.      CTA COW:  Moderate stenosis proximal right inferior M2 branch of the right MCA. Moderate focal stenosis proximal P2 branch of right PCA. Left A1 segment may be severely stenotic versus aneurysm adjacent to the anterior communicating artery and left A2 segment origin. Narrow caliber at the intracranial origin of the left vertebral artery.  CTA NECK: Calcified plaque with severe left-sided stenosis at  ICA origin by NASCET criteria.  MRI Brain:  Acute infarct in the right medulla and nick.    # Acute R medulla and nick CVA - MRI Brain:  Acute infarct in the right medulla and nick, evolution of L sided weakness.  Mild to moderate R MCA / PCA stenosis noted on CTA.  L A1 stenosis / aneurysm also noted.  Continue ASA, Statin.  TTE.  Neurology followup d/w Dr. Ridley.  PT / OT.  Anticipate EARL.    # L ICA stenosis - Noted on CTA neck.  Outpatient vascular followup.    # MARILYN, suspect contrast induced nephropathy - Cr jonna to 2.9, now 2.2.  IVF.  Monitor BMP.  Renal input.    # Diabetes Type II - monitor fingersticks.  Insulin coverage for hyperglycemia. FS was low.  Lantus stopped.   - 215    # Hypothyroidism - TSH elevated.  Synthroid increased from 50mcg -> 75 mcg.  # Prostate Ca - supportive care.  # CAD - continue ASA, Statin.   # Glaucoma - vision baseline.  Continue eyedrops.  # Inpatient DVT prophylaxis - subcutaneous Heparin     Plan of care d/w daughter Shavonne [phone] on 1/7  LIkely EARL placement - daughter opting for Or[facility]ac.

## 2023-01-07 NOTE — PROGRESS NOTE ADULT - ASSESSMENT
A: 89 yo man with acute ischemic stroke in right upper medulla / lower nick.    Recommend:  1. TTE  2. Telemetry monitoring  3. Check lipid profile, Intensive statin therapy, target LDL < 70  4. Aspirin 81mg daily  5. gradually optimize BP control  6. Optimize glucose control  7. PT/OT, speech therapy  8. Left ICA stenosis: carotid US, vascular surgery consult (as outpatient as it is unrelated to current presentation)  9. Stroke education

## 2023-01-07 NOTE — PROGRESS NOTE ADULT - SUBJECTIVE AND OBJECTIVE BOX
[facility] [place] NEPHROLOGY SERVICES, [facility]  NEPHROLOGY AND HYPERTENSION  [address]  [address]  [place] [address]  [phone]    MD SELENE BRUSH MD ANDREY GONCHARUK, MD MADHU KORRAPATI, MD YELENA ROSENBERG, MD BINNY KOSHY, MD CHRISTOPHER CAPUTO, MD CHRISTOPHER HANNA MD          Patient events noted  No distress    MEDICATIONS  (STANDING):  aspirin enteric coated 81 milliGRAM(s) Oral daily  atorvastatin 80 milliGRAM(s) Oral at bedtime  cilostazol 100 milliGRAM(s) Oral two times a day  dextrose 5%. 1000 milliLiter(s) (50 mL/Hr) IV Continuous <Continuous>  dextrose 5%. 1000 milliLiter(s) (100 mL/Hr) IV Continuous <Continuous>  dextrose 50% Injectable 25 Gram(s) IV Push once  dextrose 50% Injectable 12.5 Gram(s) IV Push once  dextrose 50% Injectable 25 Gram(s) IV Push once  dextrose 50% Injectable 12.5 Gram(s) IV Push once  finasteride 5 milliGRAM(s) Oral daily  glucagon  Injectable 1 milliGRAM(s) IntraMuscular once  heparin   Injectable 5000 Unit(s) SubCutaneous every 12 hours  insulin lispro (ADMELOG) corrective regimen sliding scale   SubCutaneous three times a day before meals  latanoprost 0.005% Ophthalmic Solution 1 Drop(s) Both EYES at bedtime  levothyroxine 75 MICROGram(s) Oral daily  pantoprazole    Tablet 40 milliGRAM(s) Oral before breakfast  sodium chloride 0.9%. 1000 milliLiter(s) (100 mL/Hr) IV Continuous <Continuous>  tamsulosin 0.4 milliGRAM(s) Oral at bedtime    MEDICATIONS  (PRN):  dextrose Oral Gel 15 Gram(s) Oral once PRN Blood Glucose LESS THAN 70 milliGRAM(s)/deciliter      01-06-23 @ 07:01  -  01-07-23 @ 07:00  --------------------------------------------------------  IN: 100 mL / OUT: 0 mL / NET: 100 mL    01-07-23 @ 07:01 - 01-08-23 @ 01:13  --------------------------------------------------------  IN: 1480 mL / OUT: 0 mL / NET: 1480 mL      PHYSICAL EXAM:      T(C): 36.7 (01-08-23 @ 00:14), Max: 36.7 (01-08-23 @ 00:14)  HR: 109 (01-08-23 @ 00:14) (102 - 109)  BP: 161/84 (01-08-23 @ 00:14) (100/63 - 161/84)  RR: 18 (01-08-23 @ 00:14) (16 - 18)  SpO2: 96% (01-08-23 @ 00:14) (96% - 100%)  Wt(kg): --  Lungs clear  Heart S1S2  Abd soft NT ND  Extremities:   tr edema                                    10.7   5.25  )-----------( 129      ( 06 Jan 2023 06:00 )             32.3     01-07    138  |  109<H>  |  39<H>  ----------------------------<  169<H>  3.7   |  20<L>  |  2.27<H>    Ca    8.2<L>      07 Jan 2023 07:19          Creatinine Trend: 2.27<--, 2.79<--, 2.90<--, 2.24<--, 1.68<--      Assessment   MARILYN CKD 3; pre renal azotemia ; contrast effect       Plan:  Maintenance IVF   Will follow    Eris Navarro MD

## 2023-01-07 NOTE — PROGRESS NOTE ADULT - SUBJECTIVE AND OBJECTIVE BOX
Patient: MARIANA WRIGHT [id number] 89y Male                            Hospitalist Attending Note    Denies complaints.     ____________________PHYSICAL EXAM:  GENERAL:  NAD Alert, cooperative.  Slurred speech   HEENT: NCAT  CARDIOVASCULAR:  S1, S2  LUNGS: CTAB  ABDOMEN:  soft, (-) tenderness, (-) distension, (+) bowel sounds, (-) guarding, (-) rebound (-) rigidity  EXTREMITIES:  no cyanosis / clubbing / edema.   NEURO: L facial droop.  LUE strength 1/5, LLE strength 3/5.    ____________________    VITALS:  Vital Signs Last 24 Hrs  T(C): 36.5 (07 Jan 2023 11:11), Max: 37 (06 Jan 2023 23:29)  T(F): 97.7 (07 Jan 2023 11:11), Max: 98.6 (06 Jan 2023 23:29)  HR: 106 (07 Jan 2023 11:11) (98 - 107)  BP: 100/63 (07 Jan 2023 11:11) (100/63 - 178/70)  BP(mean): --  RR: 17 (07 Jan 2023 11:11) (16 - 18)  SpO2: 97% (07 Jan 2023 11:11) (94% - 100%)    Parameters below as of 07 Jan 2023 11:11  Patient On (Oxygen Delivery Method): room air     Daily     Daily   CAPILLARY BLOOD GLUCOSE      POCT Blood Glucose.: 215 mg/dL (07 Jan 2023 12:02)  POCT Blood Glucose.: 169 mg/dL (07 Jan 2023 08:18)  POCT Blood Glucose.: 197 mg/dL (06 Jan 2023 16:49)    I&O's Summary    07 Jan 2023 07:01  -  07 Jan 2023 13:15  --------------------------------------------------------  IN: 280 mL / OUT: 0 mL / NET: 280 mL        LABS:                        10.7   5.25  )-----------( 129      ( 06 Jan 2023 06:00 )             32.3     01-07    138  |  109<H>  |  39<H>  ----------------------------<  169<H>  3.7   |  20<L>  |  2.27<H>    Ca    8.2<L>      07 Jan 2023 07:19                    MEDICATIONS:  aspirin enteric coated 81 milliGRAM(s) Oral daily  atorvastatin 80 milliGRAM(s) Oral at bedtime  cilostazol 100 milliGRAM(s) Oral two times a day  dextrose 5%. 1000 milliLiter(s) IV Continuous <Continuous>  dextrose 5%. 1000 milliLiter(s) IV Continuous <Continuous>  dextrose 50% Injectable 25 Gram(s) IV Push once  dextrose 50% Injectable 12.5 Gram(s) IV Push once  dextrose 50% Injectable 25 Gram(s) IV Push once  dextrose 50% Injectable 12.5 Gram(s) IV Push once  dextrose Oral Gel 15 Gram(s) Oral once PRN  finasteride 5 milliGRAM(s) Oral daily  glucagon  Injectable 1 milliGRAM(s) IntraMuscular once  heparin   Injectable 5000 Unit(s) SubCutaneous every 12 hours  insulin lispro (ADMELOG) corrective regimen sliding scale   SubCutaneous three times a day before meals  latanoprost 0.005% Ophthalmic Solution 1 Drop(s) Both EYES at bedtime  levothyroxine 75 MICROGram(s) Oral daily  pantoprazole    Tablet 40 milliGRAM(s) Oral before breakfast  sodium chloride 0.9%. 1000 milliLiter(s) IV Continuous <Continuous>  tamsulosin 0.4 milliGRAM(s) Oral at bedtime

## 2023-01-08 LAB
ANION GAP SERPL CALC-SCNC: 9 MMOL/L — SIGNIFICANT CHANGE UP (ref 5–17)
BUN SERPL-MCNC: 32 MG/DL — HIGH (ref 7–23)
CALCIUM SERPL-MCNC: 8 MG/DL — LOW (ref 8.5–10.1)
CHLORIDE SERPL-SCNC: 112 MMOL/L — HIGH (ref 96–108)
CO2 SERPL-SCNC: 19 MMOL/L — LOW (ref 22–31)
CREAT SERPL-MCNC: 1.88 MG/DL — HIGH (ref 0.5–1.3)
EGFR: 34 ML/MIN/1.73M2 — LOW
GLUCOSE BLDC GLUCOMTR-MCNC: 147 MG/DL — HIGH (ref 70–99)
GLUCOSE BLDC GLUCOMTR-MCNC: 178 MG/DL — HIGH (ref 70–99)
GLUCOSE BLDC GLUCOMTR-MCNC: 332 MG/DL — HIGH (ref 70–99)
GLUCOSE SERPL-MCNC: 153 MG/DL — HIGH (ref 70–99)
HCT VFR BLD CALC: 29.5 % — LOW (ref 39–50)
HGB BLD-MCNC: 10 G/DL — LOW (ref 13–17)
MCHC RBC-ENTMCNC: 30.8 PG — SIGNIFICANT CHANGE UP (ref 27–34)
MCHC RBC-ENTMCNC: 33.9 G/DL — SIGNIFICANT CHANGE UP (ref 32–36)
MCV RBC AUTO: 90.8 FL — SIGNIFICANT CHANGE UP (ref 80–100)
NRBC # BLD: 0 /100 WBCS — SIGNIFICANT CHANGE UP (ref 0–0)
PLATELET # BLD AUTO: 123 K/UL — LOW (ref 150–400)
POTASSIUM SERPL-MCNC: 3.8 MMOL/L — SIGNIFICANT CHANGE UP (ref 3.5–5.3)
POTASSIUM SERPL-SCNC: 3.8 MMOL/L — SIGNIFICANT CHANGE UP (ref 3.5–5.3)
RBC # BLD: 3.25 M/UL — LOW (ref 4.2–5.8)
RBC # FLD: 13 % — SIGNIFICANT CHANGE UP (ref 10.3–14.5)
SODIUM SERPL-SCNC: 140 MMOL/L — SIGNIFICANT CHANGE UP (ref 135–145)
WBC # BLD: 4.45 K/UL — SIGNIFICANT CHANGE UP (ref 3.8–10.5)
WBC # FLD AUTO: 4.45 K/UL — SIGNIFICANT CHANGE UP (ref 3.8–10.5)

## 2023-01-08 PROCEDURE — 99232 SBSQ HOSP IP/OBS MODERATE 35: CPT

## 2023-01-08 RX ADMIN — Medication 81 MILLIGRAM(S): at 11:16

## 2023-01-08 RX ADMIN — Medication 75 MICROGRAM(S): at 05:25

## 2023-01-08 RX ADMIN — SODIUM CHLORIDE 100 MILLILITER(S): 9 INJECTION INTRAMUSCULAR; INTRAVENOUS; SUBCUTANEOUS at 11:19

## 2023-01-08 RX ADMIN — LATANOPROST 1 DROP(S): 0.05 SOLUTION/ DROPS OPHTHALMIC; TOPICAL at 22:24

## 2023-01-08 RX ADMIN — CILOSTAZOL 100 MILLIGRAM(S): 100 TABLET ORAL at 05:25

## 2023-01-08 RX ADMIN — CILOSTAZOL 100 MILLIGRAM(S): 100 TABLET ORAL at 17:03

## 2023-01-08 RX ADMIN — PANTOPRAZOLE SODIUM 40 MILLIGRAM(S): 20 TABLET, DELAYED RELEASE ORAL at 06:28

## 2023-01-08 RX ADMIN — Medication 8: at 17:02

## 2023-01-08 RX ADMIN — FINASTERIDE 5 MILLIGRAM(S): 5 TABLET, FILM COATED ORAL at 11:17

## 2023-01-08 RX ADMIN — HEPARIN SODIUM 5000 UNIT(S): 5000 INJECTION INTRAVENOUS; SUBCUTANEOUS at 05:25

## 2023-01-08 RX ADMIN — Medication 2: at 11:17

## 2023-01-08 RX ADMIN — HEPARIN SODIUM 5000 UNIT(S): 5000 INJECTION INTRAVENOUS; SUBCUTANEOUS at 17:03

## 2023-01-08 RX ADMIN — TAMSULOSIN HYDROCHLORIDE 0.4 MILLIGRAM(S): 0.4 CAPSULE ORAL at 22:25

## 2023-01-08 RX ADMIN — ATORVASTATIN CALCIUM 80 MILLIGRAM(S): 80 TABLET, FILM COATED ORAL at 22:25

## 2023-01-08 NOTE — PROGRESS NOTE ADULT - SUBJECTIVE AND OBJECTIVE BOX
Patient: MARIANA WRIGHT [id number] 89y Male                            Hospitalist Attending Note    Denies complaints.     ____________________PHYSICAL EXAM:  GENERAL:  NAD Alert, cooperative.  Slurred speech   HEENT: NCAT  CARDIOVASCULAR:  S1, S2  LUNGS: CTAB  ABDOMEN:  soft, (-) tenderness, (-) distension, (+) bowel sounds, (-) guarding, (-) rebound (-) rigidity  EXTREMITIES:  no cyanosis / clubbing / edema.   NEURO: L facial droop.  L sided weakness.  ____________________    VITALS:  Vital Signs Last 24 Hrs  T(C): 36.6 (08 Jan 2023 05:07), Max: 36.7 (08 Jan 2023 00:14)  T(F): 97.9 (08 Jan 2023 05:07), Max: 98.1 (08 Jan 2023 00:14)  HR: 102 (08 Jan 2023 10:59) (102 - 119)  BP: 138/87 (08 Jan 2023 10:59) (138/87 - 161/84)  BP(mean): --  RR: 18 (08 Jan 2023 10:59) (18 - 18)  SpO2: 97% (08 Jan 2023 10:59) (96% - 97%)    Parameters below as of 08 Jan 2023 05:07  Patient On (Oxygen Delivery Method): room air     Daily     Daily   CAPILLARY BLOOD GLUCOSE      POCT Blood Glucose.: 178 mg/dL (08 Jan 2023 11:12)  POCT Blood Glucose.: 147 mg/dL (08 Jan 2023 08:16)  POCT Blood Glucose.: 171 mg/dL (07 Jan 2023 21:31)  POCT Blood Glucose.: 146 mg/dL (07 Jan 2023 16:45)    I&O's Summary    07 Jan 2023 07:01  -  08 Jan 2023 07:00  --------------------------------------------------------  IN: 1480 mL / OUT: 0 mL / NET: 1480 mL        LABS:                        10.0   4.45  )-----------( 123      ( 08 Jan 2023 07:47 )             29.5     01-08    140  |  112<H>  |  32<H>  ----------------------------<  153<H>  3.8   |  19<L>  |  1.88<H>    Ca    8.0<L>      08 Jan 2023 07:47                    MEDICATIONS:  aspirin enteric coated 81 milliGRAM(s) Oral daily  atorvastatin 80 milliGRAM(s) Oral at bedtime  cilostazol 100 milliGRAM(s) Oral two times a day  dextrose 5%. 1000 milliLiter(s) IV Continuous <Continuous>  dextrose 5%. 1000 milliLiter(s) IV Continuous <Continuous>  dextrose 50% Injectable 25 Gram(s) IV Push once  dextrose 50% Injectable 12.5 Gram(s) IV Push once  dextrose 50% Injectable 25 Gram(s) IV Push once  dextrose 50% Injectable 12.5 Gram(s) IV Push once  dextrose Oral Gel 15 Gram(s) Oral once PRN  finasteride 5 milliGRAM(s) Oral daily  glucagon  Injectable 1 milliGRAM(s) IntraMuscular once  heparin   Injectable 5000 Unit(s) SubCutaneous every 12 hours  insulin lispro (ADMELOG) corrective regimen sliding scale   SubCutaneous three times a day before meals  latanoprost 0.005% Ophthalmic Solution 1 Drop(s) Both EYES at bedtime  levothyroxine 75 MICROGram(s) Oral daily  pantoprazole    Tablet 40 milliGRAM(s) Oral before breakfast  sodium chloride 0.9%. 1000 milliLiter(s) IV Continuous <Continuous>  tamsulosin 0.4 milliGRAM(s) Oral at bedtime

## 2023-01-08 NOTE — PROGRESS NOTE ADULT - ASSESSMENT
89-year-old gentleman PMH of hypertension, diabetes, CAD with last stent 2009, prostate cancer status post TURP who presents to the ED because of concern for a CVA.  Patient went to bed about 9 PM 1 day PTA, which was a last known normal.  He woke up and was noted to have garbled speech, left facial droop, and left pronator drift.  MRI Brain:  Acute infarct in the right medulla and nick, evolution of L sided weakness.  Mild to moderate R MCA / PCA stenosis noted on CTA.  L A1 stenosis / aneurysm also noted.  Continue ASA, Statin.      CTA COW:  Moderate stenosis proximal right inferior M2 branch of the right MCA. Moderate focal stenosis proximal P2 branch of right PCA. Left A1 segment may be severely stenotic versus aneurysm adjacent to the anterior communicating artery and left A2 segment origin. Narrow caliber at the intracranial origin of the left vertebral artery.  CTA NECK: Calcified plaque with severe left-sided stenosis at  ICA origin by NASCET criteria.  MRI Brain:  Acute infarct in the right medulla and nick.    # Acute R medulla and nick CVA - MRI Brain:  Acute infarct in the right medulla and nick, evolution of L sided weakness.  Mild to moderate R MCA / PCA stenosis noted on CTA.  L A1 stenosis / aneurysm also noted.  Continue ASA, Statin.  TTE.  Neurology followup d/w Dr. Ridley.  PT / OT.  Anticipate EARL.    # L ICA stenosis - Noted on CTA neck.  Outpatient vascular followup.    # MARILYN, suspect contrast induced nephropathy - Cr jonna to 2.9, now 2.2 -> 1.8.  IVF.  Monitor BMP.  Renal following.    # Diabetes Type II - monitor fingersticks.  Insulin coverage for hyperglycemia. FS was low.  Lantus stopped.   - 215    # Hypothyroidism - TSH elevated.  Synthroid increased from 50mcg -> 75 mcg.  # Prostate Ca - supportive care.  # CAD - continue ASA, Statin.   # Glaucoma - vision baseline.  Continue eyedrops.  # Inpatient DVT prophylaxis - subcutaneous Heparin     Plan of care d/w daughter Shavonne [phone] on 1/7  LIkely EARL placement - daughter opting for [facility].

## 2023-01-09 LAB
ANION GAP SERPL CALC-SCNC: 8 MMOL/L — SIGNIFICANT CHANGE UP (ref 5–17)
BUN SERPL-MCNC: 25 MG/DL — HIGH (ref 7–23)
CALCIUM SERPL-MCNC: 7.8 MG/DL — LOW (ref 8.5–10.1)
CHLORIDE SERPL-SCNC: 114 MMOL/L — HIGH (ref 96–108)
CO2 SERPL-SCNC: 20 MMOL/L — LOW (ref 22–31)
CREAT SERPL-MCNC: 1.68 MG/DL — HIGH (ref 0.5–1.3)
EGFR: 38 ML/MIN/1.73M2 — LOW
GLUCOSE BLDC GLUCOMTR-MCNC: 113 MG/DL — HIGH (ref 70–99)
GLUCOSE BLDC GLUCOMTR-MCNC: 125 MG/DL — HIGH (ref 70–99)
GLUCOSE BLDC GLUCOMTR-MCNC: 211 MG/DL — HIGH (ref 70–99)
GLUCOSE BLDC GLUCOMTR-MCNC: 235 MG/DL — HIGH (ref 70–99)
GLUCOSE SERPL-MCNC: 105 MG/DL — HIGH (ref 70–99)
HCT VFR BLD CALC: 29.3 % — LOW (ref 39–50)
HGB BLD-MCNC: 9.8 G/DL — LOW (ref 13–17)
MAGNESIUM SERPL-MCNC: 1.8 MG/DL — SIGNIFICANT CHANGE UP (ref 1.6–2.6)
MCHC RBC-ENTMCNC: 31.3 PG — SIGNIFICANT CHANGE UP (ref 27–34)
MCHC RBC-ENTMCNC: 33.4 G/DL — SIGNIFICANT CHANGE UP (ref 32–36)
MCV RBC AUTO: 93.6 FL — SIGNIFICANT CHANGE UP (ref 80–100)
NRBC # BLD: 0 /100 WBCS — SIGNIFICANT CHANGE UP (ref 0–0)
PHOSPHATE SERPL-MCNC: 3.1 MG/DL — SIGNIFICANT CHANGE UP (ref 2.5–4.5)
PLATELET # BLD AUTO: 132 K/UL — LOW (ref 150–400)
POTASSIUM SERPL-MCNC: 3.6 MMOL/L — SIGNIFICANT CHANGE UP (ref 3.5–5.3)
POTASSIUM SERPL-SCNC: 3.6 MMOL/L — SIGNIFICANT CHANGE UP (ref 3.5–5.3)
RBC # BLD: 3.13 M/UL — LOW (ref 4.2–5.8)
RBC # FLD: 13.2 % — SIGNIFICANT CHANGE UP (ref 10.3–14.5)
SODIUM SERPL-SCNC: 142 MMOL/L — SIGNIFICANT CHANGE UP (ref 135–145)
WBC # BLD: 5.46 K/UL — SIGNIFICANT CHANGE UP (ref 3.8–10.5)
WBC # FLD AUTO: 5.46 K/UL — SIGNIFICANT CHANGE UP (ref 3.8–10.5)

## 2023-01-09 PROCEDURE — 93306 TTE W/DOPPLER COMPLETE: CPT | Mod: 26

## 2023-01-09 PROCEDURE — 99232 SBSQ HOSP IP/OBS MODERATE 35: CPT

## 2023-01-09 RX ADMIN — PANTOPRAZOLE SODIUM 40 MILLIGRAM(S): 20 TABLET, DELAYED RELEASE ORAL at 06:16

## 2023-01-09 RX ADMIN — SODIUM CHLORIDE 100 MILLILITER(S): 9 INJECTION INTRAMUSCULAR; INTRAVENOUS; SUBCUTANEOUS at 05:50

## 2023-01-09 RX ADMIN — SODIUM CHLORIDE 100 MILLILITER(S): 9 INJECTION INTRAMUSCULAR; INTRAVENOUS; SUBCUTANEOUS at 21:17

## 2023-01-09 RX ADMIN — SODIUM CHLORIDE 100 MILLILITER(S): 9 INJECTION INTRAMUSCULAR; INTRAVENOUS; SUBCUTANEOUS at 15:28

## 2023-01-09 RX ADMIN — ATORVASTATIN CALCIUM 80 MILLIGRAM(S): 80 TABLET, FILM COATED ORAL at 21:16

## 2023-01-09 RX ADMIN — LATANOPROST 1 DROP(S): 0.05 SOLUTION/ DROPS OPHTHALMIC; TOPICAL at 21:17

## 2023-01-09 RX ADMIN — HEPARIN SODIUM 5000 UNIT(S): 5000 INJECTION INTRAVENOUS; SUBCUTANEOUS at 05:50

## 2023-01-09 RX ADMIN — Medication 4: at 16:52

## 2023-01-09 RX ADMIN — HEPARIN SODIUM 5000 UNIT(S): 5000 INJECTION INTRAVENOUS; SUBCUTANEOUS at 18:59

## 2023-01-09 RX ADMIN — Medication 81 MILLIGRAM(S): at 11:40

## 2023-01-09 RX ADMIN — CILOSTAZOL 100 MILLIGRAM(S): 100 TABLET ORAL at 18:59

## 2023-01-09 RX ADMIN — FINASTERIDE 5 MILLIGRAM(S): 5 TABLET, FILM COATED ORAL at 11:41

## 2023-01-09 RX ADMIN — Medication 75 MICROGRAM(S): at 05:50

## 2023-01-09 RX ADMIN — CILOSTAZOL 100 MILLIGRAM(S): 100 TABLET ORAL at 05:51

## 2023-01-09 RX ADMIN — TAMSULOSIN HYDROCHLORIDE 0.4 MILLIGRAM(S): 0.4 CAPSULE ORAL at 21:17

## 2023-01-09 NOTE — PROGRESS NOTE ADULT - ASSESSMENT
89-year-old gentleman PMH of hypertension, diabetes, CAD with last stent 2009, prostate cancer status post TURP who presents to the ED because of concern for a CVA.  Patient went to bed about 9 PM 1 day PTA, which was a last known normal.  He woke up and was noted to have garbled speech, left facial droop, and left pronator drift.  MRI Brain:  Acute infarct in the right medulla and nick, evolution of L sided weakness.  Mild to moderate R MCA / PCA stenosis noted on CTA.  L A1 stenosis / aneurysm also noted.  Continue ASA, Statin.      CTA COW:  Moderate stenosis proximal right inferior M2 branch of the right MCA. Moderate focal stenosis proximal P2 branch of right PCA. Left A1 segment may be severely stenotic versus aneurysm adjacent to the anterior communicating artery and left A2 segment origin. Narrow caliber at the intracranial origin of the left vertebral artery.  CTA NECK: Calcified plaque with severe left-sided stenosis at  ICA origin by NASCET criteria.  MRI Brain:  Acute infarct in the right medulla and nick.    # Acute R medulla and nick CVA - MRI Brain:  Acute infarct in the right medulla and nick, evolution of L sided weakness.  Mild to moderate R MCA / PCA stenosis noted on CTA.  L A1 stenosis / aneurysm also noted.  Continue ASA, Statin.  Neurology followup.  PT / OT.  Awaiting TTE, confirmed with echo lab that pt on schedule.    # L ICA stenosis - Noted on CTA neck.  Outpatient vascular followup.    # MARILYN, suspect contrast induced nephropathy - Cr jonna to 2.9, now 2.2 -> 1.8.  IVF.  Monitor BMP.  Renal following.    # Diabetes Type II - monitor fingersticks.  Insulin coverage for hyperglycemia. FS was low.  Lantus stopped.   - 215    # Hypothyroidism - TSH elevated.  Synthroid increased from 50mcg -> 75 mcg.  # Prostate Ca - supportive care.  # CAD - continue ASA, Statin.   # Glaucoma - vision baseline.  Continue eyedrops.  # Inpatient DVT prophylaxis - subcutaneous Heparin     Plan of care d/w daughter Shavonne [phone] on 1/7  [facility] placement - daughter opting for [place].

## 2023-01-09 NOTE — PROGRESS NOTE ADULT - SUBJECTIVE AND OBJECTIVE BOX
Patient: MARIANA WRIGHT [id number] 89y Male                            Hospitalist Attending Note    Denies complaints.     ____________________PHYSICAL EXAM:  GENERAL:  NAD Alert, cooperative.  Slurred speech   HEENT: NCAT  CARDIOVASCULAR:  S1, S2  LUNGS: CTAB  ABDOMEN:  soft, (-) tenderness, (-) distension, (+) bowel sounds, (-) guarding, (-) rebound (-) rigidity  EXTREMITIES:  no cyanosis / clubbing / edema.   NEURO: L facial droop.  L sided weakness.  ____________________    VITALS:  Vital Signs Last 24 Hrs  T(C): 36.7 (2023 16:07), Max: 37 (2023 04:38)  T(F): 98 (2023 16:07), Max: 98.6 (2023 04:38)  HR: 102 (2023 16:07) (102 - 116)  BP: 156/87 (2023 16:07) (147/83 - 159/96)  BP(mean): 109 (2023 04:38) (105 - 112)  RR: 18 (2023 16:07) (18 - 20)  SpO2: 98% (2023 16:07) (96% - 100%)    Parameters below as of 2023 16:07  Patient On (Oxygen Delivery Method): room air     Daily     Daily Weight in k.6 (2023 04:38)  CAPILLARY BLOOD GLUCOSE      POCT Blood Glucose.: 211 mg/dL (2023 16:44)  POCT Blood Glucose.: 125 mg/dL (2023 11:38)  POCT Blood Glucose.: 113 mg/dL (2023 08:09)    I&O's Summary    2023 07:01  -  2023 07:00  --------------------------------------------------------  IN: 650 mL / OUT: 0 mL / NET: 650 mL        LABS:                        9.8    5.46  )-----------( 132      ( 2023 07:28 )             29.3         142  |  114<H>  |  25<H>  ----------------------------<  105<H>  3.6   |  20<L>  |  1.68<H>    Ca    7.8<L>      2023 07:28  Phos  3.1       Mg     1.8                         MEDICATIONS:  aspirin enteric coated 81 milliGRAM(s) Oral daily  atorvastatin 80 milliGRAM(s) Oral at bedtime  cilostazol 100 milliGRAM(s) Oral two times a day  dextrose 5%. 1000 milliLiter(s) IV Continuous <Continuous>  dextrose 5%. 1000 milliLiter(s) IV Continuous <Continuous>  dextrose 50% Injectable 25 Gram(s) IV Push once  dextrose 50% Injectable 12.5 Gram(s) IV Push once  dextrose 50% Injectable 25 Gram(s) IV Push once  dextrose 50% Injectable 12.5 Gram(s) IV Push once  dextrose Oral Gel 15 Gram(s) Oral once PRN  finasteride 5 milliGRAM(s) Oral daily  glucagon  Injectable 1 milliGRAM(s) IntraMuscular once  heparin   Injectable 5000 Unit(s) SubCutaneous every 12 hours  insulin lispro (ADMELOG) corrective regimen sliding scale   SubCutaneous three times a day before meals  latanoprost 0.005% Ophthalmic Solution 1 Drop(s) Both EYES at bedtime  levothyroxine 75 MICROGram(s) Oral daily  pantoprazole    Tablet 40 milliGRAM(s) Oral before breakfast  sodium chloride 0.9%. 1000 milliLiter(s) IV Continuous <Continuous>  tamsulosin 0.4 milliGRAM(s) Oral at bedtime

## 2023-01-09 NOTE — PROGRESS NOTE ADULT - SUBJECTIVE AND OBJECTIVE BOX
[facility] NEPHROLOGY SERVICES, [facility]  NEPHROLOGY AND HYPERTENSION  [address]  [address]  [place] [address]  [phone]    MD SELENE BRUSH MD ANDREY GONCHARUK, MD MADHU KORRAPATI, MD YELENA ROSENBERG, MD BINNY KOSHY, MD CHRISTOPHER CAPUTO, MD CHRISTOPHER HANNA MD          Patient events noted  No distress  MEDICATIONS  (STANDING):  aspirin enteric coated 81 milliGRAM(s) Oral daily  atorvastatin 80 milliGRAM(s) Oral at bedtime  cilostazol 100 milliGRAM(s) Oral two times a day  dextrose 5%. 1000 milliLiter(s) (50 mL/Hr) IV Continuous <Continuous>  dextrose 5%. 1000 milliLiter(s) (100 mL/Hr) IV Continuous <Continuous>  dextrose 50% Injectable 25 Gram(s) IV Push once  dextrose 50% Injectable 12.5 Gram(s) IV Push once  dextrose 50% Injectable 25 Gram(s) IV Push once  dextrose 50% Injectable 12.5 Gram(s) IV Push once  finasteride 5 milliGRAM(s) Oral daily  glucagon  Injectable 1 milliGRAM(s) IntraMuscular once  heparin   Injectable 5000 Unit(s) SubCutaneous every 12 hours  insulin lispro (ADMELOG) corrective regimen sliding scale   SubCutaneous three times a day before meals  latanoprost 0.005% Ophthalmic Solution 1 Drop(s) Both EYES at bedtime  levothyroxine 75 MICROGram(s) Oral daily  pantoprazole    Tablet 40 milliGRAM(s) Oral before breakfast  sodium chloride 0.9%. 1000 milliLiter(s) (100 mL/Hr) IV Continuous <Continuous>  tamsulosin 0.4 milliGRAM(s) Oral at bedtime    MEDICATIONS  (PRN):  dextrose Oral Gel 15 Gram(s) Oral once PRN Blood Glucose LESS THAN 70 milliGRAM(s)/deciliter      01-08-23 @ 07:01  -  01-09-23 @ 07:00  --------------------------------------------------------  IN: 650 mL / OUT: 0 mL / NET: 650 mL      PHYSICAL EXAM:      T(C): 36.7 (01-09-23 @ 16:07), Max: 37 (01-09-23 @ 04:38)  HR: 102 (01-09-23 @ 16:07) (102 - 110)  BP: 156/87 (01-09-23 @ 16:07) (154/87 - 159/96)  RR: 18 (01-09-23 @ 16:07) (18 - 20)  SpO2: 98% (01-09-23 @ 16:07) (98% - 100%)  Wt(kg): --  Lungs clear  Heart S1S2  Abd soft NT ND  Extremities:   tr edema                                    9.8    5.46  )-----------( 132      ( 09 Jan 2023 07:28 )             29.3     01-09    142  |  114<H>  |  25<H>  ----------------------------<  105<H>  3.6   |  20<L>  |  1.68<H>    Ca    7.8<L>      09 Jan 2023 07:28  Phos  3.1     01-09  Mg     1.8     01-09    Trend Bun/Cr  01-09-23 @ 07:28  BUN/CR -  25<H> / 1.68<H>  01-08-23 @ 07:47  BUN/CR -  32<H> / 1.88<H>  01-07-23 @ 07:19  BUN/CR -  39<H> / 2.27<H>  01-06-23 @ 06:00  BUN/CR -  43<H> / 2.79<H>  01-05-23 @ 09:17  BUN/CR -  44<H> / 2.90<H>  01-04-23 @ 06:35  BUN/CR -  41<H> / 2.24<H>  01-02-23 @ 16:06  BUN/CR -  30<H> / 1.68<H>  05-27-22 @ 06:32  BUN/CR -  24<H> / 1.43<H>  05-26-22 @ 07:58  BUN/CR -  31<H> / 1.64<H>  05-25-22 @ 09:42  BUN/CR -  31<H> / 1.81<H>  05-24-22 @ 07:03  BUN/CR -  32<H> / 1.94<H>  05-23-22 @ 06:40  BUN/CR -  33<H> / 2.36<H>        Creatinine Trend: 1.68<--, 1.88<--, 2.27<--, 2.79<--, 2.90<--, 2.24<--      Assessment   MARILYN pre renal azotemia, contrast effect  CKD 3   Acute cerebral  infarct  Improving trend    Plan:  DC IVF;   Bp medication adjustments    Will follow     Eris Navarro MD

## 2023-01-10 LAB
ANION GAP SERPL CALC-SCNC: 9 MMOL/L — SIGNIFICANT CHANGE UP (ref 5–17)
BUN SERPL-MCNC: 25 MG/DL — HIGH (ref 7–23)
CALCIUM SERPL-MCNC: 8.1 MG/DL — LOW (ref 8.5–10.1)
CHLORIDE SERPL-SCNC: 111 MMOL/L — HIGH (ref 96–108)
CO2 SERPL-SCNC: 20 MMOL/L — LOW (ref 22–31)
CREAT SERPL-MCNC: 1.61 MG/DL — HIGH (ref 0.5–1.3)
EGFR: 40 ML/MIN/1.73M2 — LOW
FLUAV AG NPH QL: SIGNIFICANT CHANGE UP
FLUBV AG NPH QL: SIGNIFICANT CHANGE UP
GLUCOSE BLDC GLUCOMTR-MCNC: 183 MG/DL — HIGH (ref 70–99)
GLUCOSE BLDC GLUCOMTR-MCNC: 186 MG/DL — HIGH (ref 70–99)
GLUCOSE BLDC GLUCOMTR-MCNC: 192 MG/DL — HIGH (ref 70–99)
GLUCOSE SERPL-MCNC: 185 MG/DL — HIGH (ref 70–99)
HCT VFR BLD CALC: 29.4 % — LOW (ref 39–50)
HGB BLD-MCNC: 9.8 G/DL — LOW (ref 13–17)
MCHC RBC-ENTMCNC: 31.1 PG — SIGNIFICANT CHANGE UP (ref 27–34)
MCHC RBC-ENTMCNC: 33.3 G/DL — SIGNIFICANT CHANGE UP (ref 32–36)
MCV RBC AUTO: 93.3 FL — SIGNIFICANT CHANGE UP (ref 80–100)
NRBC # BLD: 0 /100 WBCS — SIGNIFICANT CHANGE UP (ref 0–0)
PLATELET # BLD AUTO: 140 K/UL — LOW (ref 150–400)
POTASSIUM SERPL-MCNC: 3.6 MMOL/L — SIGNIFICANT CHANGE UP (ref 3.5–5.3)
POTASSIUM SERPL-SCNC: 3.6 MMOL/L — SIGNIFICANT CHANGE UP (ref 3.5–5.3)
RBC # BLD: 3.15 M/UL — LOW (ref 4.2–5.8)
RBC # FLD: 13.1 % — SIGNIFICANT CHANGE UP (ref 10.3–14.5)
SARS-COV-2 RNA SPEC QL NAA+PROBE: SIGNIFICANT CHANGE UP
SODIUM SERPL-SCNC: 140 MMOL/L — SIGNIFICANT CHANGE UP (ref 135–145)
WBC # BLD: 4.33 K/UL — SIGNIFICANT CHANGE UP (ref 3.8–10.5)
WBC # FLD AUTO: 4.33 K/UL — SIGNIFICANT CHANGE UP (ref 3.8–10.5)

## 2023-01-10 PROCEDURE — 99232 SBSQ HOSP IP/OBS MODERATE 35: CPT

## 2023-01-10 RX ORDER — METOPROLOL TARTRATE 50 MG
25 TABLET ORAL
Refills: 0 | Status: DISCONTINUED | OUTPATIENT
Start: 2023-01-10 | End: 2023-01-14

## 2023-01-10 RX ORDER — ATORVASTATIN CALCIUM 80 MG/1
1 TABLET, FILM COATED ORAL
Qty: 0 | Refills: 0 | DISCHARGE
Start: 2023-01-10

## 2023-01-10 RX ORDER — INSULIN LISPRO 100/ML
0 VIAL (ML) SUBCUTANEOUS
Qty: 0 | Refills: 0 | DISCHARGE

## 2023-01-10 RX ORDER — LEVOTHYROXINE SODIUM 125 MCG
1 TABLET ORAL
Qty: 0 | Refills: 0 | DISCHARGE
Start: 2023-01-10

## 2023-01-10 RX ORDER — INSULIN LISPRO 100/ML
1 VIAL (ML) SUBCUTANEOUS
Qty: 0 | Refills: 0 | DISCHARGE
Start: 2023-01-10

## 2023-01-10 RX ADMIN — CILOSTAZOL 100 MILLIGRAM(S): 100 TABLET ORAL at 05:29

## 2023-01-10 RX ADMIN — Medication 2: at 17:02

## 2023-01-10 RX ADMIN — Medication 2: at 08:13

## 2023-01-10 RX ADMIN — TAMSULOSIN HYDROCHLORIDE 0.4 MILLIGRAM(S): 0.4 CAPSULE ORAL at 21:29

## 2023-01-10 RX ADMIN — Medication 2: at 11:01

## 2023-01-10 RX ADMIN — LATANOPROST 1 DROP(S): 0.05 SOLUTION/ DROPS OPHTHALMIC; TOPICAL at 21:29

## 2023-01-10 RX ADMIN — HEPARIN SODIUM 5000 UNIT(S): 5000 INJECTION INTRAVENOUS; SUBCUTANEOUS at 17:02

## 2023-01-10 RX ADMIN — Medication 81 MILLIGRAM(S): at 11:01

## 2023-01-10 RX ADMIN — Medication 75 MICROGRAM(S): at 05:30

## 2023-01-10 RX ADMIN — HEPARIN SODIUM 5000 UNIT(S): 5000 INJECTION INTRAVENOUS; SUBCUTANEOUS at 05:30

## 2023-01-10 RX ADMIN — FINASTERIDE 5 MILLIGRAM(S): 5 TABLET, FILM COATED ORAL at 11:01

## 2023-01-10 RX ADMIN — ATORVASTATIN CALCIUM 80 MILLIGRAM(S): 80 TABLET, FILM COATED ORAL at 21:29

## 2023-01-10 RX ADMIN — PANTOPRAZOLE SODIUM 40 MILLIGRAM(S): 20 TABLET, DELAYED RELEASE ORAL at 05:29

## 2023-01-10 RX ADMIN — CILOSTAZOL 100 MILLIGRAM(S): 100 TABLET ORAL at 17:03

## 2023-01-10 RX ADMIN — Medication 25 MILLIGRAM(S): at 18:31

## 2023-01-10 NOTE — PROGRESS NOTE ADULT - ASSESSMENT
A: 89 yo man with acute ischemic stroke in right upper medulla / lower nick.    Recommend:  1. TTE  2. Telemetry monitoring  3. Check lipid profile, Intensive statin therapy, target LDL < 70  4. Aspirin 81mg daily  5. gradually optimize BP control  6. Optimize glucose control  7. PT/OT, speech therapy  8. Left ICA stenosis: carotid US, vascular surgery consult (as outpatient as it is unrelated to current presentation)  9. Stroke education  Can follow up with Neurology, Dr. Ronald Cee at [phone]

## 2023-01-10 NOTE — DIETITIAN INITIAL EVALUATION ADULT - DIET TYPE
low sodium/pureed/consistent carbohydrate (evening snack)/mildly thick liquids low sodium/minced and moist/consistent carbohydrate (evening snack)/mildly thick liquids

## 2023-01-10 NOTE — PROGRESS NOTE ADULT - ASSESSMENT
89-year-old gentleman PMH of hypertension, diabetes, CAD with last stent 2009, prostate cancer status post TURP who presents to the ED because of concern for a CVA.  Patient went to bed about 9 PM 1 day PTA, which was a last known normal.  He woke up and was noted to have garbled speech, left facial droop, and left pronator drift.  MRI Brain:  Acute infarct in the right medulla and nick, evolution of L sided weakness.  Mild to moderate R MCA / PCA stenosis noted on CTA.  L A1 stenosis / aneurysm also noted.  Continue ASA, Statin.      CTA COW:  Moderate stenosis proximal right inferior M2 branch of the right MCA. Moderate focal stenosis proximal P2 branch of right PCA. Left A1 segment may be severely stenotic versus aneurysm adjacent to the anterior communicating artery and left A2 segment origin. Narrow caliber at the intracranial origin of the left vertebral artery.  CTA NECK: Calcified plaque with severe left-sided stenosis at  ICA origin by NASCET criteria.  MRI Brain:  Acute infarct in the right medulla and nick.    # Acute R medulla and nick CVA - MRI Brain:  Acute infarct in the right medulla and nick, evolution of L sided weakness.  Mild to moderate R MCA / PCA stenosis noted on CTA.  L A1 stenosis / aneurysm also noted.  Continue ASA, Statin.  Neurology followup.  PT / OT.  TTE showed EF 60-65%.   # L ICA stenosis - Noted on CTA neck.  Outpatient vascular followup.    # MARILYN, suspect contrast induced nephropathy - Cr jonna to 2.9, now 2.2 -> 1.8.  IVF.  Monitor BMP.  Renal following.    # Diabetes Type II - monitor fingersticks.  Insulin coverage for hyperglycemia. FS was low.  Lantus stopped.   - 215    # Hypothyroidism - TSH elevated.  Synthroid increased from 50mcg -> 75 mcg.  # Prostate Ca - supportive care.  # CAD - continue ASA, Statin.   # Glaucoma - vision baseline.  Continue eyedrops.  # Inpatient DVT prophylaxis - subcutaneous Heparin     Plan of care d/w daughter Shavonne [phone] on 1/7  Awaiting EARL placement

## 2023-01-10 NOTE — PROGRESS NOTE ADULT - SUBJECTIVE AND OBJECTIVE BOX
Patient: MARIANA WRIGHT [id number] 89y Male                            Hospitalist Attending Note    Denies complaints.     ____________________PHYSICAL EXAM:  GENERAL:  NAD Alert, cooperative.  Slurred speech   HEENT: NCAT  CARDIOVASCULAR:  S1, S2  LUNGS: CTAB  ABDOMEN:  soft, (-) tenderness, (-) distension, (+) bowel sounds, (-) guarding, (-) rebound (-) rigidity  EXTREMITIES:  no cyanosis / clubbing / edema.   NEURO: L facial droop.  L sided weakness.  ____________________    VITALS:  Vital Signs Last 24 Hrs  T(C): 36.7 (2023 16:07), Max: 37 (2023 04:38)  T(F): 98 (2023 16:07), Max: 98.6 (2023 04:38)  HR: 102 (2023 16:07) (102 - 116)  BP: 156/87 (2023 16:07) (147/83 - 159/96)  BP(mean): 109 (2023 04:38) (105 - 112)  RR: 18 (2023 16:07) (18 - 20)  SpO2: 98% (2023 16:07) (96% - 100%)    Parameters below as of 2023 16:07  Patient On (Oxygen Delivery Method): room air     Daily     Daily Weight in k.6 (2023 04:38)  CAPILLARY BLOOD GLUCOSE      POCT Blood Glucose.: 211 mg/dL (2023 16:44)  POCT Blood Glucose.: 125 mg/dL (2023 11:38)  POCT Blood Glucose.: 113 mg/dL (2023 08:09)    I&O's Summary    2023 07:01  -  2023 07:00  --------------------------------------------------------  IN: 650 mL / OUT: 0 mL / NET: 650 mL        LABS:                        9.8    5.46  )-----------( 132      ( 2023 07:28 )             29.3         142  |  114<H>  |  25<H>  ----------------------------<  105<H>  3.6   |  20<L>  |  1.68<H>    Ca    7.8<L>      2023 07:28  Phos  3.1       Mg     1.8                         MEDICATIONS:  aspirin enteric coated 81 milliGRAM(s) Oral daily  atorvastatin 80 milliGRAM(s) Oral at bedtime  cilostazol 100 milliGRAM(s) Oral two times a day  dextrose 5%. 1000 milliLiter(s) IV Continuous <Continuous>  dextrose 5%. 1000 milliLiter(s) IV Continuous <Continuous>  dextrose 50% Injectable 25 Gram(s) IV Push once  dextrose 50% Injectable 12.5 Gram(s) IV Push once  dextrose 50% Injectable 25 Gram(s) IV Push once  dextrose 50% Injectable 12.5 Gram(s) IV Push once  dextrose Oral Gel 15 Gram(s) Oral once PRN  finasteride 5 milliGRAM(s) Oral daily  glucagon  Injectable 1 milliGRAM(s) IntraMuscular once  heparin   Injectable 5000 Unit(s) SubCutaneous every 12 hours  insulin lispro (ADMELOG) corrective regimen sliding scale   SubCutaneous three times a day before meals  latanoprost 0.005% Ophthalmic Solution 1 Drop(s) Both EYES at bedtime  levothyroxine 75 MICROGram(s) Oral daily  pantoprazole    Tablet 40 milliGRAM(s) Oral before breakfast  sodium chloride 0.9%. 1000 milliLiter(s) IV Continuous <Continuous>  tamsulosin 0.4 milliGRAM(s) Oral at bedtime

## 2023-01-10 NOTE — DIETITIAN NUTRITION RISK NOTIFICATION - TREATMENT: THE FOLLOWING DIET HAS BEEN RECOMMENDED
Diet, Pureed:   Consistent Carbohydrate {Evening Snack}  Mildly Thick Liquids (MILDTHICKLIQS)  Low Sodium (01-10-23 @ 11:43) [Pending Verification By Attending]  Diet, Minced and Moist:   Mildly Thick Liquids (MILDTHICKLIQS) (01-04-23 @ 16:19) [Active]       Diet, Minced and Moist   Consistent Carbohydrate {Evening Snack}  Mildly Thick Liquids (MILDTHICKLIQS)  Low Sodium (01-10-23 @ 11:43) [Pending Verification By Attending]  Diet, Minced and Moist:   Mildly Thick Liquids (MILDTHICKLIQS) (01-04-23 @ 16:19) [Active]

## 2023-01-10 NOTE — DIETITIAN INITIAL EVALUATION ADULT - PERTINENT MEDS FT
MEDICATIONS  (STANDING):  aspirin enteric coated 81 milliGRAM(s) Oral daily  atorvastatin 80 milliGRAM(s) Oral at bedtime  cilostazol 100 milliGRAM(s) Oral two times a day  dextrose 5%. 1000 milliLiter(s) (100 mL/Hr) IV Continuous <Continuous>  dextrose 5%. 1000 milliLiter(s) (50 mL/Hr) IV Continuous <Continuous>  dextrose 50% Injectable 25 Gram(s) IV Push once  dextrose 50% Injectable 12.5 Gram(s) IV Push once  dextrose 50% Injectable 25 Gram(s) IV Push once  dextrose 50% Injectable 12.5 Gram(s) IV Push once  finasteride 5 milliGRAM(s) Oral daily  glucagon  Injectable 1 milliGRAM(s) IntraMuscular once  heparin   Injectable 5000 Unit(s) SubCutaneous every 12 hours  insulin lispro (ADMELOG) corrective regimen sliding scale   SubCutaneous three times a day before meals  latanoprost 0.005% Ophthalmic Solution 1 Drop(s) Both EYES at bedtime  levothyroxine 75 MICROGram(s) Oral daily  pantoprazole    Tablet 40 milliGRAM(s) Oral before breakfast  tamsulosin 0.4 milliGRAM(s) Oral at bedtime    MEDICATIONS  (PRN):  dextrose Oral Gel 15 Gram(s) Oral once PRN Blood Glucose LESS THAN 70 milliGRAM(s)/deciliter

## 2023-01-10 NOTE — DIETITIAN INITIAL EVALUATION ADULT - FACTORS AFF FOOD INTAKE
Pt's breakfast meal c <50% intake when seen/change in mental status/difficulty feeding self/difficulty swallowing

## 2023-01-10 NOTE — DIETITIAN INITIAL EVALUATION ADULT - ORAL INTAKE PTA/DIET HISTORY
Pt is alert, oriented, was able to state his birthday correctly and answer some questions, but appeared forgetful.  This writer spoke c RN; pt is tolerating diet.  01/04/23, swallow evaluation c recommendation for minced, moist, mildly thick fluids.  Pt lived at home PTA, d/c plan for [facility] noted.

## 2023-01-10 NOTE — PROGRESS NOTE ADULT - SUBJECTIVE AND OBJECTIVE BOX
Patient seen and examined this am. No new events    MEDICATIONS:    aspirin enteric coated 81 milliGRAM(s) Oral daily  atorvastatin 80 milliGRAM(s) Oral at bedtime  cilostazol 100 milliGRAM(s) Oral two times a day  dextrose 5%. 1000 milliLiter(s) IV Continuous <Continuous>  dextrose 5%. 1000 milliLiter(s) IV Continuous <Continuous>  dextrose 50% Injectable 25 Gram(s) IV Push once  dextrose 50% Injectable 12.5 Gram(s) IV Push once  dextrose 50% Injectable 25 Gram(s) IV Push once  dextrose 50% Injectable 12.5 Gram(s) IV Push once  dextrose Oral Gel 15 Gram(s) Oral once PRN  finasteride 5 milliGRAM(s) Oral daily  glucagon  Injectable 1 milliGRAM(s) IntraMuscular once  heparin   Injectable 5000 Unit(s) SubCutaneous every 12 hours  insulin lispro (ADMELOG) corrective regimen sliding scale   SubCutaneous three times a day before meals  latanoprost 0.005% Ophthalmic Solution 1 Drop(s) Both EYES at bedtime  levothyroxine 75 MICROGram(s) Oral daily  pantoprazole    Tablet 40 milliGRAM(s) Oral before breakfast  tamsulosin 0.4 milliGRAM(s) Oral at bedtime      LABS:                          9.8    4.33  )-----------( 140      ( 10 Al 2023 06:49 )             29.4     01-10    140  |  111<H>  |  25<H>  ----------------------------<  185<H>  3.6   |  20<L>  |  1.61<H>    Ca    8.1<L>      10 Al 2023 06:49  Phos  3.1     01-09  Mg     1.8     01-09      CAPILLARY BLOOD GLUCOSE      POCT Blood Glucose.: 192 mg/dL (10 Al 2023 10:35)  POCT Blood Glucose.: 183 mg/dL (10 Al 2023 07:45)  POCT Blood Glucose.: 235 mg/dL (09 Jan 2023 21:25)  POCT Blood Glucose.: 211 mg/dL (09 Jan 2023 16:44)        I&O's Summary    10 Al 2023 07:01  -  10 Al 2023 12:56  --------------------------------------------------------  IN: 220 mL / OUT: 0 mL / NET: 220 mL      Vital Signs Last 24 Hrs  T(C): 36.3 (10 Al 2023 11:12), Max: 36.7 (09 Jan 2023 16:07)  T(F): 97.3 (10 Al 2023 11:12), Max: 98 (09 Jan 2023 16:07)  HR: 978 (10 Al 2023 11:12) (97 - 978)  BP: 123/71 (10 Al 2023 11:12) (123/71 - 157/93)  BP(mean): --  RR: 17 (10 Al 2023 11:12) (17 - 18)  SpO2: 97% (10 Al 2023 11:12) (96% - 98%)    Parameters below as of 10 Al 2023 11:12  Patient On (Oxygen Delivery Method): room air            MRI brain shows an 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 Appropriately interactive, normal affect. Speech fluent, No Aphasia or paraphasic errors. Naming /repetition intact   CN: BERTO, EOMI, VFF, facial sensation normal, +left central facial paresis with some right peripheral right facial +dysarthria, tongue midline, Palate moves equally, SCM equal bilaterally  Motor: 2/5 LUE, 5/5 in RUE and proximally b/l, 4+/5 left hip flexion, 5/5 distally in LEs   Sens: Impaired to LT/vibration sense in toes b/l  Reflexes: Symmetric. BJ 1+, BR 1+, KJ 1+, AJ 0, mute toes b/l  Coord:  No FNFA, dysmetria    NIHSS: 7

## 2023-01-10 NOTE — DIETITIAN INITIAL EVALUATION ADULT - ORAL NUTRITION SUPPLEMENTS
will add nutrient dense food ie., vanilla yogurt 1 x day, no sugar added magic cup 4 oz 2 x day= 520 calories, 18 grams protein,

## 2023-01-10 NOTE — DIETITIAN INITIAL EVALUATION ADULT - OTHER INFO
Per chart review, pt was on insulin glargine and Humalog sliding scale coverage PTA.  Per chart review, problem dx include MARILYN, pre-renal azotemia, CKD, acute cerebral infarct.

## 2023-01-10 NOTE — DIETITIAN INITIAL EVALUATION ADULT - REASON INDICATOR FOR ASSESSMENT
Length of stay, BMI<19 noted at present as per obtained height of 5'7" and adm wt. of 53.6 kg(01/04/23)

## 2023-01-10 NOTE — DIETITIAN INITIAL EVALUATION ADULT - PERTINENT LABORATORY DATA
01-10    140  |  111<H>  |  25<H>  ----------------------------<  185<H>  3.6   |  20<L>  |  1.61<H>    Ca    8.1<L>      10 Al 2023 06:49  Phos  3.1     01-09  Mg     1.8     01-09    POCT Blood Glucose.: 192 mg/dL (01-10-23 @ 10:35)  A1C with Estimated Average Glucose Result: 7.6 %<H> (01-03-23 @ 05:50)  A1C with Estimated Average Glucose Result: 11.0 %<H> (05-23-22 @ 08:34)

## 2023-01-10 NOTE — DIETITIAN INITIAL EVALUATION ADULT - OTHER CALCULATIONS
Height (cm): 170.2 (01-10)  Weight (kg): 53.6 (01-04)  BMI (kg/m2): 18.5 (01-10)  IBW: 67.1 kg    % IBW: 79%  01/09, 55.6 kg, wt. gain of 2 kg noted since adm.

## 2023-01-10 NOTE — DIETITIAN INITIAL EVALUATION ADULT - NS FNS WEIGHT CHANGE REASON
? accuracy of wt. hx; variations in reported usual wt. from 154->120 LBS noted as per previous adm RD notes.

## 2023-01-11 ENCOUNTER — TRANSCRIPTION ENCOUNTER (OUTPATIENT)
Age: 88
End: 2023-01-11

## 2023-01-11 LAB
ANION GAP SERPL CALC-SCNC: 8 MMOL/L — SIGNIFICANT CHANGE UP (ref 5–17)
BUN SERPL-MCNC: 19 MG/DL — SIGNIFICANT CHANGE UP (ref 7–23)
CALCIUM SERPL-MCNC: 8.3 MG/DL — LOW (ref 8.5–10.1)
CHLORIDE SERPL-SCNC: 109 MMOL/L — HIGH (ref 96–108)
CO2 SERPL-SCNC: 21 MMOL/L — LOW (ref 22–31)
CREAT SERPL-MCNC: 1.55 MG/DL — HIGH (ref 0.5–1.3)
EGFR: 42 ML/MIN/1.73M2 — LOW
GLUCOSE BLDC GLUCOMTR-MCNC: 193 MG/DL — HIGH (ref 70–99)
GLUCOSE BLDC GLUCOMTR-MCNC: 198 MG/DL — HIGH (ref 70–99)
GLUCOSE BLDC GLUCOMTR-MCNC: 201 MG/DL — HIGH (ref 70–99)
GLUCOSE BLDC GLUCOMTR-MCNC: 314 MG/DL — HIGH (ref 70–99)
GLUCOSE BLDC GLUCOMTR-MCNC: 94 MG/DL — SIGNIFICANT CHANGE UP (ref 70–99)
GLUCOSE SERPL-MCNC: 178 MG/DL — HIGH (ref 70–99)
POTASSIUM SERPL-MCNC: 3.4 MMOL/L — LOW (ref 3.5–5.3)
POTASSIUM SERPL-SCNC: 3.4 MMOL/L — LOW (ref 3.5–5.3)
SODIUM SERPL-SCNC: 138 MMOL/L — SIGNIFICANT CHANGE UP (ref 135–145)

## 2023-01-11 PROCEDURE — 99232 SBSQ HOSP IP/OBS MODERATE 35: CPT

## 2023-01-11 RX ORDER — METOPROLOL TARTRATE 50 MG
1 TABLET ORAL
Qty: 0 | Refills: 0 | DISCHARGE
Start: 2023-01-11

## 2023-01-11 RX ORDER — LOSARTAN POTASSIUM 100 MG/1
25 TABLET, FILM COATED ORAL DAILY
Refills: 0 | Status: DISCONTINUED | OUTPATIENT
Start: 2023-01-11 | End: 2023-01-14

## 2023-01-11 RX ORDER — LOSARTAN POTASSIUM 100 MG/1
1 TABLET, FILM COATED ORAL
Qty: 0 | Refills: 0 | DISCHARGE
Start: 2023-01-11

## 2023-01-11 RX ADMIN — TAMSULOSIN HYDROCHLORIDE 0.4 MILLIGRAM(S): 0.4 CAPSULE ORAL at 22:29

## 2023-01-11 RX ADMIN — Medication 25 MILLIGRAM(S): at 17:20

## 2023-01-11 RX ADMIN — CILOSTAZOL 100 MILLIGRAM(S): 100 TABLET ORAL at 05:36

## 2023-01-11 RX ADMIN — Medication 2: at 07:57

## 2023-01-11 RX ADMIN — PANTOPRAZOLE SODIUM 40 MILLIGRAM(S): 20 TABLET, DELAYED RELEASE ORAL at 05:36

## 2023-01-11 RX ADMIN — CILOSTAZOL 100 MILLIGRAM(S): 100 TABLET ORAL at 17:20

## 2023-01-11 RX ADMIN — LOSARTAN POTASSIUM 25 MILLIGRAM(S): 100 TABLET, FILM COATED ORAL at 18:29

## 2023-01-11 RX ADMIN — Medication 8: at 12:00

## 2023-01-11 RX ADMIN — Medication 81 MILLIGRAM(S): at 12:00

## 2023-01-11 RX ADMIN — LATANOPROST 1 DROP(S): 0.05 SOLUTION/ DROPS OPHTHALMIC; TOPICAL at 22:29

## 2023-01-11 RX ADMIN — ATORVASTATIN CALCIUM 80 MILLIGRAM(S): 80 TABLET, FILM COATED ORAL at 22:28

## 2023-01-11 RX ADMIN — HEPARIN SODIUM 5000 UNIT(S): 5000 INJECTION INTRAVENOUS; SUBCUTANEOUS at 17:19

## 2023-01-11 RX ADMIN — FINASTERIDE 5 MILLIGRAM(S): 5 TABLET, FILM COATED ORAL at 12:00

## 2023-01-11 RX ADMIN — Medication 75 MICROGRAM(S): at 05:36

## 2023-01-11 RX ADMIN — Medication 25 MILLIGRAM(S): at 05:36

## 2023-01-11 RX ADMIN — HEPARIN SODIUM 5000 UNIT(S): 5000 INJECTION INTRAVENOUS; SUBCUTANEOUS at 05:36

## 2023-01-11 NOTE — DISCHARGE NOTE PROVIDER - CARE PROVIDER_API CALL
Ronald Cee)  Neurology; Neurophysiology  [address], [address]  [place] [address]  Phone: [phone]  Fax: [phone]  Follow Up Time:     Eris Navarro)  Internal Medicine; Nephrology  [address], [address]  [place], [place] [id number]  Phone: [phone]  Fax: [phone]  Follow Up Time:    Ronald Cee)  Neurology; Neurophysiology  [address], [address]  [place] [address]  Phone: [phone]  Fax: [phone]  Follow Up Time:     Eris Navarro)  Internal Medicine; Nephrology  [address], [address]  [place], [place] [address]  Phone: [phone]  Fax: [phone]  Follow Up Time:     Ayaka Jeronimo)  Urology  [address] [place] [address]  Phone: [phone]  Fax: [phone]  Follow Up Time:

## 2023-01-11 NOTE — PROGRESS NOTE ADULT - ASSESSMENT
A: 89 yo man with acute ischemic stroke in right upper medulla / lower nick.    Recommend:  1. TTE reviewed  2. Telemetry monitoring  3. LDL 75 can lower statin to low dose   4. Aspirin 81mg daily  5. gradually optimize BP control  6. Optimize glucose control  7. PT/OT, speech therapy  8. Left ICA stenosis: carotid US, vascular surgery consult (as outpatient as it is unrelated to current presentation)  9. Stroke education  Can follow up with Neurology, Dr. Ronald Cee at [phone]

## 2023-01-11 NOTE — DISCHARGE NOTE PROVIDER - NSDCCPCAREPLAN_GEN_ALL_CORE_FT
PRINCIPAL DISCHARGE DIAGNOSIS  Diagnosis: CVA (cerebrovascular accident)  Assessment and Plan of Treatment: 89-year-old gentleman PMH of hypertension, diabetes, CAD with last stent 2009, prostate cancer status post TURP who presents to the ED because of concern for a CVA.  Patient went to bed about 9 PM 1 day PTA, which was a last known normal.  He woke up and was noted to have garbled speech, left facial droop, and left pronator drift.  MRI Brain:  Acute infarct in the right medulla and nick, evolution of L sided weakness.  Mild to moderate R MCA / PCA stenosis noted on CTA.  L A1 stenosis / aneurysm also noted.  Continue ASA, Statin.    CTA COW:  Moderate stenosis proximal right inferior M2 branch of the right MCA. Moderate focal stenosis proximal P2 branch of right PCA. Left A1 segment may be severely stenotic versus aneurysm adjacent to the anterior communicating artery and left A2 segment origin. Narrow caliber at the intracranial origin of the left vertebral artery.  CTA NECK: Calcified plaque with severe left-sided stenosis at  ICA origin by NASCET criteria.  MRI Brain:  Acute infarct in the right medulla and nick.  # Acute R medulla and nick CVA - MRI Brain:  Acute infarct in the right medulla and nick, evolution of L sided weakness.  Mild to moderate R MCA / PCA stenosis noted on CTA.  L A1 stenosis / aneurysm also noted.  Continue ASA, Statin.  Neurology followup.  PT / OT.  TTE showed EF 60-65%.   # L ICA stenosis - Noted on CTA neck.  Outpatient vascular followup.    # MARILYN, suspect contrast induced nephropathy - Cr jonna to 2.9, now 2.2 -> 1.8.  IVF.  Monitor BMP.  Renal following.    # Diabetes Type II - monitor fingersticks.  Insulin coverage for hyperglycemia. FS was low.  Lantus stopped.   - 215    # Hypothyroidism - TSH elevated.  Synthroid increased from 50mcg -> 75 mcg.  # Prostate Ca - supportive care.  # CAD - continue ASA, Statin.   # Glaucoma - vision baseline.  Continue eyedrops.       PRINCIPAL DISCHARGE DIAGNOSIS  Diagnosis: CVA (cerebrovascular accident)  Assessment and Plan of Treatment: 89-year-old gentleman PMH of hypertension, diabetes, CAD with last stent 2009, prostate cancer status post TURP who presents to the ED because of concern for a CVA.    # Acute R medulla and nick CVA - MRI Brain:  Acute infarct in the right medulla and nick, evolution of L sided weakness.  Mild to moderate R MCA / PCA stenosis noted on CTA.  L A1 stenosis / aneurysm also noted.  Continue ASA, Statin.  Neurology followup.  PT / OT.  TTE showed EF 60-65%.   #hematuria- presented on dc to [facility]. has known prostate ca. cbc remains stable but  renal sono shows some bladder debris.  urology consulted and now has clear urine. f/u urine cx- can be done as rehab. ,. will need op cysto. check cytology    # L ICA stenosis - Noted on CTA neck.  Outpatient vascular followup.    # MARILYN, suspect contrast induced nephropathy - Cr jonna to 2.9, now 2.2 -> 1.8.  IVF.  Monitor BMP.  Renal following.    # Diabetes Type II - monitor fingersticks.  Insulin coverage for hyperglycemia. FS was low.  Lantus stopped.   - 215    # Hypothyroidism - TSH elevated.  Synthroid increased from 50mcg -> 75 mcg.  # Prostate Ca - supportive care.  # CAD - continue ASA, Statin.   # Glaucoma - vision baseline.  Continue eyedrops.  # Inpatient DVT prophylaxis - subcutaneous Heparin    f/u urine cx   Plan of care d/w daughter Shavonne [phone]

## 2023-01-11 NOTE — DISCHARGE NOTE PROVIDER - CARE PROVIDERS DIRECT ADDRESSES
,[email or web address],[email or web address]

## 2023-01-11 NOTE — PROGRESS NOTE ADULT - SUBJECTIVE AND OBJECTIVE BOX
Patient seen and examined this am. No new events    MEDICATIONS:    aspirin enteric coated 81 milliGRAM(s) Oral daily  atorvastatin 80 milliGRAM(s) Oral at bedtime  cilostazol 100 milliGRAM(s) Oral two times a day  dextrose 5%. 1000 milliLiter(s) IV Continuous <Continuous>  dextrose 5%. 1000 milliLiter(s) IV Continuous <Continuous>  dextrose 50% Injectable 25 Gram(s) IV Push once  dextrose 50% Injectable 12.5 Gram(s) IV Push once  dextrose 50% Injectable 25 Gram(s) IV Push once  dextrose 50% Injectable 12.5 Gram(s) IV Push once  dextrose Oral Gel 15 Gram(s) Oral once PRN  finasteride 5 milliGRAM(s) Oral daily  glucagon  Injectable 1 milliGRAM(s) IntraMuscular once  heparin   Injectable 5000 Unit(s) SubCutaneous every 12 hours  insulin lispro (ADMELOG) corrective regimen sliding scale   SubCutaneous three times a day before meals  latanoprost 0.005% Ophthalmic Solution 1 Drop(s) Both EYES at bedtime  levothyroxine 75 MICROGram(s) Oral daily  metoprolol tartrate 25 milliGRAM(s) Oral two times a day  pantoprazole    Tablet 40 milliGRAM(s) Oral before breakfast  tamsulosin 0.4 milliGRAM(s) Oral at bedtime      LABS:                          9.8    4.33  )-----------( 140      ( 10 Al 2023 06:49 )             29.4     01-11    138  |  109<H>  |  19  ----------------------------<  178<H>  3.4<L>   |  21<L>  |  1.55<H>    Ca    8.3<L>      11 Jan 2023 08:00      CAPILLARY BLOOD GLUCOSE      POCT Blood Glucose.: 198 mg/dL (11 Jan 2023 07:41)  POCT Blood Glucose.: 201 mg/dL (11 Jan 2023 00:23)  POCT Blood Glucose.: 186 mg/dL (10 Al 2023 16:55)  POCT Blood Glucose.: 192 mg/dL (10 Al 2023 10:35)        I&O's Summary    10 Al 2023 07:01  -  11 Jan 2023 07:00  --------------------------------------------------------  IN: 340 mL / OUT: 401 mL / NET: -61 mL      Vital Signs Last 24 Hrs  T(C): 36.7 (11 Jan 2023 04:44), Max: 36.8 (10 Al 2023 16:52)  T(F): 98.1 (11 Jan 2023 04:44), Max: 98.2 (10 Al 2023 16:52)  HR: 90 (11 Jan 2023 04:44) (83 - 978)  BP: 156/81 (11 Jan 2023 04:44) (123/71 - 183/90)  BP(mean): --  RR: 18 (11 Jan 2023 04:44) (17 - 18)  SpO2: 97% (11 Jan 2023 04:44) (96% - 97%)    Parameters below as of 11 Jan 2023 04:44  Patient On (Oxygen Delivery Method): room air            MRI brain shows an 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 Appropriately interactive, normal affect. Speech fluent, No Aphasia or paraphasic errors. Naming /repetition intact   CN: BERTO, EOMI, VFF, facial sensation normal, +left central facial paresis with some right peripheral right facial +dysarthria, tongue midline, Palate moves equally, SCM equal bilaterally  Motor: 2/5 LUE, 5/5 in RUE and proximally b/l, 4+/5 left hip flexion, 5/5 distally in LEs   Sens: Impaired to LT/vibration sense in toes b/l  Reflexes: Symmetric. BJ 1+, BR 1+, KJ 1+, AJ 0, mute toes b/l  Coord:  No FNFA, dysmetria    NIHSS: 7      < from: TTE Echo Complete w/o Contrast w/ Doppler (01.09.23 @ 19:09) >  Summary:   1. Left ventricular ejection fraction, by visual estimation, is 60 to   65%.   2. Normal global left ventricular systolic function.   3. Poor endocardial border defintion, especially in parasternal views.   Not all segments were visualized. recommend contrast study, if clinically   wararnted, for better evaluation.   4. Mild mitral valve regurgitation.   5. Mild aortic regurgitation.   6. Sclerotic aortic valve with normal opening.   7. Although no evidence of thrombus was seen, suboptimal visualization   precludes ruling out embolic source in this study.      [id number] Dajuan Osman MD, [facility]  Electronically signed on 1/10/2023 at 3:24:09 PM      < end of copied text >

## 2023-01-11 NOTE — DISCHARGE NOTE PROVIDER - DETAILS OF MALNUTRITION DIAGNOSIS/DIAGNOSES
This patient has been assessed with a concern for Malnutrition and was treated during this hospitalization for the following Nutrition diagnosis/diagnoses:     -  01/10/2023: Severe protein-calorie malnutrition   -  01/10/2023: Underweight (BMI < 19)

## 2023-01-11 NOTE — DISCHARGE NOTE PROVIDER - HOSPITAL COURSE
89-year-old gentleman PMH of hypertension, diabetes, CAD with last stent 2009, prostate cancer status post TURP who presents to the ED because of concern for a CVA.  Patient went to bed about 9 PM 1 day PTA, which was a last known normal.  He woke up and was noted to have garbled speech, left facial droop, and left pronator drift.  MRI Brain:  Acute infarct in the right medulla and nick, evolution of L sided weakness.  Mild to moderate R MCA / PCA stenosis noted on CTA.  L A1 stenosis / aneurysm also noted.  Continue ASA, Statin.      CTA COW:  Moderate stenosis proximal right inferior M2 branch of the right MCA. Moderate focal stenosis proximal P2 branch of right PCA. Left A1 segment may be severely stenotic versus aneurysm adjacent to the anterior communicating artery and left A2 segment origin. Narrow caliber at the intracranial origin of the left vertebral artery.  CTA NECK: Calcified plaque with severe left-sided stenosis at  ICA origin by NASCET criteria.  MRI Brain:  Acute infarct in the right medulla and nick.    # Acute R medulla and nick CVA - MRI Brain:  Acute infarct in the right medulla and nick, evolution of L sided weakness.  Mild to moderate R MCA / PCA stenosis noted on CTA.  L A1 stenosis / aneurysm also noted.  Continue ASA, Statin.  Neurology followup.  PT / OT.  TTE showed EF 60-65%.   # L ICA stenosis - Noted on CTA neck.  Outpatient vascular followup.    # MARILYN, suspect contrast induced nephropathy - Cr jonna to 2.9, now 2.2 -> 1.8.  Monitor BMP.  Renal following.    # Diabetes Type II - monitor fingersticks.  Insulin coverage for hyperglycemia. FS was low.  Lantus stopped.   - 215    # Hypothyroidism - TSH elevated.  Synthroid increased from 50mcg -> 75 mcg. repeat TSH in 6 weeks   # Prostate Ca - supportive care.  # CAD - continue ASA, Statin.   # Glaucoma - vision baseline.  Continue eyedrops. 89-year-old gentleman PMH of hypertension, diabetes, CAD with last stent 2009, prostate cancer status post TURP who presents to the ED because of concern for a CVA.  Patient went to bed about 9 PM 1 day PTA, which was a last known normal.  He woke up and was noted to have garbled speech, left facial droop, and left pronator drift.  MRI Brain:  Acute infarct in the right medulla and nick, evolution of L sided weakness.  Mild to moderate R MCA / PCA stenosis noted on CTA.  L A1 stenosis / aneurysm also noted.  Continue ASA, Statin.      CTA COW:  Moderate stenosis proximal right inferior M2 branch of the right MCA. Moderate focal stenosis proximal P2 branch of right PCA. Left A1 segment may be severely stenotic versus aneurysm adjacent to the anterior communicating artery and left A2 segment origin. Narrow caliber at the intracranial origin of the left vertebral artery.  CTA NECK: Calcified plaque with severe left-sided stenosis at  ICA origin by NASCET criteria.  MRI Brain:  Acute infarct in the right medulla and nick.    # Acute R medulla and nick CVA - MRI Brain:  Acute infarct in the right medulla and nick, evolution of L sided weakness.  Mild to moderate R MCA / PCA stenosis noted on CTA.  L A1 stenosis / aneurysm also noted.  Continue ASA, Statin.  Neurology followup.  PT / OT.  TTE showed EF 60-65%.   #hematuria- presented on dc to [facility]. has known prostate ca. cbc remains stable but  renal sono shows some bladder debris.  urology consulted and now has clear urine. f/u urine cx- can be done as rehab. ,. will need op cysto. check cytology    # L ICA stenosis - Noted on CTA neck.  Outpatient vascular followup.    # MARILYN, suspect contrast induced nephropathy - Cr jonna to 2.9, now 2.2 -> 1.8.  IVF.  Monitor BMP.  Renal following.    # Diabetes Type II - monitor fingersticks.  Insulin coverage for hyperglycemia. FS was low.  Lantus stopped.   - 215    # Hypothyroidism - TSH elevated.  Synthroid increased from 50mcg -> 75 mcg.  # Prostate Ca - supportive care.  # CAD - continue ASA, Statin.   # Glaucoma - vision baseline.  Continue eyedrops.  # Inpatient DVT prophylaxis - subcutaneous Heparin    f/u urine cx   Plan of care d/w daughter Shavonne [phone]

## 2023-01-11 NOTE — DISCHARGE NOTE PROVIDER - PROVIDER TOKENS
PROVIDER:[TOKEN:[[id number]]],PROVIDER:[TOKEN:[[id number]]] PROVIDER:[TOKEN:[[id number]]],PROVIDER:[TOKEN:[[id number]]],PROVIDER:[TOKEN:[[id number]]]

## 2023-01-11 NOTE — DISCHARGE NOTE PROVIDER - NSDCMRMEDTOKEN_GEN_ALL_CORE_FT
aspirin 81 mg oral delayed release tablet: 1 tab(s) orally once a day  atorvastatin 80 mg oral tablet: 1 tab(s) orally once a day (at bedtime)  cilostazol 100 mg oral tablet: 1 tab(s) orally 2 times a day  finasteride 5 mg oral tablet: 1 tab(s) orally once a day  insulin lispro 100 units/mL injectable solution: 1 dose(s) injectable 3 times a day before meals and at bedtime    151-200 give 2 units subcut  201-250 give 4 units subcut  251-300 give 6 units subcut  301-350 give 8 units subcut  351-400 give 10 units subcut  &gt; 400 give 12 units subcut.   latanoprost 0.005% ophthalmic solution:   levothyroxine 75 mcg (0.075 mg) oral tablet: 1 tab(s) orally once a day  metoprolol tartrate 25 mg oral tablet: 1 tab(s) orally 2 times a day  omeprazole 20 mg oral delayed release capsule: 1 cap(s) orally once a day  tamsulosin 0.4 mg oral capsule: 1 cap(s) orally once a day (at bedtime)   aspirin 81 mg oral delayed release tablet: 1 tab(s) orally once a day  atorvastatin 80 mg oral tablet: 1 tab(s) orally once a day (at bedtime)  cilostazol 100 mg oral tablet: 1 tab(s) orally 2 times a day  finasteride 5 mg oral tablet: 1 tab(s) orally once a day  insulin lispro 100 units/mL injectable solution: 1 dose(s) injectable 3 times a day before meals and at bedtime    151-200 give 2 units subcut  201-250 give 4 units subcut  251-300 give 6 units subcut  301-350 give 8 units subcut  351-400 give 10 units subcut  &gt; 400 give 12 units subcut.   latanoprost 0.005% ophthalmic solution:   levothyroxine 75 mcg (0.075 mg) oral tablet: 1 tab(s) orally once a day  losartan 25 mg oral tablet: 1 tab(s) orally once a day  metoprolol tartrate 25 mg oral tablet: 1 tab(s) orally 2 times a day  omeprazole 20 mg oral delayed release capsule: 1 cap(s) orally once a day  tamsulosin 0.4 mg oral capsule: 1 cap(s) orally once a day (at bedtime)   aspirin 81 mg oral delayed release tablet: 1 tab(s) orally once a day  atorvastatin 80 mg oral tablet: 1 tab(s) orally once a day (at bedtime)  cilostazol 100 mg oral tablet: 1 tab(s) orally 2 times a day  finasteride 5 mg oral tablet: 1 tab(s) orally once a day  insulin lispro 100 units/mL injectable solution: 1 dose(s) injectable 3 times a day before meals and at bedtime    151-200 give 2 units subcut  201-250 give 4 units subcut  251-300 give 6 units subcut  301-350 give 8 units subcut  351-400 give 10 units subcut  &gt; 400 give 12 units subcut.   latanoprost 0.005% ophthalmic solution:   levothyroxine 75 mcg (0.075 mg) oral tablet: 1 tab(s) orally once a day  losartan 50 mg oral tablet: 1 tab(s) orally once a day  metoprolol tartrate 25 mg oral tablet: 1 tab(s) orally 2 times a day  omeprazole 20 mg oral delayed release capsule: 1 cap(s) orally once a day  tamsulosin 0.4 mg oral capsule: 1 cap(s) orally once a day (at bedtime)

## 2023-01-12 ENCOUNTER — TRANSCRIPTION ENCOUNTER (OUTPATIENT)
Age: 88
End: 2023-01-12

## 2023-01-12 LAB
ANION GAP SERPL CALC-SCNC: 10 MMOL/L — SIGNIFICANT CHANGE UP (ref 5–17)
BUN SERPL-MCNC: 17 MG/DL — SIGNIFICANT CHANGE UP (ref 7–23)
CALCIUM SERPL-MCNC: 8.3 MG/DL — LOW (ref 8.5–10.1)
CHLORIDE SERPL-SCNC: 104 MMOL/L — SIGNIFICANT CHANGE UP (ref 96–108)
CO2 SERPL-SCNC: 24 MMOL/L — SIGNIFICANT CHANGE UP (ref 22–31)
CREAT SERPL-MCNC: 1.59 MG/DL — HIGH (ref 0.5–1.3)
EGFR: 41 ML/MIN/1.73M2 — LOW
GLUCOSE BLDC GLUCOMTR-MCNC: 142 MG/DL — HIGH (ref 70–99)
GLUCOSE BLDC GLUCOMTR-MCNC: 184 MG/DL — HIGH (ref 70–99)
GLUCOSE BLDC GLUCOMTR-MCNC: 278 MG/DL — HIGH (ref 70–99)
GLUCOSE SERPL-MCNC: 199 MG/DL — HIGH (ref 70–99)
HCT VFR BLD CALC: 32.3 % — LOW (ref 39–50)
HGB BLD-MCNC: 10.9 G/DL — LOW (ref 13–17)
MCHC RBC-ENTMCNC: 31.1 PG — SIGNIFICANT CHANGE UP (ref 27–34)
MCHC RBC-ENTMCNC: 33.7 G/DL — SIGNIFICANT CHANGE UP (ref 32–36)
MCV RBC AUTO: 92.3 FL — SIGNIFICANT CHANGE UP (ref 80–100)
NRBC # BLD: 0 /100 WBCS — SIGNIFICANT CHANGE UP (ref 0–0)
PLATELET # BLD AUTO: 173 K/UL — SIGNIFICANT CHANGE UP (ref 150–400)
POTASSIUM SERPL-MCNC: 3.6 MMOL/L — SIGNIFICANT CHANGE UP (ref 3.5–5.3)
POTASSIUM SERPL-SCNC: 3.6 MMOL/L — SIGNIFICANT CHANGE UP (ref 3.5–5.3)
RBC # BLD: 3.5 M/UL — LOW (ref 4.2–5.8)
RBC # FLD: 13 % — SIGNIFICANT CHANGE UP (ref 10.3–14.5)
SODIUM SERPL-SCNC: 138 MMOL/L — SIGNIFICANT CHANGE UP (ref 135–145)
WBC # BLD: 4.25 K/UL — SIGNIFICANT CHANGE UP (ref 3.8–10.5)
WBC # FLD AUTO: 4.25 K/UL — SIGNIFICANT CHANGE UP (ref 3.8–10.5)

## 2023-01-12 PROCEDURE — 76770 US EXAM ABDO BACK WALL COMP: CPT | Mod: 26

## 2023-01-12 PROCEDURE — 99232 SBSQ HOSP IP/OBS MODERATE 35: CPT

## 2023-01-12 RX ADMIN — TAMSULOSIN HYDROCHLORIDE 0.4 MILLIGRAM(S): 0.4 CAPSULE ORAL at 21:31

## 2023-01-12 RX ADMIN — CILOSTAZOL 100 MILLIGRAM(S): 100 TABLET ORAL at 05:20

## 2023-01-12 RX ADMIN — HEPARIN SODIUM 5000 UNIT(S): 5000 INJECTION INTRAVENOUS; SUBCUTANEOUS at 17:07

## 2023-01-12 RX ADMIN — PANTOPRAZOLE SODIUM 40 MILLIGRAM(S): 20 TABLET, DELAYED RELEASE ORAL at 05:20

## 2023-01-12 RX ADMIN — LOSARTAN POTASSIUM 25 MILLIGRAM(S): 100 TABLET, FILM COATED ORAL at 05:20

## 2023-01-12 RX ADMIN — HEPARIN SODIUM 5000 UNIT(S): 5000 INJECTION INTRAVENOUS; SUBCUTANEOUS at 05:20

## 2023-01-12 RX ADMIN — Medication 25 MILLIGRAM(S): at 05:20

## 2023-01-12 RX ADMIN — Medication 2: at 17:06

## 2023-01-12 RX ADMIN — Medication 75 MICROGRAM(S): at 05:20

## 2023-01-12 RX ADMIN — CILOSTAZOL 100 MILLIGRAM(S): 100 TABLET ORAL at 17:08

## 2023-01-12 RX ADMIN — ATORVASTATIN CALCIUM 80 MILLIGRAM(S): 80 TABLET, FILM COATED ORAL at 21:30

## 2023-01-12 RX ADMIN — Medication 6: at 11:55

## 2023-01-12 RX ADMIN — LATANOPROST 1 DROP(S): 0.05 SOLUTION/ DROPS OPHTHALMIC; TOPICAL at 21:30

## 2023-01-12 RX ADMIN — Medication 25 MILLIGRAM(S): at 17:11

## 2023-01-12 RX ADMIN — FINASTERIDE 5 MILLIGRAM(S): 5 TABLET, FILM COATED ORAL at 11:17

## 2023-01-12 RX ADMIN — Medication 81 MILLIGRAM(S): at 11:16

## 2023-01-12 NOTE — PROGRESS NOTE ADULT - SUBJECTIVE AND OBJECTIVE BOX
Patient seen and examined this am. No new events    MEDICATIONS:    aspirin enteric coated 81 milliGRAM(s) Oral daily  atorvastatin 80 milliGRAM(s) Oral at bedtime  cilostazol 100 milliGRAM(s) Oral two times a day  dextrose 5%. 1000 milliLiter(s) IV Continuous <Continuous>  dextrose 5%. 1000 milliLiter(s) IV Continuous <Continuous>  dextrose 50% Injectable 25 Gram(s) IV Push once  dextrose 50% Injectable 12.5 Gram(s) IV Push once  dextrose 50% Injectable 25 Gram(s) IV Push once  dextrose 50% Injectable 12.5 Gram(s) IV Push once  dextrose Oral Gel 15 Gram(s) Oral once PRN  finasteride 5 milliGRAM(s) Oral daily  glucagon  Injectable 1 milliGRAM(s) IntraMuscular once  heparin   Injectable 5000 Unit(s) SubCutaneous every 12 hours  insulin lispro (ADMELOG) corrective regimen sliding scale   SubCutaneous three times a day before meals  latanoprost 0.005% Ophthalmic Solution 1 Drop(s) Both EYES at bedtime  levothyroxine 75 MICROGram(s) Oral daily  losartan 25 milliGRAM(s) Oral daily  metoprolol tartrate 25 milliGRAM(s) Oral two times a day  pantoprazole    Tablet 40 milliGRAM(s) Oral before breakfast  tamsulosin 0.4 milliGRAM(s) Oral at bedtime      LABS:      01-11    138  |  109<H>  |  19  ----------------------------<  178<H>  3.4<L>   |  21<L>  |  1.55<H>    Ca    8.3<L>      11 Jan 2023 08:00      CAPILLARY BLOOD GLUCOSE      POCT Blood Glucose.: 142 mg/dL (12 Jan 2023 07:42)  POCT Blood Glucose.: 193 mg/dL (11 Jan 2023 22:27)  POCT Blood Glucose.: 94 mg/dL (11 Jan 2023 16:46)  POCT Blood Glucose.: 314 mg/dL (11 Jan 2023 11:50)        I&O's Summary    11 Jan 2023 07:01  -  12 Jan 2023 07:00  --------------------------------------------------------  IN: 380 mL / OUT: 0 mL / NET: 380 mL      Vital Signs Last 24 Hrs  T(C): 36.4 (12 Jan 2023 10:47), Max: 36.6 (11 Jan 2023 18:06)  T(F): 97.6 (12 Jan 2023 10:47), Max: 97.9 (11 Jan 2023 23:43)  HR: 81 (12 Jan 2023 10:47) (79 - 90)  BP: 160/83 (12 Jan 2023 10:47) (142/75 - 193/97)  BP(mean): 128 (11 Jan 2023 17:20) (128 - 128)  RR: 18 (12 Jan 2023 10:47) (16 - 18)  SpO2: 97% (12 Jan 2023 10:47) (97% - 100%)    Parameters below as of 12 Jan 2023 10:47  Patient On (Oxygen Delivery Method): room air            MRI brain shows an 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 Appropriately interactive, normal affect. Speech fluent, No Aphasia or paraphasic errors. Naming /repetition intact   CN: BERTO, EOMI, VFF, facial sensation normal, +left central facial paresis with some right peripheral right facial +dysarthria, tongue midline, Palate moves equally, SCM equal bilaterally  Motor: 2/5 LUE, 5/5 in RUE and proximally b/l, 4+/5 left hip flexion, 5/5 distally in LEs   Sens: Impaired to LT/vibration sense in toes b/l  Reflexes: Symmetric. BJ 1+, BR 1+, KJ 1+, AJ 0, mute toes b/l  Coord:  No FNFA, dysmetria    NIHSS: 7      < from: TTE Echo Complete w/o Contrast w/ Doppler (01.09.23 @ 19:09) >  Summary:   1. Left ventricular ejection fraction, by visual estimation, is 60 to   65%.   2. Normal global left ventricular systolic function.   3. Poor endocardial border defintion, especially in parasternal views.   Not all segments were visualized. recommend contrast study, if clinically   wararnted, for better evaluation.   4. Mild mitral valve regurgitation.   5. Mild aortic regurgitation.   6. Sclerotic aortic valve with normal opening.   7. Although no evidence of thrombus was seen, suboptimal visualization   precludes ruling out embolic source in this study.      [id number] Dajuan Osman MD, [facility]  Electronically signed on 1/10/2023 at 3:24:09 PM      < end of copied text >

## 2023-01-12 NOTE — DISCHARGE NOTE NURSING/CASE MANAGEMENT/SOCIAL WORK - PATIENT PORTAL LINK FT
You can access the FollowMyHealth Patient Portal offered by [facility] by registering at the following website: [email or web address]. By joining [employer]’s FollowMyHealth portal, you will also be able to view your health information using other applications (apps) compatible with our system.

## 2023-01-12 NOTE — PROGRESS NOTE ADULT - ASSESSMENT
A: 89 yo man with acute ischemic stroke in right upper medulla / lower nick.    Recommend:  1. TTE reviewed  2. Telemetry monitoring  3. LDL 75 can lower statin to low dose   4. Aspirin 81mg daily  5. gradually optimize BP control  6. Optimize glucose control  7. PT/OT, speech therapy  8. Left ICA stenosis: carotid US, vascular surgery consult (as outpatient as it is unrelated to current presentation)  9. Stroke education  Can follow up with Neurology, Dr. Ronald Cee at [phone]

## 2023-01-12 NOTE — PROGRESS NOTE ADULT - ASSESSMENT
89-year-old gentleman PMH of hypertension, diabetes, CAD with last stent 2009, prostate cancer status post TURP who presents to the ED because of concern for a CVA.  Patient went to bed about 9 PM 1 day PTA, which was a last known normal.  He woke up and was noted to have garbled speech, left facial droop, and left pronator drift.  MRI Brain:  Acute infarct in the right medulla and nick, evolution of L sided weakness.  Mild to moderate R MCA / PCA stenosis noted on CTA.  L A1 stenosis / aneurysm also noted.  Continue ASA, Statin.      CTA COW:  Moderate stenosis proximal right inferior M2 branch of the right MCA. Moderate focal stenosis proximal P2 branch of right PCA. Left A1 segment may be severely stenotic versus aneurysm adjacent to the anterior communicating artery and left A2 segment origin. Narrow caliber at the intracranial origin of the left vertebral artery.  CTA NECK: Calcified plaque with severe left-sided stenosis at  ICA origin by NASCET criteria.  MRI Brain:  Acute infarct in the right medulla and nick.    # Acute R medulla and nick CVA - MRI Brain:  Acute infarct in the right medulla and nick, evolution of L sided weakness.  Mild to moderate R MCA / PCA stenosis noted on CTA.  L A1 stenosis / aneurysm also noted.  Continue ASA, Statin.  Neurology followup.  PT / OT.  TTE showed EF 60-65%.   #hematuria- presented on dc to [facility]. has known prostate ca. will check renal sono and cbc.   # L ICA stenosis - Noted on CTA neck.  Outpatient vascular followup.    # MARILYN, suspect contrast induced nephropathy - Cr jonna to 2.9, now 2.2 -> 1.8.  IVF.  Monitor BMP.  Renal following.    # Diabetes Type II - monitor fingersticks.  Insulin coverage for hyperglycemia. FS was low.  Lantus stopped.   - 215    # Hypothyroidism - TSH elevated.  Synthroid increased from 50mcg -> 75 mcg.  # Prostate Ca - supportive care.  # CAD - continue ASA, Statin.   # Glaucoma - vision baseline.  Continue eyedrops.  # Inpatient DVT prophylaxis - subcutaneous Heparin     Plan of care d/w daughter Shavonne [phone]

## 2023-01-12 NOTE — PROGRESS NOTE ADULT - SUBJECTIVE AND OBJECTIVE BOX
Patient: MARIANA WRIGHT [id number] 89y Male                            Hospitalist Attending Note    Denies complaints. had some mild hematuria prior to discharge to rehab today     ____________________PHYSICAL EXAM:  GENERAL:  NAD Alert, cooperative.  Slurred speech   HEENT: NCAT  CARDIOVASCULAR:  S1, S2  LUNGS: CTAB  ABDOMEN:  soft, (-) tenderness, (-) distension, (+) bowel sounds, (-) guarding, (-) rebound (-) rigidity  EXTREMITIES:  no cyanosis / clubbing / edema.   NEURO: L facial droop.  L sided weakness.  ____________________    Vital Signs Last 24 Hrs  T(C): 36.4 (2023 10:47), Max: 36.6 (2023 18:06)  T(F): 97.6 (2023 10:47), Max: 97.9 (2023 23:43)  HR: 81 (2023 10:47) (79 - 90)  BP: 160/83 (2023 10:47) (142/75 - 193/97)  BP(mean): 128 (2023 17:20) (128 - 128)  RR: 18 (2023 10:47) (16 - 18)  SpO2: 97% (2023 10:47) (97% - 100%)    Parameters below as of 2023 10:47  Patient On (Oxygen Delivery Method): room air       Daily     Daily Weight in k.6 (2023 04:38)  CAPILLARY BLOOD GLUCOSE      POCT Blood Glucose.: 211 mg/dL (2023 16:44)  POCT Blood Glucose.: 125 mg/dL (2023 11:38)  POCT Blood Glucose.: 113 mg/dL (2023 08:09)    I&O's Summary    2023 07:01  -  2023 07:00  --------------------------------------------------------  IN: 650 mL / OUT: 0 mL / NET: 650 mL        LABS:                        9.8    5.46  )-----------( 132      ( 2023 07:28 )             29.3         142  |  114<H>  |  25<H>  ----------------------------<  105<H>  3.6   |  20<L>  |  1.68<H>    Ca    7.8<L>      2023 07:28  Phos  3.1       Mg     1.8                         MEDICATIONS:  aspirin enteric coated 81 milliGRAM(s) Oral daily  atorvastatin 80 milliGRAM(s) Oral at bedtime  cilostazol 100 milliGRAM(s) Oral two times a day  dextrose 5%. 1000 milliLiter(s) IV Continuous <Continuous>  dextrose 5%. 1000 milliLiter(s) IV Continuous <Continuous>  dextrose 50% Injectable 25 Gram(s) IV Push once  dextrose 50% Injectable 12.5 Gram(s) IV Push once  dextrose 50% Injectable 25 Gram(s) IV Push once  dextrose 50% Injectable 12.5 Gram(s) IV Push once  dextrose Oral Gel 15 Gram(s) Oral once PRN  finasteride 5 milliGRAM(s) Oral daily  glucagon  Injectable 1 milliGRAM(s) IntraMuscular once  heparin   Injectable 5000 Unit(s) SubCutaneous every 12 hours  insulin lispro (ADMELOG) corrective regimen sliding scale   SubCutaneous three times a day before meals  latanoprost 0.005% Ophthalmic Solution 1 Drop(s) Both EYES at bedtime  levothyroxine 75 MICROGram(s) Oral daily  pantoprazole    Tablet 40 milliGRAM(s) Oral before breakfast  sodium chloride 0.9%. 1000 milliLiter(s) IV Continuous <Continuous>  tamsulosin 0.4 milliGRAM(s) Oral at bedtime

## 2023-01-13 LAB
ANION GAP SERPL CALC-SCNC: 10 MMOL/L — SIGNIFICANT CHANGE UP (ref 5–17)
BUN SERPL-MCNC: 17 MG/DL — SIGNIFICANT CHANGE UP (ref 7–23)
CALCIUM SERPL-MCNC: 7.9 MG/DL — LOW (ref 8.5–10.1)
CHLORIDE SERPL-SCNC: 107 MMOL/L — SIGNIFICANT CHANGE UP (ref 96–108)
CO2 SERPL-SCNC: 22 MMOL/L — SIGNIFICANT CHANGE UP (ref 22–31)
CREAT SERPL-MCNC: 1.55 MG/DL — HIGH (ref 0.5–1.3)
EGFR: 42 ML/MIN/1.73M2 — LOW
GLUCOSE BLDC GLUCOMTR-MCNC: 150 MG/DL — HIGH (ref 70–99)
GLUCOSE BLDC GLUCOMTR-MCNC: 167 MG/DL — HIGH (ref 70–99)
GLUCOSE BLDC GLUCOMTR-MCNC: 167 MG/DL — HIGH (ref 70–99)
GLUCOSE BLDC GLUCOMTR-MCNC: 243 MG/DL — HIGH (ref 70–99)
GLUCOSE SERPL-MCNC: 150 MG/DL — HIGH (ref 70–99)
HCT VFR BLD CALC: 29.3 % — LOW (ref 39–50)
HGB BLD-MCNC: 9.8 G/DL — LOW (ref 13–17)
MCHC RBC-ENTMCNC: 30.8 PG — SIGNIFICANT CHANGE UP (ref 27–34)
MCHC RBC-ENTMCNC: 33.4 G/DL — SIGNIFICANT CHANGE UP (ref 32–36)
MCV RBC AUTO: 92.1 FL — SIGNIFICANT CHANGE UP (ref 80–100)
NRBC # BLD: 0 /100 WBCS — SIGNIFICANT CHANGE UP (ref 0–0)
PLATELET # BLD AUTO: 165 K/UL — SIGNIFICANT CHANGE UP (ref 150–400)
POTASSIUM SERPL-MCNC: 3.7 MMOL/L — SIGNIFICANT CHANGE UP (ref 3.5–5.3)
POTASSIUM SERPL-SCNC: 3.7 MMOL/L — SIGNIFICANT CHANGE UP (ref 3.5–5.3)
RBC # BLD: 3.18 M/UL — LOW (ref 4.2–5.8)
RBC # FLD: 13 % — SIGNIFICANT CHANGE UP (ref 10.3–14.5)
SODIUM SERPL-SCNC: 139 MMOL/L — SIGNIFICANT CHANGE UP (ref 135–145)
WBC # BLD: 4.5 K/UL — SIGNIFICANT CHANGE UP (ref 3.8–10.5)
WBC # FLD AUTO: 4.5 K/UL — SIGNIFICANT CHANGE UP (ref 3.8–10.5)

## 2023-01-13 PROCEDURE — 99232 SBSQ HOSP IP/OBS MODERATE 35: CPT

## 2023-01-13 RX ADMIN — Medication 81 MILLIGRAM(S): at 13:04

## 2023-01-13 RX ADMIN — TAMSULOSIN HYDROCHLORIDE 0.4 MILLIGRAM(S): 0.4 CAPSULE ORAL at 21:12

## 2023-01-13 RX ADMIN — FINASTERIDE 5 MILLIGRAM(S): 5 TABLET, FILM COATED ORAL at 13:04

## 2023-01-13 RX ADMIN — Medication 4: at 17:15

## 2023-01-13 RX ADMIN — Medication 75 MICROGRAM(S): at 05:24

## 2023-01-13 RX ADMIN — Medication 25 MILLIGRAM(S): at 17:14

## 2023-01-13 RX ADMIN — ATORVASTATIN CALCIUM 80 MILLIGRAM(S): 80 TABLET, FILM COATED ORAL at 21:12

## 2023-01-13 RX ADMIN — CILOSTAZOL 100 MILLIGRAM(S): 100 TABLET ORAL at 17:14

## 2023-01-13 RX ADMIN — HEPARIN SODIUM 5000 UNIT(S): 5000 INJECTION INTRAVENOUS; SUBCUTANEOUS at 05:25

## 2023-01-13 RX ADMIN — PANTOPRAZOLE SODIUM 40 MILLIGRAM(S): 20 TABLET, DELAYED RELEASE ORAL at 05:25

## 2023-01-13 RX ADMIN — Medication 2: at 09:04

## 2023-01-13 RX ADMIN — LATANOPROST 1 DROP(S): 0.05 SOLUTION/ DROPS OPHTHALMIC; TOPICAL at 21:12

## 2023-01-13 RX ADMIN — CILOSTAZOL 100 MILLIGRAM(S): 100 TABLET ORAL at 05:24

## 2023-01-13 NOTE — PROGRESS NOTE ADULT - SUBJECTIVE AND OBJECTIVE BOX
Patient: MARIANA WRIGHT [id number] 89y Male                            Hospitalist Attending Note    still with mild hematuria     ____________________PHYSICAL EXAM:  GENERAL:  NAD Alert, cooperative.  Slurred speech   HEENT: NCAT  CARDIOVASCULAR:  S1, S2  LUNGS: CTAB  ABDOMEN:  soft, (-) tenderness, (-) distension, (+) bowel sounds, (-) guarding, (-) rebound (-) rigidity  EXTREMITIES:  no cyanosis / clubbing / edema.   NEURO: L facial droop.  L sided weakness.  ____________________    Vital Signs Last 24 Hrs  T(C): 36.5 (2023 10:59), Max: 36.8 (2023 16:20)  T(F): 97.7 (2023 10:59), Max: 98.3 (2023 16:20)  HR: 93 (2023 10:59) (80 - 93)  BP: 136/78 (2023 10:59) (108/69 - 171/95)  BP(mean): --  RR: 18 (2023 10:59) (18 - 18)  SpO2: 96% (2023 10:59) (96% - 100%)      Parameters below as of 2023 10:47  Patient On (Oxygen Delivery Method): room air       Daily     Daily Weight in k.6 (2023 04:38)  CAPILLARY BLOOD GLUCOSE          LABS:                        9.8    4.50  )-----------( 165      ( 2023 05:46 )             29.3     -    139  |  107  |  17  ----------------------------<  150<H>  3.7   |  22  |  1.55<H>    Ca    7.9<L>      2023 05:46                          MEDICATIONS:  aspirin enteric coated 81 milliGRAM(s) Oral daily  atorvastatin 80 milliGRAM(s) Oral at bedtime  cilostazol 100 milliGRAM(s) Oral two times a day  dextrose 5%. 1000 milliLiter(s) IV Continuous <Continuous>  dextrose 5%. 1000 milliLiter(s) IV Continuous <Continuous>  dextrose 50% Injectable 25 Gram(s) IV Push once  dextrose 50% Injectable 12.5 Gram(s) IV Push once  dextrose 50% Injectable 25 Gram(s) IV Push once  dextrose 50% Injectable 12.5 Gram(s) IV Push once  dextrose Oral Gel 15 Gram(s) Oral once PRN  finasteride 5 milliGRAM(s) Oral daily  glucagon  Injectable 1 milliGRAM(s) IntraMuscular once  heparin   Injectable 5000 Unit(s) SubCutaneous every 12 hours  insulin lispro (ADMELOG) corrective regimen sliding scale   SubCutaneous three times a day before meals  latanoprost 0.005% Ophthalmic Solution 1 Drop(s) Both EYES at bedtime  levothyroxine 75 MICROGram(s) Oral daily  pantoprazole    Tablet 40 milliGRAM(s) Oral before breakfast  sodium chloride 0.9%. 1000 milliLiter(s) IV Continuous <Continuous>  tamsulosin 0.4 milliGRAM(s) Oral at bedtime

## 2023-01-13 NOTE — PROGRESS NOTE ADULT - SUBJECTIVE AND OBJECTIVE BOX
Patient seen and examined this am. No new events      MEDICATIONS:    aspirin enteric coated 81 milliGRAM(s) Oral daily  atorvastatin 80 milliGRAM(s) Oral at bedtime  cilostazol 100 milliGRAM(s) Oral two times a day  dextrose 5%. 1000 milliLiter(s) IV Continuous <Continuous>  dextrose 5%. 1000 milliLiter(s) IV Continuous <Continuous>  dextrose 50% Injectable 25 Gram(s) IV Push once  dextrose 50% Injectable 12.5 Gram(s) IV Push once  dextrose 50% Injectable 25 Gram(s) IV Push once  dextrose 50% Injectable 12.5 Gram(s) IV Push once  dextrose Oral Gel 15 Gram(s) Oral once PRN  finasteride 5 milliGRAM(s) Oral daily  glucagon  Injectable 1 milliGRAM(s) IntraMuscular once  heparin   Injectable 5000 Unit(s) SubCutaneous every 12 hours  insulin lispro (ADMELOG) corrective regimen sliding scale   SubCutaneous three times a day before meals  latanoprost 0.005% Ophthalmic Solution 1 Drop(s) Both EYES at bedtime  levothyroxine 75 MICROGram(s) Oral daily  losartan 25 milliGRAM(s) Oral daily  metoprolol tartrate 25 milliGRAM(s) Oral two times a day  pantoprazole    Tablet 40 milliGRAM(s) Oral before breakfast  tamsulosin 0.4 milliGRAM(s) Oral at bedtime      LABS:                          9.8    4.50  )-----------( 165      ( 13 Jan 2023 05:46 )             29.3     01-13    139  |  107  |  17  ----------------------------<  150<H>  3.7   |  22  |  1.55<H>    Ca    7.9<L>      13 Jan 2023 05:46      CAPILLARY BLOOD GLUCOSE      POCT Blood Glucose.: 167 mg/dL (13 Jan 2023 11:35)  POCT Blood Glucose.: 167 mg/dL (13 Jan 2023 08:10)  POCT Blood Glucose.: 184 mg/dL (12 Jan 2023 16:51)        I&O's Summary    12 Jan 2023 07:01  -  13 Jan 2023 07:00  --------------------------------------------------------  IN: 160 mL / OUT: 800 mL / NET: -640 mL      Vital Signs Last 24 Hrs  T(C): 36.5 (13 Jan 2023 10:59), Max: 36.8 (12 Jan 2023 16:20)  T(F): 97.7 (13 Jan 2023 10:59), Max: 98.3 (12 Jan 2023 16:20)  HR: 93 (13 Jan 2023 10:59) (80 - 93)  BP: 136/78 (13 Jan 2023 10:59) (108/69 - 171/95)  BP(mean): --  RR: 18 (13 Jan 2023 10:59) (18 - 18)  SpO2: 96% (13 Jan 2023 10:59) (96% - 100%)        MRI brain shows an 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 Appropriately interactive, normal affect. Speech fluent, No Aphasia or paraphasic errors. Naming /repetition intact   CN: BERTO, EOMI, VFF, facial sensation normal, +left central facial paresis with some right peripheral right facial +dysarthria, tongue midline, Palate moves equally, SCM equal bilaterally  Motor: 2/5 LUE, 5/5 in RUE and proximally b/l, 4+/5 left hip flexion, 5/5 distally in LEs   Sens: Impaired to LT/vibration sense in toes b/l  Reflexes: Symmetric. BJ 1+, BR 1+, KJ 1+, AJ 0, mute toes b/l  Coord:  No FNFA, dysmetria    NIHSS: 7      < from: TTE Echo Complete w/o Contrast w/ Doppler (01.09.23 @ 19:09) >  Summary:   1. Left ventricular ejection fraction, by visual estimation, is 60 to   65%.   2. Normal global left ventricular systolic function.   3. Poor endocardial border defintion, especially in parasternal views.   Not all segments were visualized. recommend contrast study, if clinically   wararnted, for better evaluation.   4. Mild mitral valve regurgitation.   5. Mild aortic regurgitation.   6. Sclerotic aortic valve with normal opening.   7. Although no evidence of thrombus was seen, suboptimal visualization   precludes ruling out embolic source in this study.      [id number] Dajuan Osman MD, FACC  Electronically signed on 1/10/2023 at 3:24:09 PM      < end of copied text >

## 2023-01-13 NOTE — CONSULT NOTE ADULT - SUBJECTIVE AND OBJECTIVE BOX
Patient is a 89y old  Male who presents with a chief complaint of Left sided facial weakness (13 Jan 2023 15:39)      HPI:  89-year-old gentleman PMH of hypertension, diabetes, CAD with last stent 2009, prostate cancer status post TURP who presents to the ED because of concern for a CVA.  Patient went to bed about 9 PM yesterday which was a last known normal.  He woke up this morning, and was noted to have garbled speech, left facial droop, and left pronator drift at 11 AM.  Patient denies any headache, chest pain, shortness of breath, fever, abdominal pain.  No history of stroke in the past. (02 Jan 2023 18:44)    Pt seen bedside with daughter present. Reports h/o prostate ca s/p TURP 20 years ago, does not recall name of his urologist. Pt's PMD is Chet Rollinse. Pt denies urinary complaints, dysuria, hematuria prior to admission. He uses a diaper at night but is otherwise continent of urine. Per medical team, patient was noted with new hematuria via condom catheter yesterday, which was dark maroon in color. He is presently without complaint, and denies fever, chills, dyspnea, abdominal pain, nausea, vomiting, difficulty voiding.    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 1000 milliLiter(s) (100 mL/Hr) IV Continuous <Continuous>  dextrose 5%. 1000 milliLiter(s) (50 mL/Hr) IV Continuous <Continuous>  dextrose 50% Injectable 25 Gram(s) IV Push once  dextrose 50% Injectable 12.5 Gram(s) IV Push once  dextrose 50% Injectable 25 Gram(s) IV Push once  dextrose 50% Injectable 12.5 Gram(s) IV Push once  finasteride 5 milliGRAM(s) Oral daily  glucagon  Injectable 1 milliGRAM(s) IntraMuscular once  insulin lispro (ADMELOG) corrective regimen sliding scale   SubCutaneous three times a day before meals  latanoprost 0.005% Ophthalmic Solution 1 Drop(s) Both EYES at bedtime  levothyroxine 75 MICROGram(s) Oral daily  losartan 25 milliGRAM(s) Oral daily  metoprolol tartrate 25 milliGRAM(s) Oral two times a day  pantoprazole    Tablet 40 milliGRAM(s) Oral before breakfast  tamsulosin 0.4 milliGRAM(s) Oral at bedtime    MEDICATIONS  (PRN):  dextrose Oral Gel 15 Gram(s) Oral once PRN Blood Glucose LESS THAN 70 milliGRAM(s)/deciliter      Allergies  No Known Allergies    SOCIAL HISTORY lives with family          FAMILY HISTORY:  No pertinent family history in first degree relatives    FH: HTN (hypertension)        Vital Signs Last 24 Hrs  T(C): 36.3 (13 Jan 2023 16:15), Max: 36.7 (13 Jan 2023 00:16)  T(F): 97.4 (13 Jan 2023 16:15), Max: 98 (13 Jan 2023 00:16)  HR: 96 (13 Jan 2023 16:15) (80 - 96)  BP: 146/87 (13 Jan 2023 16:15) (108/69 - 146/87)  BP(mean): --  RR: 18 (13 Jan 2023 16:15) (18 - 18)  SpO2: 100% (13 Jan 2023 16:15) (96% - 100%)        Physical Exam:  General: Appears stated age, well-groomed, well-nourished, no distress  Eyes: EOMI, conjunctiva clear  HENT:  WNL, no JVD  Chest:  clear breath sounds  Cardiovascular:  Regular rate & rhythm  Abdomen:  soft, nontender  Extremities:  no pedal edema or calf tenderness noted  Skin:  No rash  Musculoskeletal: good strength  Neuro/Psych:  Alert, oriented to time, place and person   : condom catheter indwelling draining slightly blood tinged urine, appears dark orange in color. No clots in tubing. Draining well. No suprapubic tenderness    LABS:                        9.8    4.50  )-----------( 165      ( 13 Jan 2023 05:46 )             29.3     01-13    139  |  107  |  17  ----------------------------<  150<H>  3.7   |  22  |  1.55<H>    Ca    7.9<L>      13 Jan 2023 05:46        RADIOLOGY & ADDITIONAL STUDIES: < from: US Kidney and Bladder (01.12.23 @ 14:26) >    IMPRESSION:  Enlarged prostate and possible hemorrhagic debris within the urinary   bladder.      < end of copied text >      Impression/Plan: 89yo male with multiple comorbidities and remote h/o prostate ca seen with gross hematuria  patient admitted with CVA and medically stable for d/c to rehab when gross hematuria began per primary team  urine is mostly clear, light blood tinge  h/h stable, renal function improving  no new anticoagulants, patient remains on ASA 81 daily  check PVRs, record bladder scans post void  send urine for cytology, UA and UCx  check PSA  medical management  may remove condom cath to allow for evaluation  trend h/h  will follow Patient is a 89y old  Male who presents with a chief complaint of Left sided facial weakness (13 Jan 2023 15:39)      HPI:  89-year-old gentleman PMH of hypertension, diabetes, CAD with last stent 2009, prostate cancer status post TURP who presents to the ED because of concern for a CVA.  Patient went to bed about 9 PM yesterday which was a last known normal.  He woke up this morning, and was noted to have garbled speech, left facial droop, and left pronator drift at 11 AM.  Patient denies any headache, chest pain, shortness of breath, fever, abdominal pain.  No history of stroke in the past. (02 Jan 2023 18:44)    Pt seen bedside with daughter present. Reports h/o prostate ca s/p TURP 20 years ago, does not recall name of his urologist. Pt's PMD is Chet Rollinse. Pt denies urinary complaints, dysuria, hematuria prior to admission. He uses a diaper at night but is otherwise continent of urine. Per medical team, patient was noted with new hematuria via condom catheter yesterday, which was dark maroon in color. He is presently without complaint, and denies fever, chills, dyspnea, abdominal pain, nausea, vomiting, difficulty voiding.    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 1000 milliLiter(s) (100 mL/Hr) IV Continuous <Continuous>  dextrose 5%. 1000 milliLiter(s) (50 mL/Hr) IV Continuous <Continuous>  dextrose 50% Injectable 25 Gram(s) IV Push once  dextrose 50% Injectable 12.5 Gram(s) IV Push once  dextrose 50% Injectable 25 Gram(s) IV Push once  dextrose 50% Injectable 12.5 Gram(s) IV Push once  finasteride 5 milliGRAM(s) Oral daily  glucagon  Injectable 1 milliGRAM(s) IntraMuscular once  insulin lispro (ADMELOG) corrective regimen sliding scale   SubCutaneous three times a day before meals  latanoprost 0.005% Ophthalmic Solution 1 Drop(s) Both EYES at bedtime  levothyroxine 75 MICROGram(s) Oral daily  losartan 25 milliGRAM(s) Oral daily  metoprolol tartrate 25 milliGRAM(s) Oral two times a day  pantoprazole    Tablet 40 milliGRAM(s) Oral before breakfast  tamsulosin 0.4 milliGRAM(s) Oral at bedtime    MEDICATIONS  (PRN):  dextrose Oral Gel 15 Gram(s) Oral once PRN Blood Glucose LESS THAN 70 milliGRAM(s)/deciliter    Allergies  No Known Allergies    SOCIAL HISTORY lives with family          FAMILY HISTORY:  No pertinent family history in first degree relatives  FH: HTN (hypertension)      Vital Signs Last 24 Hrs  T(C): 36.3 (13 Jan 2023 16:15), Max: 36.7 (13 Jan 2023 00:16)  T(F): 97.4 (13 Jan 2023 16:15), Max: 98 (13 Jan 2023 00:16)  HR: 96 (13 Jan 2023 16:15) (80 - 96)  BP: 146/87 (13 Jan 2023 16:15) (108/69 - 146/87)  BP(mean): --  RR: 18 (13 Jan 2023 16:15) (18 - 18)  SpO2: 100% (13 Jan 2023 16:15) (96% - 100%)      Physical Exam:  General: Appears stated age, well-groomed, well-nourished, no distress  Eyes: EOMI, conjunctiva clear  HENT:  WNL, no JVD  Chest:  clear breath sounds  Cardiovascular:  Regular rate & rhythm  Abdomen:  soft, nontender  Extremities:  no pedal edema or calf tenderness noted  Skin:  No rash  Musculoskeletal: good strength  Neuro/Psych: Alert, oriented to time, place and person   : condom catheter indwelling draining slightly blood tinged urine, appears light pink in color. No clots in tubing. Draining well. No suprapubic tenderness    LABS:                        9.8    4.50  )-----------( 165      ( 13 Jan 2023 05:46 )             29.3     01-13    139  |  107  |  17  ----------------------------<  150<H>  3.7   |  22  |  1.55<H>    Ca    7.9<L>      13 Jan 2023 05:46        RADIOLOGY & ADDITIONAL STUDIES: < from: US Kidney and Bladder (01.12.23 @ 14:26) >    IMPRESSION:  Enlarged prostate and possible hemorrhagic debris within the urinary   bladder.      < end of copied text >      Impression/Plan: 89yo male with multiple comorbidities and remote h/o prostate ca seen with gross hematuria  patient admitted with CVA and was medically stable for d/c to rehab when gross hematuria began per primary team  urine is mostly clear, light pink tinge  h/h stable, renal function improving  no new anticoagulants, patient remains on ASA 81 daily  check PVRs, record bladder scans post void  send urine for cytology, UA and UCx  check PSA  medical management  may remove condom cath to allow for evaluation  monitor CBC  will follow  may plan eventual cysto as OP once stable. Patient is a 89y old  Male who presents with a chief complaint of Left sided facial weakness (13 Jan 2023 15:39)      HPI:  89-year-old gentleman PMH of hypertension, diabetes, CAD with last stent 2009, prostate cancer status post TURP who presents to the ED because of concern for a CVA.  Patient went to bed about 9 PM yesterday which was a last known normal.  He woke up this morning, and was noted to have garbled speech, left facial droop, and left pronator drift at 11 AM.  Patient denies any headache, chest pain, shortness of breath, fever, abdominal pain.  No history of stroke in the past. (02 Jan 2023 18:44)    Pt seen bedside with daughter present. Reports h/o prostate ca s/p TURP 20 years ago, does not recall name of his urologist. Pt's PMD is Chet Rollinse. Pt denies urinary complaints, dysuria, hematuria prior to admission. He uses a diaper at night but is otherwise continent of urine. Per medical team, patient was noted with new hematuria via condom catheter yesterday, which was dark maroon in color. He is presently without complaint, and denies fever, chills, dyspnea, abdominal pain, nausea, vomiting, difficulty voiding.    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 1000 milliLiter(s) (100 mL/Hr) IV Continuous <Continuous>  dextrose 5%. 1000 milliLiter(s) (50 mL/Hr) IV Continuous <Continuous>  dextrose 50% Injectable 25 Gram(s) IV Push once  dextrose 50% Injectable 12.5 Gram(s) IV Push once  dextrose 50% Injectable 25 Gram(s) IV Push once  dextrose 50% Injectable 12.5 Gram(s) IV Push once  finasteride 5 milliGRAM(s) Oral daily  glucagon  Injectable 1 milliGRAM(s) IntraMuscular once  insulin lispro (ADMELOG) corrective regimen sliding scale   SubCutaneous three times a day before meals  latanoprost 0.005% Ophthalmic Solution 1 Drop(s) Both EYES at bedtime  levothyroxine 75 MICROGram(s) Oral daily  losartan 25 milliGRAM(s) Oral daily  metoprolol tartrate 25 milliGRAM(s) Oral two times a day  pantoprazole    Tablet 40 milliGRAM(s) Oral before breakfast  tamsulosin 0.4 milliGRAM(s) Oral at bedtime    MEDICATIONS  (PRN):  dextrose Oral Gel 15 Gram(s) Oral once PRN Blood Glucose LESS THAN 70 milliGRAM(s)/deciliter    Allergies  No Known Allergies    SOCIAL HISTORY lives with family          FAMILY HISTORY:  No pertinent family history in first degree relatives  FH: HTN (hypertension)      Vital Signs Last 24 Hrs  T(C): 36.3 (13 Jan 2023 16:15), Max: 36.7 (13 Jan 2023 00:16)  T(F): 97.4 (13 Jan 2023 16:15), Max: 98 (13 Jan 2023 00:16)  HR: 96 (13 Jan 2023 16:15) (80 - 96)  BP: 146/87 (13 Jan 2023 16:15) (108/69 - 146/87)  BP(mean): --  RR: 18 (13 Jan 2023 16:15) (18 - 18)  SpO2: 100% (13 Jan 2023 16:15) (96% - 100%)      Physical Exam:  General: Appears stated age, well-groomed, well-nourished, no distress  Eyes: EOMI, conjunctiva clear  HENT:  WNL, no JVD  Chest:  clear breath sounds  Cardiovascular:  Regular rate & rhythm  Abdomen:  soft, nontender  Extremities:  no pedal edema or calf tenderness noted  Skin:  No rash  Musculoskeletal: good strength  Neuro/Psych: Alert, oriented to time, place and person   : condom catheter indwelling draining slightly blood tinged urine, appears light pink in color. No clots in tubing. Draining well. No suprapubic tenderness    LABS:                        9.8    4.50  )-----------( 165      ( 13 Jan 2023 05:46 )             29.3     01-13    139  |  107  |  17  ----------------------------<  150<H>  3.7   |  22  |  1.55<H>    Ca    7.9<L>      13 Jan 2023 05:46        RADIOLOGY & ADDITIONAL STUDIES: < from: US Kidney and Bladder (01.12.23 @ 14:26) >    IMPRESSION:  Enlarged prostate and possible hemorrhagic debris within the urinary   bladder.      < end of copied text >      Impression/Plan: 89yo male with multiple comorbidities and remote h/o prostate ca seen with gross hematuria  patient admitted with CVA and was medically stable for d/c to rehab when gross hematuria began per primary team  urine is mostly clear, light pink tinge  h/h stable, renal function improving  no new anticoagulants, patient remains on ASA 81 daily  check PVRs, record bladder scans post void  send urine for cytology, UA and UCx  check PSA  medical management  may remove condom cath to allow for evaluation  monitor CBC  Discussed with Dr Concepcion.  may plan eventual cysto as OP once stable.

## 2023-01-13 NOTE — PROGRESS NOTE ADULT - ASSESSMENT
A: 89 yo man with acute ischemic stroke in right upper medulla / lower nick.    Recommend:  1. TTE reviewed  2. Telemetry monitoring  3. LDL 75 can lower statin to low dose   4. Aspirin 81mg daily  5. gradually optimize BP control  6. Optimize glucose control  7. PT/OT, speech therapy  8. Left ICA stenosis: carotid US, vascular surgery consult (as outpatient as it is unrelated to current presentation)  9. Stroke education  Can follow up with Neurology, Dr. Ronald Cee at [phone]

## 2023-01-13 NOTE — PROGRESS NOTE ADULT - ASSESSMENT
89-year-old gentleman PMH of hypertension, diabetes, CAD with last stent 2009, prostate cancer status post TURP who presents to the ED because of concern for a CVA.  Patient went to bed about 9 PM 1 day PTA, which was a last known normal.  He woke up and was noted to have garbled speech, left facial droop, and left pronator drift.  MRI Brain:  Acute infarct in the right medulla and nick, evolution of L sided weakness.  Mild to moderate R MCA / PCA stenosis noted on CTA.  L A1 stenosis / aneurysm also noted.  Continue ASA, Statin.      CTA COW:  Moderate stenosis proximal right inferior M2 branch of the right MCA. Moderate focal stenosis proximal P2 branch of right PCA. Left A1 segment may be severely stenotic versus aneurysm adjacent to the anterior communicating artery and left A2 segment origin. Narrow caliber at the intracranial origin of the left vertebral artery.  CTA NECK: Calcified plaque with severe left-sided stenosis at  ICA origin by NASCET criteria.  MRI Brain:  Acute infarct in the right medulla and nick.    # Acute R medulla and nick CVA - MRI Brain:  Acute infarct in the right medulla and nick, evolution of L sided weakness.  Mild to moderate R MCA / PCA stenosis noted on CTA.  L A1 stenosis / aneurysm also noted.  Continue ASA, Statin.  Neurology followup.  PT / OT.  TTE showed EF 60-65%.   #hematuria- presented on dc to [facility]. has known prostate ca. cbc remains stable but  renal sono shows some bladder debris. will get urology consult   # L ICA stenosis - Noted on CTA neck.  Outpatient vascular followup.    # MARILYN, suspect contrast induced nephropathy - Cr jonna to 2.9, now 2.2 -> 1.8.  IVF.  Monitor BMP.  Renal following.    # Diabetes Type II - monitor fingersticks.  Insulin coverage for hyperglycemia. FS was low.  Lantus stopped.   - 215    # Hypothyroidism - TSH elevated.  Synthroid increased from 50mcg -> 75 mcg.  # Prostate Ca - supportive care.  # CAD - continue ASA, Statin.   # Glaucoma - vision baseline.  Continue eyedrops.  # Inpatient DVT prophylaxis - subcutaneous Heparin     Plan of care d/w daughter Shavonne [phone]

## 2023-01-14 VITALS — DIASTOLIC BLOOD PRESSURE: 67 MMHG | HEART RATE: 88 BPM | SYSTOLIC BLOOD PRESSURE: 138 MMHG

## 2023-01-14 LAB
ANION GAP SERPL CALC-SCNC: 7 MMOL/L — SIGNIFICANT CHANGE UP (ref 5–17)
APPEARANCE UR: ABNORMAL
BACTERIA # UR AUTO: ABNORMAL
BILIRUB UR-MCNC: NEGATIVE — SIGNIFICANT CHANGE UP
BUN SERPL-MCNC: 15 MG/DL — SIGNIFICANT CHANGE UP (ref 7–23)
CALCIUM SERPL-MCNC: 8.3 MG/DL — LOW (ref 8.5–10.1)
CHLORIDE SERPL-SCNC: 106 MMOL/L — SIGNIFICANT CHANGE UP (ref 96–108)
CO2 SERPL-SCNC: 24 MMOL/L — SIGNIFICANT CHANGE UP (ref 22–31)
COLOR SPEC: YELLOW — SIGNIFICANT CHANGE UP
CREAT SERPL-MCNC: 1.44 MG/DL — HIGH (ref 0.5–1.3)
DIFF PNL FLD: ABNORMAL
EGFR: 46 ML/MIN/1.73M2 — LOW
EPI CELLS # UR: SIGNIFICANT CHANGE UP
FLUAV AG NPH QL: SIGNIFICANT CHANGE UP
FLUBV AG NPH QL: SIGNIFICANT CHANGE UP
GLUCOSE BLDC GLUCOMTR-MCNC: 163 MG/DL — HIGH (ref 70–99)
GLUCOSE BLDC GLUCOMTR-MCNC: 168 MG/DL — HIGH (ref 70–99)
GLUCOSE BLDC GLUCOMTR-MCNC: 189 MG/DL — HIGH (ref 70–99)
GLUCOSE SERPL-MCNC: 166 MG/DL — HIGH (ref 70–99)
GLUCOSE UR QL: NEGATIVE MG/DL — SIGNIFICANT CHANGE UP
HCT VFR BLD CALC: 30.1 % — LOW (ref 39–50)
HGB BLD-MCNC: 10.1 G/DL — LOW (ref 13–17)
KETONES UR-MCNC: NEGATIVE — SIGNIFICANT CHANGE UP
LEUKOCYTE ESTERASE UR-ACNC: ABNORMAL
MCHC RBC-ENTMCNC: 31 PG — SIGNIFICANT CHANGE UP (ref 27–34)
MCHC RBC-ENTMCNC: 33.6 G/DL — SIGNIFICANT CHANGE UP (ref 32–36)
MCV RBC AUTO: 92.3 FL — SIGNIFICANT CHANGE UP (ref 80–100)
NITRITE UR-MCNC: NEGATIVE — SIGNIFICANT CHANGE UP
NRBC # BLD: 0 /100 WBCS — SIGNIFICANT CHANGE UP (ref 0–0)
PH UR: 5 — SIGNIFICANT CHANGE UP (ref 5–8)
PLATELET # BLD AUTO: 170 K/UL — SIGNIFICANT CHANGE UP (ref 150–400)
POTASSIUM SERPL-MCNC: 3.6 MMOL/L — SIGNIFICANT CHANGE UP (ref 3.5–5.3)
POTASSIUM SERPL-SCNC: 3.6 MMOL/L — SIGNIFICANT CHANGE UP (ref 3.5–5.3)
PROT UR-MCNC: 30 MG/DL
PSA FLD-MCNC: 1.49 NG/ML — SIGNIFICANT CHANGE UP (ref 0–4)
PSA FLD-MCNC: 1.5 NG/ML — SIGNIFICANT CHANGE UP (ref 0–4)
RBC # BLD: 3.26 M/UL — LOW (ref 4.2–5.8)
RBC # FLD: 13.1 % — SIGNIFICANT CHANGE UP (ref 10.3–14.5)
RBC CASTS # UR COMP ASSIST: ABNORMAL /HPF (ref 0–4)
SARS-COV-2 RNA SPEC QL NAA+PROBE: SIGNIFICANT CHANGE UP
SODIUM SERPL-SCNC: 137 MMOL/L — SIGNIFICANT CHANGE UP (ref 135–145)
SP GR SPEC: 1.01 — SIGNIFICANT CHANGE UP (ref 1.01–1.02)
UROBILINOGEN FLD QL: NEGATIVE MG/DL — SIGNIFICANT CHANGE UP
WBC # BLD: 5.15 K/UL — SIGNIFICANT CHANGE UP (ref 3.8–10.5)
WBC # FLD AUTO: 5.15 K/UL — SIGNIFICANT CHANGE UP (ref 3.8–10.5)
WBC UR QL: >50

## 2023-01-14 PROCEDURE — 99239 HOSP IP/OBS DSCHRG MGMT >30: CPT

## 2023-01-14 RX ORDER — LOSARTAN POTASSIUM 100 MG/1
1 TABLET, FILM COATED ORAL
Qty: 0 | Refills: 0 | DISCHARGE
Start: 2023-01-14

## 2023-01-14 RX ORDER — LOSARTAN POTASSIUM 100 MG/1
50 TABLET, FILM COATED ORAL DAILY
Refills: 0 | Status: DISCONTINUED | OUTPATIENT
Start: 2023-01-14 | End: 2023-01-14

## 2023-01-14 RX ORDER — HYDRALAZINE HCL 50 MG
10 TABLET ORAL ONCE
Refills: 0 | Status: COMPLETED | OUTPATIENT
Start: 2023-01-14 | End: 2023-01-14

## 2023-01-14 RX ADMIN — Medication 2: at 11:10

## 2023-01-14 RX ADMIN — Medication 2: at 16:51

## 2023-01-14 RX ADMIN — CILOSTAZOL 100 MILLIGRAM(S): 100 TABLET ORAL at 05:02

## 2023-01-14 RX ADMIN — Medication 2: at 08:11

## 2023-01-14 RX ADMIN — Medication 75 MICROGRAM(S): at 05:02

## 2023-01-14 RX ADMIN — Medication 10 MILLIGRAM(S): at 19:31

## 2023-01-14 RX ADMIN — FINASTERIDE 5 MILLIGRAM(S): 5 TABLET, FILM COATED ORAL at 11:11

## 2023-01-14 RX ADMIN — CILOSTAZOL 100 MILLIGRAM(S): 100 TABLET ORAL at 16:52

## 2023-01-14 RX ADMIN — Medication 25 MILLIGRAM(S): at 05:01

## 2023-01-14 RX ADMIN — Medication 25 MILLIGRAM(S): at 16:52

## 2023-01-14 RX ADMIN — PANTOPRAZOLE SODIUM 40 MILLIGRAM(S): 20 TABLET, DELAYED RELEASE ORAL at 05:01

## 2023-01-14 RX ADMIN — Medication 81 MILLIGRAM(S): at 11:09

## 2023-01-14 RX ADMIN — LOSARTAN POTASSIUM 25 MILLIGRAM(S): 100 TABLET, FILM COATED ORAL at 05:02

## 2023-01-14 NOTE — PROGRESS NOTE ADULT - NUTRITIONAL ASSESSMENT
This patient has been assessed with a concern for Malnutrition and has been determined to have a diagnosis/diagnoses of Severe protein-calorie malnutrition and Underweight (BMI < 19).    This patient is being managed with:   Diet Minced and Moist-  Consistent Carbohydrate {Evening Snack}  Mildly Thick Liquids (MILDTHICKLIQS)  Low Sodium  Entered: Al 10 2023 11:51AM    Diet Minced and Moist-  Mildly Thick Liquids (MILDTHICKLIQS)  Entered: Jan 4 2023  4:19PM    The following pending diet order is being considered for treatment of Severe protein-calorie malnutrition and Underweight (BMI < 19):  Diet Pureed-  Consistent Carbohydrate {Evening Snack}  Mildly Thick Liquids (MILDTHICKLIQS)  Low Sodium  Entered: Al 10 2023 11:43AM  
This patient has been assessed with a concern for Malnutrition and has been determined to have a diagnosis/diagnoses of Severe protein-calorie malnutrition and Underweight (BMI < 19).    This patient is being managed with:   Diet Minced and Moist-  Consistent Carbohydrate {Evening Snack}  Mildly Thick Liquids (MILDTHICKLIQS)  Low Sodium  Entered: Al 10 2023 11:51AM    

## 2023-01-14 NOTE — PROGRESS NOTE ADULT - PROVIDER SPECIALTY LIST ADULT
Hospitalist
Nephrology
Nephrology
Neurology
Hospitalist
Neurology
Hospitalist
Neurology
Hospitalist

## 2023-01-14 NOTE — PROGRESS NOTE ADULT - ASSESSMENT
89-year-old gentleman PMH of hypertension, diabetes, CAD with last stent 2009, prostate cancer status post TURP who presents to the ED because of concern for a CVA.  Patient went to bed about 9 PM 1 day PTA, which was a last known normal.  He woke up and was noted to have garbled speech, left facial droop, and left pronator drift.  MRI Brain:  Acute infarct in the right medulla and nick, evolution of L sided weakness.  Mild to moderate R MCA / PCA stenosis noted on CTA.  L A1 stenosis / aneurysm also noted.  Continue ASA, Statin.      CTA COW:  Moderate stenosis proximal right inferior M2 branch of the right MCA. Moderate focal stenosis proximal P2 branch of right PCA. Left A1 segment may be severely stenotic versus aneurysm adjacent to the anterior communicating artery and left A2 segment origin. Narrow caliber at the intracranial origin of the left vertebral artery.  CTA NECK: Calcified plaque with severe left-sided stenosis at  ICA origin by NASCET criteria.  MRI Brain:  Acute infarct in the right medulla and nick.    # Acute R medulla and nick CVA - MRI Brain:  Acute infarct in the right medulla and nick, evolution of L sided weakness.  Mild to moderate R MCA / PCA stenosis noted on CTA.  L A1 stenosis / aneurysm also noted.  Continue ASA, Statin.  Neurology followup.  PT / OT.  TTE showed EF 60-65%.   #hematuria- presented on dc to [facility]. has known prostate ca. cbc remains stable but  renal sono shows some bladder debris.  urology consulted and now has clear urine. f/u urine cx,. will need op cysto. check cytology    # L ICA stenosis - Noted on CTA neck.  Outpatient vascular followup.    # MARILYN, suspect contrast induced nephropathy - Cr jonna to 2.9, now 2.2 -> 1.8.  IVF.  Monitor BMP.  Renal following.    # Diabetes Type II - monitor fingersticks.  Insulin coverage for hyperglycemia. FS was low.  Lantus stopped.   - 215    # Hypothyroidism - TSH elevated.  Synthroid increased from 50mcg -> 75 mcg.  # Prostate Ca - supportive care.  # CAD - continue ASA, Statin.   # Glaucoma - vision baseline.  Continue eyedrops.  # Inpatient DVT prophylaxis - subcutaneous Heparin     possible dc to [facility] this weekend. f/u urine cx   Plan of care d/w daughter Shavonne [phone]      89-year-old gentleman PMH of hypertension, diabetes, CAD with last stent 2009, prostate cancer status post TURP who presents to the ED because of concern for a CVA.  Patient went to bed about 9 PM 1 day PTA, which was a last known normal.  He woke up and was noted to have garbled speech, left facial droop, and left pronator drift.  MRI Brain:  Acute infarct in the right medulla and nick, evolution of L sided weakness.  Mild to moderate R MCA / PCA stenosis noted on CTA.  L A1 stenosis / aneurysm also noted.  Continue ASA, Statin.      CTA COW:  Moderate stenosis proximal right inferior M2 branch of the right MCA. Moderate focal stenosis proximal P2 branch of right PCA. Left A1 segment may be severely stenotic versus aneurysm adjacent to the anterior communicating artery and left A2 segment origin. Narrow caliber at the intracranial origin of the left vertebral artery.  CTA NECK: Calcified plaque with severe left-sided stenosis at  ICA origin by NASCET criteria.  MRI Brain:  Acute infarct in the right medulla and nick.    # Acute R medulla and nick CVA - MRI Brain:  Acute infarct in the right medulla and nick, evolution of L sided weakness.  Mild to moderate R MCA / PCA stenosis noted on CTA.  L A1 stenosis / aneurysm also noted.  Continue ASA, Statin.  Neurology followup.  PT / OT.  TTE showed EF 60-65%.   #hematuria- presented on dc to [facility]. has known prostate ca. cbc remains stable but  renal sono shows some bladder debris.  urology consulted and now has clear urine. f/u urine cx- can be done as rehab. ,. will need op cysto. check cytology    # L ICA stenosis - Noted on CTA neck.  Outpatient vascular followup.    # MARILYN, suspect contrast induced nephropathy - Cr jonna to 2.9, now 2.2 -> 1.8.  IVF.  Monitor BMP.  Renal following.    # Diabetes Type II - monitor fingersticks.  Insulin coverage for hyperglycemia. FS was low.  Lantus stopped.   - 215    # Hypothyroidism - TSH elevated.  Synthroid increased from 50mcg -> 75 mcg.  # Prostate Ca - supportive care.  # CAD - continue ASA, Statin.   # Glaucoma - vision baseline.  Continue eyedrops.  # Inpatient DVT prophylaxis - subcutaneous Heparin     possible dc to [facility] this weekend. f/u urine cx   Plan of care d/w daughter Shavonne [phone]

## 2023-01-14 NOTE — PROGRESS NOTE ADULT - SUBJECTIVE AND OBJECTIVE BOX
Patient: MARIANA WRIGHT [id number] 89y Male                            Hospitalist Attending Note    urine yellow     ____________________PHYSICAL EXAM:  GENERAL:  NAD Alert, cooperative.  Slurred speech   HEENT: NCAT  CARDIOVASCULAR:  S1, S2  LUNGS: CTAB  ABDOMEN:  soft, (-) tenderness, (-) distension, (+) bowel sounds, (-) guarding, (-) rebound (-) rigidity  EXTREMITIES:  no cyanosis / clubbing / edema.   NEURO: L facial droop.  L sided weakness.  ____________________    Vital Signs Last 24 Hrs  T(C): 36.2 (2023 10:36), Max: 36.9 (2023 23:30)  T(F): 97.2 (2023 10:36), Max: 98.5 (2023 23:30)  HR: 77 (2023 10:36) (77 - 96)  BP: 177/84 (2023 10:36) (126/79 - 177/84)  BP(mean): 110 (2023 23:30) (110 - 110)  RR: 18 (2023 10:36) (18 - 18)  SpO2: 99% (2023 10:36) (94% - 100%)    Parameters below as of 2023 10:36  Patient On (Oxygen Delivery Method): room air         Daily     Daily Weight in k.6 (2023 04:38)  CAPILLARY BLOOD GLUCOSE          LABS:                                   10.1   5.15  )-----------( 170      ( 2023 07:00 )             30.1     -14    137  |  106  |  15  ----------------------------<  166<H>  3.6   |  24  |  1.44<H>    Ca    8.3<L>      2023 07:00        Urinalysis Basic - ( 2023 08:20 )    Color: Yellow / Appearance: Slightly Turbid / S.010 / pH: x  Gluc: x / Ketone: Negative  / Bili: Negative / Urobili: Negative mg/dL   Blood: x / Protein: 30 mg/dL / Nitrite: Negative   Leuk Esterase: Moderate / RBC: 3-5 /HPF / WBC >50   Sq Epi: x / Non Sq Epi: Occasional / Bacteria: Many                            MEDICATIONS:  aspirin enteric coated 81 milliGRAM(s) Oral daily  atorvastatin 80 milliGRAM(s) Oral at bedtime  cilostazol 100 milliGRAM(s) Oral two times a day  dextrose 5%. 1000 milliLiter(s) IV Continuous <Continuous>  dextrose 5%. 1000 milliLiter(s) IV Continuous <Continuous>  dextrose 50% Injectable 25 Gram(s) IV Push once  dextrose 50% Injectable 12.5 Gram(s) IV Push once  dextrose 50% Injectable 25 Gram(s) IV Push once  dextrose 50% Injectable 12.5 Gram(s) IV Push once  dextrose Oral Gel 15 Gram(s) Oral once PRN  finasteride 5 milliGRAM(s) Oral daily  glucagon  Injectable 1 milliGRAM(s) IntraMuscular once  heparin   Injectable 5000 Unit(s) SubCutaneous every 12 hours  insulin lispro (ADMELOG) corrective regimen sliding scale   SubCutaneous three times a day before meals  latanoprost 0.005% Ophthalmic Solution 1 Drop(s) Both EYES at bedtime  levothyroxine 75 MICROGram(s) Oral daily  pantoprazole    Tablet 40 milliGRAM(s) Oral before breakfast  sodium chloride 0.9%. 1000 milliLiter(s) IV Continuous <Continuous>  tamsulosin 0.4 milliGRAM(s) Oral at bedtime

## 2023-01-14 NOTE — PROGRESS NOTE ADULT - REASON FOR ADMISSION
Left sided facial weakness

## 2023-01-18 ENCOUNTER — OUTPATIENT (OUTPATIENT)
Dept: OUTPATIENT SERVICES | Facility: HOSPITAL | Age: 88
LOS: 1 days | Discharge: HOME | End: 2023-01-18
Payer: MEDICARE

## 2023-01-18 ENCOUNTER — APPOINTMENT (OUTPATIENT)
Dept: RADIOLOGY | Facility: HOSPITAL | Age: 88
End: 2023-01-18

## 2023-01-18 DIAGNOSIS — Z85.46 PERSONAL HISTORY OF MALIGNANT NEOPLASM OF PROSTATE: ICD-10-CM

## 2023-01-18 DIAGNOSIS — R31.9 HEMATURIA, UNSPECIFIED: ICD-10-CM

## 2023-01-18 DIAGNOSIS — Z79.890 HORMONE REPLACEMENT THERAPY: ICD-10-CM

## 2023-01-18 DIAGNOSIS — E78.00 PURE HYPERCHOLESTEROLEMIA, UNSPECIFIED: ICD-10-CM

## 2023-01-18 DIAGNOSIS — I65.22 OCCLUSION AND STENOSIS OF LEFT CAROTID ARTERY: ICD-10-CM

## 2023-01-18 DIAGNOSIS — I82.403 ACUTE EMBOLISM AND THROMBOSIS OF UNSPECIFIED DEEP VEINS OF LOWER EXTREMITY, BILATERAL: ICD-10-CM

## 2023-01-18 DIAGNOSIS — N40.0 BENIGN PROSTATIC HYPERPLASIA WITHOUT LOWER URINARY TRACT SYMPTOMS: ICD-10-CM

## 2023-01-18 DIAGNOSIS — Z98.89 OTHER SPECIFIED POSTPROCEDURAL STATES: Chronic | ICD-10-CM

## 2023-01-18 DIAGNOSIS — R63.6 UNDERWEIGHT: ICD-10-CM

## 2023-01-18 DIAGNOSIS — I63.219 CEREBRAL INFARCTION DUE TO UNSPECIFIED OCCLUSION OR STENOSIS OF UNSPECIFIED VERTEBRAL ARTERY: ICD-10-CM

## 2023-01-18 DIAGNOSIS — Z95.5 PRESENCE OF CORONARY ANGIOPLASTY IMPLANT AND GRAFT: ICD-10-CM

## 2023-01-18 DIAGNOSIS — G81.94 HEMIPLEGIA, UNSPECIFIED AFFECTING LEFT NONDOMINANT SIDE: ICD-10-CM

## 2023-01-18 DIAGNOSIS — I25.10 ATHEROSCLEROTIC HEART DISEASE OF NATIVE CORONARY ARTERY WITHOUT ANGINA PECTORIS: ICD-10-CM

## 2023-01-18 DIAGNOSIS — T50.8X5A ADVERSE EFFECT OF DIAGNOSTIC AGENTS, INITIAL ENCOUNTER: ICD-10-CM

## 2023-01-18 DIAGNOSIS — I25.2 OLD MYOCARDIAL INFARCTION: ICD-10-CM

## 2023-01-18 DIAGNOSIS — N17.9 ACUTE KIDNEY FAILURE, UNSPECIFIED: ICD-10-CM

## 2023-01-18 DIAGNOSIS — R29.705 NIHSS SCORE 5: ICD-10-CM

## 2023-01-18 DIAGNOSIS — Z79.82 LONG TERM (CURRENT) USE OF ASPIRIN: ICD-10-CM

## 2023-01-18 DIAGNOSIS — H40.9 UNSPECIFIED GLAUCOMA: ICD-10-CM

## 2023-01-18 DIAGNOSIS — E11.9 TYPE 2 DIABETES MELLITUS WITHOUT COMPLICATIONS: ICD-10-CM
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
## 2023-01-18 DIAGNOSIS — I63.9 CEREBRAL INFARCTION, UNSPECIFIED: ICD-10-CM

## 2023-01-18 DIAGNOSIS — I10 ESSENTIAL (PRIMARY) HYPERTENSION: ICD-10-CM

## 2023-01-18 DIAGNOSIS — Z79.84 LONG TERM (CURRENT) USE OF ORAL HYPOGLYCEMIC DRUGS: ICD-10-CM
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
## 2023-01-18 DIAGNOSIS — N14.11 CONTRAST-INDUCED NEPHROPATHY: ICD-10-CM

## 2023-01-18 DIAGNOSIS — E43 UNSPECIFIED SEVERE PROTEIN-CALORIE MALNUTRITION: ICD-10-CM

## 2023-01-18 DIAGNOSIS — E03.9 HYPOTHYROIDISM, UNSPECIFIED: ICD-10-CM

## 2023-01-18 DIAGNOSIS — Z79.4 LONG TERM (CURRENT) USE OF INSULIN: ICD-10-CM

## 2023-01-18 PROCEDURE — 70371 SPEECH EVALUATION COMPLEX: CPT | Mod: 26

## 2023-03-16 NOTE — DIETITIAN INITIAL EVALUATION ADULT - FEEDING ASSISTANCE
yes  Doxycycline Counseling:  Patient counseled regarding possible photosensitivity and increased risk for sunburn.  Patient instructed to avoid sunlight, if possible.  When exposed to sunlight, patients should wear protective clothing, sunglasses, and sunscreen.  The patient was instructed to call the office immediately if the following severe adverse effects occur:  hearing changes, easy bruising/bleeding, severe headache, or vision changes.  The patient verbalized understanding of the proper use and possible adverse effects of doxycycline.  All of the patient's questions and concerns were addressed.

## 2023-03-24 NOTE — ED PROVIDER NOTE - NIH STROKE SCALE: 11. EXTINCTION AND INATTENTION, QM
Order signed and faxed to [facility]. Awaiting fax confirmation. received fax confirmation     [facility] [place]'s number is [phone]    Called patient and let him know new order was faxed to [facility] and they will be reaching out. Patient will reach out if there is any issues   (0) No abnormality

## 2023-03-28 NOTE — ED ADULT TRIAGE NOTE - CHIEF COMPLAINT QUOTE
Sent from MD for hyperglycemia. As per daughter, pt. was seen at his PCP a few days ago after "feeling off". Blood work showed glucose to be in the 700s. States he's compliant with meds.  in triage.   Shavonne (daughter) [phone]
Patient

## 2023-04-12 NOTE — PROGRESS NOTE ADULT - SUBJECTIVE AND OBJECTIVE BOX
Patient seen and examined this am. No new events    MEDICATIONS:    aspirin enteric coated 81 milliGRAM(s) Oral daily  atorvastatin 80 milliGRAM(s) Oral at bedtime  cilostazol 100 milliGRAM(s) Oral two times a day  dextrose 5%. 1000 milliLiter(s) IV Continuous <Continuous>  dextrose 5%. 1000 milliLiter(s) IV Continuous <Continuous>  dextrose 50% Injectable 25 Gram(s) IV Push once  dextrose 50% Injectable 12.5 Gram(s) IV Push once  dextrose 50% Injectable 25 Gram(s) IV Push once  dextrose 50% Injectable 12.5 Gram(s) IV Push once  dextrose Oral Gel 15 Gram(s) Oral once PRN  finasteride 5 milliGRAM(s) Oral daily  glucagon  Injectable 1 milliGRAM(s) IntraMuscular once  heparin   Injectable 5000 Unit(s) SubCutaneous every 12 hours  insulin lispro (ADMELOG) corrective regimen sliding scale   SubCutaneous three times a day before meals  latanoprost 0.005% Ophthalmic Solution 1 Drop(s) Both EYES at bedtime  levothyroxine 75 MICROGram(s) Oral daily  pantoprazole    Tablet 40 milliGRAM(s) Oral before breakfast  sodium chloride 0.9%. 1000 milliLiter(s) IV Continuous <Continuous>  tamsulosin 0.4 milliGRAM(s) Oral at bedtime      LABS:                          9.8    5.46  )-----------( 132      ( 09 Jan 2023 07:28 )             29.3     01-09    142  |  114<H>  |  25<H>  ----------------------------<  105<H>  3.6   |  20<L>  |  1.68<H>    Ca    7.8<L>      09 Jan 2023 07:28  Phos  3.1     01-09  Mg     1.8     01-09      CAPILLARY BLOOD GLUCOSE      POCT Blood Glucose.: 125 mg/dL (09 Jan 2023 11:38)  POCT Blood Glucose.: 113 mg/dL (09 Jan 2023 08:09)  POCT Blood Glucose.: 332 mg/dL (08 Jan 2023 16:51)        I&O's Summary    08 Jan 2023 07:01  -  09 Jan 2023 07:00  --------------------------------------------------------  IN: 650 mL / OUT: 0 mL / NET: 650 mL      Vital Signs Last 24 Hrs  T(C): 37 (09 Jan 2023 04:38), Max: 37 (09 Jan 2023 04:38)  T(F): 98.6 (09 Jan 2023 04:38), Max: 98.6 (09 Jan 2023 04:38)  HR: 108 (09 Jan 2023 04:38) (107 - 116)  BP: 154/87 (09 Jan 2023 04:38) (147/83 - 156/91)  BP(mean): 109 (09 Jan 2023 04:38) (105 - 112)  RR: 19 (09 Jan 2023 04:38) (19 - 20)  SpO2: 98% (09 Jan 2023 04:38) (96% - 100%)    Parameters below as of 09 Jan 2023 04:38  Patient On (Oxygen Delivery Method): room air          MRI brain shows an 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 Appropriately interactive, normal affect. Speech fluent, No Aphasia or paraphasic errors. Naming /repetition intact   CN: BERTO, EOMI, VFF, facial sensation normal, +left central facial paresis with some right peripheral right facial +dysarthria, tongue midline, Palate moves equally, SCM equal bilaterally  Motor: 2/5 LUE, 5/5 in RUE and proximally b/l, 4+/5 left hip flexion, 5/5 distally in LEs   Sens: Impaired to LT/vibration sense in toes b/l  Reflexes: Symmetric. BJ 1+, BR 1+, KJ 1+, AJ 0, mute toes b/l  Coord:  No FNFA, dysmetria    NIHSS: 7               Ear Star Wedge Flap Text: The defect edges were debeveled with a #15 blade scalpel.  Given the location of the defect and the proximity to free margins (helical rim) an ear star wedge flap was deemed most appropriate.  Using a sterile surgical marker, the appropriate flap was drawn incorporating the defect and placing the expected incisions between the helical rim and antihelix where possible.  The area thus outlined was incised through and through with a #15 scalpel blade.

## 2023-05-02 NOTE — ED PROVIDER NOTE - CHPI ED SYMPTOMS POS
Rec covid vaccine    Rec prevnar 20    Rec dexa scan    Rec mammgram yearly    Colonoscopy recommended- Dr. Nuno Reach    Bariatric surgical referral    Yearly eye exam      Ramiro Garcia's SCREENING SCHEDULE   Tests on this list are recommended by your physician but may not be covered, or covered at this frequency, by your insurer. Please check with your insurance carrier before scheduling to verify coverage.    PREVENTATIVE SERVICES FREQUENCY &  COVERAGE DETAILS LAST COMPLETION DATE   Diabetes Screening    Fasting Blood Sugar /  Glucose    One screening every 12 months if never tested or if previously tested but not diagnosed with pre-diabetes   One screening every 6 months if diagnosed with pre-diabetes Lab Results   Component Value Date     (H) 12/12/2022        Cardiovascular Disease Screening    Lipid Panel  Cholesterol  Lipoprotein (HDL)  Triglycerides Covered every 5 years for all Medicare beneficiaries without apparent signs or symptoms of cardiovascular disease Lab Results   Component Value Date    CHOLEST 165 12/12/2022    HDL 61 (H) 12/12/2022    LDL 80 12/12/2022    TRIG 138 12/12/2022         Electrocardiogram (EKG)   Covered if needed at Welcome to Medicare, and non-screening if indicated for medical reasons 04/28/2020      Ultrasound Screening for Abdominal Aortic Aneurysm (AAA) Covered once in a lifetime for one of the following risk factors    Men who are 73-68 years old and have ever smoked    Anyone with a family history -     Colorectal Cancer Screening  Covered for ages 52-80; only need ONE of the following:    Colonoscopy   Covered every 10 years    Covered every 2 years if patient is at high risk or previous colonoscopy was abnormal -    Colorectal Cancer Screening due on 11/09/2022    Flexible Sigmoidoscopy   Covered every 4 years -    Fecal Occult Blood Test Covered annually -   Bone Density Screening    Bone density screening    Covered every 2 years after age 72 if diagnosed with risk of osteoporosis or estrogen deficiency. Covered yearly for long-term glucocorticoid medication use (Steroids) No results found for this or any previous visit. DEXA Scan Never done   Pap and Pelvic    Pap   Covered every 2 years for women at normal risk; Annually if at high risk 07/08/2022  Pap Smear due on 07/08/2025    Chlamydia Annually if high risk -  No recommendations at this time   Screening Mammogram    Mammogram     Recommend annually for all female patients aged 36 and older    One baseline mammogram covered for patients aged 32-38 11/10/2022    Mammogram due on 11/10/2023    Immunizations    Influenza Covered once per flu season  Please get every year 10/05/2022  No recommendations at this time    Pneumococcal Each vaccine (Ppeazya95 & Codzoiyit16) covered once after 65 Prevnar 13: -    [id number]: 06/23/2020     Pneumococcal Vaccination(2 - PCV) due on 06/23/2021    Hepatitis B One screening covered for patients with certain risk factors   -  No recommendations at this time    Tetanus Toxoid Not covered by Medicare Part B unless medically necessary (cut with metal); may be covered with your pharmacy prescription benefits 04/15/1998    Tetanus, Diptheria and Pertusis TD and TDaP Not covered by Medicare Part B -  No recommendations at this time    Zoster Not covered by Medicare Part B; may be covered with your pharmacy  prescription benefits -  No recommendations at this time       Diabetes      Hemoglobin A1C Annually; if last result is elevated, may be asked to retest more frequently.     Medicare covers every 3 months Lab Results   Component Value Date     (H) 12/12/2022    A1C 6.4 (H) 12/12/2022       No recommendations at this time    Creat/alb ratio Annually Lab Results   Component Value Date    MICROALBCREA 10.3 06/24/2022       LDL Annually Lab Results   Component Value Date    LDL 80 12/12/2022       Dilated Eye Exam Annually 05/19/2022       Annual Monitoring of Persistent Medications (ACE/ARB, digoxin diuretics, anticonvulsants)    Potassium Annually Lab Results   Component Value Date    K 4.1 12/12/2022         Creatinine   Annually Lab Results   Component Value Date    CREATSERUM 0.80 12/12/2022         BUN Annually Lab Results   Component Value Date    BUN 17 12/12/2022       Drug Serum Conc Annually No results found for: DIGOXIN, DIG, VALP DECREASED EATING/DRINKING/DIZZINESS

## 2023-06-15 ENCOUNTER — INPATIENT (INPATIENT)
Facility: HOSPITAL | Age: 88
LOS: 17 days | Discharge: NURSING, REHAB OR LONG-TERM CARE | End: 2023-07-03
Attending: INTERNAL MEDICINE | Admitting: INTERNAL MEDICINE
Payer: MEDICARE

## 2023-06-15 VITALS
SYSTOLIC BLOOD PRESSURE: 135 MMHG | HEIGHT: 66 IN | HEART RATE: 85 BPM | TEMPERATURE: 98 F | DIASTOLIC BLOOD PRESSURE: 65 MMHG | OXYGEN SATURATION: 98 % | WEIGHT: 119.93 LBS | RESPIRATION RATE: 18 BRPM

## 2023-06-15 DIAGNOSIS — Z98.89 OTHER SPECIFIED POSTPROCEDURAL STATES: Chronic | ICD-10-CM

## 2023-06-15 LAB
ALBUMIN SERPL ELPH-MCNC: 2.5 G/DL — LOW (ref 3.3–5)
ALP SERPL-CCNC: 88 U/L — SIGNIFICANT CHANGE UP (ref 40–120)
ALT FLD-CCNC: 17 U/L — SIGNIFICANT CHANGE UP (ref 12–78)
ANION GAP SERPL CALC-SCNC: 7 MMOL/L — SIGNIFICANT CHANGE UP (ref 5–17)
AST SERPL-CCNC: 25 U/L — SIGNIFICANT CHANGE UP (ref 15–37)
BASOPHILS # BLD AUTO: 0.01 K/UL — SIGNIFICANT CHANGE UP (ref 0–0.2)
BASOPHILS NFR BLD AUTO: 0.1 % — SIGNIFICANT CHANGE UP (ref 0–2)
BILIRUB SERPL-MCNC: 1 MG/DL — SIGNIFICANT CHANGE UP (ref 0.2–1.2)
BLD GP AB SCN SERPL QL: SIGNIFICANT CHANGE UP
BUN SERPL-MCNC: 42 MG/DL — HIGH (ref 7–23)
CALCIUM SERPL-MCNC: 9.2 MG/DL — SIGNIFICANT CHANGE UP (ref 8.5–10.1)
CHLORIDE SERPL-SCNC: 113 MMOL/L — HIGH (ref 96–108)
CO2 SERPL-SCNC: 23 MMOL/L — SIGNIFICANT CHANGE UP (ref 22–31)
CREAT SERPL-MCNC: 2.58 MG/DL — HIGH (ref 0.5–1.3)
EGFR: 23 ML/MIN/1.73M2 — LOW
EOSINOPHIL # BLD AUTO: 0.04 K/UL — SIGNIFICANT CHANGE UP (ref 0–0.5)
EOSINOPHIL NFR BLD AUTO: 0.5 % — SIGNIFICANT CHANGE UP (ref 0–6)
GLUCOSE BLDC GLUCOMTR-MCNC: 162 MG/DL — HIGH (ref 70–99)
GLUCOSE BLDC GLUCOMTR-MCNC: 259 MG/DL — HIGH (ref 70–99)
GLUCOSE BLDC GLUCOMTR-MCNC: 269 MG/DL — HIGH (ref 70–99)
GLUCOSE BLDC GLUCOMTR-MCNC: 329 MG/DL — HIGH (ref 70–99)
GLUCOSE SERPL-MCNC: 197 MG/DL — HIGH (ref 70–99)
HCT VFR BLD CALC: 24.4 % — LOW (ref 39–50)
HCT VFR BLD CALC: 25.6 % — LOW (ref 39–50)
HCT VFR BLD CALC: 28.4 % — LOW (ref 39–50)
HGB BLD-MCNC: 7.9 G/DL — LOW (ref 13–17)
HGB BLD-MCNC: 8.3 G/DL — LOW (ref 13–17)
HGB BLD-MCNC: 9.2 G/DL — LOW (ref 13–17)
IMM GRANULOCYTES NFR BLD AUTO: 0.5 % — SIGNIFICANT CHANGE UP (ref 0–0.9)
INR BLD: 1.3 RATIO — HIGH (ref 0.88–1.16)
LACTATE SERPL-SCNC: 2.3 MMOL/L — HIGH (ref 0.7–2)
LACTATE SERPL-SCNC: 3 MMOL/L — HIGH (ref 0.7–2)
LIDOCAIN IGE QN: 115 U/L — SIGNIFICANT CHANGE UP (ref 73–393)
LYMPHOCYTES # BLD AUTO: 0.82 K/UL — LOW (ref 1–3.3)
LYMPHOCYTES # BLD AUTO: 10.2 % — LOW (ref 13–44)
MCHC RBC-ENTMCNC: 29.6 PG — SIGNIFICANT CHANGE UP (ref 27–34)
MCHC RBC-ENTMCNC: 32.4 G/DL — SIGNIFICANT CHANGE UP (ref 32–36)
MCV RBC AUTO: 91.3 FL — SIGNIFICANT CHANGE UP (ref 80–100)
MONOCYTES # BLD AUTO: 0.58 K/UL — SIGNIFICANT CHANGE UP (ref 0–0.9)
MONOCYTES NFR BLD AUTO: 7.2 % — SIGNIFICANT CHANGE UP (ref 2–14)
NEUTROPHILS # BLD AUTO: 6.52 K/UL — SIGNIFICANT CHANGE UP (ref 1.8–7.4)
NEUTROPHILS NFR BLD AUTO: 81.5 % — HIGH (ref 43–77)
NRBC # BLD: 0 /100 WBCS — SIGNIFICANT CHANGE UP (ref 0–0)
PLATELET # BLD AUTO: 159 K/UL — SIGNIFICANT CHANGE UP (ref 150–400)
POTASSIUM SERPL-MCNC: 4.1 MMOL/L — SIGNIFICANT CHANGE UP (ref 3.5–5.3)
POTASSIUM SERPL-SCNC: 4.1 MMOL/L — SIGNIFICANT CHANGE UP (ref 3.5–5.3)
PROT SERPL-MCNC: 6.8 GM/DL — SIGNIFICANT CHANGE UP (ref 6–8.3)
PROTHROM AB SERPL-ACNC: 15.7 SEC — HIGH (ref 10.5–13.4)
RAPID RVP RESULT: SIGNIFICANT CHANGE UP
RBC # BLD: 3.11 M/UL — LOW (ref 4.2–5.8)
RBC # FLD: 16.3 % — HIGH (ref 10.3–14.5)
SARS-COV-2 RNA SPEC QL NAA+PROBE: SIGNIFICANT CHANGE UP
SODIUM SERPL-SCNC: 143 MMOL/L — SIGNIFICANT CHANGE UP (ref 135–145)
TROPONIN I, HIGH SENSITIVITY RESULT: 32.5 NG/L — SIGNIFICANT CHANGE UP
WBC # BLD: 8.01 K/UL — SIGNIFICANT CHANGE UP (ref 3.8–10.5)
WBC # FLD AUTO: 8.01 K/UL — SIGNIFICANT CHANGE UP (ref 3.8–10.5)

## 2023-06-15 PROCEDURE — 71045 X-RAY EXAM CHEST 1 VIEW: CPT | Mod: 26

## 2023-06-15 PROCEDURE — 74174 CTA ABD&PLVS W/CONTRAST: CPT | Mod: 26,MA

## 2023-06-15 PROCEDURE — 99285 EMERGENCY DEPT VISIT HI MDM: CPT

## 2023-06-15 PROCEDURE — 99223 1ST HOSP IP/OBS HIGH 75: CPT

## 2023-06-15 PROCEDURE — 93010 ELECTROCARDIOGRAM REPORT: CPT

## 2023-06-15 PROCEDURE — 12345: CPT | Mod: NC

## 2023-06-15 PROCEDURE — 70450 CT HEAD/BRAIN W/O DYE: CPT | Mod: 26,MA

## 2023-06-15 RX ORDER — LATANOPROST 0.05 MG/ML
1 SOLUTION/ DROPS OPHTHALMIC; TOPICAL
Refills: 0 | DISCHARGE

## 2023-06-15 RX ORDER — DEXTROSE 50 % IN WATER 50 %
12.5 SYRINGE (ML) INTRAVENOUS ONCE
Refills: 0 | Status: DISCONTINUED | OUTPATIENT
Start: 2023-06-15 | End: 2023-07-03

## 2023-06-15 RX ORDER — FINASTERIDE 5 MG/1
5 TABLET, FILM COATED ORAL DAILY
Refills: 0 | Status: DISCONTINUED | OUTPATIENT
Start: 2023-06-15 | End: 2023-07-03

## 2023-06-15 RX ORDER — LEVOTHYROXINE SODIUM 125 MCG
75 TABLET ORAL DAILY
Refills: 0 | Status: DISCONTINUED | OUTPATIENT
Start: 2023-06-15 | End: 2023-07-03

## 2023-06-15 RX ORDER — ACETAMINOPHEN 500 MG
650 TABLET ORAL EVERY 6 HOURS
Refills: 0 | Status: DISCONTINUED | OUTPATIENT
Start: 2023-06-15 | End: 2023-07-03

## 2023-06-15 RX ORDER — DEXTROSE 50 % IN WATER 50 %
25 SYRINGE (ML) INTRAVENOUS ONCE
Refills: 0 | Status: DISCONTINUED | OUTPATIENT
Start: 2023-06-15 | End: 2023-07-03

## 2023-06-15 RX ORDER — LATANOPROST 0.05 MG/ML
0 SOLUTION/ DROPS OPHTHALMIC; TOPICAL
Qty: 0 | Refills: 0 | DISCHARGE

## 2023-06-15 RX ORDER — LANOLIN ALCOHOL/MO/W.PET/CERES
3 CREAM (GRAM) TOPICAL AT BEDTIME
Refills: 0 | Status: DISCONTINUED | OUTPATIENT
Start: 2023-06-15 | End: 2023-07-03

## 2023-06-15 RX ORDER — ONDANSETRON 8 MG/1
4 TABLET, FILM COATED ORAL ONCE
Refills: 0 | Status: COMPLETED | OUTPATIENT
Start: 2023-06-15 | End: 2023-06-15

## 2023-06-15 RX ORDER — CILOSTAZOL 100 MG/1
100 TABLET ORAL
Refills: 0 | Status: DISCONTINUED | OUTPATIENT
Start: 2023-06-15 | End: 2023-07-03

## 2023-06-15 RX ORDER — INSULIN LISPRO 100/ML
VIAL (ML) SUBCUTANEOUS
Refills: 0 | Status: DISCONTINUED | OUTPATIENT
Start: 2023-06-15 | End: 2023-06-30

## 2023-06-15 RX ORDER — SODIUM CHLORIDE 9 MG/ML
1000 INJECTION INTRAMUSCULAR; INTRAVENOUS; SUBCUTANEOUS ONCE
Refills: 0 | Status: COMPLETED | OUTPATIENT
Start: 2023-06-15 | End: 2023-06-15

## 2023-06-15 RX ORDER — LOSARTAN POTASSIUM 100 MG/1
50 TABLET, FILM COATED ORAL DAILY
Refills: 0 | Status: DISCONTINUED | OUTPATIENT
Start: 2023-06-15 | End: 2023-06-16

## 2023-06-15 RX ORDER — GLUCAGON INJECTION, SOLUTION 0.5 MG/.1ML
1 INJECTION, SOLUTION SUBCUTANEOUS ONCE
Refills: 0 | Status: DISCONTINUED | OUTPATIENT
Start: 2023-06-15 | End: 2023-07-03

## 2023-06-15 RX ORDER — TRANEXAMIC ACID 100 MG/ML
1000 INJECTION, SOLUTION INTRAVENOUS ONCE
Refills: 0 | Status: COMPLETED | OUTPATIENT
Start: 2023-06-15 | End: 2023-06-15

## 2023-06-15 RX ORDER — ATORVASTATIN CALCIUM 80 MG/1
80 TABLET, FILM COATED ORAL AT BEDTIME
Refills: 0 | Status: DISCONTINUED | OUTPATIENT
Start: 2023-06-15 | End: 2023-07-03

## 2023-06-15 RX ORDER — METOPROLOL TARTRATE 50 MG
25 TABLET ORAL
Refills: 0 | Status: DISCONTINUED | OUTPATIENT
Start: 2023-06-15 | End: 2023-07-03

## 2023-06-15 RX ORDER — SODIUM CHLORIDE 9 MG/ML
1000 INJECTION INTRAMUSCULAR; INTRAVENOUS; SUBCUTANEOUS
Refills: 0 | Status: DISCONTINUED | OUTPATIENT
Start: 2023-06-15 | End: 2023-06-17

## 2023-06-15 RX ORDER — OMEPRAZOLE 10 MG/1
1 CAPSULE, DELAYED RELEASE ORAL
Qty: 0 | Refills: 0 | DISCHARGE

## 2023-06-15 RX ORDER — LATANOPROST 0.05 MG/ML
1 SOLUTION/ DROPS OPHTHALMIC; TOPICAL AT BEDTIME
Refills: 0 | Status: DISCONTINUED | OUTPATIENT
Start: 2023-06-15 | End: 2023-07-03

## 2023-06-15 RX ORDER — LATANOPROST 0.05 MG/ML
0 SOLUTION/ DROPS OPHTHALMIC; TOPICAL
Qty: 0 | Refills: 4 | DISCHARGE

## 2023-06-15 RX ORDER — SODIUM CHLORIDE 9 MG/ML
1000 INJECTION, SOLUTION INTRAVENOUS
Refills: 0 | Status: DISCONTINUED | OUTPATIENT
Start: 2023-06-15 | End: 2023-07-03

## 2023-06-15 RX ORDER — DEXTROSE 50 % IN WATER 50 %
15 SYRINGE (ML) INTRAVENOUS ONCE
Refills: 0 | Status: DISCONTINUED | OUTPATIENT
Start: 2023-06-15 | End: 2023-07-03

## 2023-06-15 RX ORDER — ACETAMINOPHEN 500 MG
975 TABLET ORAL ONCE
Refills: 0 | Status: COMPLETED | OUTPATIENT
Start: 2023-06-15 | End: 2023-06-15

## 2023-06-15 RX ORDER — ONDANSETRON 8 MG/1
4 TABLET, FILM COATED ORAL EVERY 8 HOURS
Refills: 0 | Status: DISCONTINUED | OUTPATIENT
Start: 2023-06-15 | End: 2023-07-03

## 2023-06-15 RX ORDER — PANTOPRAZOLE SODIUM 20 MG/1
40 TABLET, DELAYED RELEASE ORAL
Refills: 0 | Status: DISCONTINUED | OUTPATIENT
Start: 2023-06-15 | End: 2023-06-15

## 2023-06-15 RX ORDER — TAMSULOSIN HYDROCHLORIDE 0.4 MG/1
0.4 CAPSULE ORAL AT BEDTIME
Refills: 0 | Status: DISCONTINUED | OUTPATIENT
Start: 2023-06-15 | End: 2023-07-03

## 2023-06-15 RX ORDER — INSULIN LISPRO 100/ML
VIAL (ML) SUBCUTANEOUS AT BEDTIME
Refills: 0 | Status: DISCONTINUED | OUTPATIENT
Start: 2023-06-15 | End: 2023-06-30

## 2023-06-15 RX ORDER — PANTOPRAZOLE SODIUM 20 MG/1
8 TABLET, DELAYED RELEASE ORAL
Qty: 80 | Refills: 0 | Status: DISCONTINUED | OUTPATIENT
Start: 2023-06-15 | End: 2023-06-17

## 2023-06-15 RX ADMIN — PANTOPRAZOLE SODIUM 10 MG/HR: 20 TABLET, DELAYED RELEASE ORAL at 12:54

## 2023-06-15 RX ADMIN — Medication 975 MILLIGRAM(S): at 03:29

## 2023-06-15 RX ADMIN — CILOSTAZOL 100 MILLIGRAM(S): 100 TABLET ORAL at 17:44

## 2023-06-15 RX ADMIN — TRANEXAMIC ACID 1000 MILLIGRAM(S): 100 INJECTION, SOLUTION INTRAVENOUS at 03:00

## 2023-06-15 RX ADMIN — SODIUM CHLORIDE 1000 MILLILITER(S): 9 INJECTION INTRAMUSCULAR; INTRAVENOUS; SUBCUTANEOUS at 02:05

## 2023-06-15 RX ADMIN — CILOSTAZOL 100 MILLIGRAM(S): 100 TABLET ORAL at 06:59

## 2023-06-15 RX ADMIN — Medication 25 MILLIGRAM(S): at 06:59

## 2023-06-15 RX ADMIN — LATANOPROST 1 DROP(S): 0.05 SOLUTION/ DROPS OPHTHALMIC; TOPICAL at 21:27

## 2023-06-15 RX ADMIN — Medication 975 MILLIGRAM(S): at 02:29

## 2023-06-15 RX ADMIN — ATORVASTATIN CALCIUM 80 MILLIGRAM(S): 80 TABLET, FILM COATED ORAL at 21:26

## 2023-06-15 RX ADMIN — TAMSULOSIN HYDROCHLORIDE 0.4 MILLIGRAM(S): 0.4 CAPSULE ORAL at 21:26

## 2023-06-15 RX ADMIN — Medication 3: at 11:24

## 2023-06-15 RX ADMIN — SODIUM CHLORIDE 1000 MILLILITER(S): 9 INJECTION INTRAMUSCULAR; INTRAVENOUS; SUBCUTANEOUS at 03:21

## 2023-06-15 RX ADMIN — Medication 3: at 17:20

## 2023-06-15 RX ADMIN — SODIUM CHLORIDE 1000 MILLILITER(S): 9 INJECTION INTRAMUSCULAR; INTRAVENOUS; SUBCUTANEOUS at 04:12

## 2023-06-15 RX ADMIN — SODIUM CHLORIDE 1000 MILLILITER(S): 9 INJECTION INTRAMUSCULAR; INTRAVENOUS; SUBCUTANEOUS at 06:00

## 2023-06-15 RX ADMIN — LOSARTAN POTASSIUM 50 MILLIGRAM(S): 100 TABLET, FILM COATED ORAL at 06:59

## 2023-06-15 RX ADMIN — TRANEXAMIC ACID 220 MILLIGRAM(S): 100 INJECTION, SOLUTION INTRAVENOUS at 02:26

## 2023-06-15 RX ADMIN — Medication 1: at 07:59

## 2023-06-15 RX ADMIN — PANTOPRAZOLE SODIUM 10 MG/HR: 20 TABLET, DELAYED RELEASE ORAL at 02:22

## 2023-06-15 RX ADMIN — ONDANSETRON 4 MILLIGRAM(S): 8 TABLET, FILM COATED ORAL at 02:30

## 2023-06-15 RX ADMIN — Medication 75 MICROGRAM(S): at 06:59

## 2023-06-15 RX ADMIN — SODIUM CHLORIDE 100 MILLILITER(S): 9 INJECTION INTRAMUSCULAR; INTRAVENOUS; SUBCUTANEOUS at 17:20

## 2023-06-15 RX ADMIN — SODIUM CHLORIDE 100 MILLILITER(S): 9 INJECTION INTRAMUSCULAR; INTRAVENOUS; SUBCUTANEOUS at 06:00

## 2023-06-15 RX ADMIN — Medication 2: at 21:33

## 2023-06-15 RX ADMIN — FINASTERIDE 5 MILLIGRAM(S): 5 TABLET, FILM COATED ORAL at 11:24

## 2023-06-15 NOTE — PATIENT PROFILE ADULT - NSPRONUTRITIONRISK_GEN_A_NUR
normal mouth and gums/moist normal mouth and gums/moist normal mouth and gums/moist normal mouth and gums/moist normal mouth and gums/moist normal mouth and gums/moist normal mouth and gums/moist normal mouth and gums/moist normal mouth and gums/moist No indicators present

## 2023-06-15 NOTE — ED ADULT NURSE NOTE - OBJECTIVE STATEMENT
Pt presents disoriented to time but alert. Per pt daughter, he had 2 episodes of coffee ground emesis last night and became unresponsive shortly after. Pt became responsive again after receiving 1L NS per EMS. Pt is now AOxself and situation. Hx HTN, CAD, DM, HLD, MI, CVA with residual L side weakness

## 2023-06-15 NOTE — ED ADULT TRIAGE NOTE - CHIEF COMPLAINT QUOTE
vomiting coffee ground twice , b/p at home 83/49 and unresponsive , woke up after fluids one liter normal saline , Zofran 5.5mg iv pt is taking cilostazol , denies pain

## 2023-06-15 NOTE — H&P ADULT - NSHPPHYSICALEXAM_GEN_ALL_CORE
PHYSICAL EXAM:  GENERAL: NAD, lying in bed comfortably  HEAD:  Atraumatic, Normocephalic  EYES: EOMI, PERRLA, conjunctiva and sclera clear  ENT: Moist mucous membranes  NECK: Supple, No JVD  CHEST/LUNG: Clear to auscultation bilaterally; No rales, rhonchi, wheezing, or rubs. Unlabored respirations  HEART: Regular rate and rhythm; No murmurs, rubs, or gallops  ABDOMEN: Bowel sounds present; Soft, Nontender, Nondistended. No hepatomegally  EXTREMITIES:  2+ Peripheral Pulses, brisk capillary refill. No clubbing, cyanosis, or edema  NERVOUS SYSTEM:  Alert & Oriented X3, speech clear. No deficits   MSK: FROM all 4 extremities, full and equal strength  SKIN: No rashes or lesions PHYSICAL EXAM:  GENERAL: NAD, lying in bed comfortably  HEAD:  Atraumatic, Normocephalic  EYES: EOMI, PERRLA, conjunctiva and sclera clear  ENT: Moist mucous membranes  NECK: Supple, No JVD  CHEST/LUNG: Clear to auscultation bilaterally; No rales, rhonchi, wheezing, or rubs. Unlabored respirations  HEART: Regular rate and rhythm; No murmurs, rubs, or gallops  ABDOMEN: Bowel sounds present; Soft, Nontender, Nondistended. No hepatomegally  EXTREMITIES:  2+ Peripheral Pulses, brisk capillary refill. No clubbing, cyanosis, or edema  NERVOUS SYSTEM:  Alert & Oriented X1, speech clear. No deficits. B/L legs muscle atrophy with dressing/crepe bandage in place.   MSK: FROM all 4 extremities.  SKIN: No rashes or lesions

## 2023-06-15 NOTE — ED PROVIDER NOTE - CLINICAL SUMMARY MEDICAL DECISION MAKING FREE TEXT BOX
89 yo M with coffee ground emesis, concerning for UGIB  -basic labs, type and screen, blood cx, ua, cx, lactate, lipase, occult stool for blood, rvp, trop, CTA ab/pel, CT Brain (for ?unresponsiveness), cxr, ekg, iv, monitor, hydration, tylenol prn, zofran for nausea, protonix infusion for GIB as well as TXA for bleeding coverage, npo, monitor  -f/u results, reeval, admit to tele likely if vitals stable

## 2023-06-15 NOTE — ED PROVIDER NOTE - IV ALTEPLASE ADMIN OUTSIDE HIDDEN
show Otezla Counseling: The side effects of Otezla were discussed with the patient, including but not limited to worsening or new depression, weight loss, diarrhea, nausea, upper respiratory tract infection, and headache. Patient instructed to call the office should any adverse effect occur.  The patient verbalized understanding of the proper use and possible adverse effects of Otezla.  All the patient's questions and concerns were addressed.

## 2023-06-15 NOTE — ED PROVIDER NOTE - OBJECTIVE STATEMENT
89 yo M bibems from home with coffee ground emesis x 2.  Pt. was found to be hypotensive at 83/49 and unresponsive.  Pt. woke up after fluids started (NS) by EMS and zofran given for nauesa.  Pt. denies abd pain.  NO other complaints or inciting event.   ROS: negative for fever, cough, headache, chest pain, shortness of breath, abd pain, diarrhea, rash, paresthesia, and focal weakness--all other systems reviewed are negative.   PMH: hypertension, diabetes, CAD with last stent 2009, prostate cancer status post TURP; Meds: See EMR for list; SH: Denies smoking/drinking/drug use

## 2023-06-15 NOTE — PROGRESS NOTE ADULT - ASSESSMENT
The patient is a 89y Male with significant PMH of HTN, CAD, s/p stent in 2009, HPL, DM-2, CKD-stage III, CVA with muscle wasting, H/o prostate CA s/p TURP, Glaucoma, B/L muscle atrophy and others was BIBA in ED from home with coffee ground emesis x 2.  Pt. was found to be hypotensive at 83/49 and unresponsive per EMS.  Pt. woke up after fluids started (NS) by EMS and Zofran given for nausea.  Patient is a very poor historian. I was not able to get any information from the patient. Most of the information has been obtained from EMR documentation and in discussion with ED provider. Patient is on ASA. No melena or hemoptysis or hematuria reported. He is not in any acute distress. No family members at bedside.     # Acute blood loss anemia with hematemesis with possible PUD, erossive gastritis/duodenitis/esophagitis or AVMs.  Hb is 9.2 (with baseline Hb 10.0-11.3).  Admit to telemetry.  Maintenance IV fluids.  Monitor H/H q6h, and transfuse PRBC if HB <8.0  Hold his ASA for now.  Continue Protonix drip   Appreciate GI recs- will c/w CLD and f/u repeat cbc in AM    # Uncontrolled DM-2 with hyperglycemia.  .  A1c level.  ISS with coverage.  Accu-checks q4h while NPO.    # Acute worsening of CKD stage III.  Cr 2.54 ( was 1.44 in 01/2023).  Adequate hydration.  BMP in am.    # HTN, CAD, s/p stent and HPL.  Stable and no acute issue.  Continue his Metoprolol, Losartan and Atorvastatin.    # H/o Prostate cancer and TURP.  On Flomax and Finasteride.    # Hypothyroidism   On Levothyroxine.    # DVT prophylaxis: SCDs for now.

## 2023-06-15 NOTE — ED PROVIDER NOTE - PHYSICAL EXAMINATION
Vitals: WNL  Gen: AAOx3, NAD, sitting comfortably in stretcher, calm, non-toxic, responsive to questions   Head: ncat, perrla, eomi b/l  Neck: supple, no lymphadenopathy, no midline deviation  Heart: rrr, no m/r/g  Lungs: CTA b/l, no rales/ronchi/wheezes  Abd: soft, nontender, non-distended, no rebound or guarding  Ext: no clubbing/cyanosis/edema  Neuro: sensation and muscle strength intact b/l, steady gait

## 2023-06-15 NOTE — ED PROVIDER NOTE - PROGRESS NOTE DETAILS
vitals improved/normalized  no active bleeding on CTA  pt. comfortable  will admit for further monitoring/GI eval in AM

## 2023-06-15 NOTE — H&P ADULT - HISTORY OF PRESENT ILLNESS
Chief Complaint: vomiting blood.    The patient is a 89y Male with significant PMH of HTN, CAD, s/p stent in 2009, HPL, DM-2, CKD-stage III, H/o prostate CA s/p TURP, Glaucoma, B/L muscle atrophy and others was BIBA in ED from home with coffee ground emesis x 2.  Pt. was found to be hypotensive at 83/49 and unresponsive per EMS.  Pt. woke up after fluids started (NS) by EMS and Zofran given for nausea.  Patient is a very poor historian. I was not able to get any information from the patient. Most of the information has been obtained from EMR documentation and in discussion with ED provider. Patient is on ASA. No melena or hemoptysis or hematuria reported. He is not in any acute distress. No family members at bedside.   Vitals stable.  Hb is 9.2 (with baseline Hb 10.0-11.3), BUN 42, Cr 2.50, , LA 3.0->2.3. Lipase normal.    CT abd/pelvis: No CT evidence of acute GI bleeding at the time scanning. Constipation. There is no associated obstruction. Thick-walled appearance of the urinary bladder which may be related to sequelae of chronic outlet obstruction from prostate enlargement or cystitis.  Diffuse esophageal wall thickening suggestive of esophagitis. There is also mild gastric wall thickening which may be related to under distention or gastritis.    CT Head: No acute intracranial hemorrhage, mass effect, or midline shift.    Protonix drip started in ED after bolus dose,          Chief Complaint: vomiting blood.    The patient is a 89y Male with significant PMH of HTN, CAD, s/p stent in 2009, HPL, DM-2, CKD-stage III, CVA with muscle wasting, H/o prostate CA s/p TURP, Glaucoma, B/L muscle atrophy and others was BIBA in ED from home with coffee ground emesis x 2.  Pt. was found to be hypotensive at 83/49 and unresponsive per EMS.  Pt. woke up after fluids started (NS) by EMS and Zofran given for nausea.  Patient is a very poor historian. I was not able to get any information from the patient. Most of the information has been obtained from EMR documentation and in discussion with ED provider. Patient is on ASA. No melena or hemoptysis or hematuria reported. He is not in any acute distress. No family members at bedside.   Vitals stable.  Hb is 9.2 (with baseline Hb 10.0-11.3), BUN 42, Cr 2.50, , LA 3.0->2.3. Lipase normal.    CT abd/pelvis: No CT evidence of acute GI bleeding at the time scanning. Constipation. There is no associated obstruction. Thick-walled appearance of the urinary bladder which may be related to sequelae of chronic outlet obstruction from prostate enlargement or cystitis.  Diffuse esophageal wall thickening suggestive of esophagitis. There is also mild gastric wall thickening which may be related to under distention or gastritis.    CT Head: No acute intracranial hemorrhage, mass effect, or midline shift.    Protonix drip started in ED after bolus dose,

## 2023-06-15 NOTE — PROGRESS NOTE ADULT - SUBJECTIVE AND OBJECTIVE BOX
Patient is a 89y old  Male who presents with a chief complaint of Hematemesis (15 Cam 2023 13:39)    INTERVAL HPI/OVERNIGHT EVENTS: No acute events overnight. HD stable. No episodes of GIB overnight.     MEDICATIONS  (STANDING):  atorvastatin 80 milliGRAM(s) Oral at bedtime  cilostazol 100 milliGRAM(s) Oral two times a day  dextrose 5%. 1000 milliLiter(s) (50 mL/Hr) IV Continuous <Continuous>  dextrose 5%. 1000 milliLiter(s) (100 mL/Hr) IV Continuous <Continuous>  dextrose 50% Injectable 25 Gram(s) IV Push once  dextrose 50% Injectable 12.5 Gram(s) IV Push once  dextrose 50% Injectable 25 Gram(s) IV Push once  finasteride 5 milliGRAM(s) Oral daily  glucagon  Injectable 1 milliGRAM(s) IntraMuscular once  insulin lispro (ADMELOG) corrective regimen sliding scale   SubCutaneous three times a day before meals  insulin lispro (ADMELOG) corrective regimen sliding scale   SubCutaneous at bedtime  latanoprost 0.005% Ophthalmic Solution 1 Drop(s) Both EYES at bedtime  levothyroxine 75 MICROGram(s) Oral daily  losartan 50 milliGRAM(s) Oral daily  metoprolol tartrate 25 milliGRAM(s) Oral two times a day  pantoprazole Infusion 8 mG/Hr (10 mL/Hr) IV Continuous <Continuous>  sodium chloride 0.9%. 1000 milliLiter(s) (100 mL/Hr) IV Continuous <Continuous>  tamsulosin 0.4 milliGRAM(s) Oral at bedtime    MEDICATIONS  (PRN):  acetaminophen     Tablet .. 650 milliGRAM(s) Oral every 6 hours PRN Temp greater or equal to 38C (100.4F), Mild Pain (1 - 3)  aluminum hydroxide/magnesium hydroxide/simethicone Suspension 30 milliLiter(s) Oral every 4 hours PRN Dyspepsia  dextrose Oral Gel 15 Gram(s) Oral once PRN Blood Glucose LESS THAN 70 milliGRAM(s)/deciliter  melatonin 3 milliGRAM(s) Oral at bedtime PRN Insomnia  ondansetron Injectable 4 milliGRAM(s) IV Push every 8 hours PRN Nausea and/or Vomiting      Allergies    No Known Allergies    Intolerances        REVIEW OF SYSTEMS: all negative with exception of above    Vital Signs Last 24 Hrs  T(C): 36.7 (15 Cam 2023 10:50), Max: 36.9 (15 Cam 2023 01:31)  T(F): 98 (15 Cam 2023 10:50), Max: 98.5 (15 Cam 2023 01:31)  HR: 106 (15 Cam 2023 10:50) (85 - 106)  BP: 97/58 (15 Cam 2023 10:50) (85/61 - 135/65)  BP(mean): --  RR: 17 (15 Cam 2023 10:50) (13 - 19)  SpO2: 98% (15 Cam 2023 10:50) (98% - 100%)    Parameters below as of 15 Cam 2023 10:50  Patient On (Oxygen Delivery Method): nasal cannula        PHYSICAL EXAM:  GENERAL: NAD, lying in bed comfortably  CHEST/LUNG: Clear to auscultation bilaterally; No rales, rhonchi, wheezing, or rubs. Unlabored respirations  HEART: Regular rate and rhythm; No murmurs, rubs, or gallops  ABDOMEN: Bowel sounds present; Soft, Nontender, Nondistended. No hepatomegally  EXTREMITIES:  2+ Peripheral Pulses, brisk capillary refill. No clubbing, cyanosis, or edema  NERVOUS SYSTEM:  Alert & Oriented X1, speech clear. No deficits. B/L legs muscle atrophy with dressing/crepe bandage in place.     LABS:                        7.9    x     )-----------( x        ( 15 Cam 2023 13:52 )             24.4     06-15    143  |  113<H>  |  42<H>  ----------------------------<  197<H>  4.1   |  23  |  2.58<H>    Ca    9.2      15 Cam 2023 02:00    TPro  6.8  /  Alb  2.5<L>  /  TBili  1.0  /  DBili  x   /  AST  25  /  ALT  17  /  AlkPhos  88  06-15    PT/INR - ( 15 Cam 2023 08:15 )   PT: 15.7 sec;   INR: 1.30 ratio             CAPILLARY BLOOD GLUCOSE      POCT Blood Glucose.: 259 mg/dL (15 Cam 2023 10:57)  POCT Blood Glucose.: 162 mg/dL (15 Cam 2023 07:43)      RADIOLOGY & ADDITIONAL TESTS:    Imaging Personally Reviewed:  [ ] YES  [ ] NO    Consultant(s) Notes Reviewed:  [ ] YES  [ ] NO    Care Discussed with Consultants/Other Providers [ ] YES  [ ] NO

## 2023-06-15 NOTE — ED ADULT NURSE NOTE - NSFALLHARMRISKINTERV_ED_ALL_ED

## 2023-06-15 NOTE — H&P ADULT - NSICDXPASTMEDICALHX_GEN_ALL_CORE_FT
PAST MEDICAL HISTORY:  BPH without urinary obstruction     CAD (Coronary Artery Disease)     DM Type 2 (Diabetes Mellitus,     High cholesterol     History of PTCA 7/09, stents to Cx and RCA    HTN (hypertension)     Hypercholesteremia     Old MI (Myocardial Infarction) 7/09    PC (Prostate Cancer) treated surgically    Personal History of Hypertensi     Prostate CA     Type 2 diabetes mellitus      PAST MEDICAL HISTORY:  BPH without urinary obstruction     CAD (Coronary Artery Disease)     DM Type 2 (Diabetes Mellitus,     High cholesterol     History of PTCA 7/09, stents to Cx and RCA    HTN (hypertension)     Hypercholesteremia     Old MI (Myocardial Infarction) 7/09    PC (Prostate Cancer) treated surgically    Personal History of Hypertensi     Prostate CA     Stage 3 chronic kidney disease     Type 2 diabetes mellitus

## 2023-06-15 NOTE — PATIENT PROFILE ADULT - FALL HARM RISK - HARM RISK INTERVENTIONS

## 2023-06-15 NOTE — H&P ADULT - NSHPLABSRESULTS_GEN_ALL_CORE
LABS:                        9.2    8.01  )-----------( 159      ( 15 Cam 2023 02:00 )             28.4     06-15    143  |  113<H>  |  42<H>  ----------------------------<  197<H>  4.1   |  23  |  2.58<H>    Ca    9.2      15 Cam 2023 02:00    TPro  6.8  /  Alb  2.5<L>  /  TBili  1.0  /  DBili  x   /  AST  25  /  ALT  17  /  AlkPhos  88  06-15 LABS:                        9.2    8.01  )-----------( 159      ( 15 Cam 2023 02:00 )             28.4     06-15    143  |  113<H>  |  42<H>  ----------------------------<  197<H>  4.1   |  23  |  2.58<H>    Ca    9.2      15 Cam 2023 02:00    TPro  6.8  /  Alb  2.5<L>  /  TBili  1.0  /  DBili  x   /  AST  25  /  ALT  17  /  AlkPhos  88  06-15    < from: CT Angio Abdomen and Pelvis w/ IV Cont (06.15.23 @ 03:15) >    IMPRESSION:  No CT evidence of acute GI bleeding at the time scanning.    Constipation.    Configuration of mesenteric vasculature and lateralization of right-sided   small bowel loops suggestive of an internal hernia. There is no   associated obstruction.    Thick-walled appearance of the urinary bladder which may be related to   sequelae of chronic outlet obstruction from prostate enlargement or   cystitis. Correlate with urinalysis.    Bilateral renal cysts and indeterminate lesion as above. MRI can be   obtained as deemed clinically indicated.     Diffuse esophageal wall thickening suggestive of esophagitis. There is   also mild gastric wall thickening which may be related to under   distention or gastritis.    Question mild diffuse wall thickening of the gallbladder.        --- End of Report ---        < from: CT Head No Cont (06.15.23 @ 03:02) >      IMPRESSION:  No acute intracranial hemorrhage, mass effect, or midline shift.    --- End of Report ---    < end of copied text >

## 2023-06-15 NOTE — H&P ADULT - ASSESSMENT
The patient is a 89y Male with significant PMH of HTN, CAD, s/p stent in 2009, HPL, DM-2, CKD-stage III, H/o prostate CA s/p TURP, Glaucoma, B/L muscle atrophy and others was BIBA in ED from home with coffee ground emesis x 2.  Pt. was found to be hypotensive at 83/49 and unresponsive per EMS.  Pt. woke up after fluids started (NS) by EMS and Zofran given for nausea.  Patient is a very poor historian. I was not able to get any information from the patient. Most of the information has been obtained from EMR documentation and in discussion with ED provider. Patient is on ASA. No melena or hemoptysis or hematuria reported. He is not in any acute distress. No family members at bedside.       # Acute blood loss anemia with hematemesis with possible PUD, erossive gastritis/duodenitis/esophagitis or AVMs.  Hb is 9.2 (with baseline Hb 10.0-11.3).  Admit to telemetry.  Maintenance IV fluids.  Monitor H/H q6h, and transfuse PRBC if HB <8.0  Hold his ASA for now.  Continue Protonix drip   Consult GI in am.    # Uncontrolled DM-2 with hyperglycemia.  .  A1c level.  ISS with coverage.  Accu-checks q4h while NPO.    # Acute worsening of CKD stage III.  Cr 2.54 ( was 1.44 in 01/2023).  Adequate hydration.  BMP in am.    # HTN, CAD, s/p stent and HPL.  Stable and no acute issue.  Continue his Metoprolol, Losartan and Atorvastatin.    # H/o Prostate cancer and TURP.  On Flomax and Finasteride.    # Hypothyroidism   On Levothyroxine.    # DVT prophylaxis: SCDs for now.   The patient is a 89y Male with significant PMH of HTN, CAD, s/p stent in 2009, HPL, DM-2, CKD-stage III, CVA with muscle wasting, H/o prostate CA s/p TURP, Glaucoma, B/L muscle atrophy and others was BIBA in ED from home with coffee ground emesis x 2.  Pt. was found to be hypotensive at 83/49 and unresponsive per EMS.  Pt. woke up after fluids started (NS) by EMS and Zofran given for nausea.  Patient is a very poor historian. I was not able to get any information from the patient. Most of the information has been obtained from EMR documentation and in discussion with ED provider. Patient is on ASA. No melena or hemoptysis or hematuria reported. He is not in any acute distress. No family members at bedside.       # Acute blood loss anemia with hematemesis with possible PUD, erossive gastritis/duodenitis/esophagitis or AVMs.  Hb is 9.2 (with baseline Hb 10.0-11.3).  Admit to telemetry.  Maintenance IV fluids.  Monitor H/H q6h, and transfuse PRBC if HB <8.0  Hold his ASA for now.  Continue Protonix drip   Consult GI in am.    # Uncontrolled DM-2 with hyperglycemia.  .  A1c level.  ISS with coverage.  Accu-checks q4h while NPO.    # Acute worsening of CKD stage III.  Cr 2.54 ( was 1.44 in 01/2023).  Adequate hydration.  BMP in am.    # HTN, CAD, s/p stent and HPL.  Stable and no acute issue.  Continue his Metoprolol, Losartan and Atorvastatin.    # H/o Prostate cancer and TURP.  On Flomax and Finasteride.    # Hypothyroidism   On Levothyroxine.    # DVT prophylaxis: SCDs for now.

## 2023-06-15 NOTE — ED ADULT NURSE REASSESSMENT NOTE - NS ED NURSE REASSESS COMMENT FT1
Pt is resting in bed after returning from CT. Meds are infusing per order with no signs of adverse rxn. Cardiac monitoring and safety measures in place, family at bedside.

## 2023-06-16 LAB
A1C WITH ESTIMATED AVERAGE GLUCOSE RESULT: 13.2 % — HIGH (ref 4–5.6)
ABO RH CONFIRMATION: SIGNIFICANT CHANGE UP
ALBUMIN SERPL ELPH-MCNC: 2 G/DL — LOW (ref 3.3–5)
ALP SERPL-CCNC: 74 U/L — SIGNIFICANT CHANGE UP (ref 40–120)
ALT FLD-CCNC: 18 U/L — SIGNIFICANT CHANGE UP (ref 12–78)
ANION GAP SERPL CALC-SCNC: 7 MMOL/L — SIGNIFICANT CHANGE UP (ref 5–17)
AST SERPL-CCNC: 41 U/L — HIGH (ref 15–37)
BILIRUB SERPL-MCNC: 0.7 MG/DL — SIGNIFICANT CHANGE UP (ref 0.2–1.2)
BUN SERPL-MCNC: 37 MG/DL — HIGH (ref 7–23)
CALCIUM SERPL-MCNC: 7.8 MG/DL — LOW (ref 8.5–10.1)
CHLORIDE SERPL-SCNC: 119 MMOL/L — HIGH (ref 96–108)
CO2 SERPL-SCNC: 18 MMOL/L — LOW (ref 22–31)
CREAT SERPL-MCNC: 2.6 MG/DL — HIGH (ref 0.5–1.3)
EGFR: 23 ML/MIN/1.73M2 — LOW
ESTIMATED AVERAGE GLUCOSE: 332 MG/DL — HIGH (ref 68–114)
GLUCOSE BLDC GLUCOMTR-MCNC: 140 MG/DL — HIGH (ref 70–99)
GLUCOSE BLDC GLUCOMTR-MCNC: 258 MG/DL — HIGH (ref 70–99)
GLUCOSE BLDC GLUCOMTR-MCNC: 272 MG/DL — HIGH (ref 70–99)
GLUCOSE BLDC GLUCOMTR-MCNC: 361 MG/DL — HIGH (ref 70–99)
GLUCOSE SERPL-MCNC: 126 MG/DL — HIGH (ref 70–99)
HCT VFR BLD CALC: 23.8 % — LOW (ref 39–50)
HGB BLD-MCNC: 7.6 G/DL — LOW (ref 13–17)
MCHC RBC-ENTMCNC: 29.1 PG — SIGNIFICANT CHANGE UP (ref 27–34)
MCHC RBC-ENTMCNC: 31.9 G/DL — LOW (ref 32–36)
MCV RBC AUTO: 91.2 FL — SIGNIFICANT CHANGE UP (ref 80–100)
NRBC # BLD: 0 /100 WBCS — SIGNIFICANT CHANGE UP (ref 0–0)
PLATELET # BLD AUTO: 142 K/UL — LOW (ref 150–400)
POTASSIUM SERPL-MCNC: 3.8 MMOL/L — SIGNIFICANT CHANGE UP (ref 3.5–5.3)
POTASSIUM SERPL-SCNC: 3.8 MMOL/L — SIGNIFICANT CHANGE UP (ref 3.5–5.3)
PROT SERPL-MCNC: 5.8 GM/DL — LOW (ref 6–8.3)
RBC # BLD: 2.61 M/UL — LOW (ref 4.2–5.8)
RBC # FLD: 16.3 % — HIGH (ref 10.3–14.5)
SODIUM SERPL-SCNC: 144 MMOL/L — SIGNIFICANT CHANGE UP (ref 135–145)
WBC # BLD: 7.71 K/UL — SIGNIFICANT CHANGE UP (ref 3.8–10.5)
WBC # FLD AUTO: 7.71 K/UL — SIGNIFICANT CHANGE UP (ref 3.8–10.5)

## 2023-06-16 PROCEDURE — 99233 SBSQ HOSP IP/OBS HIGH 50: CPT

## 2023-06-16 RX ADMIN — SODIUM CHLORIDE 100 MILLILITER(S): 9 INJECTION INTRAMUSCULAR; INTRAVENOUS; SUBCUTANEOUS at 01:58

## 2023-06-16 RX ADMIN — SODIUM CHLORIDE 100 MILLILITER(S): 9 INJECTION INTRAMUSCULAR; INTRAVENOUS; SUBCUTANEOUS at 23:09

## 2023-06-16 RX ADMIN — CILOSTAZOL 100 MILLIGRAM(S): 100 TABLET ORAL at 06:39

## 2023-06-16 RX ADMIN — FINASTERIDE 5 MILLIGRAM(S): 5 TABLET, FILM COATED ORAL at 11:40

## 2023-06-16 RX ADMIN — Medication 1: at 21:58

## 2023-06-16 RX ADMIN — LATANOPROST 1 DROP(S): 0.05 SOLUTION/ DROPS OPHTHALMIC; TOPICAL at 23:09

## 2023-06-16 RX ADMIN — ATORVASTATIN CALCIUM 80 MILLIGRAM(S): 80 TABLET, FILM COATED ORAL at 22:03

## 2023-06-16 RX ADMIN — Medication 75 MICROGRAM(S): at 06:39

## 2023-06-16 RX ADMIN — PANTOPRAZOLE SODIUM 10 MG/HR: 20 TABLET, DELAYED RELEASE ORAL at 02:00

## 2023-06-16 RX ADMIN — TAMSULOSIN HYDROCHLORIDE 0.4 MILLIGRAM(S): 0.4 CAPSULE ORAL at 22:05

## 2023-06-16 RX ADMIN — SODIUM CHLORIDE 100 MILLILITER(S): 9 INJECTION INTRAMUSCULAR; INTRAVENOUS; SUBCUTANEOUS at 11:45

## 2023-06-16 RX ADMIN — Medication 25 MILLIGRAM(S): at 06:42

## 2023-06-16 RX ADMIN — Medication 5: at 17:05

## 2023-06-16 RX ADMIN — PANTOPRAZOLE SODIUM 10 MG/HR: 20 TABLET, DELAYED RELEASE ORAL at 09:33

## 2023-06-16 RX ADMIN — CILOSTAZOL 100 MILLIGRAM(S): 100 TABLET ORAL at 17:06

## 2023-06-16 RX ADMIN — Medication 3: at 11:40

## 2023-06-16 NOTE — PROGRESS NOTE ADULT - ASSESSMENT
The patient is a 89y Male with significant PMH of HTN, CAD, s/p stent in 2009, HPL, DM-2, CKD-stage III, CVA with muscle wasting, H/o prostate CA s/p TURP, Glaucoma, B/L muscle atrophy and others was BIBA in ED from home with coffee ground emesis x 2.  Pt. was found to be hypotensive at 83/49 and unresponsive per EMS.  Pt. woke up after fluids started (NS) by EMS and Zofran given for nausea.  Patient is a very poor historian. I was not able to get any information from the patient. Most of the information has been obtained from EMR documentation and in discussion with ED provider. Patient is on ASA. No melena or hemoptysis or hematuria reported. He is not in any acute distress. No family members at bedside.     # Acute blood loss anemia with hematemesis with possible PUD, erossive gastritis/duodenitis/esophagitis or AVMs.  Hb is 9.2 (with baseline Hb 10.0-11.3).  Admit to telemetry.  Maintenance IV fluids.  Monitor H/H q6h, and transfuse PRBC if HB <8.0  Hold his ASA for now.  Continue Protonix drip   Appreciate GI recs- will c/w CLD and f/u repeat cbc  - Will transfuse 1U PRBC    # Uncontrolled DM-2 with hyperglycemia.  .  A1c level- 13.2  ISS with coverage.  Accu-checks q4h while NPO.    # Acute worsening of CKD stage III.  Cr 2.54 ( was 1.44 in 01/2023).  Adequate hydration.  BMP in am.    # HTN, CAD, s/p stent and HPL.  Stable and no acute issue.  Continue his Metoprolol, Losartan and Atorvastatin.    # H/o Prostate cancer and TURP.  On Flomax and Finasteride.    # Hypothyroidism   On Levothyroxine.    # DVT prophylaxis: SCDs for now.

## 2023-06-16 NOTE — PROGRESS NOTE ADULT - SUBJECTIVE AND OBJECTIVE BOX
Patient is a 89y old  Male who presents with a chief complaint of Hematemesis (16 Jun 2023 11:03)    INTERVAL HPI/OVERNIGHT EVENTS: No acute events overnight. HD stable. H/H continues to downtrend. No episodes of bleeding.     MEDICATIONS  (STANDING):  atorvastatin 80 milliGRAM(s) Oral at bedtime  cilostazol 100 milliGRAM(s) Oral two times a day  dextrose 5%. 1000 milliLiter(s) (50 mL/Hr) IV Continuous <Continuous>  dextrose 5%. 1000 milliLiter(s) (100 mL/Hr) IV Continuous <Continuous>  dextrose 50% Injectable 25 Gram(s) IV Push once  dextrose 50% Injectable 12.5 Gram(s) IV Push once  dextrose 50% Injectable 25 Gram(s) IV Push once  finasteride 5 milliGRAM(s) Oral daily  glucagon  Injectable 1 milliGRAM(s) IntraMuscular once  insulin lispro (ADMELOG) corrective regimen sliding scale   SubCutaneous three times a day before meals  insulin lispro (ADMELOG) corrective regimen sliding scale   SubCutaneous at bedtime  latanoprost 0.005% Ophthalmic Solution 1 Drop(s) Both EYES at bedtime  levothyroxine 75 MICROGram(s) Oral daily  losartan 50 milliGRAM(s) Oral daily  metoprolol tartrate 25 milliGRAM(s) Oral two times a day  pantoprazole Infusion 8 mG/Hr (10 mL/Hr) IV Continuous <Continuous>  sodium chloride 0.9%. 1000 milliLiter(s) (100 mL/Hr) IV Continuous <Continuous>  tamsulosin 0.4 milliGRAM(s) Oral at bedtime    MEDICATIONS  (PRN):  acetaminophen     Tablet .. 650 milliGRAM(s) Oral every 6 hours PRN Temp greater or equal to 38C (100.4F), Mild Pain (1 - 3)  aluminum hydroxide/magnesium hydroxide/simethicone Suspension 30 milliLiter(s) Oral every 4 hours PRN Dyspepsia  dextrose Oral Gel 15 Gram(s) Oral once PRN Blood Glucose LESS THAN 70 milliGRAM(s)/deciliter  melatonin 3 milliGRAM(s) Oral at bedtime PRN Insomnia  ondansetron Injectable 4 milliGRAM(s) IV Push every 8 hours PRN Nausea and/or Vomiting      Allergies    No Known Allergies    Intolerances        REVIEW OF SYSTEMS: all negative with exception of above    Vital Signs Last 24 Hrs  T(C): 36.4 (16 Jun 2023 10:37), Max: 37.6 (15 Cam 2023 23:38)  T(F): 97.6 (16 Jun 2023 10:37), Max: 99.7 (15 Cam 2023 23:38)  HR: 76 (16 Jun 2023 10:37) (76 - 110)  BP: 91/56 (16 Jun 2023 10:37) (91/56 - 101/61)  BP(mean): --  RR: 18 (16 Jun 2023 10:37) (16 - 18)  SpO2: 97% (16 Jun 2023 10:37) (96% - 97%)    Parameters below as of 16 Jun 2023 10:37  Patient On (Oxygen Delivery Method): nasal cannula        PHYSICAL EXAM:  GENERAL: NAD, lying in bed comfortably  CHEST/LUNG: Clear to auscultation bilaterally; No rales, rhonchi, wheezing, or rubs. Unlabored respirations  HEART: Regular rate and rhythm; No murmurs, rubs, or gallops  ABDOMEN: Bowel sounds present; Soft, Nontender, Nondistended. No hepatomegally  EXTREMITIES:  2+ Peripheral Pulses, brisk capillary refill. No clubbing, cyanosis, or edema  NERVOUS SYSTEM:  Alert & Oriented X1, speech clear. No deficits. B/L legs muscle atrophy with dressing/crepe bandage in place.     LABS:                        7.6    7.71  )-----------( 142      ( 16 Jun 2023 05:58 )             23.8     06-15    143  |  113<H>  |  42<H>  ----------------------------<  197<H>  4.1   |  23  |  2.58<H>    Ca    9.2      15 Cam 2023 02:00    TPro  6.8  /  Alb  2.5<L>  /  TBili  1.0  /  DBili  x   /  AST  25  /  ALT  17  /  AlkPhos  88  06-15    PT/INR - ( 15 Cam 2023 08:15 )   PT: 15.7 sec;   INR: 1.30 ratio             CAPILLARY BLOOD GLUCOSE      POCT Blood Glucose.: 258 mg/dL (16 Jun 2023 11:35)  POCT Blood Glucose.: 140 mg/dL (16 Jun 2023 07:47)  POCT Blood Glucose.: 329 mg/dL (15 Cam 2023 21:15)  POCT Blood Glucose.: 269 mg/dL (15 Cam 2023 17:02)      RADIOLOGY & ADDITIONAL TESTS:    Imaging Personally Reviewed:  [ ] YES  [ ] NO    Consultant(s) Notes Reviewed:  [ ] YES  [ ] NO    Care Discussed with Consultants/Other Providers [ ] YES  [ ] NO

## 2023-06-16 NOTE — PROGRESS NOTE ADULT - SUBJECTIVE AND OBJECTIVE BOX
Pt with multiple medical problems: HTN, CAD, s/p stent in 2009, HPL, DM-2, CKD-stage III, CVA with muscle wasting  H/o prostate CA s/p TURP, Glaucoma, B/L muscle atrophy    Pt s/p coffee ground emesis  +slow drop in H/H      MEDICATIONS  (STANDING):  atorvastatin 80 milliGRAM(s) Oral at bedtime  cilostazol 100 milliGRAM(s) Oral two times a day  dextrose 5%. 1000 milliLiter(s) (50 mL/Hr) IV Continuous <Continuous>  dextrose 5%. 1000 milliLiter(s) (100 mL/Hr) IV Continuous <Continuous>  dextrose 50% Injectable 25 Gram(s) IV Push once  dextrose 50% Injectable 12.5 Gram(s) IV Push once  dextrose 50% Injectable 25 Gram(s) IV Push once  finasteride 5 milliGRAM(s) Oral daily  glucagon  Injectable 1 milliGRAM(s) IntraMuscular once  insulin lispro (ADMELOG) corrective regimen sliding scale   SubCutaneous three times a day before meals  insulin lispro (ADMELOG) corrective regimen sliding scale   SubCutaneous at bedtime  latanoprost 0.005% Ophthalmic Solution 1 Drop(s) Both EYES at bedtime  levothyroxine 75 MICROGram(s) Oral daily  losartan 50 milliGRAM(s) Oral daily  metoprolol tartrate 25 milliGRAM(s) Oral two times a day  pantoprazole Infusion 8 mG/Hr (10 mL/Hr) IV Continuous <Continuous>  sodium chloride 0.9%. 1000 milliLiter(s) (100 mL/Hr) IV Continuous <Continuous>  tamsulosin 0.4 milliGRAM(s) Oral at bedtime    MEDICATIONS  (PRN):  acetaminophen     Tablet .. 650 milliGRAM(s) Oral every 6 hours PRN Temp greater or equal to 38C (100.4F), Mild Pain (1 - 3)  aluminum hydroxide/magnesium hydroxide/simethicone Suspension 30 milliLiter(s) Oral every 4 hours PRN Dyspepsia  dextrose Oral Gel 15 Gram(s) Oral once PRN Blood Glucose LESS THAN 70 milliGRAM(s)/deciliter  melatonin 3 milliGRAM(s) Oral at bedtime PRN Insomnia  ondansetron Injectable 4 milliGRAM(s) IV Push every 8 hours PRN Nausea and/or Vomiting      Allergies    No Known Allergies    Intolerances        Vital Signs Last 24 Hrs  T(C): 36.4 (16 Jun 2023 10:37), Max: 37.6 (15 Cam 2023 23:38)  T(F): 97.6 (16 Jun 2023 10:37), Max: 99.7 (15 Cam 2023 23:38)  HR: 76 (16 Jun 2023 10:37) (76 - 110)  BP: 91/56 (16 Jun 2023 10:37) (91/56 - 101/61)  BP(mean): --  RR: 18 (16 Jun 2023 10:37) (16 - 18)  SpO2: 97% (16 Jun 2023 10:37) (96% - 97%)    Parameters below as of 16 Jun 2023 10:37  Patient On (Oxygen Delivery Method): nasal cannula        PHYSICAL EXAM:  General: NAD.  CVS: S1, S2  Chest: air entry bilaterally present  Abd: BS present, soft, non-tender      LABS:                        7.6    7.71  )-----------( 142      ( 16 Jun 2023 05:58 )             23.8     06-15    143  |  113<H>  |  42<H>  ----------------------------<  197<H>  4.1   |  23  |  2.58<H>    Ca    9.2      15 Cam 2023 02:00    TPro  6.8  /  Alb  2.5<L>  /  TBili  1.0  /  DBili  x   /  AST  25  /  ALT  17  /  AlkPhos  88  06-15    PT/INR - ( 15 Cam 2023 08:15 )   PT: 15.7 sec;   INR: 1.30 ratio         continue IV protonix  CBC at 4pm and in AM

## 2023-06-17 LAB
ALBUMIN SERPL ELPH-MCNC: 1.9 G/DL — LOW (ref 3.3–5)
ALP SERPL-CCNC: 77 U/L — SIGNIFICANT CHANGE UP (ref 40–120)
ALT FLD-CCNC: 17 U/L — SIGNIFICANT CHANGE UP (ref 12–78)
ANION GAP SERPL CALC-SCNC: 8 MMOL/L — SIGNIFICANT CHANGE UP (ref 5–17)
APPEARANCE UR: ABNORMAL
AST SERPL-CCNC: 33 U/L — SIGNIFICANT CHANGE UP (ref 15–37)
BACTERIA # UR AUTO: ABNORMAL
BILIRUB SERPL-MCNC: 0.8 MG/DL — SIGNIFICANT CHANGE UP (ref 0.2–1.2)
BILIRUB UR-MCNC: NEGATIVE — SIGNIFICANT CHANGE UP
BUN SERPL-MCNC: 41 MG/DL — HIGH (ref 7–23)
CALCIUM SERPL-MCNC: 7.4 MG/DL — LOW (ref 8.5–10.1)
CHLORIDE SERPL-SCNC: 117 MMOL/L — HIGH (ref 96–108)
CO2 SERPL-SCNC: 17 MMOL/L — LOW (ref 22–31)
COLOR SPEC: YELLOW — SIGNIFICANT CHANGE UP
COMMENT - URINE: SIGNIFICANT CHANGE UP
CREAT SERPL-MCNC: 3.21 MG/DL — HIGH (ref 0.5–1.3)
DIFF PNL FLD: ABNORMAL
EGFR: 18 ML/MIN/1.73M2 — LOW
EPI CELLS # UR: SIGNIFICANT CHANGE UP
GLUCOSE BLDC GLUCOMTR-MCNC: 228 MG/DL — HIGH (ref 70–99)
GLUCOSE BLDC GLUCOMTR-MCNC: 230 MG/DL — HIGH (ref 70–99)
GLUCOSE BLDC GLUCOMTR-MCNC: 237 MG/DL — HIGH (ref 70–99)
GLUCOSE BLDC GLUCOMTR-MCNC: 252 MG/DL — HIGH (ref 70–99)
GLUCOSE SERPL-MCNC: 229 MG/DL — HIGH (ref 70–99)
GLUCOSE UR QL: NEGATIVE MG/DL — SIGNIFICANT CHANGE UP
HCT VFR BLD CALC: 26.4 % — LOW (ref 39–50)
HGB BLD-MCNC: 8.5 G/DL — LOW (ref 13–17)
KETONES UR-MCNC: NEGATIVE — SIGNIFICANT CHANGE UP
LEUKOCYTE ESTERASE UR-ACNC: ABNORMAL
MCHC RBC-ENTMCNC: 28.5 PG — SIGNIFICANT CHANGE UP (ref 27–34)
MCHC RBC-ENTMCNC: 32.2 G/DL — SIGNIFICANT CHANGE UP (ref 32–36)
MCV RBC AUTO: 88.6 FL — SIGNIFICANT CHANGE UP (ref 80–100)
NITRITE UR-MCNC: NEGATIVE — SIGNIFICANT CHANGE UP
NRBC # BLD: 0 /100 WBCS — SIGNIFICANT CHANGE UP (ref 0–0)
OB PNL STL: NEGATIVE — SIGNIFICANT CHANGE UP
PH UR: 5 — SIGNIFICANT CHANGE UP (ref 5–8)
PLATELET # BLD AUTO: 135 K/UL — LOW (ref 150–400)
POTASSIUM SERPL-MCNC: 3.4 MMOL/L — LOW (ref 3.5–5.3)
POTASSIUM SERPL-SCNC: 3.4 MMOL/L — LOW (ref 3.5–5.3)
PROT SERPL-MCNC: 5.5 GM/DL — LOW (ref 6–8.3)
PROT UR-MCNC: 100 MG/DL
RBC # BLD: 2.98 M/UL — LOW (ref 4.2–5.8)
RBC # FLD: 17.2 % — HIGH (ref 10.3–14.5)
RBC CASTS # UR COMP ASSIST: ABNORMAL /HPF (ref 0–4)
SODIUM SERPL-SCNC: 142 MMOL/L — SIGNIFICANT CHANGE UP (ref 135–145)
SP GR SPEC: 1.01 — SIGNIFICANT CHANGE UP (ref 1.01–1.02)
UROBILINOGEN FLD QL: NEGATIVE MG/DL — SIGNIFICANT CHANGE UP
WBC # BLD: 7.92 K/UL — SIGNIFICANT CHANGE UP (ref 3.8–10.5)
WBC # FLD AUTO: 7.92 K/UL — SIGNIFICANT CHANGE UP (ref 3.8–10.5)
WBC UR QL: SIGNIFICANT CHANGE UP /HPF (ref 0–5)

## 2023-06-17 PROCEDURE — 99233 SBSQ HOSP IP/OBS HIGH 50: CPT

## 2023-06-17 RX ORDER — PANTOPRAZOLE SODIUM 20 MG/1
40 TABLET, DELAYED RELEASE ORAL
Refills: 0 | Status: DISCONTINUED | OUTPATIENT
Start: 2023-06-17 | End: 2023-06-18

## 2023-06-17 RX ORDER — POTASSIUM CHLORIDE 20 MEQ
20 PACKET (EA) ORAL ONCE
Refills: 0 | Status: COMPLETED | OUTPATIENT
Start: 2023-06-17 | End: 2023-06-17

## 2023-06-17 RX ORDER — SODIUM CHLORIDE 9 MG/ML
1000 INJECTION, SOLUTION INTRAVENOUS
Refills: 0 | Status: DISCONTINUED | OUTPATIENT
Start: 2023-06-17 | End: 2023-06-19

## 2023-06-17 RX ADMIN — Medication 75 MICROGRAM(S): at 05:36

## 2023-06-17 RX ADMIN — Medication 25 MILLIGRAM(S): at 05:58

## 2023-06-17 RX ADMIN — Medication 2: at 17:16

## 2023-06-17 RX ADMIN — Medication 650 MILLIGRAM(S): at 17:22

## 2023-06-17 RX ADMIN — Medication 2: at 11:43

## 2023-06-17 RX ADMIN — SODIUM CHLORIDE 100 MILLILITER(S): 9 INJECTION INTRAMUSCULAR; INTRAVENOUS; SUBCUTANEOUS at 08:05

## 2023-06-17 RX ADMIN — Medication 25 MILLIGRAM(S): at 17:17

## 2023-06-17 RX ADMIN — Medication 3: at 08:04

## 2023-06-17 RX ADMIN — SODIUM CHLORIDE 75 MILLILITER(S): 9 INJECTION, SOLUTION INTRAVENOUS at 15:04

## 2023-06-17 RX ADMIN — PANTOPRAZOLE SODIUM 10 MG/HR: 20 TABLET, DELAYED RELEASE ORAL at 09:52

## 2023-06-17 RX ADMIN — CILOSTAZOL 100 MILLIGRAM(S): 100 TABLET ORAL at 05:33

## 2023-06-17 RX ADMIN — ATORVASTATIN CALCIUM 80 MILLIGRAM(S): 80 TABLET, FILM COATED ORAL at 22:07

## 2023-06-17 RX ADMIN — PANTOPRAZOLE SODIUM 10 MG/HR: 20 TABLET, DELAYED RELEASE ORAL at 01:17

## 2023-06-17 RX ADMIN — TAMSULOSIN HYDROCHLORIDE 0.4 MILLIGRAM(S): 0.4 CAPSULE ORAL at 22:08

## 2023-06-17 RX ADMIN — CILOSTAZOL 100 MILLIGRAM(S): 100 TABLET ORAL at 17:17

## 2023-06-17 RX ADMIN — FINASTERIDE 5 MILLIGRAM(S): 5 TABLET, FILM COATED ORAL at 11:42

## 2023-06-17 RX ADMIN — Medication 650 MILLIGRAM(S): at 16:23

## 2023-06-17 RX ADMIN — PANTOPRAZOLE SODIUM 40 MILLIGRAM(S): 20 TABLET, DELAYED RELEASE ORAL at 17:22

## 2023-06-17 RX ADMIN — LATANOPROST 1 DROP(S): 0.05 SOLUTION/ DROPS OPHTHALMIC; TOPICAL at 22:07

## 2023-06-17 RX ADMIN — Medication 20 MILLIEQUIVALENT(S): at 16:22

## 2023-06-17 RX ADMIN — SODIUM CHLORIDE 100 MILLILITER(S): 9 INJECTION INTRAMUSCULAR; INTRAVENOUS; SUBCUTANEOUS at 11:42

## 2023-06-17 NOTE — DIETITIAN INITIAL EVALUATION ADULT - DIET TYPE
NSA Magic Cup x 2/day (provides 580 kcal, 18 g protein)/minced and moist/consistent carbohydrate (evening snack)/supplement (specify)/mildly thick liquids

## 2023-06-17 NOTE — PROGRESS NOTE ADULT - ASSESSMENT
The patient is a 89y Male with significant PMH of HTN, CAD, s/p stent in 2009, HPL, DM-2, CKD-stage III, CVA with muscle wasting, H/o prostate CA s/p TURP, Glaucoma, B/L muscle atrophy and others was BIBA in ED from home with coffee ground emesis x 2.  Pt. was found to be hypotensive at 83/49 and unresponsive per EMS.  Pt. woke up after fluids started (NS) by EMS and Zofran given for nausea.  Patient is a very poor historian. I was not able to get any information from the patient. Most of the information has been obtained from EMR documentation and in discussion with ED provider. Patient is on ASA. No melena or hemoptysis or hematuria reported. He is not in any acute distress. No family members at bedside.     # Acute blood loss anemia with hematemesis with possible PUD, erossive gastritis/duodenitis/esophagitis or AVMs.  Hb is 9.2 (with baseline Hb 10.0-11.3).  Admit to telemetry.  Maintenance IV fluids.  Monitor H/H q6h, and transfuse PRBC if HB <8.0  Hold his ASA for now.  Appreciate GI recs- will c/w CLD  - Switched to IV protonix    # Uncontrolled DM-2 with hyperglycemia.  .  A1c level- 13.2  ISS with coverage.  Accu-checks q4h while NPO.    # Acute worsening of CKD stage III.  Cr 2.54 ( was 1.44 in 01/2023).  Adequate hydration.  - nephro following  - started 1/2 NS at 75 cc/hr    # HTN, CAD, s/p stent and HPL.  Stable and no acute issue.  Continue his Metoprolol, Losartan and Atorvastatin.    # H/o Prostate cancer and TURP.  On Flomax and Finasteride.    # Hypothyroidism   On Levothyroxine.    # DVT prophylaxis: SCDs for now.

## 2023-06-17 NOTE — DIETITIAN INITIAL EVALUATION ADULT - PERTINENT LABORATORY DATA
06-17    142  |  117<H>  |  41<H>  ----------------------------<  229<H>  3.4<L>   |  17<L>  |  3.21<H>    Ca    7.4<L>      17 Jun 2023 05:56    TPro  5.5<L>  /  Alb  1.9<L>  /  TBili  0.8  /  DBili  x   /  AST  33  /  ALT  17  /  AlkPhos  77  06-17  POCT Blood Glucose.: 228 mg/dL (06-17-23 @ 10:59)  A1C with Estimated Average Glucose Result: 13.2 % (06-16-23 @ 05:58)  A1C with Estimated Average Glucose Result: 7.6 % (01-03-23 @ 05:50)

## 2023-06-17 NOTE — PROGRESS NOTE ADULT - SUBJECTIVE AND OBJECTIVE BOX
Resting    Vital Signs Last 24 Hrs  T(C): 37.5 (23 @ 16:19), Max: 37.5 (23 @ 16:19)  T(F): 99.5 (23 @ 16:19), Max: 99.5 (23 @ 16:19)  HR: 108 (23 @ 16:19) (90 - 108)  BP: 160/76 (23 @ 16:19) (104/66 - 160/76)  RR: 19 (23 @ 16:19) (18 - 19)  SpO2: 100% (23 @ 16:19) (94% - 100%)    I&O's Detail    2023 07:01  -  2023 07:00  --------------------------------------------------------  IN:    Oral Fluid: 50 mL    Pantoprazole: 120 mL    PRBCs (Packed Red Blood Cells): 245 mL    sodium chloride 0.9%: 600 mL  Total IN: 1015 mL    OUT:    Intermittent Catheterization - Urethral (mL): 500 mL  Total OUT: 500 mL    Total NET: 515 mL    Lungs b/l air entry  Heart S1S2  Abd soft ND  Extremities:   tr edema                        8.5    7.92  )-----------( 135      ( 2023 05:56 )             26.4     2023 05:56    142    |  117    |  41     ----------------------------<  229    3.4     |  17     |  3.21     Ca    7.4        2023 05:56    TPro  5.5    /  Alb  1.9    /  TBili  0.8    /  DBili  x      /  AST  33     /  ALT  17     /  AlkPhos  77     2023 05:56    LIVER FUNCTIONS - ( 2023 05:56 )  Alb: 1.9 g/dL / Pro: 5.5 gm/dL / ALK PHOS: 77 U/L / ALT: 17 U/L / AST: 33 U/L / GGT: x           Urinalysis Basic - ( 2023 07:00 )    Color: Yellow / Appearance: very cloudy / S.010 / pH: x  Gluc: x / Ketone: Negative  / Bili: Negative / Urobili: Negative mg/dL   Blood: x / Protein: 100 mg/dL / Nitrite: Negative   Leuk Esterase: Moderate / RBC: 6-10 /HPF / WBC TNTC /HPF   Sq Epi: x / Non Sq Epi: x / Bacteria: Moderate    Culture - Blood (collected 15 Cam 2023 02:45)  Source: .Blood Blood-Peripheral  Preliminary Report (2023 11:01):    No growth to date.    Culture - Blood (collected 15 Cam 2023 02:00)  Source: .Blood Blood-Peripheral  Preliminary Report (2023 11:01):    No growth to date.    acetaminophen     Tablet .. 650 milliGRAM(s) Oral every 6 hours PRN  aluminum hydroxide/magnesium hydroxide/simethicone Suspension 30 milliLiter(s) Oral every 4 hours PRN  atorvastatin 80 milliGRAM(s) Oral at bedtime  cilostazol 100 milliGRAM(s) Oral two times a day  dextrose 5%. 1000 milliLiter(s) IV Continuous <Continuous>  dextrose 5%. 1000 milliLiter(s) IV Continuous <Continuous>  dextrose 50% Injectable 25 Gram(s) IV Push once  dextrose 50% Injectable 12.5 Gram(s) IV Push once  dextrose 50% Injectable 25 Gram(s) IV Push once  dextrose Oral Gel 15 Gram(s) Oral once PRN  finasteride 5 milliGRAM(s) Oral daily  glucagon  Injectable 1 milliGRAM(s) IntraMuscular once  insulin lispro (ADMELOG) corrective regimen sliding scale   SubCutaneous three times a day before meals  insulin lispro (ADMELOG) corrective regimen sliding scale   SubCutaneous at bedtime  latanoprost 0.005% Ophthalmic Solution 1 Drop(s) Both EYES at bedtime  levothyroxine 75 MICROGram(s) Oral daily  melatonin 3 milliGRAM(s) Oral at bedtime PRN  metoprolol tartrate 25 milliGRAM(s) Oral two times a day  ondansetron Injectable 4 milliGRAM(s) IV Push every 8 hours PRN  pantoprazole  Injectable 40 milliGRAM(s) IV Push two times a day  sodium chloride 0.45%. 1000 milliLiter(s) IV Continuous <Continuous>  tamsulosin 0.4 milliGRAM(s) Oral at bedtime    Home Medications:  aspirin 81 mg oral delayed release tablet: 1 tab(s) orally once a day (15 Cam 2023 14:53)  atorvastatin 80 mg oral tablet: 1 tab(s) orally once a day (at bedtime) (15 Cam 2023 14:53)  cilostazol 100 mg oral tablet: 1 tab(s) orally 2 times a day (15 Cam 2023 14:53)  finasteride 5 mg oral tablet: 1 tab(s) orally once a day (15 Cam 2023 14:53)  insulin lispro 100 units/mL injectable solution: 1 dose(s) injectable 3 times a day before meals and at bedtime  latanoprost 0.005% ophthalmic solution: 1 drop(s) in each eye once a day (in the evening) (15 Cam 2023 14:50)  levothyroxine 75 mcg (0.075 mg) oral tablet: 1 tab(s) orally once a day (15 Cam 2023 14:53)  losartan 50 mg oral tablet: 1 tab(s) orally once a day (15 Cam 2023 14:53)  metoprolol tartrate 25 mg oral tablet: 1 tab(s) orally 2 times a day (15 Cam 2023 14:53)  omeprazole 20 mg oral delayed release capsule: 1 cap(s) orally once a day (15 Cam 2023 14:53)  tamsulosin 0.4 mg oral capsule: 1 cap(s) orally once a day (at bedtime) (15 Cam 2023 14:53)    Assessment/Plan:    S/p CT w/IV dye 6/15/23  No hydro   Contrast MARILYN/CKD 3 (Cr 1.44 - 23)  Avoid nephrotoxins   Avoid ACE/ARB  Gentle IVF  F/u PVR, make sure no retention  F/u urine cx, r/o UTI  BMP in am    [phone]

## 2023-06-17 NOTE — DIETITIAN INITIAL EVALUATION ADULT - PERTINENT MEDS FT
MEDICATIONS  (STANDING):  atorvastatin 80 milliGRAM(s) Oral at bedtime  cilostazol 100 milliGRAM(s) Oral two times a day  dextrose 5%. 1000 milliLiter(s) (50 mL/Hr) IV Continuous <Continuous>  dextrose 5%. 1000 milliLiter(s) (100 mL/Hr) IV Continuous <Continuous>  dextrose 50% Injectable 25 Gram(s) IV Push once  dextrose 50% Injectable 12.5 Gram(s) IV Push once  dextrose 50% Injectable 25 Gram(s) IV Push once  finasteride 5 milliGRAM(s) Oral daily  glucagon  Injectable 1 milliGRAM(s) IntraMuscular once  insulin lispro (ADMELOG) corrective regimen sliding scale   SubCutaneous three times a day before meals  insulin lispro (ADMELOG) corrective regimen sliding scale   SubCutaneous at bedtime  latanoprost 0.005% Ophthalmic Solution 1 Drop(s) Both EYES at bedtime  levothyroxine 75 MICROGram(s) Oral daily  metoprolol tartrate 25 milliGRAM(s) Oral two times a day  pantoprazole  Injectable 40 milliGRAM(s) IV Push two times a day  tamsulosin 0.4 milliGRAM(s) Oral at bedtime    MEDICATIONS  (PRN):  acetaminophen     Tablet .. 650 milliGRAM(s) Oral every 6 hours PRN Temp greater or equal to 38C (100.4F), Mild Pain (1 - 3)  aluminum hydroxide/magnesium hydroxide/simethicone Suspension 30 milliLiter(s) Oral every 4 hours PRN Dyspepsia  dextrose Oral Gel 15 Gram(s) Oral once PRN Blood Glucose LESS THAN 70 milliGRAM(s)/deciliter  melatonin 3 milliGRAM(s) Oral at bedtime PRN Insomnia  ondansetron Injectable 4 milliGRAM(s) IV Push every 8 hours PRN Nausea and/or Vomiting

## 2023-06-17 NOTE — PROGRESS NOTE ADULT - SUBJECTIVE AND OBJECTIVE BOX
Pt stable  No bloody bowel movements  No coffee ground emesis  on IV protonix      MEDICATIONS  (STANDING):  atorvastatin 80 milliGRAM(s) Oral at bedtime  cilostazol 100 milliGRAM(s) Oral two times a day  dextrose 5%. 1000 milliLiter(s) (50 mL/Hr) IV Continuous <Continuous>  dextrose 5%. 1000 milliLiter(s) (100 mL/Hr) IV Continuous <Continuous>  dextrose 50% Injectable 25 Gram(s) IV Push once  dextrose 50% Injectable 12.5 Gram(s) IV Push once  dextrose 50% Injectable 25 Gram(s) IV Push once  finasteride 5 milliGRAM(s) Oral daily  glucagon  Injectable 1 milliGRAM(s) IntraMuscular once  insulin lispro (ADMELOG) corrective regimen sliding scale   SubCutaneous three times a day before meals  insulin lispro (ADMELOG) corrective regimen sliding scale   SubCutaneous at bedtime  latanoprost 0.005% Ophthalmic Solution 1 Drop(s) Both EYES at bedtime  levothyroxine 75 MICROGram(s) Oral daily  metoprolol tartrate 25 milliGRAM(s) Oral two times a day  pantoprazole  Injectable 40 milliGRAM(s) IV Push two times a day  sodium chloride 0.45%. 1000 milliLiter(s) (75 mL/Hr) IV Continuous <Continuous>  tamsulosin 0.4 milliGRAM(s) Oral at bedtime    MEDICATIONS  (PRN):  acetaminophen     Tablet .. 650 milliGRAM(s) Oral every 6 hours PRN Temp greater or equal to 38C (100.4F), Mild Pain (1 - 3)  aluminum hydroxide/magnesium hydroxide/simethicone Suspension 30 milliLiter(s) Oral every 4 hours PRN Dyspepsia  dextrose Oral Gel 15 Gram(s) Oral once PRN Blood Glucose LESS THAN 70 milliGRAM(s)/deciliter  melatonin 3 milliGRAM(s) Oral at bedtime PRN Insomnia  ondansetron Injectable 4 milliGRAM(s) IV Push every 8 hours PRN Nausea and/or Vomiting      Allergies    No Known Allergies    Intolerances        Vital Signs Last 24 Hrs  T(C): 37.5 (17 Jun 2023 16:19), Max: 37.5 (17 Jun 2023 16:19)  T(F): 99.5 (17 Jun 2023 16:19), Max: 99.5 (17 Jun 2023 16:19)  HR: 108 (17 Jun 2023 16:19) (73 - 108)  BP: 160/76 (17 Jun 2023 16:19) (101/62 - 160/76)  BP(mean): --  RR: 19 (17 Jun 2023 16:19) (18 - 19)  SpO2: 100% (17 Jun 2023 16:19) (94% - 100%)    Parameters below as of 17 Jun 2023 16:19  Patient On (Oxygen Delivery Method): room air        PHYSICAL EXAM:  General: NAD.  CVS: S1, S2  Chest: air entry bilaterally present  Abd: BS present, soft, non-tender      LABS:                        8.5    7.92  )-----------( 135      ( 17 Jun 2023 05:56 )             26.4     06-17    142  |  117<H>  |  41<H>  ----------------------------<  229<H>  3.4<L>   |  17<L>  |  3.21<H>    Ca    7.4<L>      17 Jun 2023 05:56    TPro  5.5<L>  /  Alb  1.9<L>  /  TBili  0.8  /  DBili  x   /  AST  33  /  ALT  17  /  AlkPhos  77  06-17        continue present meds  follow CBC

## 2023-06-17 NOTE — DIETITIAN INITIAL EVALUATION ADULT - NSICDXPASTMEDICALHX_GEN_ALL_CORE_FT
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

## 2023-06-17 NOTE — PROGRESS NOTE ADULT - SUBJECTIVE AND OBJECTIVE BOX
Patient is a 89y old  Male who presents with a chief complaint of Hematemesis (2023 15:05)      INTERVAL HPI/OVERNIGHT EVENTS: Patient continues to have loose stools. Denies bloody BM. Denies n/v and abdominal pain.     MEDICATIONS  (STANDING):  atorvastatin 80 milliGRAM(s) Oral at bedtime  cilostazol 100 milliGRAM(s) Oral two times a day  dextrose 5%. 1000 milliLiter(s) (50 mL/Hr) IV Continuous <Continuous>  dextrose 5%. 1000 milliLiter(s) (100 mL/Hr) IV Continuous <Continuous>  dextrose 50% Injectable 25 Gram(s) IV Push once  dextrose 50% Injectable 12.5 Gram(s) IV Push once  dextrose 50% Injectable 25 Gram(s) IV Push once  finasteride 5 milliGRAM(s) Oral daily  glucagon  Injectable 1 milliGRAM(s) IntraMuscular once  insulin lispro (ADMELOG) corrective regimen sliding scale   SubCutaneous three times a day before meals  insulin lispro (ADMELOG) corrective regimen sliding scale   SubCutaneous at bedtime  latanoprost 0.005% Ophthalmic Solution 1 Drop(s) Both EYES at bedtime  levothyroxine 75 MICROGram(s) Oral daily  metoprolol tartrate 25 milliGRAM(s) Oral two times a day  pantoprazole  Injectable 40 milliGRAM(s) IV Push two times a day  tamsulosin 0.4 milliGRAM(s) Oral at bedtime    MEDICATIONS  (PRN):  acetaminophen     Tablet .. 650 milliGRAM(s) Oral every 6 hours PRN Temp greater or equal to 38C (100.4F), Mild Pain (1 - 3)  aluminum hydroxide/magnesium hydroxide/simethicone Suspension 30 milliLiter(s) Oral every 4 hours PRN Dyspepsia  dextrose Oral Gel 15 Gram(s) Oral once PRN Blood Glucose LESS THAN 70 milliGRAM(s)/deciliter  melatonin 3 milliGRAM(s) Oral at bedtime PRN Insomnia  ondansetron Injectable 4 milliGRAM(s) IV Push every 8 hours PRN Nausea and/or Vomiting      Allergies    No Known Allergies    Intolerances        REVIEW OF SYSTEMS: all negative with exception of above    Vital Signs Last 24 Hrs  T(C): 36.8 (2023 10:47), Max: 37.4 (2023 04:41)  T(F): 98.2 (2023 10:47), Max: 99.3 (2023 04:41)  HR: 104 (2023 10:47) (73 - 105)  BP: 104/66 (2023 10:47) (95/54 - 125/69)  BP(mean): --  RR: 18 (2023 10:47) (17 - 19)  SpO2: 94% (2023 10:47) (94% - 97%)    Parameters below as of 2023 10:47  Patient On (Oxygen Delivery Method): room air        PHYSICAL EXAM:  GENERAL: NAD, well-groomed, well-developed  HEAD:  Atraumatic, Normocephalic  EYES: EOMI, PERRLA, conjunctiva and sclera clear  ENMT: No tonsillar erythema, exudates, or enlargement; Moist mucous membranes, Good dentition, No lesions  NECK: Supple, No JVD, Normal thyroid  NERVOUS SYSTEM:  Alert & Oriented X3, Good concentration; Motor Strength 5/5 B/L upper and lower extremities; DTRs 2+ intact and symmetric  CHEST/LUNG: Clear to percussion bilaterally; No rales, rhonchi, wheezing, or rubs  HEART: Regular rate and rhythm; No murmurs, rubs, or gallops  ABDOMEN: Soft, Nontender, Nondistended; Bowel sounds present  EXTREMITIES:  2+ Peripheral Pulses, No clubbing, cyanosis, or edema  LYMPH: No lymphadenopathy noted  SKIN: No rashes or lesions    LABS:                        8.5    7.92  )-----------( 135      ( 2023 05:56 )             26.4     06-17    142  |  117<H>  |  41<H>  ----------------------------<  229<H>  3.4<L>   |  17<L>  |  3.21<H>    Ca    7.4<L>      2023 05:56    TPro  5.5<L>  /  Alb  1.9<L>  /  TBili  0.8  /  DBili  x   /  AST  33  /  ALT  17  /  AlkPhos  77  06-17      Urinalysis Basic - ( 2023 07:00 )    Color: Yellow / Appearance: very cloudy / S.010 / pH: x  Gluc: x / Ketone: Negative  / Bili: Negative / Urobili: Negative mg/dL   Blood: x / Protein: 100 mg/dL / Nitrite: Negative   Leuk Esterase: Moderate / RBC: 6-10 /HPF / WBC TNTC /HPF   Sq Epi: x / Non Sq Epi: x / Bacteria: Moderate      CAPILLARY BLOOD GLUCOSE      POCT Blood Glucose.: 228 mg/dL (2023 10:59)  POCT Blood Glucose.: 252 mg/dL (2023 07:35)  POCT Blood Glucose.: 272 mg/dL (2023 21:11)  POCT Blood Glucose.: 361 mg/dL (2023 16:33)      RADIOLOGY & ADDITIONAL TESTS:    Imaging Personally Reviewed:  [ ] YES  [ ] NO    Consultant(s) Notes Reviewed:  [ ] YES  [ ] NO    Care Discussed with Consultants/Other Providers [ ] YES  [ ] NO

## 2023-06-17 NOTE — DIETITIAN INITIAL EVALUATION ADULT - OTHER INFO
Pt seen on medical floor, adm w/ hematemesis ; CKD stage 3; HTN ; CAD ; DM2 ( 6/16 - HgbA1c = 13.2 %). Pt is bedbound, communication limitations ; poor historian. He lives w/ his wife and 2 daughters. No reports of N/V/D/C/Chewing/Swallowing issues, No food allergies. Pt appears to have gained wt since last admission. He had been receiving a minced and moist; mildly thick liquid diet[facility] snack and nutritional supplement  magic cup.

## 2023-06-18 LAB
ALBUMIN SERPL ELPH-MCNC: 2 G/DL — LOW (ref 3.3–5)
ALP SERPL-CCNC: 82 U/L — SIGNIFICANT CHANGE UP (ref 40–120)
ALT FLD-CCNC: 21 U/L — SIGNIFICANT CHANGE UP (ref 12–78)
ANION GAP SERPL CALC-SCNC: 9 MMOL/L — SIGNIFICANT CHANGE UP (ref 5–17)
AST SERPL-CCNC: 44 U/L — HIGH (ref 15–37)
BILIRUB SERPL-MCNC: 0.8 MG/DL — SIGNIFICANT CHANGE UP (ref 0.2–1.2)
BUN SERPL-MCNC: 48 MG/DL — HIGH (ref 7–23)
CALCIUM SERPL-MCNC: 8.4 MG/DL — LOW (ref 8.5–10.1)
CHLORIDE SERPL-SCNC: 115 MMOL/L — HIGH (ref 96–108)
CO2 SERPL-SCNC: 17 MMOL/L — LOW (ref 22–31)
CREAT SERPL-MCNC: 4 MG/DL — HIGH (ref 0.5–1.3)
EGFR: 14 ML/MIN/1.73M2 — LOW
GLUCOSE BLDC GLUCOMTR-MCNC: 184 MG/DL — HIGH (ref 70–99)
GLUCOSE BLDC GLUCOMTR-MCNC: 201 MG/DL — HIGH (ref 70–99)
GLUCOSE BLDC GLUCOMTR-MCNC: 214 MG/DL — HIGH (ref 70–99)
GLUCOSE BLDC GLUCOMTR-MCNC: 253 MG/DL — HIGH (ref 70–99)
GLUCOSE SERPL-MCNC: 184 MG/DL — HIGH (ref 70–99)
HCT VFR BLD CALC: 28.3 % — LOW (ref 39–50)
HGB BLD-MCNC: 9.3 G/DL — LOW (ref 13–17)
MCHC RBC-ENTMCNC: 28.6 PG — SIGNIFICANT CHANGE UP (ref 27–34)
MCHC RBC-ENTMCNC: 32.9 G/DL — SIGNIFICANT CHANGE UP (ref 32–36)
MCV RBC AUTO: 87.1 FL — SIGNIFICANT CHANGE UP (ref 80–100)
NRBC # BLD: 0 /100 WBCS — SIGNIFICANT CHANGE UP (ref 0–0)
PLATELET # BLD AUTO: 149 K/UL — LOW (ref 150–400)
POTASSIUM SERPL-MCNC: 3.2 MMOL/L — LOW (ref 3.5–5.3)
POTASSIUM SERPL-SCNC: 3.2 MMOL/L — LOW (ref 3.5–5.3)
PROT SERPL-MCNC: 6 GM/DL — SIGNIFICANT CHANGE UP (ref 6–8.3)
RBC # BLD: 3.25 M/UL — LOW (ref 4.2–5.8)
RBC # FLD: 16.9 % — HIGH (ref 10.3–14.5)
SODIUM SERPL-SCNC: 141 MMOL/L — SIGNIFICANT CHANGE UP (ref 135–145)
WBC # BLD: 9.09 K/UL — SIGNIFICANT CHANGE UP (ref 3.8–10.5)
WBC # FLD AUTO: 9.09 K/UL — SIGNIFICANT CHANGE UP (ref 3.8–10.5)

## 2023-06-18 PROCEDURE — 99233 SBSQ HOSP IP/OBS HIGH 50: CPT

## 2023-06-18 RX ORDER — POTASSIUM CHLORIDE 20 MEQ
40 PACKET (EA) ORAL ONCE
Refills: 0 | Status: COMPLETED | OUTPATIENT
Start: 2023-06-18 | End: 2023-06-18

## 2023-06-18 RX ORDER — CEFTRIAXONE 500 MG/1
INJECTION, POWDER, FOR SOLUTION INTRAMUSCULAR; INTRAVENOUS
Refills: 0 | Status: DISCONTINUED | OUTPATIENT
Start: 2023-06-18 | End: 2023-06-20

## 2023-06-18 RX ORDER — ERYTHROPOIETIN 10000 [IU]/ML
10000 INJECTION, SOLUTION INTRAVENOUS; SUBCUTANEOUS
Refills: 0 | Status: DISCONTINUED | OUTPATIENT
Start: 2023-06-18 | End: 2023-07-03

## 2023-06-18 RX ORDER — CEFTRIAXONE 500 MG/1
1000 INJECTION, POWDER, FOR SOLUTION INTRAMUSCULAR; INTRAVENOUS ONCE
Refills: 0 | Status: COMPLETED | OUTPATIENT
Start: 2023-06-18 | End: 2023-06-18

## 2023-06-18 RX ORDER — SODIUM BICARBONATE 1 MEQ/ML
650 SYRINGE (ML) INTRAVENOUS THREE TIMES A DAY
Refills: 0 | Status: DISCONTINUED | OUTPATIENT
Start: 2023-06-18 | End: 2023-07-03

## 2023-06-18 RX ORDER — CEFTRIAXONE 500 MG/1
1000 INJECTION, POWDER, FOR SOLUTION INTRAMUSCULAR; INTRAVENOUS EVERY 24 HOURS
Refills: 0 | Status: DISCONTINUED | OUTPATIENT
Start: 2023-06-19 | End: 2023-06-20

## 2023-06-18 RX ORDER — PANTOPRAZOLE SODIUM 20 MG/1
40 TABLET, DELAYED RELEASE ORAL DAILY
Refills: 0 | Status: DISCONTINUED | OUTPATIENT
Start: 2023-06-18 | End: 2023-06-19

## 2023-06-18 RX ADMIN — SODIUM CHLORIDE 75 MILLILITER(S): 9 INJECTION, SOLUTION INTRAVENOUS at 03:47

## 2023-06-18 RX ADMIN — CILOSTAZOL 100 MILLIGRAM(S): 100 TABLET ORAL at 05:43

## 2023-06-18 RX ADMIN — Medication 2: at 11:48

## 2023-06-18 RX ADMIN — CEFTRIAXONE 1000 MILLIGRAM(S): 500 INJECTION, POWDER, FOR SOLUTION INTRAMUSCULAR; INTRAVENOUS at 15:50

## 2023-06-18 RX ADMIN — Medication 25 MILLIGRAM(S): at 17:17

## 2023-06-18 RX ADMIN — Medication 1: at 07:55

## 2023-06-18 RX ADMIN — FINASTERIDE 5 MILLIGRAM(S): 5 TABLET, FILM COATED ORAL at 11:48

## 2023-06-18 RX ADMIN — PANTOPRAZOLE SODIUM 40 MILLIGRAM(S): 20 TABLET, DELAYED RELEASE ORAL at 12:28

## 2023-06-18 RX ADMIN — CILOSTAZOL 100 MILLIGRAM(S): 100 TABLET ORAL at 17:17

## 2023-06-18 RX ADMIN — SODIUM CHLORIDE 75 MILLILITER(S): 9 INJECTION, SOLUTION INTRAVENOUS at 07:56

## 2023-06-18 RX ADMIN — Medication 25 MILLIGRAM(S): at 05:43

## 2023-06-18 RX ADMIN — Medication 3: at 16:47

## 2023-06-18 RX ADMIN — Medication 40 MILLIEQUIVALENT(S): at 15:49

## 2023-06-18 RX ADMIN — Medication 75 MICROGRAM(S): at 05:43

## 2023-06-18 RX ADMIN — LATANOPROST 1 DROP(S): 0.05 SOLUTION/ DROPS OPHTHALMIC; TOPICAL at 22:03

## 2023-06-18 RX ADMIN — PANTOPRAZOLE SODIUM 40 MILLIGRAM(S): 20 TABLET, DELAYED RELEASE ORAL at 05:43

## 2023-06-18 RX ADMIN — TAMSULOSIN HYDROCHLORIDE 0.4 MILLIGRAM(S): 0.4 CAPSULE ORAL at 22:03

## 2023-06-18 RX ADMIN — ATORVASTATIN CALCIUM 80 MILLIGRAM(S): 80 TABLET, FILM COATED ORAL at 22:03

## 2023-06-18 RX ADMIN — SODIUM CHLORIDE 75 MILLILITER(S): 9 INJECTION, SOLUTION INTRAVENOUS at 15:49

## 2023-06-18 NOTE — PROGRESS NOTE ADULT - SUBJECTIVE AND OBJECTIVE BOX
Patient is a 89y old  Male who presents with a chief complaint of Hematemesis (2023 23:17)    INTERVAL HPI/OVERNIGHT EVENTS: No acute events overnight. HD stable.     MEDICATIONS  (STANDING):  atorvastatin 80 milliGRAM(s) Oral at bedtime  cilostazol 100 milliGRAM(s) Oral two times a day  dextrose 5%. 1000 milliLiter(s) (50 mL/Hr) IV Continuous <Continuous>  dextrose 5%. 1000 milliLiter(s) (100 mL/Hr) IV Continuous <Continuous>  dextrose 50% Injectable 25 Gram(s) IV Push once  dextrose 50% Injectable 12.5 Gram(s) IV Push once  dextrose 50% Injectable 25 Gram(s) IV Push once  finasteride 5 milliGRAM(s) Oral daily  glucagon  Injectable 1 milliGRAM(s) IntraMuscular once  insulin lispro (ADMELOG) corrective regimen sliding scale   SubCutaneous three times a day before meals  insulin lispro (ADMELOG) corrective regimen sliding scale   SubCutaneous at bedtime  latanoprost 0.005% Ophthalmic Solution 1 Drop(s) Both EYES at bedtime  levothyroxine 75 MICROGram(s) Oral daily  metoprolol tartrate 25 milliGRAM(s) Oral two times a day  pantoprazole  Injectable 40 milliGRAM(s) IV Push daily  potassium chloride    Tablet ER 40 milliEquivalent(s) Oral once  sodium chloride 0.45%. 1000 milliLiter(s) (75 mL/Hr) IV Continuous <Continuous>  tamsulosin 0.4 milliGRAM(s) Oral at bedtime    MEDICATIONS  (PRN):  acetaminophen     Tablet .. 650 milliGRAM(s) Oral every 6 hours PRN Temp greater or equal to 38C (100.4F), Mild Pain (1 - 3)  aluminum hydroxide/magnesium hydroxide/simethicone Suspension 30 milliLiter(s) Oral every 4 hours PRN Dyspepsia  dextrose Oral Gel 15 Gram(s) Oral once PRN Blood Glucose LESS THAN 70 milliGRAM(s)/deciliter  melatonin 3 milliGRAM(s) Oral at bedtime PRN Insomnia  ondansetron Injectable 4 milliGRAM(s) IV Push every 8 hours PRN Nausea and/or Vomiting      Allergies    No Known Allergies    Intolerances        REVIEW OF SYSTEMS: all negative with exception of above    Vital Signs Last 24 Hrs  T(C): 37.6 (2023 10:59), Max: 37.6 (2023 10:59)  T(F): 99.7 (2023 10:59), Max: 99.7 (2023 10:59)  HR: 90 (2023 10:59) (74 - 108)  BP: 147/79 (2023 10:59) (118/69 - 160/76)  BP(mean): --  RR: 18 (2023 10:59) (18 - 19)  SpO2: 96% (2023 10:59) (95% - 100%)    Parameters below as of 2023 10:59  Patient On (Oxygen Delivery Method): room air        PHYSICAL EXAM:  GENERAL: NAD, well-groomed  NERVOUS SYSTEM:  Alert & Oriented X3, Good concentration; Motor Strength 5/5 B/L upper and lower extremities; DTRs 2+ intact and symmetric  CHEST/LUNG: Clear to percussion bilaterally; No rales, rhonchi, wheezing, or rubs  HEART: Regular rate and rhythm; No murmurs, rubs, or gallops  ABDOMEN: Soft, Nontender, Nondistended; Bowel sounds present  EXTREMITIES:  2+ Peripheral Pulses, No clubbing, cyanosis, or edema      LABS:                        9.3    9.09  )-----------( 149      ( 2023 05:30 )             28.3     06-18    141  |  115<H>  |  48<H>  ----------------------------<  184<H>  3.2<L>   |  17<L>  |  4.00<H>    Ca    8.4<L>      2023 05:30    TPro  6.0  /  Alb  2.0<L>  /  TBili  0.8  /  DBili  x   /  AST  44<H>  /  ALT  21  /  AlkPhos  82  06-18      Urinalysis Basic - ( 2023 07:00 )    Color: Yellow / Appearance: very cloudy / S.010 / pH: x  Gluc: x / Ketone: Negative  / Bili: Negative / Urobili: Negative mg/dL   Blood: x / Protein: 100 mg/dL / Nitrite: Negative   Leuk Esterase: Moderate / RBC: 6-10 /HPF / WBC TNTC /HPF   Sq Epi: x / Non Sq Epi: x / Bacteria: Moderate      CAPILLARY BLOOD GLUCOSE      POCT Blood Glucose.: 214 mg/dL (2023 11:12)  POCT Blood Glucose.: 184 mg/dL (2023 07:37)  POCT Blood Glucose.: 237 mg/dL (2023 20:58)  POCT Blood Glucose.: 230 mg/dL (2023 16:31)      RADIOLOGY & ADDITIONAL TESTS:    Imaging Personally Reviewed:  [ ] YES  [ ] NO    Consultant(s) Notes Reviewed:  [ ] YES  [ ] NO    Care Discussed with Consultants/Other Providers [ ] YES  [ ] NO

## 2023-06-18 NOTE — PHYSICAL THERAPY INITIAL EVALUATION ADULT - TRANSFER TRAINING, PT EVAL
Jojo in  transfer ability bed to chair and vice versa using appropriate assistive device  and prevent falls.

## 2023-06-18 NOTE — PHYSICAL THERAPY INITIAL EVALUATION ADULT - BED MOBILITY TRAINING, PT EVAL
Jojo in bed mobility- supine<>sit, rolling side<>side observing proper body mechanics, proper positioning, body alignment and precautions.

## 2023-06-18 NOTE — PROGRESS NOTE ADULT - ASSESSMENT
The patient is a 89y Male with significant PMH of HTN, CAD, s/p stent in 2009, HPL, DM-2, CKD-stage III, CVA with muscle wasting, H/o prostate CA s/p TURP, Glaucoma, B/L muscle atrophy and others was BIBA in ED from home with coffee ground emesis x 2.  Pt. was found to be hypotensive at 83/49 and unresponsive per EMS.  Pt. woke up after fluids started (NS) by EMS and Zofran given for nausea.  Patient is a very poor historian. I was not able to get any information from the patient. Most of the information has been obtained from EMR documentation and in discussion with ED provider. Patient is on ASA. No melena or hemoptysis or hematuria reported. He is not in any acute distress. No family members at bedside.     #UTI  - U/A positive  - Started Ctx  - F/u UCx    # Acute blood loss anemia with hematemesis with possible PUD, erossive gastritis/duodenitis/esophagitis or AVMs.  Hb is 9.2 (with baseline Hb 10.0-11.3).  Admit to telemetry.  Maintenance IV fluids.  Monitor H/H q6h, and transfuse PRBC if HB <8.0  Hold his ASA for now.  Appreciate GI recs- will c/w CLD  - Switched to IV protonix    # Uncontrolled DM-2 with hyperglycemia.  .  A1c level- 13.2  ISS with coverage.  Accu-checks q4h while NPO.    # Acute worsening of CKD stage III- likely pre-renal  Cr 2.54 ( was 1.44 in 01/2023).  Adequate hydration.  - nephro following  - started 1/2 NS at 75 cc/hr    # HTN, CAD, s/p stent and HPL.  Stable and no acute issue.  Continue his Metoprolol, Losartan and Atorvastatin.    # H/o Prostate cancer and TURP.  On Flomax and Finasteride.    # Hypothyroidism   On Levothyroxine.    # DVT prophylaxis: SCDs for now.

## 2023-06-18 NOTE — PROGRESS NOTE ADULT - SUBJECTIVE AND OBJECTIVE BOX
Pt stable   No bleeding  Hgbb improving slowly      MEDICATIONS  (STANDING):  atorvastatin 80 milliGRAM(s) Oral at bedtime  cefTRIAXone   IVPB      cilostazol 100 milliGRAM(s) Oral two times a day  dextrose 5%. 1000 milliLiter(s) (100 mL/Hr) IV Continuous <Continuous>  dextrose 5%. 1000 milliLiter(s) (50 mL/Hr) IV Continuous <Continuous>  dextrose 50% Injectable 25 Gram(s) IV Push once  dextrose 50% Injectable 12.5 Gram(s) IV Push once  dextrose 50% Injectable 25 Gram(s) IV Push once  finasteride 5 milliGRAM(s) Oral daily  glucagon  Injectable 1 milliGRAM(s) IntraMuscular once  insulin lispro (ADMELOG) corrective regimen sliding scale   SubCutaneous three times a day before meals  insulin lispro (ADMELOG) corrective regimen sliding scale   SubCutaneous at bedtime  latanoprost 0.005% Ophthalmic Solution 1 Drop(s) Both EYES at bedtime  levothyroxine 75 MICROGram(s) Oral daily  metoprolol tartrate 25 milliGRAM(s) Oral two times a day  pantoprazole  Injectable 40 milliGRAM(s) IV Push daily  sodium chloride 0.45%. 1000 milliLiter(s) (75 mL/Hr) IV Continuous <Continuous>  tamsulosin 0.4 milliGRAM(s) Oral at bedtime    MEDICATIONS  (PRN):  acetaminophen     Tablet .. 650 milliGRAM(s) Oral every 6 hours PRN Temp greater or equal to 38C (100.4F), Mild Pain (1 - 3)  aluminum hydroxide/magnesium hydroxide/simethicone Suspension 30 milliLiter(s) Oral every 4 hours PRN Dyspepsia  dextrose Oral Gel 15 Gram(s) Oral once PRN Blood Glucose LESS THAN 70 milliGRAM(s)/deciliter  melatonin 3 milliGRAM(s) Oral at bedtime PRN Insomnia  ondansetron Injectable 4 milliGRAM(s) IV Push every 8 hours PRN Nausea and/or Vomiting      Allergies    No Known Allergies    Intolerances        Vital Signs Last 24 Hrs  T(C): 37.6 (18 Jun 2023 10:59), Max: 37.6 (18 Jun 2023 10:59)  T(F): 99.7 (18 Jun 2023 10:59), Max: 99.7 (18 Jun 2023 10:59)  HR: 90 (18 Jun 2023 10:59) (74 - 99)  BP: 147/79 (18 Jun 2023 10:59) (118/69 - 147/79)  BP(mean): --  RR: 18 (18 Jun 2023 10:59) (18 - 19)  SpO2: 96% (18 Jun 2023 10:59) (95% - 96%)    Parameters below as of 18 Jun 2023 10:59  Patient On (Oxygen Delivery Method): room air        PHYSICAL EXAM:  General: NAD.  CVS: S1, S2  Chest: air entry bilaterally present  Abd: BS present, soft, non-tender      LABS:                        9.3    9.09  )-----------( 149      ( 18 Jun 2023 05:30 )             28.3     06-18    141  |  115<H>  |  48<H>  ----------------------------<  184<H>  3.2<L>   |  17<L>  |  4.00<H>    Ca    8.4<L>      18 Jun 2023 05:30    TPro  6.0  /  Alb  2.0<L>  /  TBili  0.8  /  DBili  x   /  AST  44<H>  /  ALT  21  /  AlkPhos  82  06-18      on IV protonix  CBC in AM  advance diet as tolerated

## 2023-06-18 NOTE — PHYSICAL THERAPY INITIAL EVALUATION ADULT - GAIT TRAINING, PT EVAL
Pt will be able to ambulate using assistive device up to 20 ft or more, safely observing proper gait, posture and prevent falls.

## 2023-06-18 NOTE — PROGRESS NOTE ADULT - SUBJECTIVE AND OBJECTIVE BOX
Resting    Vital Signs Last 24 Hrs  T(C): 37.6 (06-18-23 @ 10:59), Max: 37.6 (06-18-23 @ 10:59)  T(F): 99.7 (06-18-23 @ 10:59), Max: 99.7 (06-18-23 @ 10:59)  HR: 90 (06-18-23 @ 10:59) (74 - 99)  BP: 147/79 (06-18-23 @ 10:59) (118/69 - 147/79)  RR: 18 (06-18-23 @ 10:59) (18 - 19)  SpO2: 96% (06-18-23 @ 10:59) (95% - 96%)    Lungs b/l air entry  Heart S1S2  Abd soft ND  Extremities: no edema                                9.3    9.09  )-----------( 149      ( 18 Jun 2023 05:30 )             28.3     18 Jun 2023 05:30    141    |  115    |  48     ----------------------------<  184    3.2     |  17     |  4.00     Ca    8.4        18 Jun 2023 05:30    TPro  6.0    /  Alb  2.0    /  TBili  0.8    /  DBili  x      /  AST  44     /  ALT  21     /  AlkPhos  82     18 Jun 2023 05:30    LIVER FUNCTIONS - ( 18 Jun 2023 05:30 )  Alb: 2.0 g/dL / Pro: 6.0 gm/dL / ALK PHOS: 82 U/L / ALT: 21 U/L / AST: 44 U/L / GGT: x           acetaminophen     Tablet .. 650 milliGRAM(s) Oral every 6 hours PRN  aluminum hydroxide/magnesium hydroxide/simethicone Suspension 30 milliLiter(s) Oral every 4 hours PRN  atorvastatin 80 milliGRAM(s) Oral at bedtime  cefTRIAXone   IVPB      cilostazol 100 milliGRAM(s) Oral two times a day  dextrose 5%. 1000 milliLiter(s) IV Continuous <Continuous>  dextrose 5%. 1000 milliLiter(s) IV Continuous <Continuous>  dextrose 50% Injectable 25 Gram(s) IV Push once  dextrose 50% Injectable 12.5 Gram(s) IV Push once  dextrose 50% Injectable 25 Gram(s) IV Push once  dextrose Oral Gel 15 Gram(s) Oral once PRN  finasteride 5 milliGRAM(s) Oral daily  glucagon  Injectable 1 milliGRAM(s) IntraMuscular once  insulin lispro (ADMELOG) corrective regimen sliding scale   SubCutaneous three times a day before meals  insulin lispro (ADMELOG) corrective regimen sliding scale   SubCutaneous at bedtime  latanoprost 0.005% Ophthalmic Solution 1 Drop(s) Both EYES at bedtime  levothyroxine 75 MICROGram(s) Oral daily  melatonin 3 milliGRAM(s) Oral at bedtime PRN  metoprolol tartrate 25 milliGRAM(s) Oral two times a day  ondansetron Injectable 4 milliGRAM(s) IV Push every 8 hours PRN  pantoprazole  Injectable 40 milliGRAM(s) IV Push daily  sodium chloride 0.45%. 1000 milliLiter(s) IV Continuous <Continuous>  tamsulosin 0.4 milliGRAM(s) Oral at bedtime    Home Medications:  aspirin 81 mg oral delayed release tablet: 1 tab(s) orally once a day (15 Cam 2023 14:53)  atorvastatin 80 mg oral tablet: 1 tab(s) orally once a day (at bedtime) (15 Cam 2023 14:53)  cilostazol 100 mg oral tablet: 1 tab(s) orally 2 times a day (15 Cam 2023 14:53)  finasteride 5 mg oral tablet: 1 tab(s) orally once a day (15 Cam 2023 14:53)  insulin lispro 100 units/mL injectable solution: 1 dose(s) injectable 3 times a day before meals and at bedtime  latanoprost 0.005% ophthalmic solution: 1 drop(s) in each eye once a day (in the evening) (15 Cam 2023 14:50)  levothyroxine 75 mcg (0.075 mg) oral tablet: 1 tab(s) orally once a day (15 Cam 2023 14:53)  losartan 50 mg oral tablet: 1 tab(s) orally once a day (15 Cam 2023 14:53)  metoprolol tartrate 25 mg oral tablet: 1 tab(s) orally 2 times a day (15 Cam 2023 14:53)  omeprazole 20 mg oral delayed release capsule: 1 cap(s) orally once a day (15 Cam 2023 14:53)  tamsulosin 0.4 mg oral capsule: 1 cap(s) orally once a day (at bedtime) (15 Cam 2023 14:53)    Assessment/Plan:    S/p CT w/IV dye 6/15/23  No hydro on imaging   Contrast MARILYN/CKD 3 (Cr 1.44 - 1/14/23)  Avoid nephrotoxins   Avoid ACE/ARB  Gentle IVF  F/u PVR, make sure no retention  F/u urine cx, r/o UTI  BMP in am  Prognosis is guarded    [phone]

## 2023-06-19 ENCOUNTER — TRANSCRIPTION ENCOUNTER (OUTPATIENT)
Age: 88
End: 2023-06-19

## 2023-06-19 LAB
ALBUMIN SERPL ELPH-MCNC: 1.9 G/DL — LOW (ref 3.3–5)
ALP SERPL-CCNC: 79 U/L — SIGNIFICANT CHANGE UP (ref 40–120)
ALT FLD-CCNC: 20 U/L — SIGNIFICANT CHANGE UP (ref 12–78)
ANION GAP SERPL CALC-SCNC: 7 MMOL/L — SIGNIFICANT CHANGE UP (ref 5–17)
AST SERPL-CCNC: 35 U/L — SIGNIFICANT CHANGE UP (ref 15–37)
BILIRUB SERPL-MCNC: 0.7 MG/DL — SIGNIFICANT CHANGE UP (ref 0.2–1.2)
BUN SERPL-MCNC: 45 MG/DL — HIGH (ref 7–23)
CALCIUM SERPL-MCNC: 7.8 MG/DL — LOW (ref 8.5–10.1)
CHLORIDE SERPL-SCNC: 118 MMOL/L — HIGH (ref 96–108)
CO2 SERPL-SCNC: 15 MMOL/L — LOW (ref 22–31)
CREAT SERPL-MCNC: 3.84 MG/DL — HIGH (ref 0.5–1.3)
CULTURE RESULTS: SIGNIFICANT CHANGE UP
EGFR: 14 ML/MIN/1.73M2 — LOW
GLUCOSE BLDC GLUCOMTR-MCNC: 143 MG/DL — HIGH (ref 70–99)
GLUCOSE BLDC GLUCOMTR-MCNC: 156 MG/DL — HIGH (ref 70–99)
GLUCOSE BLDC GLUCOMTR-MCNC: 159 MG/DL — HIGH (ref 70–99)
GLUCOSE BLDC GLUCOMTR-MCNC: 166 MG/DL — HIGH (ref 70–99)
GLUCOSE SERPL-MCNC: 139 MG/DL — HIGH (ref 70–99)
HCT VFR BLD CALC: 28.2 % — LOW (ref 39–50)
HGB BLD-MCNC: 9.4 G/DL — LOW (ref 13–17)
MAGNESIUM SERPL-MCNC: 1.6 MG/DL — SIGNIFICANT CHANGE UP (ref 1.6–2.6)
MCHC RBC-ENTMCNC: 29.1 PG — SIGNIFICANT CHANGE UP (ref 27–34)
MCHC RBC-ENTMCNC: 33.3 G/DL — SIGNIFICANT CHANGE UP (ref 32–36)
MCV RBC AUTO: 87.3 FL — SIGNIFICANT CHANGE UP (ref 80–100)
NRBC # BLD: 0 /100 WBCS — SIGNIFICANT CHANGE UP (ref 0–0)
PLATELET # BLD AUTO: 149 K/UL — LOW (ref 150–400)
POTASSIUM SERPL-MCNC: 3.1 MMOL/L — LOW (ref 3.5–5.3)
POTASSIUM SERPL-SCNC: 3.1 MMOL/L — LOW (ref 3.5–5.3)
PROT SERPL-MCNC: 5.7 GM/DL — LOW (ref 6–8.3)
RBC # BLD: 3.23 M/UL — LOW (ref 4.2–5.8)
RBC # FLD: 16.7 % — HIGH (ref 10.3–14.5)
SODIUM SERPL-SCNC: 140 MMOL/L — SIGNIFICANT CHANGE UP (ref 135–145)
SPECIMEN SOURCE: SIGNIFICANT CHANGE UP
WBC # BLD: 8.55 K/UL — SIGNIFICANT CHANGE UP (ref 3.8–10.5)
WBC # FLD AUTO: 8.55 K/UL — SIGNIFICANT CHANGE UP (ref 3.8–10.5)

## 2023-06-19 PROCEDURE — 99233 SBSQ HOSP IP/OBS HIGH 50: CPT

## 2023-06-19 RX ORDER — PANTOPRAZOLE SODIUM 20 MG/1
40 TABLET, DELAYED RELEASE ORAL
Refills: 0 | Status: DISCONTINUED | OUTPATIENT
Start: 2023-06-19 | End: 2023-07-03

## 2023-06-19 RX ORDER — POTASSIUM CHLORIDE 20 MEQ
40 PACKET (EA) ORAL ONCE
Refills: 0 | Status: COMPLETED | OUTPATIENT
Start: 2023-06-19 | End: 2023-06-19

## 2023-06-19 RX ORDER — MAGNESIUM SULFATE 500 MG/ML
1 VIAL (ML) INJECTION ONCE
Refills: 0 | Status: COMPLETED | OUTPATIENT
Start: 2023-06-19 | End: 2023-06-19

## 2023-06-19 RX ORDER — ALBUMIN HUMAN 25 %
100 VIAL (ML) INTRAVENOUS ONCE
Refills: 0 | Status: COMPLETED | OUTPATIENT
Start: 2023-06-19 | End: 2023-06-19

## 2023-06-19 RX ADMIN — Medication 100 GRAM(S): at 09:59

## 2023-06-19 RX ADMIN — SODIUM CHLORIDE 75 MILLILITER(S): 9 INJECTION, SOLUTION INTRAVENOUS at 06:29

## 2023-06-19 RX ADMIN — CEFTRIAXONE 100 MILLIGRAM(S): 500 INJECTION, POWDER, FOR SOLUTION INTRAMUSCULAR; INTRAVENOUS at 14:30

## 2023-06-19 RX ADMIN — Medication 75 MICROGRAM(S): at 06:29

## 2023-06-19 RX ADMIN — Medication 50 MILLILITER(S): at 11:10

## 2023-06-19 RX ADMIN — CILOSTAZOL 100 MILLIGRAM(S): 100 TABLET ORAL at 17:26

## 2023-06-19 RX ADMIN — ERYTHROPOIETIN 10000 UNIT(S): 10000 INJECTION, SOLUTION INTRAVENOUS; SUBCUTANEOUS at 11:56

## 2023-06-19 RX ADMIN — Medication 40 MILLIEQUIVALENT(S): at 09:59

## 2023-06-19 RX ADMIN — ATORVASTATIN CALCIUM 80 MILLIGRAM(S): 80 TABLET, FILM COATED ORAL at 22:37

## 2023-06-19 RX ADMIN — Medication 25 MILLIGRAM(S): at 17:25

## 2023-06-19 RX ADMIN — FINASTERIDE 5 MILLIGRAM(S): 5 TABLET, FILM COATED ORAL at 11:56

## 2023-06-19 RX ADMIN — Medication 650 MILLIGRAM(S): at 13:55

## 2023-06-19 RX ADMIN — CILOSTAZOL 100 MILLIGRAM(S): 100 TABLET ORAL at 06:29

## 2023-06-19 RX ADMIN — LATANOPROST 1 DROP(S): 0.05 SOLUTION/ DROPS OPHTHALMIC; TOPICAL at 22:38

## 2023-06-19 RX ADMIN — Medication 1: at 11:56

## 2023-06-19 RX ADMIN — Medication 650 MILLIGRAM(S): at 06:30

## 2023-06-19 RX ADMIN — Medication 25 MILLIGRAM(S): at 06:29

## 2023-06-19 RX ADMIN — Medication 650 MILLIGRAM(S): at 22:38

## 2023-06-19 RX ADMIN — Medication 1: at 17:25

## 2023-06-19 RX ADMIN — TAMSULOSIN HYDROCHLORIDE 0.4 MILLIGRAM(S): 0.4 CAPSULE ORAL at 22:37

## 2023-06-19 NOTE — PROGRESS NOTE ADULT - ASSESSMENT
The patient is a 89y Male with significant PMH of HTN, CAD, s/p stent in 2009, HPL, DM-2, CKD-stage III, CVA with muscle wasting, H/o prostate CA s/p TURP, Glaucoma, B/L muscle atrophy and others was BIBA in ED from home with coffee ground emesis x 2.  Pt. was found to be hypotensive at 83/49 and unresponsive per EMS.  Pt. woke up after fluids started (NS) by EMS and Zofran given for nausea.  Patient is a very poor historian. I was not able to get any information from the patient. Most of the information has been obtained from EMR documentation and in discussion with ED provider. Patient is on ASA. No melena or hemoptysis or hematuria reported. He is not in any acute distress. No family members at bedside.     #UTI  - U/A positive  - Started CTX D2/3  - F/u UCx    # Acute worsening of CKD stage III- likely pre-renal  Cr 2.54 ( was 1.44 in 01/2023).  Adequate hydration.  - nephro following  - s/p albumin x1    # Acute blood loss anemia with hematemesis with possible PUD, erossive gastritis/duodenitis/esophagitis or AVMs.  Hb is 9.2 (with baseline Hb 10.0-11.3).  Admit to telemetry.  Maintenance IV fluids.  Monitor H/H q6h, and transfuse PRBC if HB <8.0  Hold his ASA for now.  Appreciate GI recs- will c/w CLD  - Switched to IV protonix  - Will obtain S/S evaluation to r/o dysphagia. If unremarkable, will consider trial of mirtazapine    # Uncontrolled DM-2 with hyperglycemia.  .  A1c level- 13.2  ISS with coverage.  # HTN, CAD, s/p stent and HPL.  Stable and no acute issue.  Continue his Metoprolol, Losartan and Atorvastatin.    # H/o Prostate cancer and TURP.  On Flomax and Finasteride.    # Hypothyroidism   On Levothyroxine.    # DVT prophylaxis: SCDs for now.

## 2023-06-19 NOTE — SWALLOW BEDSIDE ASSESSMENT ADULT - ORAL PREPARATORY PHASE
Decreased mastication ability/Bolus falls into left lateral sulci Within functional limits Lateral loss of bolus Bolus falls into left lateral sulci

## 2023-06-19 NOTE — SWALLOW BEDSIDE ASSESSMENT ADULT - COMMENTS
prolonged mastication; oral residue remained after thin liquids or puree bolus; intermittent coughing given thin liquid to clear solid residue prolonged manipulation at times; cleared oral residue with thin liquid bolus PMH HTN, CAD, s/p stent in 2009, HPL, DM-2, CKD-stage III, CVA with muscle wasting, H/o prostate CA s/p TURP, Glaucoma, B/L muscle atrophy  6/15 CXR  Clear lungs with no acute cardiopulmonary abnormalities; No free air seen beneath the diaphragm  CT head No acute intracranial hemorrhage, mass effect, or midline shift.  CT abd/pelvis  Diffuse esophageal wall thickening suggestive of esophagitis. There is also mild gastric wall thickening which may be related to under   distention or gastritis; No CT evidence of acute GI bleeding at the time scanning  6/19 MD note UTI; Acute blood loss anemia with hematemesis with possible PUD, erosive gastritis/duodenitis/esophagitis or AVMs; Will obtain S/S evaluation to r/o dysphagia.  6/19 GI note change protonix to PO;  advance diet strong burping following swallows prolonged manipulation of bolus at times prolonged mastication; oral residue remained after thin liquids or puree bolus; intermittent coughing following thin liquid to clear solid residue

## 2023-06-19 NOTE — DISCHARGE NOTE PROVIDER - NSDCMRMEDTOKEN_GEN_ALL_CORE_FT
aspirin 81 mg oral delayed release tablet: 1 tab(s) orally once a day  atorvastatin 80 mg oral tablet: 1 tab(s) orally once a day (at bedtime)  cilostazol 100 mg oral tablet: 1 tab(s) orally 2 times a day  finasteride 5 mg oral tablet: 1 tab(s) orally once a day  insulin lispro 100 units/mL injectable solution: 1 dose(s) injectable 3 times a day before meals and at bedtime    151-200 give 2 units subcut  201-250 give 4 units subcut  251-300 give 6 units subcut  301-350 give 8 units subcut  351-400 give 10 units subcut  &gt; 400 give 12 units subcut.   latanoprost 0.005% ophthalmic solution: 1 drop(s) in each eye once a day (in the evening)  levothyroxine 75 mcg (0.075 mg) oral tablet: 1 tab(s) orally once a day  losartan 50 mg oral tablet: 1 tab(s) orally once a day  metoprolol tartrate 25 mg oral tablet: 1 tab(s) orally 2 times a day  omeprazole 20 mg oral delayed release capsule: 1 cap(s) orally once a day  tamsulosin 0.4 mg oral capsule: 1 cap(s) orally once a day (at bedtime)   acetaminophen 325 mg oral tablet: 2 tab(s) orally every 6 hours As needed Temp greater or equal to 38C (100.4F), Mild Pain (1 - 3)  aspirin 81 mg oral delayed release tablet: 1 tab(s) orally once a day  atorvastatin 80 mg oral tablet: 1 tab(s) orally once a day (at bedtime)  cilostazol 100 mg oral tablet: 1 tab(s) orally 2 times a day  finasteride 5 mg oral tablet: 1 tab(s) orally once a day  insulin lispro 100 units/mL injectable solution: 1 dose(s) injectable 3 times a day before meals and at bedtime    151-200 give 2 units subcut  201-250 give 4 units subcut  251-300 give 6 units subcut  301-350 give 8 units subcut  351-400 give 10 units subcut  &gt; 400 give 12 units subcut.   latanoprost 0.005% ophthalmic solution: 1 drop(s) in each eye once a day (in the evening)  levothyroxine 75 mcg (0.075 mg) oral tablet: 1 tab(s) orally once a day  losartan 50 mg oral tablet: 1 tab(s) orally once a day  melatonin 3 mg oral tablet: 1 tab(s) orally once a day (at bedtime) As needed Insomnia  metoprolol tartrate 25 mg oral tablet: 1 tab(s) orally 2 times a day  mirtazapine 7.5 mg oral tablet: 1 tab(s) orally once a day (at bedtime)  omeprazole 20 mg oral delayed release capsule: 1 cap(s) orally once a day  sodium bicarbonate 650 mg oral tablet: 1 tab(s) orally 3 times a day  tamsulosin 0.4 mg oral capsule: 1 cap(s) orally once a day (at bedtime)

## 2023-06-19 NOTE — SWALLOW BEDSIDE ASSESSMENT ADULT - MODE OF PRESENTATION
fed by PCA/cup/spoon/straw/fed by clinician fed by PCA/spoon/fed by clinician cup/spoon/fed by clinician

## 2023-06-19 NOTE — SWALLOW BEDSIDE ASSESSMENT ADULT - PHARYNGEAL PHASE
Decreased laryngeal elevation/Multiple swallows Delayed pharyngeal swallow/Decreased laryngeal elevation/Multiple swallows Delayed pharyngeal swallow/Decreased laryngeal elevation/Cough post oral intake/Delayed cough post oral intake

## 2023-06-19 NOTE — PHYSICAL THERAPY INITIAL EVALUATION ADULT - PRECAUTIONS/LIMITATIONS, REHAB EVAL
Pt denies any hearing or vision problems but catehrine reports pt being [place] and has glaucoma./fall precautions
fall precautions

## 2023-06-19 NOTE — SWALLOW BEDSIDE ASSESSMENT ADULT - SLP GENERAL OBSERVATIONS
strong and loud burping produced multiple times during few po intakes alert and oriented to name; strong and loud burping produced multiple times during few po intakes alert and oriented to name; strong and loud burping produced multiple times during few po intakes; followed simple directions; minimal verbal output but poor responses to simple questions

## 2023-06-19 NOTE — DISCHARGE NOTE PROVIDER - DETAILS OF MALNUTRITION DIAGNOSIS/DIAGNOSES
This patient has been assessed with a concern for Malnutrition and was treated during this hospitalization for the following Nutrition diagnosis/diagnoses:     -  06/17/2023: Moderate protein-calorie malnutrition

## 2023-06-19 NOTE — PHYSICAL THERAPY INITIAL EVALUATION ADULT - AMBULATION SKILLS, REHAB EVAL
Non ambulatory since  Jan 2023  Uses wheel chair for indoor + outdoor  locomotion  . has a ramp at his home.
Non ambulatory since  Jan 2023  Uses wheel chair for indoor + outdoor  locomotion  . has a ramp at his home.

## 2023-06-19 NOTE — SWALLOW BEDSIDE ASSESSMENT ADULT - NS SPL SWALLOW CLINIC TRIAL FT
protective cough produced with MIXED textures in mouth at same time; given thin liquids only pt's airway protection improved; pt denied pharyngeal stasis protective cough produced with MIXED textures in mouth at same time; This is mitigated given thin liquids alone in mouth and in single sips from cup or spoon amounts

## 2023-06-19 NOTE — PHYSICAL THERAPY INITIAL EVALUATION ADULT - PATIENT/FAMILY AGREES WITH PLAN
yes
d/w dtr Shavonne - who requested  Home PT  as pt was in Rehab fro 6 weeks in Jan-Feb 2023. Family wants Home PT and  Home care services./no

## 2023-06-19 NOTE — DISCHARGE NOTE PROVIDER - NSDCCPCAREPLAN_GEN_ALL_CORE_FT
PRINCIPAL DISCHARGE DIAGNOSIS  Diagnosis: GIB (gastrointestinal bleeding)  Assessment and Plan of Treatment:

## 2023-06-19 NOTE — SWALLOW BEDSIDE ASSESSMENT ADULT - SWALLOW EVAL: DIAGNOSIS
Oropharyngeal dysphagia with reduced diogenes-motor skills for bolus manipulation and weakness /fatigue with subsequent swallows; Airway protections deficits are intermittent and given large sized bolus and mixed textures;  Per imaging there is Esophagitis which may impact po intake; r/o intermittent cough due to sensory symptoms and frequent burping after minimal intake Oropharyngeal dysphagia with reduced diogenes-motor skills for bolus manipulation and weakness /fatigue with subsequent swallows; Airway protection deficits are intermittent during large sized bolus and mixed textures;  Per imaging there is Esophagitis which may impact po intake; R/O intermittent cough related to sensory symptoms sec Esophagitis and strong burping after minimal intake

## 2023-06-19 NOTE — DISCHARGE NOTE PROVIDER - HOSPITAL COURSE
89y Male with significant PMH of HTN, CAD, s/p stent in 2009, HPL, DM-2, CKD-stage III, CVA with muscle wasting, H/o prostate CA s/p TURP, Glaucoma, B/L muscle atrophy and others was BIBA in ED from home with coffee ground emesis x 2. Treated for R foot gangrene and UTI.      Course:    # Hypernatremia / Severe Protein Calorie Malnutrition- encourage po intake.  improved.     #RT heel gangrene  - Appreciate vascular and podiatry recs  - discussed with ID>> no more antibiotic. Patient is palliative care.     # Poor appetite - pt's poor appetite, progressive decline d/w daughter.  She states pt and family opt against PEG placement.  Cont Remeron.      #UTI  - U/A positive, s/p CTX    # Acute worsening of CKD stage III- likely pre-renal  Cr 2.54 ( was 1.44 in 01/2023).  creatinine no significant change. no more labs per daughter.     # Acute blood loss anemia with hematemesis with possible PUD, erossive gastritis/duodenitis/esophagitis or AVMs.  Hb stable (with baseline Hb 10.0-11.3). GI recs reviewed.     # DM-2 with hyperglycemia.  controlled. Cont finger sticks BID.     # HTN, CAD, s/p stent and HPL.  Stable and no acute issue.  Continue current meds.     # H/o Prostate cancer and TURP.  cont current meds.     # Hypothyroidism   cont current medication.     #Functional Quadriplegia and severe PCM- supportive care.     # Moderate PCM: Cont current diet.     # DVT prophylaxis: SCDs for now.    CODE STATUS- DNR/DNI. Comfort measures only.     Seen and examined by me today. Vitals stable.   All questions welcomed and answered appropriately. Patient's daughter verbalized understanding of post discharge physician's follows up and discharge instructions.   DC time spent by me face to face excluding billable procedures 38 mins

## 2023-06-19 NOTE — PROGRESS NOTE ADULT - SUBJECTIVE AND OBJECTIVE BOX
No significant changes  No bleeding  Hgb slightly better  stools neg for OB      MEDICATIONS  (STANDING):  atorvastatin 80 milliGRAM(s) Oral at bedtime  cefTRIAXone   IVPB 1000 milliGRAM(s) IV Intermittent every 24 hours  cefTRIAXone   IVPB      cilostazol 100 milliGRAM(s) Oral two times a day  dextrose 5%. 1000 milliLiter(s) (50 mL/Hr) IV Continuous <Continuous>  dextrose 5%. 1000 milliLiter(s) (100 mL/Hr) IV Continuous <Continuous>  dextrose 50% Injectable 25 Gram(s) IV Push once  dextrose 50% Injectable 12.5 Gram(s) IV Push once  dextrose 50% Injectable 25 Gram(s) IV Push once  epoetin shai-epbx (RETACRIT) Injectable 92354 Unit(s) SubCutaneous every 7 days  finasteride 5 milliGRAM(s) Oral daily  glucagon  Injectable 1 milliGRAM(s) IntraMuscular once  insulin lispro (ADMELOG) corrective regimen sliding scale   SubCutaneous three times a day before meals  insulin lispro (ADMELOG) corrective regimen sliding scale   SubCutaneous at bedtime  latanoprost 0.005% Ophthalmic Solution 1 Drop(s) Both EYES at bedtime  levothyroxine 75 MICROGram(s) Oral daily  metoprolol tartrate 25 milliGRAM(s) Oral two times a day  pantoprazole  Injectable 40 milliGRAM(s) IV Push daily  sodium bicarbonate 650 milliGRAM(s) Oral three times a day  sodium chloride 0.45%. 1000 milliLiter(s) (75 mL/Hr) IV Continuous <Continuous>  tamsulosin 0.4 milliGRAM(s) Oral at bedtime    MEDICATIONS  (PRN):  acetaminophen     Tablet .. 650 milliGRAM(s) Oral every 6 hours PRN Temp greater or equal to 38C (100.4F), Mild Pain (1 - 3)  aluminum hydroxide/magnesium hydroxide/simethicone Suspension 30 milliLiter(s) Oral every 4 hours PRN Dyspepsia  dextrose Oral Gel 15 Gram(s) Oral once PRN Blood Glucose LESS THAN 70 milliGRAM(s)/deciliter  melatonin 3 milliGRAM(s) Oral at bedtime PRN Insomnia  ondansetron Injectable 4 milliGRAM(s) IV Push every 8 hours PRN Nausea and/or Vomiting      Allergies    No Known Allergies    Intolerances        Vital Signs Last 24 Hrs  T(C): 37.1 (19 Jun 2023 04:58), Max: 37.6 (18 Jun 2023 10:59)  T(F): 98.8 (19 Jun 2023 04:58), Max: 99.7 (18 Jun 2023 10:59)  HR: 87 (19 Jun 2023 04:58) (85 - 90)  BP: 149/72 (19 Jun 2023 04:58) (120/69 - 149/72)  BP(mean): --  RR: 19 (19 Jun 2023 04:58) (18 - 19)  SpO2: 98% (19 Jun 2023 04:58) (95% - 98%)    Parameters below as of 18 Jun 2023 10:59  Patient On (Oxygen Delivery Method): room air        PHYSICAL EXAM:  General: NAD.  CVS: S1, S2  Chest: air entry bilaterally present  Abd: BS present, soft, non-tender      LABS:                        9.4    8.55  )-----------( 149      ( 19 Jun 2023 06:10 )             28.2     06-19    140  |  118<H>  |  45<H>  ----------------------------<  139<H>  3.1<L>   |  15<L>  |  3.84<H>    Ca    7.8<L>      19 Jun 2023 06:10  Mg     1.6     06-19    TPro  5.7<L>  /  Alb  1.9<L>  /  TBili  0.7  /  DBili  x   /  AST  35  /  ALT  20  /  AlkPhos  79  06-19    stool neg for OB    change protonix to PO  CBC in AM       No significant changes  No bleeding  Hgb slightly better  stools neg for OB      MEDICATIONS  (STANDING):  atorvastatin 80 milliGRAM(s) Oral at bedtime  cefTRIAXone   IVPB 1000 milliGRAM(s) IV Intermittent every 24 hours  cefTRIAXone   IVPB      cilostazol 100 milliGRAM(s) Oral two times a day  dextrose 5%. 1000 milliLiter(s) (50 mL/Hr) IV Continuous <Continuous>  dextrose 5%. 1000 milliLiter(s) (100 mL/Hr) IV Continuous <Continuous>  dextrose 50% Injectable 25 Gram(s) IV Push once  dextrose 50% Injectable 12.5 Gram(s) IV Push once  dextrose 50% Injectable 25 Gram(s) IV Push once  epoetin shai-epbx (RETACRIT) Injectable 54753 Unit(s) SubCutaneous every 7 days  finasteride 5 milliGRAM(s) Oral daily  glucagon  Injectable 1 milliGRAM(s) IntraMuscular once  insulin lispro (ADMELOG) corrective regimen sliding scale   SubCutaneous three times a day before meals  insulin lispro (ADMELOG) corrective regimen sliding scale   SubCutaneous at bedtime  latanoprost 0.005% Ophthalmic Solution 1 Drop(s) Both EYES at bedtime  levothyroxine 75 MICROGram(s) Oral daily  metoprolol tartrate 25 milliGRAM(s) Oral two times a day  pantoprazole  Injectable 40 milliGRAM(s) IV Push daily  sodium bicarbonate 650 milliGRAM(s) Oral three times a day  sodium chloride 0.45%. 1000 milliLiter(s) (75 mL/Hr) IV Continuous <Continuous>  tamsulosin 0.4 milliGRAM(s) Oral at bedtime    MEDICATIONS  (PRN):  acetaminophen     Tablet .. 650 milliGRAM(s) Oral every 6 hours PRN Temp greater or equal to 38C (100.4F), Mild Pain (1 - 3)  aluminum hydroxide/magnesium hydroxide/simethicone Suspension 30 milliLiter(s) Oral every 4 hours PRN Dyspepsia  dextrose Oral Gel 15 Gram(s) Oral once PRN Blood Glucose LESS THAN 70 milliGRAM(s)/deciliter  melatonin 3 milliGRAM(s) Oral at bedtime PRN Insomnia  ondansetron Injectable 4 milliGRAM(s) IV Push every 8 hours PRN Nausea and/or Vomiting      Allergies    No Known Allergies    Intolerances        Vital Signs Last 24 Hrs  T(C): 37.1 (19 Jun 2023 04:58), Max: 37.6 (18 Jun 2023 10:59)  T(F): 98.8 (19 Jun 2023 04:58), Max: 99.7 (18 Jun 2023 10:59)  HR: 87 (19 Jun 2023 04:58) (85 - 90)  BP: 149/72 (19 Jun 2023 04:58) (120/69 - 149/72)  BP(mean): --  RR: 19 (19 Jun 2023 04:58) (18 - 19)  SpO2: 98% (19 Jun 2023 04:58) (95% - 98%)    Parameters below as of 18 Jun 2023 10:59  Patient On (Oxygen Delivery Method): room air        PHYSICAL EXAM:  General: NAD.  CVS: S1, S2  Chest: air entry bilaterally present  Abd: BS present, soft, non-tender      LABS:                        9.4    8.55  )-----------( 149      ( 19 Jun 2023 06:10 )             28.2     06-19    140  |  118<H>  |  45<H>  ----------------------------<  139<H>  3.1<L>   |  15<L>  |  3.84<H>    Ca    7.8<L>      19 Jun 2023 06:10  Mg     1.6     06-19    TPro  5.7<L>  /  Alb  1.9<L>  /  TBili  0.7  /  DBili  x   /  AST  35  /  ALT  20  /  AlkPhos  79  06-19    stool neg for OB    change protonix to PO  CBC in AM  advance diet

## 2023-06-19 NOTE — SWALLOW BEDSIDE ASSESSMENT ADULT - SLP PERTINENT HISTORY OF CURRENT PROBLEM
Hematemesis; ED from home with coffee ground emesis x 2.  Pt. was found to be hypotensive and unresponsive per EMS

## 2023-06-19 NOTE — PROGRESS NOTE ADULT - SUBJECTIVE AND OBJECTIVE BOX
Patient is a 89y old  Male who presents with a chief complaint of Hematemesis (19 Jun 2023 08:39)    INTERVAL HPI/OVERNIGHT EVENTS: No acute events overnight. HD stable.     MEDICATIONS  (STANDING):  atorvastatin 80 milliGRAM(s) Oral at bedtime  cefTRIAXone   IVPB 1000 milliGRAM(s) IV Intermittent every 24 hours  cefTRIAXone   IVPB      cilostazol 100 milliGRAM(s) Oral two times a day  dextrose 5%. 1000 milliLiter(s) (50 mL/Hr) IV Continuous <Continuous>  dextrose 5%. 1000 milliLiter(s) (100 mL/Hr) IV Continuous <Continuous>  dextrose 50% Injectable 25 Gram(s) IV Push once  dextrose 50% Injectable 12.5 Gram(s) IV Push once  dextrose 50% Injectable 25 Gram(s) IV Push once  epoetin shai-epbx (RETACRIT) Injectable 07256 Unit(s) SubCutaneous every 7 days  finasteride 5 milliGRAM(s) Oral daily  glucagon  Injectable 1 milliGRAM(s) IntraMuscular once  insulin lispro (ADMELOG) corrective regimen sliding scale   SubCutaneous three times a day before meals  insulin lispro (ADMELOG) corrective regimen sliding scale   SubCutaneous at bedtime  latanoprost 0.005% Ophthalmic Solution 1 Drop(s) Both EYES at bedtime  levothyroxine 75 MICROGram(s) Oral daily  metoprolol tartrate 25 milliGRAM(s) Oral two times a day  pantoprazole    Tablet 40 milliGRAM(s) Oral before breakfast  sodium bicarbonate 650 milliGRAM(s) Oral three times a day  tamsulosin 0.4 milliGRAM(s) Oral at bedtime    MEDICATIONS  (PRN):  acetaminophen     Tablet .. 650 milliGRAM(s) Oral every 6 hours PRN Temp greater or equal to 38C (100.4F), Mild Pain (1 - 3)  aluminum hydroxide/magnesium hydroxide/simethicone Suspension 30 milliLiter(s) Oral every 4 hours PRN Dyspepsia  dextrose Oral Gel 15 Gram(s) Oral once PRN Blood Glucose LESS THAN 70 milliGRAM(s)/deciliter  melatonin 3 milliGRAM(s) Oral at bedtime PRN Insomnia  ondansetron Injectable 4 milliGRAM(s) IV Push every 8 hours PRN Nausea and/or Vomiting      Allergies    No Known Allergies    Intolerances        REVIEW OF SYSTEMS: all negative with exception of above    Vital Signs Last 24 Hrs  T(C): 37.1 (19 Jun 2023 10:58), Max: 37.3 (18 Jun 2023 23:33)  T(F): 98.7 (19 Jun 2023 10:58), Max: 99.2 (18 Jun 2023 23:33)  HR: 74 (19 Jun 2023 10:58) (74 - 87)  BP: 133/70 (19 Jun 2023 10:58) (120/69 - 149/72)  BP(mean): --  RR: 18 (19 Jun 2023 10:58) (18 - 19)  SpO2: 97% (19 Jun 2023 10:58) (95% - 98%)    Parameters below as of 19 Jun 2023 10:58  Patient On (Oxygen Delivery Method): room air        PHYSICAL EXAM:  GENERAL: NAD, well-groomed, well-developed  HEAD:  Atraumatic, Normocephalic  EYES: EOMI, PERRLA, conjunctiva and sclera clear  ENMT: No tonsillar erythema, exudates, or enlargement; Moist mucous membranes, Good dentition, No lesions  NECK: Supple, No JVD, Normal thyroid  NERVOUS SYSTEM:  Alert & Oriented X3, Good concentration; Motor Strength 5/5 B/L upper and lower extremities; DTRs 2+ intact and symmetric  CHEST/LUNG: Clear to percussion bilaterally; No rales, rhonchi, wheezing, or rubs  HEART: Regular rate and rhythm; No murmurs, rubs, or gallops  ABDOMEN: Soft, Nontender, Nondistended; Bowel sounds present  EXTREMITIES:  2+ Peripheral Pulses, No clubbing, cyanosis, or edema  LYMPH: No lymphadenopathy noted  SKIN: No rashes or lesions    LABS:                        9.4    8.55  )-----------( 149      ( 19 Jun 2023 06:10 )             28.2     06-19    140  |  118<H>  |  45<H>  ----------------------------<  139<H>  3.1<L>   |  15<L>  |  3.84<H>    Ca    7.8<L>      19 Jun 2023 06:10  Mg     1.6     06-19    TPro  5.7<L>  /  Alb  1.9<L>  /  TBili  0.7  /  DBili  x   /  AST  35  /  ALT  20  /  AlkPhos  79  06-19        CAPILLARY BLOOD GLUCOSE      POCT Blood Glucose.: 159 mg/dL (19 Jun 2023 11:31)  POCT Blood Glucose.: 143 mg/dL (19 Jun 2023 08:06)  POCT Blood Glucose.: 201 mg/dL (18 Jun 2023 20:52)  POCT Blood Glucose.: 253 mg/dL (18 Jun 2023 16:31)      RADIOLOGY & ADDITIONAL TESTS:    Imaging Personally Reviewed:  [ ] YES  [ ] NO    Consultant(s) Notes Reviewed:  [ ] YES  [ ] NO    Care Discussed with Consultants/Other Providers [ ] YES  [ ] NO

## 2023-06-19 NOTE — SWALLOW BEDSIDE ASSESSMENT ADULT - SWALLOW EVAL: PATIENT/FAMILY GOALS STATEMENT
English pt stated " I think that's enough , no more" during feeding;  repeated few utterances pt stated " I think that's enough , no more" after only few trials taken and strong burping

## 2023-06-19 NOTE — PROGRESS NOTE ADULT - SUBJECTIVE AND OBJECTIVE BOX
[facility] NEPHROLOGY SERVICES, [facility]  NEPHROLOGY AND HYPERTENSION  [address]  [address]  [place] [address]  [phone]    MD SELENE BRUSH MD YELENA ROSENBERG, MD BINNY KOSHY, MD CHRISTOPHER CAPUTO, MD EDWARD BOVER, MD          Patient events noted    MEDICATIONS  (STANDING):  atorvastatin 80 milliGRAM(s) Oral at bedtime  cefTRIAXone   IVPB 1000 milliGRAM(s) IV Intermittent every 24 hours  cefTRIAXone   IVPB      cilostazol 100 milliGRAM(s) Oral two times a day  dextrose 5%. 1000 milliLiter(s) (50 mL/Hr) IV Continuous <Continuous>  dextrose 5%. 1000 milliLiter(s) (100 mL/Hr) IV Continuous <Continuous>  dextrose 50% Injectable 25 Gram(s) IV Push once  dextrose 50% Injectable 12.5 Gram(s) IV Push once  dextrose 50% Injectable 25 Gram(s) IV Push once  epoetin shai-epbx (RETACRIT) Injectable 34072 Unit(s) SubCutaneous every 7 days  finasteride 5 milliGRAM(s) Oral daily  glucagon  Injectable 1 milliGRAM(s) IntraMuscular once  insulin lispro (ADMELOG) corrective regimen sliding scale   SubCutaneous three times a day before meals  insulin lispro (ADMELOG) corrective regimen sliding scale   SubCutaneous at bedtime  latanoprost 0.005% Ophthalmic Solution 1 Drop(s) Both EYES at bedtime  levothyroxine 75 MICROGram(s) Oral daily  metoprolol tartrate 25 milliGRAM(s) Oral two times a day  pantoprazole    Tablet 40 milliGRAM(s) Oral before breakfast  sodium bicarbonate 650 milliGRAM(s) Oral three times a day  tamsulosin 0.4 milliGRAM(s) Oral at bedtime    MEDICATIONS  (PRN):  acetaminophen     Tablet .. 650 milliGRAM(s) Oral every 6 hours PRN Temp greater or equal to 38C (100.4F), Mild Pain (1 - 3)  aluminum hydroxide/magnesium hydroxide/simethicone Suspension 30 milliLiter(s) Oral every 4 hours PRN Dyspepsia  dextrose Oral Gel 15 Gram(s) Oral once PRN Blood Glucose LESS THAN 70 milliGRAM(s)/deciliter  melatonin 3 milliGRAM(s) Oral at bedtime PRN Insomnia  ondansetron Injectable 4 milliGRAM(s) IV Push every 8 hours PRN Nausea and/or Vomiting      06-18-23 @ 07:01  -  06-19-23 @ 07:00  --------------------------------------------------------  IN: 1500 mL / OUT: 0 mL / NET: 1500 mL    06-19-23 @ 07:01  -  06-19-23 @ 23:37  --------------------------------------------------------  IN: 600 mL / OUT: 0 mL / NET: 600 mL      PHYSICAL EXAM:      T(C): 36.6 (06-19-23 @ 16:20), Max: 37.1 (06-19-23 @ 04:58)  HR: 73 (06-19-23 @ 16:20) (73 - 87)  BP: 131/71 (06-19-23 @ 16:20) (131/71 - 149/72)  RR: 18 (06-19-23 @ 16:20) (18 - 19)  SpO2: 97% (06-19-23 @ 16:20) (97% - 98%)  Wt(kg): --  Lungs clear  Heart S1S2  Abd soft NT ND  Extremities:   tr edema                                    9.4    8.55  )-----------( 149      ( 19 Jun 2023 06:10 )             28.2     06-19    140  |  118<H>  |  45<H>  ----------------------------<  139<H>  3.1<L>   |  15<L>  |  3.84<H>    Ca    7.8<L>      19 Jun 2023 06:10  Mg     1.6     06-19    TPro  5.7<L>  /  Alb  1.9<L>  /  TBili  0.7  /  DBili  x   /  AST  35  /  ALT  20  /  AlkPhos  79  06-19      LIVER FUNCTIONS - ( 19 Jun 2023 06:10 )  Alb: 1.9 g/dL / Pro: 5.7 gm/dL / ALK PHOS: 79 U/L / ALT: 20 U/L / AST: 35 U/L / GGT: x           Creatinine Trend: 3.84<--, 4.00<--, 3.21<--, 2.60<--, 2.58<--      Assessment/Plan:      Assessment   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

## 2023-06-19 NOTE — PROVIDER CONTACT NOTE (OTHER) - ACTION/TREATMENT ORDERED:
PA Diane at the bedside and assess patient. Left arm elevated on pillow. Warm compress applied per PA request.

## 2023-06-19 NOTE — SWALLOW BEDSIDE ASSESSMENT ADULT - MUCOSAL QUALITY
prolonged manipulation with each bolus, delayed completion of swallows with bolus hold at times; there is fatigue noted and then pt refused on his own

## 2023-06-19 NOTE — PHYSICAL THERAPY INITIAL EVALUATION ADULT - MODALITIES TREATMENT COMMENTS
Healed/ scabbed wounds through both anterior shins & knees. +R heel DTI with fluctuant & visible fluid underneath intact skin +malodor.

## 2023-06-19 NOTE — DISCHARGE NOTE PROVIDER - NSDCFUADDAPPT_GEN_ALL_CORE_FT
Podiatry Discharge Instructions:  Follow up: Please follow up with Dr. Doll within 1 week of discharge from the hospital, please call [phone] for appointment and discuss that you recently were seen in the hospital.  Wound Care: Please apply betadine directly to the right heel wound daily followed by 4x4 gauze and georgina.   Weight bearing: Please weight bear as tolerated in a surgical shoe to the right foot and wear z-flows to both feet at all times while in bed.  Antibiotics: Please continue as instructed.

## 2023-06-19 NOTE — PHYSICAL THERAPY INITIAL EVALUATION ADULT - ADDITIONAL COMMENTS
Lives with wife and 2 dtrs johnnie PH with 4 steps to enter with RHR. Pt is never left alone. He wears diapers 24 hrs/day. Dtr is CDPAP  4hr x 5 days . Pt gets assistance from family to get OOB into wheelchair . Per dtr pt is [place]. and has Glaucoma
Lives with wife and 2 dtrs johnnie PH with 4 steps to enter with RHR. Pt is never left alone. He wears diapers 24 hrs/day. Dtr is CDPAP  4hr x 5 days . Pt gets assistance from family to get OOB into wheelchair . Per dtr pt is [place]. and has Glaucoma

## 2023-06-19 NOTE — PHYSICAL THERAPY INITIAL EVALUATION ADULT - PERTINENT HX OF CURRENT PROBLEM, REHAB EVAL
Pt had  Angiogram & stents put in his BLE in 2019 He has h/o BLE wounds  in the past that resolved  with  medical interventions. Approx 1 month ago pt developed redness & swelling of BLE  with numerous seeping wounds and blackening of R heel.  On 6/13/23 pt underwent consultation with Dr Javier LE for the first time @ [facility] located at  [address] [place] [address]. Pt was scheduled for f/u session on 6/20/23 for removal of Amber Boots but pt got admitted to [facility] on 6/15/23.  At this session pt's amber boots dressings were cut . Shavonne took pic and videos of the BLE  and shared with her sister who is patient's CDPAP HHA.  Both  agreed that pt's BLE wounds have healed approx 80% -  The redness of BLE  has resolved, seeping wounds have closed  and the swelling has decreased. Per Shavonne patient was going to be assessed for vascular testing on 6/20/23 f/u session with Dr Herrera. Therapist called Dr Herrera's office to get further info, but office closed for Juneteenth holiday.
Admitted for GIB   H/H :  9.8/28.3

## 2023-06-19 NOTE — SWALLOW BEDSIDE ASSESSMENT ADULT - ORAL PHASE
Stasis in lateral sulci chewing motions noted/Delayed oral transit time/Stasis in lateral sulci bolus hold x 1 at end/Delayed oral transit time left sided residue/Decreased anterior-posterior movement of the bolus/Delayed oral transit time/Stasis in lateral sulci

## 2023-06-19 NOTE — SWALLOW BEDSIDE ASSESSMENT ADULT - SWALLOW EVAL: RECOMMENDED DIET
Minced /Moist texture with Minced /Moist texture with THIN liquids VIA SINGLE SIPS FROM CUP OR SPOON AMOUNTS;  NO STRAWS ; PILLS WITH APPLESAUCE

## 2023-06-20 LAB
ALBUMIN SERPL ELPH-MCNC: 2 G/DL — LOW (ref 3.3–5)
ALP SERPL-CCNC: 74 U/L — SIGNIFICANT CHANGE UP (ref 40–120)
ALT FLD-CCNC: 19 U/L — SIGNIFICANT CHANGE UP (ref 12–78)
ANION GAP SERPL CALC-SCNC: 6 MMOL/L — SIGNIFICANT CHANGE UP (ref 5–17)
AST SERPL-CCNC: 26 U/L — SIGNIFICANT CHANGE UP (ref 15–37)
BILIRUB SERPL-MCNC: 0.9 MG/DL — SIGNIFICANT CHANGE UP (ref 0.2–1.2)
BUN SERPL-MCNC: 52 MG/DL — HIGH (ref 7–23)
CALCIUM SERPL-MCNC: 8.9 MG/DL — SIGNIFICANT CHANGE UP (ref 8.5–10.1)
CHLORIDE SERPL-SCNC: 121 MMOL/L — HIGH (ref 96–108)
CO2 SERPL-SCNC: 15 MMOL/L — LOW (ref 22–31)
CREAT SERPL-MCNC: 3.84 MG/DL — HIGH (ref 0.5–1.3)
CULTURE RESULTS: SIGNIFICANT CHANGE UP
CULTURE RESULTS: SIGNIFICANT CHANGE UP
EGFR: 14 ML/MIN/1.73M2 — LOW
GLUCOSE BLDC GLUCOMTR-MCNC: 153 MG/DL — HIGH (ref 70–99)
GLUCOSE BLDC GLUCOMTR-MCNC: 162 MG/DL — HIGH (ref 70–99)
GLUCOSE BLDC GLUCOMTR-MCNC: 194 MG/DL — HIGH (ref 70–99)
GLUCOSE BLDC GLUCOMTR-MCNC: 236 MG/DL — HIGH (ref 70–99)
GLUCOSE SERPL-MCNC: 153 MG/DL — HIGH (ref 70–99)
HCT VFR BLD CALC: 28.4 % — LOW (ref 39–50)
HGB BLD-MCNC: 9.5 G/DL — LOW (ref 13–17)
MCHC RBC-ENTMCNC: 29.2 PG — SIGNIFICANT CHANGE UP (ref 27–34)
MCHC RBC-ENTMCNC: 33.5 G/DL — SIGNIFICANT CHANGE UP (ref 32–36)
MCV RBC AUTO: 87.4 FL — SIGNIFICANT CHANGE UP (ref 80–100)
NRBC # BLD: 0 /100 WBCS — SIGNIFICANT CHANGE UP (ref 0–0)
PLATELET # BLD AUTO: 143 K/UL — LOW (ref 150–400)
POTASSIUM SERPL-MCNC: 3.8 MMOL/L — SIGNIFICANT CHANGE UP (ref 3.5–5.3)
POTASSIUM SERPL-SCNC: 3.8 MMOL/L — SIGNIFICANT CHANGE UP (ref 3.5–5.3)
PROT SERPL-MCNC: 5.6 GM/DL — LOW (ref 6–8.3)
RBC # BLD: 3.25 M/UL — LOW (ref 4.2–5.8)
RBC # FLD: 16.8 % — HIGH (ref 10.3–14.5)
SODIUM SERPL-SCNC: 142 MMOL/L — SIGNIFICANT CHANGE UP (ref 135–145)
SPECIMEN SOURCE: SIGNIFICANT CHANGE UP
SPECIMEN SOURCE: SIGNIFICANT CHANGE UP
WBC # BLD: 8.61 K/UL — SIGNIFICANT CHANGE UP (ref 3.8–10.5)
WBC # FLD AUTO: 8.61 K/UL — SIGNIFICANT CHANGE UP (ref 3.8–10.5)

## 2023-06-20 PROCEDURE — 93971 EXTREMITY STUDY: CPT | Mod: 26,LT

## 2023-06-20 PROCEDURE — 99223 1ST HOSP IP/OBS HIGH 75: CPT

## 2023-06-20 PROCEDURE — 99233 SBSQ HOSP IP/OBS HIGH 50: CPT

## 2023-06-20 RX ORDER — PIPERACILLIN AND TAZOBACTAM 4; .5 G/20ML; G/20ML
3.38 INJECTION, POWDER, LYOPHILIZED, FOR SOLUTION INTRAVENOUS ONCE
Refills: 0 | Status: COMPLETED | OUTPATIENT
Start: 2023-06-20 | End: 2023-06-20

## 2023-06-20 RX ORDER — SODIUM BICARBONATE 1 MEQ/ML
0.2 SYRINGE (ML) INTRAVENOUS
Qty: 150 | Refills: 0 | Status: COMPLETED | OUTPATIENT
Start: 2023-06-20 | End: 2023-06-20

## 2023-06-20 RX ORDER — PIPERACILLIN AND TAZOBACTAM 4; .5 G/20ML; G/20ML
3.38 INJECTION, POWDER, LYOPHILIZED, FOR SOLUTION INTRAVENOUS ONCE
Refills: 0 | Status: COMPLETED | OUTPATIENT
Start: 2023-06-21 | End: 2023-06-21

## 2023-06-20 RX ORDER — PIPERACILLIN AND TAZOBACTAM 4; .5 G/20ML; G/20ML
3.38 INJECTION, POWDER, LYOPHILIZED, FOR SOLUTION INTRAVENOUS EVERY 12 HOURS
Refills: 0 | Status: DISCONTINUED | OUTPATIENT
Start: 2023-06-21 | End: 2023-06-27

## 2023-06-20 RX ADMIN — TAMSULOSIN HYDROCHLORIDE 0.4 MILLIGRAM(S): 0.4 CAPSULE ORAL at 21:49

## 2023-06-20 RX ADMIN — Medication 1: at 11:45

## 2023-06-20 RX ADMIN — Medication 75 MEQ/KG/HR: at 17:15

## 2023-06-20 RX ADMIN — Medication 650 MILLIGRAM(S): at 00:05

## 2023-06-20 RX ADMIN — Medication 1: at 17:14

## 2023-06-20 RX ADMIN — Medication 650 MILLIGRAM(S): at 05:53

## 2023-06-20 RX ADMIN — Medication 25 MILLIGRAM(S): at 05:53

## 2023-06-20 RX ADMIN — Medication 650 MILLIGRAM(S): at 21:49

## 2023-06-20 RX ADMIN — PANTOPRAZOLE SODIUM 40 MILLIGRAM(S): 20 TABLET, DELAYED RELEASE ORAL at 05:53

## 2023-06-20 RX ADMIN — Medication 25 MILLIGRAM(S): at 17:15

## 2023-06-20 RX ADMIN — Medication 75 MICROGRAM(S): at 05:53

## 2023-06-20 RX ADMIN — LATANOPROST 1 DROP(S): 0.05 SOLUTION/ DROPS OPHTHALMIC; TOPICAL at 21:50

## 2023-06-20 RX ADMIN — PIPERACILLIN AND TAZOBACTAM 200 GRAM(S): 4; .5 INJECTION, POWDER, LYOPHILIZED, FOR SOLUTION INTRAVENOUS at 20:03

## 2023-06-20 RX ADMIN — CEFTRIAXONE 100 MILLIGRAM(S): 500 INJECTION, POWDER, FOR SOLUTION INTRAMUSCULAR; INTRAVENOUS at 15:05

## 2023-06-20 RX ADMIN — Medication 650 MILLIGRAM(S): at 15:06

## 2023-06-20 RX ADMIN — FINASTERIDE 5 MILLIGRAM(S): 5 TABLET, FILM COATED ORAL at 11:32

## 2023-06-20 RX ADMIN — CILOSTAZOL 100 MILLIGRAM(S): 100 TABLET ORAL at 17:15

## 2023-06-20 RX ADMIN — ATORVASTATIN CALCIUM 80 MILLIGRAM(S): 80 TABLET, FILM COATED ORAL at 21:49

## 2023-06-20 RX ADMIN — CILOSTAZOL 100 MILLIGRAM(S): 100 TABLET ORAL at 05:53

## 2023-06-20 RX ADMIN — Medication 650 MILLIGRAM(S): at 01:04

## 2023-06-20 RX ADMIN — Medication 1: at 08:12

## 2023-06-20 NOTE — PROGRESS NOTE ADULT - SUBJECTIVE AND OBJECTIVE BOX
Patient is a 89y old  Male who presents with a chief complaint of Hematemesis (19 Jun 2023 23:37)    INTERVAL HPI/OVERNIGHT EVENTS: No acute events overnight. HD stable.     MEDICATIONS  (STANDING):  atorvastatin 80 milliGRAM(s) Oral at bedtime  cefTRIAXone   IVPB 1000 milliGRAM(s) IV Intermittent every 24 hours  cefTRIAXone   IVPB      cilostazol 100 milliGRAM(s) Oral two times a day  dextrose 5%. 1000 milliLiter(s) (50 mL/Hr) IV Continuous <Continuous>  dextrose 5%. 1000 milliLiter(s) (100 mL/Hr) IV Continuous <Continuous>  dextrose 50% Injectable 25 Gram(s) IV Push once  dextrose 50% Injectable 12.5 Gram(s) IV Push once  dextrose 50% Injectable 25 Gram(s) IV Push once  epoetin shai-epbx (RETACRIT) Injectable 34723 Unit(s) SubCutaneous every 7 days  finasteride 5 milliGRAM(s) Oral daily  glucagon  Injectable 1 milliGRAM(s) IntraMuscular once  insulin lispro (ADMELOG) corrective regimen sliding scale   SubCutaneous three times a day before meals  insulin lispro (ADMELOG) corrective regimen sliding scale   SubCutaneous at bedtime  latanoprost 0.005% Ophthalmic Solution 1 Drop(s) Both EYES at bedtime  levothyroxine 75 MICROGram(s) Oral daily  metoprolol tartrate 25 milliGRAM(s) Oral two times a day  pantoprazole    Tablet 40 milliGRAM(s) Oral before breakfast  sodium bicarbonate 650 milliGRAM(s) Oral three times a day  tamsulosin 0.4 milliGRAM(s) Oral at bedtime    MEDICATIONS  (PRN):  acetaminophen     Tablet .. 650 milliGRAM(s) Oral every 6 hours PRN Temp greater or equal to 38C (100.4F), Mild Pain (1 - 3)  aluminum hydroxide/magnesium hydroxide/simethicone Suspension 30 milliLiter(s) Oral every 4 hours PRN Dyspepsia  dextrose Oral Gel 15 Gram(s) Oral once PRN Blood Glucose LESS THAN 70 milliGRAM(s)/deciliter  melatonin 3 milliGRAM(s) Oral at bedtime PRN Insomnia  ondansetron Injectable 4 milliGRAM(s) IV Push every 8 hours PRN Nausea and/or Vomiting      Allergies    No Known Allergies    Intolerances        REVIEW OF SYSTEMS: all negative with exception of above    Vital Signs Last 24 Hrs  T(C): 36.8 (20 Jun 2023 10:46), Max: 38.2 (19 Jun 2023 23:54)  T(F): 98.3 (20 Jun 2023 10:46), Max: 100.8 (19 Jun 2023 23:54)  HR: 83 (20 Jun 2023 10:46) (73 - 99)  BP: 126/65 (20 Jun 2023 10:46) (107/63 - 131/71)  BP(mean): --  RR: 18 (20 Jun 2023 10:46) (18 - 18)  SpO2: 95% (20 Jun 2023 10:46) (95% - 99%)    Parameters below as of 20 Jun 2023 10:46  Patient On (Oxygen Delivery Method): room air        PHYSICAL EXAM:  GENERAL: NAD, well-groomed  NERVOUS SYSTEM:  Alert & Oriented X3, Good concentration; Motor Strength 5/5 B/L upper and lower extremities; DTRs 2+ intact and symmetric  CHEST/LUNG: Clear to percussion bilaterally; No rales, rhonchi, wheezing, or rubs  HEART: Regular rate and rhythm; No murmurs, rubs, or gallops  ABDOMEN: Soft, Nontender, Nondistended; Bowel sounds present  EXTREMITIES:  2+ Peripheral Pulses, No clubbing, cyanosis, or edema      LABS:                        9.5    8.61  )-----------( 143      ( 20 Jun 2023 08:00 )             28.4     06-20    142  |  121<H>  |  52<H>  ----------------------------<  153<H>  3.8   |  15<L>  |  3.84<H>    Ca    8.9      20 Jun 2023 08:00  Mg     1.6     06-19    TPro  5.6<L>  /  Alb  2.0<L>  /  TBili  0.9  /  DBili  x   /  AST  26  /  ALT  19  /  AlkPhos  74  06-20        CAPILLARY BLOOD GLUCOSE      POCT Blood Glucose.: 153 mg/dL (20 Jun 2023 11:39)  POCT Blood Glucose.: 162 mg/dL (20 Jun 2023 07:44)  POCT Blood Glucose.: 156 mg/dL (19 Jun 2023 20:56)  POCT Blood Glucose.: 166 mg/dL (19 Jun 2023 16:33)      RADIOLOGY & ADDITIONAL TESTS:    Imaging Personally Reviewed:  [ ] YES  [ ] NO    Consultant(s) Notes Reviewed:  [ ] YES  [ ] NO    Care Discussed with Consultants/Other Providers [ ] YES  [ ] NO

## 2023-06-20 NOTE — CONSULT NOTE ADULT - ASSESSMENT
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  - Pt seen and evaluated.  - Afebrile, WBC 8.61, ESR/CRP ordered.  - Right heel posterior and lateral full thickness dry gangrene with plantar medial ischemic changes, no areas of fluctuance, no drainage, no purulence, no erythema, no edema, no acute signs of infection. Left heel deep tissue injury with no acute signs of infection.  - Right Foot X-Rays: Pending.  - Recommend right heel wound care with betadine and dry sterile dressing.  - Recommend BL z-flows to be worn at all times while in bed to offload BL heels and prevent further breakdown 2/2 pressure.  - Vasc recs, appreciated.  - Pod Plan: Local wound care.  - Wound care instructions and followup information listed in discharge provider note.  - Discussed with attending. 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  - Pt seen and evaluated.  - Afebrile, WBC 8.61, ESR/CRP ordered.  - Right heel posterior and lateral full thickness dry gangrene with plantar medial ischemic changes, no areas of fluctuance, no drainage, no purulence, no erythema, no edema, no acute signs of infection. Left heel deep tissue injury with no acute signs of infection.  - Right Foot X-Rays: Pending.  - No right foot wound culture taken 2/2 no acute signs of infection and no drainage therefore culture would likely yield skin contaminant.   - Recommend right heel wound care with betadine and dry sterile dressing.  - Recommend BL z-flows to be worn at all times while in bed to offload BL heels and prevent further breakdown 2/2 pressure.  - Terell gonzalez, appreciated.  - Pod Plan: Local wound care.  - Wound care instructions and followup information listed in discharge provider note.  - Discussed with attending.

## 2023-06-20 NOTE — PROGRESS NOTE ADULT - SUBJECTIVE AND OBJECTIVE BOX
[facility] NEPHROLOGY SERVICES, [facility]  NEPHROLOGY AND HYPERTENSION  [address]  [address]  [place] [address]  [phone]    MD SELENE BRUSH MD YELENA ROSENBERG, MD BINNY KOSHY, MD CHRISTOPHER CAPUTO, MD EDWARD BOVER, MD          Patient events noted    MEDICATIONS  (STANDING):  atorvastatin 80 milliGRAM(s) Oral at bedtime  cefTRIAXone   IVPB 1000 milliGRAM(s) IV Intermittent every 24 hours  cefTRIAXone   IVPB      cilostazol 100 milliGRAM(s) Oral two times a day  dextrose 5%. 1000 milliLiter(s) (50 mL/Hr) IV Continuous <Continuous>  dextrose 5%. 1000 milliLiter(s) (100 mL/Hr) IV Continuous <Continuous>  dextrose 50% Injectable 25 Gram(s) IV Push once  dextrose 50% Injectable 12.5 Gram(s) IV Push once  dextrose 50% Injectable 25 Gram(s) IV Push once  epoetin shai-epbx (RETACRIT) Injectable 34578 Unit(s) SubCutaneous every 7 days  finasteride 5 milliGRAM(s) Oral daily  glucagon  Injectable 1 milliGRAM(s) IntraMuscular once  insulin lispro (ADMELOG) corrective regimen sliding scale   SubCutaneous at bedtime  insulin lispro (ADMELOG) corrective regimen sliding scale   SubCutaneous three times a day before meals  latanoprost 0.005% Ophthalmic Solution 1 Drop(s) Both EYES at bedtime  levothyroxine 75 MICROGram(s) Oral daily  metoprolol tartrate 25 milliGRAM(s) Oral two times a day  pantoprazole    Tablet 40 milliGRAM(s) Oral before breakfast  sodium bicarbonate 650 milliGRAM(s) Oral three times a day  tamsulosin 0.4 milliGRAM(s) Oral at bedtime    MEDICATIONS  (PRN):  acetaminophen     Tablet .. 650 milliGRAM(s) Oral every 6 hours PRN Temp greater or equal to 38C (100.4F), Mild Pain (1 - 3)  aluminum hydroxide/magnesium hydroxide/simethicone Suspension 30 milliLiter(s) Oral every 4 hours PRN Dyspepsia  dextrose Oral Gel 15 Gram(s) Oral once PRN Blood Glucose LESS THAN 70 milliGRAM(s)/deciliter  melatonin 3 milliGRAM(s) Oral at bedtime PRN Insomnia  ondansetron Injectable 4 milliGRAM(s) IV Push every 8 hours PRN Nausea and/or Vomiting      06-19-23 @ 07:01  -  06-20-23 @ 07:00  --------------------------------------------------------  IN: 600 mL / OUT: 0 mL / NET: 600 mL      PHYSICAL EXAM:      T(C): 36.9 (06-20-23 @ 15:52), Max: 38.2 (06-19-23 @ 23:54)  HR: 81 (06-20-23 @ 15:52) (81 - 99)  BP: 139/68 (06-20-23 @ 15:52) (107/63 - 139/68)  RR: 18 (06-20-23 @ 15:52) (18 - 18)  SpO2: 96% (06-20-23 @ 15:52) (95% - 99%)  Wt(kg): --  Lungs clear  Heart S1S2  Abd soft NT ND  Extremities:   tr edema                                    9.5    8.61  )-----------( 143      ( 20 Jun 2023 08:00 )             28.4     06-20    142  |  121<H>  |  52<H>  ----------------------------<  153<H>  3.8   |  15<L>  |  3.84<H>    Ca    8.9      20 Jun 2023 08:00  Mg     1.6     06-19    TPro  5.6<L>  /  Alb  2.0<L>  /  TBili  0.9  /  DBili  x   /  AST  26  /  ALT  19  /  AlkPhos  74  06-20      LIVER FUNCTIONS - ( 20 Jun 2023 08:00 )  Alb: 2.0 g/dL / Pro: 5.6 gm/dL / ALK PHOS: 74 U/L / ALT: 19 U/L / AST: 26 U/L / GGT: x           Creatinine Trend: 3.84<--, 3.84<--, 4.00<--, 3.21<--, 2.60<--, 2.58<--      Assessment   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

## 2023-06-20 NOTE — CONSULT NOTE ADULT - NS ATTEND AMEND GEN_ALL_CORE FT
The patient has right foot heel wound, necrotic skinwet eschar, APD. CKD.  Not sure if a candidate for angio. poor peda lpulses.  plan-- Podiatry consult, PAOLO and PVR, Betadine to the right foot. I spoke with the daughter Shavonne

## 2023-06-20 NOTE — PROGRESS NOTE ADULT - SUBJECTIVE AND OBJECTIVE BOX
No further bleeding  stable  Hgb now 9,5      MEDICATIONS  (STANDING):  atorvastatin 80 milliGRAM(s) Oral at bedtime  cefTRIAXone   IVPB 1000 milliGRAM(s) IV Intermittent every 24 hours  cefTRIAXone   IVPB      cilostazol 100 milliGRAM(s) Oral two times a day  dextrose 5%. 1000 milliLiter(s) (50 mL/Hr) IV Continuous <Continuous>  dextrose 5%. 1000 milliLiter(s) (100 mL/Hr) IV Continuous <Continuous>  dextrose 50% Injectable 25 Gram(s) IV Push once  dextrose 50% Injectable 12.5 Gram(s) IV Push once  dextrose 50% Injectable 25 Gram(s) IV Push once  epoetin shai-epbx (RETACRIT) Injectable 48449 Unit(s) SubCutaneous every 7 days  finasteride 5 milliGRAM(s) Oral daily  glucagon  Injectable 1 milliGRAM(s) IntraMuscular once  insulin lispro (ADMELOG) corrective regimen sliding scale   SubCutaneous three times a day before meals  insulin lispro (ADMELOG) corrective regimen sliding scale   SubCutaneous at bedtime  latanoprost 0.005% Ophthalmic Solution 1 Drop(s) Both EYES at bedtime  levothyroxine 75 MICROGram(s) Oral daily  metoprolol tartrate 25 milliGRAM(s) Oral two times a day  pantoprazole    Tablet 40 milliGRAM(s) Oral before breakfast  sodium bicarbonate 650 milliGRAM(s) Oral three times a day  tamsulosin 0.4 milliGRAM(s) Oral at bedtime    MEDICATIONS  (PRN):  acetaminophen     Tablet .. 650 milliGRAM(s) Oral every 6 hours PRN Temp greater or equal to 38C (100.4F), Mild Pain (1 - 3)  aluminum hydroxide/magnesium hydroxide/simethicone Suspension 30 milliLiter(s) Oral every 4 hours PRN Dyspepsia  dextrose Oral Gel 15 Gram(s) Oral once PRN Blood Glucose LESS THAN 70 milliGRAM(s)/deciliter  melatonin 3 milliGRAM(s) Oral at bedtime PRN Insomnia  ondansetron Injectable 4 milliGRAM(s) IV Push every 8 hours PRN Nausea and/or Vomiting      Allergies    No Known Allergies    Intolerances        Vital Signs Last 24 Hrs  T(C): 36.8 (20 Jun 2023 10:46), Max: 38.2 (19 Jun 2023 23:54)  T(F): 98.3 (20 Jun 2023 10:46), Max: 100.8 (19 Jun 2023 23:54)  HR: 83 (20 Jun 2023 10:46) (73 - 99)  BP: 126/65 (20 Jun 2023 10:46) (107/63 - 131/71)  BP(mean): --  RR: 18 (20 Jun 2023 10:46) (18 - 18)  SpO2: 95% (20 Jun 2023 10:46) (95% - 99%)    Parameters below as of 20 Jun 2023 10:46  Patient On (Oxygen Delivery Method): room air        PHYSICAL EXAM:  General: NAD.  CVS: S1, S2  Chest: air entry bilaterally present  Abd: BS present, soft, non-tender      LABS:                        9.5    8.61  )-----------( 143      ( 20 Jun 2023 08:00 )             28.4     06-20    142  |  121<H>  |  52<H>  ----------------------------<  153<H>  3.8   |  15<L>  |  3.84<H>    Ca    8.9      20 Jun 2023 08:00  Mg     1.6     06-19    TPro  5.6<L>  /  Alb  2.0<L>  /  TBili  0.9  /  DBili  x   /  AST  26  /  ALT  19  /  AlkPhos  74  06-20      on rocephin  continue protonix  diet as tolerated  cbc in AM

## 2023-06-20 NOTE — PROGRESS NOTE ADULT - ASSESSMENT
The patient is a 89y Male with significant PMH of HTN, CAD, s/p stent in 2009, HPL, DM-2, CKD-stage III, CVA with muscle wasting, H/o prostate CA s/p TURP, Glaucoma, B/L muscle atrophy and others was BIBA in ED from home with coffee ground emesis x 2.  Pt. was found to be hypotensive at 83/49 and unresponsive per EMS.  Pt. woke up after fluids started (NS) by EMS and Zofran given for nausea.  Patient is a very poor historian. I was not able to get any information from the patient. Most of the information has been obtained from EMR documentation and in discussion with ED provider. Patient is on ASA. No melena or hemoptysis or hematuria reported. He is not in any acute distress. No family members at bedside.     #UTI  - U/A positive  - C/w CTX D3/3    # Acute worsening of CKD stage III- likely pre-renal  Cr 2.54 ( was 1.44 in 01/2023).  Adequate hydration.  - nephro following  - s/p albumin x1    # Acute blood loss anemia with hematemesis with possible PUD, erossive gastritis/duodenitis/esophagitis or AVMs.  Hb is 9.2 (with baseline Hb 10.0-11.3).  Admit to telemetry.  Maintenance IV fluids.  Monitor H/H q6h, and transfuse PRBC if HB <8.0  Hold his ASA for now.  Appreciate GI recs- will c/w CLD  - Switched to IV protonix  - Will obtain S/S evaluation to r/o dysphagia. If unremarkable, will consider trial of mirtazapine    # Uncontrolled DM-2 with hyperglycemia.  .  A1c level- 13.2  ISS with coverage.    # HTN, CAD, s/p stent and HPL.  Stable and no acute issue.  Continue his Metoprolol, Losartan and Atorvastatin.    # H/o Prostate cancer and TURP.  On Flomax and Finasteride.    # Hypothyroidism   On Levothyroxine.    # DVT prophylaxis:   SCDs for now.

## 2023-06-20 NOTE — CONSULT NOTE ADULT - ASSESSMENT
89M with possible wet gangrene of RLE heel. Febrile overnight to 100.8. MARILYN.    Recommend STAT podiatry consult to evaluate wound. Discussed with Dr. Mittal.  Antibiotics  Dr. Gutierrez obtained history from daughter Shavonne [phone]  Follow up RLE foot xray  Follow up arterial duplex and PAOLO/PVR  Patient seen and discussed with Dr. Gutierrez

## 2023-06-21 LAB
ALBUMIN SERPL ELPH-MCNC: 1.8 G/DL — LOW (ref 3.3–5)
ALP SERPL-CCNC: 74 U/L — SIGNIFICANT CHANGE UP (ref 40–120)
ALT FLD-CCNC: 16 U/L — SIGNIFICANT CHANGE UP (ref 12–78)
ANION GAP SERPL CALC-SCNC: 6 MMOL/L — SIGNIFICANT CHANGE UP (ref 5–17)
AST SERPL-CCNC: 25 U/L — SIGNIFICANT CHANGE UP (ref 15–37)
BILIRUB SERPL-MCNC: 0.8 MG/DL — SIGNIFICANT CHANGE UP (ref 0.2–1.2)
BUN SERPL-MCNC: 45 MG/DL — HIGH (ref 7–23)
CALCIUM SERPL-MCNC: 7.8 MG/DL — LOW (ref 8.5–10.1)
CHLORIDE SERPL-SCNC: 117 MMOL/L — HIGH (ref 96–108)
CO2 SERPL-SCNC: 18 MMOL/L — LOW (ref 22–31)
CREAT SERPL-MCNC: 3.25 MG/DL — HIGH (ref 0.5–1.3)
CRP SERPL-MCNC: 202 MG/L — HIGH
EGFR: 17 ML/MIN/1.73M2 — LOW
ERYTHROCYTE [SEDIMENTATION RATE] IN BLOOD: 5 MM/HR — SIGNIFICANT CHANGE UP (ref 0–20)
GLUCOSE BLDC GLUCOMTR-MCNC: 172 MG/DL — HIGH (ref 70–99)
GLUCOSE BLDC GLUCOMTR-MCNC: 230 MG/DL — HIGH (ref 70–99)
GLUCOSE BLDC GLUCOMTR-MCNC: 249 MG/DL — HIGH (ref 70–99)
GLUCOSE BLDC GLUCOMTR-MCNC: 288 MG/DL — HIGH (ref 70–99)
GLUCOSE SERPL-MCNC: 266 MG/DL — HIGH (ref 70–99)
HCT VFR BLD CALC: 26.6 % — LOW (ref 39–50)
HGB BLD-MCNC: 8.7 G/DL — LOW (ref 13–17)
MCHC RBC-ENTMCNC: 28.7 PG — SIGNIFICANT CHANGE UP (ref 27–34)
MCHC RBC-ENTMCNC: 32.7 G/DL — SIGNIFICANT CHANGE UP (ref 32–36)
MCV RBC AUTO: 87.8 FL — SIGNIFICANT CHANGE UP (ref 80–100)
NRBC # BLD: 0 /100 WBCS — SIGNIFICANT CHANGE UP (ref 0–0)
PLATELET # BLD AUTO: 157 K/UL — SIGNIFICANT CHANGE UP (ref 150–400)
POTASSIUM SERPL-MCNC: 3.4 MMOL/L — LOW (ref 3.5–5.3)
POTASSIUM SERPL-SCNC: 3.4 MMOL/L — LOW (ref 3.5–5.3)
PROT SERPL-MCNC: 5.5 GM/DL — LOW (ref 6–8.3)
RBC # BLD: 3.03 M/UL — LOW (ref 4.2–5.8)
RBC # FLD: 16.8 % — HIGH (ref 10.3–14.5)
SODIUM SERPL-SCNC: 141 MMOL/L — SIGNIFICANT CHANGE UP (ref 135–145)
WBC # BLD: 7.76 K/UL — SIGNIFICANT CHANGE UP (ref 3.8–10.5)
WBC # FLD AUTO: 7.76 K/UL — SIGNIFICANT CHANGE UP (ref 3.8–10.5)

## 2023-06-21 PROCEDURE — 93925 LOWER EXTREMITY STUDY: CPT | Mod: 26

## 2023-06-21 PROCEDURE — 93923 UPR/LXTR ART STDY 3+ LVLS: CPT | Mod: 26

## 2023-06-21 PROCEDURE — 99233 SBSQ HOSP IP/OBS HIGH 50: CPT

## 2023-06-21 PROCEDURE — 73620 X-RAY EXAM OF FOOT: CPT | Mod: 26,RT

## 2023-06-21 PROCEDURE — 99222 1ST HOSP IP/OBS MODERATE 55: CPT

## 2023-06-21 RX ORDER — POTASSIUM CHLORIDE 20 MEQ
40 PACKET (EA) ORAL ONCE
Refills: 0 | Status: COMPLETED | OUTPATIENT
Start: 2023-06-21 | End: 2023-06-21

## 2023-06-21 RX ORDER — POTASSIUM CHLORIDE 20 MEQ
40 PACKET (EA) ORAL ONCE
Refills: 0 | Status: DISCONTINUED | OUTPATIENT
Start: 2023-06-21 | End: 2023-06-21

## 2023-06-21 RX ADMIN — PIPERACILLIN AND TAZOBACTAM 25 GRAM(S): 4; .5 INJECTION, POWDER, LYOPHILIZED, FOR SOLUTION INTRAVENOUS at 17:29

## 2023-06-21 RX ADMIN — Medication 650 MILLIGRAM(S): at 21:17

## 2023-06-21 RX ADMIN — CILOSTAZOL 100 MILLIGRAM(S): 100 TABLET ORAL at 05:17

## 2023-06-21 RX ADMIN — Medication 2: at 16:43

## 2023-06-21 RX ADMIN — Medication 40 MILLIEQUIVALENT(S): at 17:29

## 2023-06-21 RX ADMIN — TAMSULOSIN HYDROCHLORIDE 0.4 MILLIGRAM(S): 0.4 CAPSULE ORAL at 21:17

## 2023-06-21 RX ADMIN — FINASTERIDE 5 MILLIGRAM(S): 5 TABLET, FILM COATED ORAL at 14:00

## 2023-06-21 RX ADMIN — Medication 3: at 07:49

## 2023-06-21 RX ADMIN — Medication 75 MICROGRAM(S): at 05:17

## 2023-06-21 RX ADMIN — Medication 650 MILLIGRAM(S): at 14:00

## 2023-06-21 RX ADMIN — Medication 2: at 14:01

## 2023-06-21 RX ADMIN — Medication 650 MILLIGRAM(S): at 05:17

## 2023-06-21 RX ADMIN — LATANOPROST 1 DROP(S): 0.05 SOLUTION/ DROPS OPHTHALMIC; TOPICAL at 21:18

## 2023-06-21 RX ADMIN — Medication 25 MILLIGRAM(S): at 05:17

## 2023-06-21 RX ADMIN — PIPERACILLIN AND TAZOBACTAM 25 GRAM(S): 4; .5 INJECTION, POWDER, LYOPHILIZED, FOR SOLUTION INTRAVENOUS at 05:17

## 2023-06-21 RX ADMIN — CILOSTAZOL 100 MILLIGRAM(S): 100 TABLET ORAL at 17:30

## 2023-06-21 RX ADMIN — Medication 25 MILLIGRAM(S): at 17:30

## 2023-06-21 RX ADMIN — ATORVASTATIN CALCIUM 80 MILLIGRAM(S): 80 TABLET, FILM COATED ORAL at 21:17

## 2023-06-21 NOTE — CONSULT NOTE ADULT - ASSESSMENT
he patient is a 89y Male with significant PMH of HTN, CAD, s/p stent in 2009, HPL, DM-2, CKD-stage III, CVA with muscle wasting, H/o prostate CA s/p TURP, Glaucoma, B/L muscle atrophy and others was BIBA in ED from home with coffee ground emesis x 2.  Pt. was found to be hypotensive at 83/49 and unresponsive per EMS.  Pt. woke up after fluids started (NS) by EMS and Zofran given for nausea.  Patient is a very poor historian. Patient is on ASA. No melena or hemoptysis or hematuria reported.   Was treated for UTI with ceftriaxone but foot noted to have foul odor and concern for infection so was started on zosyn   This appears to be have ischemic changes and the foul odor is likely decaying skin. There could be old blood there as well. If blood flow is compromised, infection risk can be high. Agree with zosyn for now while cultures and imaging are being performed.     Plan:  continue zosyn  send 2 sets of blood if spikes another fever  keep foot wrapped and covered with boots  frequent turns, offloading, and nutrition optimization to avoid bedsores-protective heel/leg protectors   monitor Hemoglobin  PAOLO/PVR, vascular studies being performed   duration of antibiotics TBD    Discussed with Dr. Daxa Smith, DO  Infectious Disease Attending  Reachable via Microsoft Teams or [place] office: [phone]  After 5pm/weekends please call [phone] for all inquiries and new consults

## 2023-06-21 NOTE — PROGRESS NOTE ADULT - SUBJECTIVE AND OBJECTIVE BOX
Patient is a 89y old  Male who presents with a chief complaint of Hematemesis (21 Jun 2023 12:41)    INTERVAL HPI/OVERNIGHT EVENTS: No acute events overnight. HD stable.     MEDICATIONS  (STANDING):  atorvastatin 80 milliGRAM(s) Oral at bedtime  cilostazol 100 milliGRAM(s) Oral two times a day  dextrose 5%. 1000 milliLiter(s) (50 mL/Hr) IV Continuous <Continuous>  dextrose 5%. 1000 milliLiter(s) (100 mL/Hr) IV Continuous <Continuous>  dextrose 50% Injectable 25 Gram(s) IV Push once  dextrose 50% Injectable 25 Gram(s) IV Push once  dextrose 50% Injectable 12.5 Gram(s) IV Push once  epoetin shai-epbx (RETACRIT) Injectable 03220 Unit(s) SubCutaneous every 7 days  finasteride 5 milliGRAM(s) Oral daily  glucagon  Injectable 1 milliGRAM(s) IntraMuscular once  insulin lispro (ADMELOG) corrective regimen sliding scale   SubCutaneous three times a day before meals  insulin lispro (ADMELOG) corrective regimen sliding scale   SubCutaneous at bedtime  latanoprost 0.005% Ophthalmic Solution 1 Drop(s) Both EYES at bedtime  levothyroxine 75 MICROGram(s) Oral daily  metoprolol tartrate 25 milliGRAM(s) Oral two times a day  pantoprazole    Tablet 40 milliGRAM(s) Oral before breakfast  piperacillin/tazobactam IVPB.. 3.375 Gram(s) IV Intermittent every 12 hours  potassium chloride   Powder 40 milliEquivalent(s) Oral once  sodium bicarbonate 650 milliGRAM(s) Oral three times a day  tamsulosin 0.4 milliGRAM(s) Oral at bedtime    MEDICATIONS  (PRN):  acetaminophen     Tablet .. 650 milliGRAM(s) Oral every 6 hours PRN Temp greater or equal to 38C (100.4F), Mild Pain (1 - 3)  aluminum hydroxide/magnesium hydroxide/simethicone Suspension 30 milliLiter(s) Oral every 4 hours PRN Dyspepsia  dextrose Oral Gel 15 Gram(s) Oral once PRN Blood Glucose LESS THAN 70 milliGRAM(s)/deciliter  melatonin 3 milliGRAM(s) Oral at bedtime PRN Insomnia  ondansetron Injectable 4 milliGRAM(s) IV Push every 8 hours PRN Nausea and/or Vomiting      Allergies    No Known Allergies    Intolerances        REVIEW OF SYSTEMS: all negative with exception of above    Vital Signs Last 24 Hrs  T(C): 37.2 (21 Jun 2023 10:19), Max: 37.2 (20 Jun 2023 23:36)  T(F): 98.9 (21 Jun 2023 10:19), Max: 99 (20 Jun 2023 23:36)  HR: 72 (21 Jun 2023 10:19) (72 - 93)  BP: 117/66 (21 Jun 2023 10:19) (117/66 - 139/68)  BP(mean): --  RR: 18 (21 Jun 2023 10:19) (18 - 18)  SpO2: 96% (21 Jun 2023 10:19) (95% - 96%)    Parameters below as of 21 Jun 2023 10:19  Patient On (Oxygen Delivery Method): room air        PHYSICAL EXAM:  GENERAL: NAD, well-groomed  NERVOUS SYSTEM:  Alert & Oriented X3, Good concentration; Motor Strength 5/5 B/L upper and lower extremities; DTRs 2+ intact and symmetric  CHEST/LUNG: Clear to percussion bilaterally; No rales, rhonchi, wheezing, or rubs  HEART: Regular rate and rhythm; No murmurs, rubs, or gallops  ABDOMEN: Soft, Nontender, Nondistended; Bowel sounds present  EXTREMITIES:  2+ Peripheral Pulses, No clubbing, cyanosis, or edema    LABS:                        8.7    7.76  )-----------( 157      ( 21 Jun 2023 07:22 )             26.6     06-21    141  |  117<H>  |  45<H>  ----------------------------<  266<H>  3.4<L>   |  18<L>  |  3.25<H>    Ca    7.8<L>      21 Jun 2023 07:22    TPro  5.5<L>  /  Alb  1.8<L>  /  TBili  0.8  /  DBili  x   /  AST  25  /  ALT  16  /  AlkPhos  74  06-21      Urinalysis Basic - ( 21 Jun 2023 07:22 )    Color: x / Appearance: x / SG: x / pH: x  Gluc: 266 mg/dL / Ketone: x  / Bili: x / Urobili: x   Blood: x / Protein: x / Nitrite: x   Leuk Esterase: x / RBC: x / WBC x   Sq Epi: x / Non Sq Epi: x / Bacteria: x      CAPILLARY BLOOD GLUCOSE      POCT Blood Glucose.: 230 mg/dL (21 Jun 2023 13:58)  POCT Blood Glucose.: 288 mg/dL (21 Jun 2023 07:30)  POCT Blood Glucose.: 236 mg/dL (20 Jun 2023 20:38)  POCT Blood Glucose.: 194 mg/dL (20 Jun 2023 16:50)      RADIOLOGY & ADDITIONAL TESTS:    Imaging Personally Reviewed:  [ ] YES  [ ] NO    Consultant(s) Notes Reviewed:  [ ] YES  [ ] NO    Care Discussed with Consultants/Other Providers [ ] YES  [ ] NO

## 2023-06-21 NOTE — PROGRESS NOTE ADULT - SUBJECTIVE AND OBJECTIVE BOX
No bleeding but noted to have small drop in H/H      MEDICATIONS  (STANDING):  atorvastatin 80 milliGRAM(s) Oral at bedtime  cilostazol 100 milliGRAM(s) Oral two times a day  dextrose 5%. 1000 milliLiter(s) (50 mL/Hr) IV Continuous <Continuous>  dextrose 5%. 1000 milliLiter(s) (100 mL/Hr) IV Continuous <Continuous>  dextrose 50% Injectable 25 Gram(s) IV Push once  dextrose 50% Injectable 25 Gram(s) IV Push once  dextrose 50% Injectable 12.5 Gram(s) IV Push once  epoetin shai-epbx (RETACRIT) Injectable 09344 Unit(s) SubCutaneous every 7 days  finasteride 5 milliGRAM(s) Oral daily  glucagon  Injectable 1 milliGRAM(s) IntraMuscular once  insulin lispro (ADMELOG) corrective regimen sliding scale   SubCutaneous three times a day before meals  insulin lispro (ADMELOG) corrective regimen sliding scale   SubCutaneous at bedtime  latanoprost 0.005% Ophthalmic Solution 1 Drop(s) Both EYES at bedtime  levothyroxine 75 MICROGram(s) Oral daily  metoprolol tartrate 25 milliGRAM(s) Oral two times a day  pantoprazole    Tablet 40 milliGRAM(s) Oral before breakfast  piperacillin/tazobactam IVPB.. 3.375 Gram(s) IV Intermittent every 12 hours  potassium chloride    Tablet ER 40 milliEquivalent(s) Oral once  sodium bicarbonate 650 milliGRAM(s) Oral three times a day  tamsulosin 0.4 milliGRAM(s) Oral at bedtime    MEDICATIONS  (PRN):  acetaminophen     Tablet .. 650 milliGRAM(s) Oral every 6 hours PRN Temp greater or equal to 38C (100.4F), Mild Pain (1 - 3)  aluminum hydroxide/magnesium hydroxide/simethicone Suspension 30 milliLiter(s) Oral every 4 hours PRN Dyspepsia  dextrose Oral Gel 15 Gram(s) Oral once PRN Blood Glucose LESS THAN 70 milliGRAM(s)/deciliter  melatonin 3 milliGRAM(s) Oral at bedtime PRN Insomnia  ondansetron Injectable 4 milliGRAM(s) IV Push every 8 hours PRN Nausea and/or Vomiting      Allergies    No Known Allergies    Intolerances        Vital Signs Last 24 Hrs  T(C): 37.2 (21 Jun 2023 10:19), Max: 37.2 (20 Jun 2023 23:36)  T(F): 98.9 (21 Jun 2023 10:19), Max: 99 (20 Jun 2023 23:36)  HR: 72 (21 Jun 2023 10:19) (72 - 93)  BP: 117/66 (21 Jun 2023 10:19) (117/66 - 141/71)  BP(mean): --  RR: 18 (21 Jun 2023 10:19) (18 - 18)  SpO2: 96% (21 Jun 2023 10:19) (95% - 96%)    Parameters below as of 21 Jun 2023 10:19  Patient On (Oxygen Delivery Method): room air        PHYSICAL EXAM:  General: NAD.  CVS: S1, S2  Chest: air entry bilaterally present  Abd: BS present, soft, non-tender      LABS:                        8.7    7.76  )-----------( 157      ( 21 Jun 2023 07:22 )             26.6     06-21    141  |  117<H>  |  45<H>  ----------------------------<  266<H>  3.4<L>   |  18<L>  |  3.25<H>    Ca    7.8<L>      21 Jun 2023 07:22    TPro  5.5<L>  /  Alb  1.8<L>  /  TBili  0.8  /  DBili  x   /  AST  25  /  ALT  16  /  AlkPhos  74  06-21        continue zosyn  stool for OB (previous test was negative)  CBC in AM

## 2023-06-21 NOTE — PROGRESS NOTE ADULT - SUBJECTIVE AND OBJECTIVE BOX
[facility] NEPHROLOGY SERVICES, [facility]  NEPHROLOGY AND HYPERTENSION  [address]  [address]  [place] [address]  [phone]    MD SELENE BRUSH MD YELENA ROSENBERG, MD BINNY KOSHY, MD CHRISTOPHER CAPUTO, MD EDWARD BOVER, MD          Patient events noted    MEDICATIONS  (STANDING):  atorvastatin 80 milliGRAM(s) Oral at bedtime  cilostazol 100 milliGRAM(s) Oral two times a day  dextrose 5%. 1000 milliLiter(s) (100 mL/Hr) IV Continuous <Continuous>  dextrose 5%. 1000 milliLiter(s) (50 mL/Hr) IV Continuous <Continuous>  dextrose 50% Injectable 12.5 Gram(s) IV Push once  dextrose 50% Injectable 25 Gram(s) IV Push once  dextrose 50% Injectable 25 Gram(s) IV Push once  epoetin shai-epbx (RETACRIT) Injectable 09777 Unit(s) SubCutaneous every 7 days  finasteride 5 milliGRAM(s) Oral daily  glucagon  Injectable 1 milliGRAM(s) IntraMuscular once  insulin lispro (ADMELOG) corrective regimen sliding scale   SubCutaneous three times a day before meals  insulin lispro (ADMELOG) corrective regimen sliding scale   SubCutaneous at bedtime  latanoprost 0.005% Ophthalmic Solution 1 Drop(s) Both EYES at bedtime  levothyroxine 75 MICROGram(s) Oral daily  metoprolol tartrate 25 milliGRAM(s) Oral two times a day  pantoprazole    Tablet 40 milliGRAM(s) Oral before breakfast  piperacillin/tazobactam IVPB.. 3.375 Gram(s) IV Intermittent every 12 hours  sodium bicarbonate 650 milliGRAM(s) Oral three times a day  tamsulosin 0.4 milliGRAM(s) Oral at bedtime    MEDICATIONS  (PRN):  acetaminophen     Tablet .. 650 milliGRAM(s) Oral every 6 hours PRN Temp greater or equal to 38C (100.4F), Mild Pain (1 - 3)  aluminum hydroxide/magnesium hydroxide/simethicone Suspension 30 milliLiter(s) Oral every 4 hours PRN Dyspepsia  dextrose Oral Gel 15 Gram(s) Oral once PRN Blood Glucose LESS THAN 70 milliGRAM(s)/deciliter  melatonin 3 milliGRAM(s) Oral at bedtime PRN Insomnia  ondansetron Injectable 4 milliGRAM(s) IV Push every 8 hours PRN Nausea and/or Vomiting      06-20-23 @ 07:01  -  06-21-23 @ 07:00  --------------------------------------------------------  IN: 50 mL / OUT: 0 mL / NET: 50 mL    06-21-23 @ 07:01  -  06-21-23 @ 22:20  --------------------------------------------------------  IN: 120 mL / OUT: 0 mL / NET: 120 mL      PHYSICAL EXAM:      T(C): 36.4 (06-21-23 @ 16:23), Max: 37.2 (06-20-23 @ 23:36)  HR: 94 (06-21-23 @ 16:23) (72 - 94)  BP: 164/84 (06-21-23 @ 16:23) (117/66 - 164/84)  RR: 18 (06-21-23 @ 16:23) (18 - 18)  SpO2: 96% (06-21-23 @ 16:23) (95% - 96%)  Wt(kg): --  Lungs clear  Heart S1S2  Abd soft NT ND  Extremities:   tr edema                                    8.7    7.76  )-----------( 157      ( 21 Jun 2023 07:22 )             26.6     06-21    141  |  117<H>  |  45<H>  ----------------------------<  266<H>  3.4<L>   |  18<L>  |  3.25<H>    Ca    7.8<L>      21 Jun 2023 07:22    TPro  5.5<L>  /  Alb  1.8<L>  /  TBili  0.8  /  DBili  x   /  AST  25  /  ALT  16  /  AlkPhos  74  06-21      LIVER FUNCTIONS - ( 21 Jun 2023 07:22 )  Alb: 1.8 g/dL / Pro: 5.5 gm/dL / ALK PHOS: 74 U/L / ALT: 16 U/L / AST: 25 U/L / GGT: x           Creatinine Trend: 3.25<--, 3.84<--, 3.84<--, 4.00<--, 3.21<--, 2.60<--      Assessment   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

## 2023-06-21 NOTE — PROGRESS NOTE ADULT - ASSESSMENT
The patient is a 89y Male with significant PMH of HTN, CAD, s/p stent in 2009, HPL, DM-2, CKD-stage III, CVA with muscle wasting, H/o prostate CA s/p TURP, Glaucoma, B/L muscle atrophy and others was BIBA in ED from home with coffee ground emesis x 2.  Pt. was found to be hypotensive at 83/49 and unresponsive per EMS.  Pt. woke up after fluids started (NS) by EMS and Zofran given for nausea.  Patient is a very poor historian. I was not able to get any information from the patient. Most of the information has been obtained from EMR documentation and in discussion with ED provider. Patient is on ASA. No melena or hemoptysis or hematuria reported. He is not in any acute distress. No family members at bedside.     #RT heel gangrene  - Appreciate vascular and podiatry recs  - Will c/w Zosyn  - F/u PAOLO/PVR  - ID c/s placed    #UTI  - U/A positive, s/p CTX    # Acute worsening of CKD stage III- likely pre-renal  Cr 2.54 ( was 1.44 in 01/2023).  Adequate hydration.  - nephro following  - s/p albumin x1    # Acute blood loss anemia with hematemesis with possible PUD, erossive gastritis/duodenitis/esophagitis or AVMs.  Hb is 9.2 (with baseline Hb 10.0-11.3).  Admit to telemetry.  Maintenance IV fluids.  Monitor H/H q6h, and transfuse PRBC if HB <8.0  Hold his ASA for now.  Appreciate GI recs- will c/w CLD  - Switched to IV protonix    # Uncontrolled DM-2 with hyperglycemia.  .  A1c level- 13.2  ISS with coverage.    # HTN, CAD, s/p stent and HPL.  Stable and no acute issue.  Continue his Metoprolol, Losartan and Atorvastatin.    # H/o Prostate cancer and TURP.  On Flomax and Finasteride.    # Hypothyroidism   On Levothyroxine.    # DVT prophylaxis:   SCDs for now.

## 2023-06-22 LAB
ALBUMIN SERPL ELPH-MCNC: 1.7 G/DL — LOW (ref 3.3–5)
ALP SERPL-CCNC: 83 U/L — SIGNIFICANT CHANGE UP (ref 40–120)
ALT FLD-CCNC: 19 U/L — SIGNIFICANT CHANGE UP (ref 12–78)
ANION GAP SERPL CALC-SCNC: 4 MMOL/L — LOW (ref 5–17)
AST SERPL-CCNC: 31 U/L — SIGNIFICANT CHANGE UP (ref 15–37)
BILIRUB SERPL-MCNC: 0.7 MG/DL — SIGNIFICANT CHANGE UP (ref 0.2–1.2)
BUN SERPL-MCNC: 36 MG/DL — HIGH (ref 7–23)
CALCIUM SERPL-MCNC: 8.1 MG/DL — LOW (ref 8.5–10.1)
CHLORIDE SERPL-SCNC: 119 MMOL/L — HIGH (ref 96–108)
CO2 SERPL-SCNC: 20 MMOL/L — LOW (ref 22–31)
CREAT SERPL-MCNC: 2.8 MG/DL — HIGH (ref 0.5–1.3)
EGFR: 21 ML/MIN/1.73M2 — LOW
GLUCOSE BLDC GLUCOMTR-MCNC: 155 MG/DL — HIGH (ref 70–99)
GLUCOSE BLDC GLUCOMTR-MCNC: 174 MG/DL — HIGH (ref 70–99)
GLUCOSE BLDC GLUCOMTR-MCNC: 185 MG/DL — HIGH (ref 70–99)
GLUCOSE BLDC GLUCOMTR-MCNC: 208 MG/DL — HIGH (ref 70–99)
GLUCOSE SERPL-MCNC: 153 MG/DL — HIGH (ref 70–99)
HCT VFR BLD CALC: 26.9 % — LOW (ref 39–50)
HGB BLD-MCNC: 8.7 G/DL — LOW (ref 13–17)
MCHC RBC-ENTMCNC: 28.7 PG — SIGNIFICANT CHANGE UP (ref 27–34)
MCHC RBC-ENTMCNC: 32.3 G/DL — SIGNIFICANT CHANGE UP (ref 32–36)
MCV RBC AUTO: 88.8 FL — SIGNIFICANT CHANGE UP (ref 80–100)
NRBC # BLD: 0 /100 WBCS — SIGNIFICANT CHANGE UP (ref 0–0)
PLATELET # BLD AUTO: 161 K/UL — SIGNIFICANT CHANGE UP (ref 150–400)
POTASSIUM SERPL-MCNC: 3.8 MMOL/L — SIGNIFICANT CHANGE UP (ref 3.5–5.3)
POTASSIUM SERPL-SCNC: 3.8 MMOL/L — SIGNIFICANT CHANGE UP (ref 3.5–5.3)
PROT SERPL-MCNC: 5.7 GM/DL — LOW (ref 6–8.3)
RBC # BLD: 3.03 M/UL — LOW (ref 4.2–5.8)
RBC # FLD: 17 % — HIGH (ref 10.3–14.5)
SODIUM SERPL-SCNC: 143 MMOL/L — SIGNIFICANT CHANGE UP (ref 135–145)
WBC # BLD: 8.4 K/UL — SIGNIFICANT CHANGE UP (ref 3.8–10.5)
WBC # FLD AUTO: 8.4 K/UL — SIGNIFICANT CHANGE UP (ref 3.8–10.5)

## 2023-06-22 PROCEDURE — 99232 SBSQ HOSP IP/OBS MODERATE 35: CPT

## 2023-06-22 PROCEDURE — 99233 SBSQ HOSP IP/OBS HIGH 50: CPT

## 2023-06-22 RX ADMIN — Medication 650 MILLIGRAM(S): at 05:42

## 2023-06-22 RX ADMIN — Medication 75 MICROGRAM(S): at 05:42

## 2023-06-22 RX ADMIN — Medication 2: at 16:38

## 2023-06-22 RX ADMIN — ATORVASTATIN CALCIUM 80 MILLIGRAM(S): 80 TABLET, FILM COATED ORAL at 22:04

## 2023-06-22 RX ADMIN — CILOSTAZOL 100 MILLIGRAM(S): 100 TABLET ORAL at 05:42

## 2023-06-22 RX ADMIN — Medication 1: at 11:32

## 2023-06-22 RX ADMIN — CILOSTAZOL 100 MILLIGRAM(S): 100 TABLET ORAL at 17:07

## 2023-06-22 RX ADMIN — PIPERACILLIN AND TAZOBACTAM 25 GRAM(S): 4; .5 INJECTION, POWDER, LYOPHILIZED, FOR SOLUTION INTRAVENOUS at 11:29

## 2023-06-22 RX ADMIN — PIPERACILLIN AND TAZOBACTAM 25 GRAM(S): 4; .5 INJECTION, POWDER, LYOPHILIZED, FOR SOLUTION INTRAVENOUS at 03:20

## 2023-06-22 RX ADMIN — PANTOPRAZOLE SODIUM 40 MILLIGRAM(S): 20 TABLET, DELAYED RELEASE ORAL at 07:49

## 2023-06-22 RX ADMIN — Medication 650 MILLIGRAM(S): at 22:05

## 2023-06-22 RX ADMIN — LATANOPROST 1 DROP(S): 0.05 SOLUTION/ DROPS OPHTHALMIC; TOPICAL at 22:05

## 2023-06-22 RX ADMIN — Medication 650 MILLIGRAM(S): at 13:05

## 2023-06-22 RX ADMIN — TAMSULOSIN HYDROCHLORIDE 0.4 MILLIGRAM(S): 0.4 CAPSULE ORAL at 22:05

## 2023-06-22 RX ADMIN — Medication 25 MILLIGRAM(S): at 17:08

## 2023-06-22 RX ADMIN — Medication 1: at 07:48

## 2023-06-22 RX ADMIN — Medication 25 MILLIGRAM(S): at 05:42

## 2023-06-22 RX ADMIN — FINASTERIDE 5 MILLIGRAM(S): 5 TABLET, FILM COATED ORAL at 11:31

## 2023-06-22 NOTE — PROGRESS NOTE ADULT - SUBJECTIVE AND OBJECTIVE BOX
Pt confused - multiple medical problems  PMH with h/o HTN, CAD, s/p stent in 2009, HPL, DM-2, CKD-stage III, CVA with muscle wasting, H/o prostate CA s/p TURP, Glaucoma, B/L muscle atrophy  No further bleeding   Was treated for UTI with ceftriaxone but foot noted to have foul odor and concern for infection so was started on zosyn   Foot with ischemic changes and foul odor -->decaying skin. Zosyn added by ID    MEDICATIONS  (STANDING):  atorvastatin 80 milliGRAM(s) Oral at bedtime  cilostazol 100 milliGRAM(s) Oral two times a day  dextrose 5%. 1000 milliLiter(s) (50 mL/Hr) IV Continuous <Continuous>  dextrose 5%. 1000 milliLiter(s) (100 mL/Hr) IV Continuous <Continuous>  dextrose 50% Injectable 25 Gram(s) IV Push once  dextrose 50% Injectable 12.5 Gram(s) IV Push once  dextrose 50% Injectable 25 Gram(s) IV Push once  epoetin shai-epbx (RETACRIT) Injectable 27498 Unit(s) SubCutaneous every 7 days  finasteride 5 milliGRAM(s) Oral daily  glucagon  Injectable 1 milliGRAM(s) IntraMuscular once  insulin lispro (ADMELOG) corrective regimen sliding scale   SubCutaneous at bedtime  insulin lispro (ADMELOG) corrective regimen sliding scale   SubCutaneous three times a day before meals  latanoprost 0.005% Ophthalmic Solution 1 Drop(s) Both EYES at bedtime  levothyroxine 75 MICROGram(s) Oral daily  metoprolol tartrate 25 milliGRAM(s) Oral two times a day  pantoprazole    Tablet 40 milliGRAM(s) Oral before breakfast  piperacillin/tazobactam IVPB.. 3.375 Gram(s) IV Intermittent every 12 hours  sodium bicarbonate 650 milliGRAM(s) Oral three times a day  tamsulosin 0.4 milliGRAM(s) Oral at bedtime    MEDICATIONS  (PRN):  acetaminophen     Tablet .. 650 milliGRAM(s) Oral every 6 hours PRN Temp greater or equal to 38C (100.4F), Mild Pain (1 - 3)  aluminum hydroxide/magnesium hydroxide/simethicone Suspension 30 milliLiter(s) Oral every 4 hours PRN Dyspepsia  dextrose Oral Gel 15 Gram(s) Oral once PRN Blood Glucose LESS THAN 70 milliGRAM(s)/deciliter  melatonin 3 milliGRAM(s) Oral at bedtime PRN Insomnia  ondansetron Injectable 4 milliGRAM(s) IV Push every 8 hours PRN Nausea and/or Vomiting      Allergies    No Known Allergies    Intolerances        Vital Signs Last 24 Hrs  T(C): 36.7 (22 Jun 2023 04:46), Max: 37.6 (21 Jun 2023 23:27)  T(F): 98.1 (22 Jun 2023 04:46), Max: 99.6 (21 Jun 2023 23:27)  HR: 81 (22 Jun 2023 04:46) (72 - 94)  BP: 145/77 (22 Jun 2023 04:46) (117/66 - 164/84)  BP(mean): --  RR: 18 (22 Jun 2023 04:46) (18 - 18)  SpO2: 97% (22 Jun 2023 04:46) (95% - 97%)    Parameters below as of 21 Jun 2023 16:23  Patient On (Oxygen Delivery Method): room air        PHYSICAL EXAM:  General: NAD.  CVS: S1, S2  Chest: air entry bilaterally present  Abd: BS present, soft, non-tender      LABS:                        8.7    8.40  )-----------( 161      ( 22 Jun 2023 06:20 )             26.9     06-21    141  |  117<H>  |  45<H>  ----------------------------<  266<H>  3.4<L>   |  18<L>  |  3.25<H>    Ca    7.8<L>      21 Jun 2023 07:22    TPro  5.5<L>  /  Alb  1.8<L>  /  TBili  0.8  /  DBili  x   /  AST  25  /  ALT  16  /  AlkPhos  74  06-21      as per ID   follow CBC

## 2023-06-22 NOTE — PROGRESS NOTE ADULT - SUBJECTIVE AND OBJECTIVE BOX
Patient is a 89y old  Male who presents with a chief complaint of Hematemesis (22 Jun 2023 11:24)    INTERVAL HPI/OVERNIGHT EVENTS: No acute events overnight. HD stable.     MEDICATIONS  (STANDING):  atorvastatin 80 milliGRAM(s) Oral at bedtime  cilostazol 100 milliGRAM(s) Oral two times a day  dextrose 5%. 1000 milliLiter(s) (50 mL/Hr) IV Continuous <Continuous>  dextrose 5%. 1000 milliLiter(s) (100 mL/Hr) IV Continuous <Continuous>  dextrose 50% Injectable 25 Gram(s) IV Push once  dextrose 50% Injectable 12.5 Gram(s) IV Push once  dextrose 50% Injectable 25 Gram(s) IV Push once  epoetin shai-epbx (RETACRIT) Injectable 41844 Unit(s) SubCutaneous every 7 days  finasteride 5 milliGRAM(s) Oral daily  glucagon  Injectable 1 milliGRAM(s) IntraMuscular once  insulin lispro (ADMELOG) corrective regimen sliding scale   SubCutaneous at bedtime  insulin lispro (ADMELOG) corrective regimen sliding scale   SubCutaneous three times a day before meals  latanoprost 0.005% Ophthalmic Solution 1 Drop(s) Both EYES at bedtime  levothyroxine 75 MICROGram(s) Oral daily  metoprolol tartrate 25 milliGRAM(s) Oral two times a day  pantoprazole    Tablet 40 milliGRAM(s) Oral before breakfast  piperacillin/tazobactam IVPB.. 3.375 Gram(s) IV Intermittent every 12 hours  sodium bicarbonate 650 milliGRAM(s) Oral three times a day  tamsulosin 0.4 milliGRAM(s) Oral at bedtime    MEDICATIONS  (PRN):  acetaminophen     Tablet .. 650 milliGRAM(s) Oral every 6 hours PRN Temp greater or equal to 38C (100.4F), Mild Pain (1 - 3)  aluminum hydroxide/magnesium hydroxide/simethicone Suspension 30 milliLiter(s) Oral every 4 hours PRN Dyspepsia  dextrose Oral Gel 15 Gram(s) Oral once PRN Blood Glucose LESS THAN 70 milliGRAM(s)/deciliter  melatonin 3 milliGRAM(s) Oral at bedtime PRN Insomnia  ondansetron Injectable 4 milliGRAM(s) IV Push every 8 hours PRN Nausea and/or Vomiting      Allergies    No Known Allergies    Intolerances        REVIEW OF SYSTEMS: all negative with exception of above    Vital Signs Last 24 Hrs  T(C): 36.7 (22 Jun 2023 11:35), Max: 37.6 (21 Jun 2023 23:27)  T(F): 98.1 (22 Jun 2023 11:35), Max: 99.6 (21 Jun 2023 23:27)  HR: 86 (22 Jun 2023 14:00) (81 - 94)  BP: 164/90 (22 Jun 2023 14:00) (145/77 - 164/90)  BP(mean): --  RR: 18 (22 Jun 2023 14:00) (18 - 18)  SpO2: 96% (22 Jun 2023 14:00) (95% - 97%)    Parameters below as of 22 Jun 2023 14:00  Patient On (Oxygen Delivery Method): room air        PHYSICAL EXAM:  GENERAL: NAD, well-groomed  NERVOUS SYSTEM:  Alert & Oriented X3, Good concentration; Motor Strength 5/5 B/L upper and lower extremities; DTRs 2+ intact and symmetric  CHEST/LUNG: Clear to percussion bilaterally; No rales, rhonchi, wheezing, or rubs  HEART: Regular rate and rhythm; No murmurs, rubs, or gallops  ABDOMEN: Soft, Nontender, Nondistended; Bowel sounds present  EXTREMITIES:  2+ Peripheral Pulses, No clubbing, cyanosis, or edema      LABS:                        8.7    8.40  )-----------( 161      ( 22 Jun 2023 06:20 )             26.9     06-22    143  |  119<H>  |  36<H>  ----------------------------<  153<H>  3.8   |  20<L>  |  2.80<H>    Ca    8.1<L>      22 Jun 2023 06:20    TPro  5.7<L>  /  Alb  1.7<L>  /  TBili  0.7  /  DBili  x   /  AST  31  /  ALT  19  /  AlkPhos  83  06-22      Urinalysis Basic - ( 22 Jun 2023 06:20 )    Color: x / Appearance: x / SG: x / pH: x  Gluc: 153 mg/dL / Ketone: x  / Bili: x / Urobili: x   Blood: x / Protein: x / Nitrite: x   Leuk Esterase: x / RBC: x / WBC x   Sq Epi: x / Non Sq Epi: x / Bacteria: x      CAPILLARY BLOOD GLUCOSE      POCT Blood Glucose.: 185 mg/dL (22 Jun 2023 10:55)  POCT Blood Glucose.: 174 mg/dL (22 Jun 2023 07:33)  POCT Blood Glucose.: 172 mg/dL (21 Jun 2023 21:06)  POCT Blood Glucose.: 249 mg/dL (21 Jun 2023 16:31)      RADIOLOGY & ADDITIONAL TESTS:    Imaging Personally Reviewed:  [ ] YES  [ ] NO    Consultant(s) Notes Reviewed:  [ ] YES  [ ] NO    Care Discussed with Consultants/Other Providers [ ] YES  [ ] NO

## 2023-06-22 NOTE — PROGRESS NOTE ADULT - ASSESSMENT
The patient is a 89y Male with significant PMH of HTN, CAD, s/p stent in 2009, HPL, DM-2, CKD-stage III, CVA with muscle wasting, H/o prostate CA s/p TURP, Glaucoma, B/L muscle atrophy and others was BIBA in ED from home with coffee ground emesis x 2.  Pt. was found to be hypotensive at 83/49 and unresponsive per EMS.  Pt. woke up after fluids started (NS) by EMS and Zofran given for nausea.  Patient is a very poor historian. I was not able to get any information from the patient. Most of the information has been obtained from EMR documentation and in discussion with ED provider. Patient is on ASA. No melena or hemoptysis or hematuria reported. He is not in any acute distress. No family members at bedside.     #RT heel gangrene  - Appreciate vascular and podiatry recs  - Will c/w Zosyn  - ID recs appreciated    #UTI  - U/A positive, s/p CTX    # Acute worsening of CKD stage III- likely pre-renal  Cr 2.54 ( was 1.44 in 01/2023).  Adequate hydration.  - nephro following  - s/p albumin x1    # Acute blood loss anemia with hematemesis with possible PUD, erossive gastritis/duodenitis/esophagitis or AVMs.  Hb is 9.2 (with baseline Hb 10.0-11.3).  Admit to telemetry.  Maintenance IV fluids.  Monitor H/H q6h, and transfuse PRBC if HB <8.0  Hold his ASA for now.  Appreciate GI recs- will c/w CLD  - Switched to IV protonix    # Uncontrolled DM-2 with hyperglycemia.  .  A1c level- 13.2  ISS with coverage.    # HTN, CAD, s/p stent and HPL.  Stable and no acute issue.  Continue his Metoprolol, Losartan and Atorvastatin.    # H/o Prostate cancer and TURP.  On Flomax and Finasteride.    # Hypothyroidism   On Levothyroxine.    # DVT prophylaxis:   SCDs for now.    Palliative c/s placed for management of gangreneeeee

## 2023-06-22 NOTE — PROGRESS NOTE ADULT - ASSESSMENT
he patient is a 89y Male with significant PMH of HTN, CAD, s/p stent in 2009, HPL, DM-2, CKD-stage III, CVA with muscle wasting, H/o prostate CA s/p TURP, Glaucoma, B/L muscle atrophy and others was BIBA in ED from home with coffee ground emesis x 2.  Pt. was found to be hypotensive at 83/49 and unresponsive per EMS.  Pt. woke up after fluids started (NS) by EMS and Zofran given for nausea.  Patient is a very poor historian. Patient is on ASA. No melena or hemoptysis or hematuria reported.   Was treated for UTI with ceftriaxone but foot noted to have foul odor and concern for infection so was started on zosyn   This appears to be have ischemic changes and the foul odor is likely decaying skin. There could be old blood there as well. If blood flow is compromised, infection risk can be high. Agree with zosyn for now while cultures and imaging are being performed.     6/22: no fever, RA, no wbc, Cr better 2.8, no vascular procedure is planned, BCs with no growth to date, UC with strep group B, Zosyn IV continued for now.     Plan:  continue zosyn  send 2 sets of blood if spikes another fever  keep foot wrapped and covered with boots  frequent turns, offloading, and nutrition optimization to avoid bedsores-protective heel/leg protectors   monitor Hemoglobin  Vascular surgery consult is appreciated   duration of antibiotics TBD  please address GOC

## 2023-06-22 NOTE — PROGRESS NOTE ADULT - NS ATTEND AMEND GEN_ALL_CORE FT
I have reviewed all pertinent clinical information and agree with the NP's note.   patient has significant vascular disease  will need to establish GOC to determine course of action   for now can continue zosyn but duration of antibiotics TBD    Dieter Smith, DO  Infectious Disease Attending  Reachable via Microsoft Teams or ID office: [phone]  After 5pm/weekends please call [phone] for all inquiries and new consults

## 2023-06-22 NOTE — PROGRESS NOTE ADULT - SUBJECTIVE AND OBJECTIVE BOX
NEPHROLOGY PROGRESS NOTE    CHIEF COMPLAINT:  MARILYN/CKD    HPI:  Renal function improving.       EXAM:  T(F): 98.1 (06-22-23 @ 04:46)  HR: 81 (06-22-23 @ 04:46)  BP: 145/77 (06-22-23 @ 04:46)  RR: 18 (06-22-23 @ 04:46)  SpO2: 97% (06-22-23 @ 04:46)    Conversant, in no apparent distress  Normal respiratory effort, lungs clear bilaterally  Heart RRR with no murmur, no peripheral edema         LABS                             8.7    8.40  )-----------( 161      ( 22 Jun 2023 06:20 )             26.9          06-22    143  |  119<H>  |  36<H>  ----------------------------<  153<H>  3.8   |  20<L>  |  2.80<H>    Ca    8.1<L>      22 Jun 2023 06:20    TPro  5.7<L>  /  Alb  1.7<L>  /  TBili  0.7  /  DBili  x   /  AST  31  /  ALT  19  /  AlkPhos  83  06-22    Urinalysis Basic - ( 22 Jun 2023 06:20 )    Color: x / Appearance: x / SG: x / pH: x  Gluc: 153 mg/dL / Ketone: x  / Bili: x / Urobili: x   Blood: x / Protein: x / Nitrite: x   Leuk Esterase: x / RBC: x / WBC x   Sq Epi: x / Non Sq Epi: x / Bacteria: x      Assessment   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

## 2023-06-22 NOTE — PROGRESS NOTE ADULT - SUBJECTIVE AND OBJECTIVE BOX
MARIANA WRIGHT  MRN-[id number]    Follow Up:  foot infection, uti    Interval History: the pt was seen and examined earlier, no acute distress, pt does not speak much, does not answer my questions. pt is afebrile, RA.     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      History of PTCA  7/09, stents to Cx and RCA      Prostate CA      Type 2 diabetes mellitus      HTN (hypertension)      BPH without urinary obstruction      High cholesterol      Stage 3 chronic kidney disease      Inferior MI      Diabetes Mellitus      CAD (Coronary Artery Disease)      S/P TURP (status post transurethral resection of prostate)      No significant past surgical history          ROS:    [x ] Unobtainable because: confused   [ ] All other systems negative    Constitutional: no fever, no chills  Head: no trauma  Eyes: no vision changes, no eye pain  ENT:  no sore throat, no rhinorrhea  Cardiovascular:  no chest pain, no palpitation  Respiratory:  no SOB, no cough  GI:  no abd pain, no vomiting, no diarrhea  urinary: no dysuria, no hematuria, no flank pain  musculoskeletal:  no joint pain, no joint swelling  skin:  no rash  neurology:  no headache, no seizure, no change in mental status  psych: no anxiety, no depression         Allergies  No Known Allergies        ANTIMICROBIALS:  piperacillin/tazobactam IVPB.. 3.375 every 12 hours      OTHER MEDS:  acetaminophen     Tablet .. 650 milliGRAM(s) Oral every 6 hours PRN  aluminum hydroxide/magnesium hydroxide/simethicone Suspension 30 milliLiter(s) Oral every 4 hours PRN  atorvastatin 80 milliGRAM(s) Oral at bedtime  cilostazol 100 milliGRAM(s) Oral two times a day  dextrose 5%. 1000 milliLiter(s) IV Continuous <Continuous>  dextrose 5%. 1000 milliLiter(s) IV Continuous <Continuous>  dextrose 50% Injectable 25 Gram(s) IV Push once  dextrose 50% Injectable 12.5 Gram(s) IV Push once  dextrose 50% Injectable 25 Gram(s) IV Push once  dextrose Oral Gel 15 Gram(s) Oral once PRN  epoetin shai-epbx (RETACRIT) Injectable 90704 Unit(s) SubCutaneous every 7 days  finasteride 5 milliGRAM(s) Oral daily  glucagon  Injectable 1 milliGRAM(s) IntraMuscular once  insulin lispro (ADMELOG) corrective regimen sliding scale   SubCutaneous three times a day before meals  insulin lispro (ADMELOG) corrective regimen sliding scale   SubCutaneous at bedtime  latanoprost 0.005% Ophthalmic Solution 1 Drop(s) Both EYES at bedtime  levothyroxine 75 MICROGram(s) Oral daily  melatonin 3 milliGRAM(s) Oral at bedtime PRN  metoprolol tartrate 25 milliGRAM(s) Oral two times a day  ondansetron Injectable 4 milliGRAM(s) IV Push every 8 hours PRN  pantoprazole    Tablet 40 milliGRAM(s) Oral before breakfast  sodium bicarbonate 650 milliGRAM(s) Oral three times a day  tamsulosin 0.4 milliGRAM(s) Oral at bedtime      Vital Signs Last 24 Hrs  T(C): 36.6 (22 Jun 2023 15:42), Max: 37.6 (21 Jun 2023 23:27)  T(F): 97.8 (22 Jun 2023 15:42), Max: 99.6 (21 Jun 2023 23:27)  HR: 83 (22 Jun 2023 15:42) (81 - 86)  BP: 181/79 (22 Jun 2023 15:42) (145/77 - 181/79)  BP(mean): --  RR: 18 (22 Jun 2023 15:42) (18 - 18)  SpO2: 97% (22 Jun 2023 15:42) (95% - 97%)    Parameters below as of 22 Jun 2023 14:00  Patient On (Oxygen Delivery Method): room air        Physical Exam:  Constitutional: non-toxic, no distress  HEAD/EYES: anicteric, no conjunctival injection  ENT:  supple, no thrush  Cardiovascular:   normal S1, S2, no murmur, no edema  Respiratory:  clear BS bilaterally, no wheezes, no rales  GI:  soft, non-tender, normal bowel sounds, obese   :  no hardin, no CVA tenderness  Musculoskeletal:  no synovitis, normal ROM  Neurologic: awake and alert, normal strength, no focal findings  Skin:  right heel dressed c/d/i, no phlebitis   Heme/Onc: no lymphadenopathy   Psychiatric:  awake, alert, appropriate mood    WBC Count: 8.40 K/uL (06-22 @ 06:20)  WBC Count: 7.76 K/uL (06-21 @ 07:22)  WBC Count: 8.61 K/uL (06-20 @ 08:00)  WBC Count: 8.55 K/uL (06-19 @ 06:10)  WBC Count: 9.09 K/uL (06-18 @ 05:30)  WBC Count: 7.92 K/uL (06-17 @ 05:56)  WBC Count: 7.71 K/uL (06-16 @ 05:58)                            8.7    8.40  )-----------( 161      ( 22 Jun 2023 06:20 )             26.9       06-22    143  |  119<H>  |  36<H>  ----------------------------<  153<H>  3.8   |  20<L>  |  2.80<H>    Ca    8.1<L>      22 Jun 2023 06:20    TPro  5.7<L>  /  Alb  1.7<L>  /  TBili  0.7  /  DBili  x   /  AST  31  /  ALT  19  /  AlkPhos  83  06-22      Urinalysis Basic - ( 22 Jun 2023 06:20 )    Color: x / Appearance: x / SG: x / pH: x  Gluc: 153 mg/dL / Ketone: x  / Bili: x / Urobili: x   Blood: x / Protein: x / Nitrite: x   Leuk Esterase: x / RBC: x / WBC x   Sq Epi: x / Non Sq Epi: x / Bacteria: x        Creatinine Trend: 2.80<--, 3.25<--, 3.84<--, 3.84<--, 4.00<--, 3.21<--      MICROBIOLOGY:  v  Clean Catch Clean Catch (Midstream)  06-17-23   >100,000 CFU/ml Streptococcus agalactiae (Group B) isolated  Group B streptococci are susceptible to ampicillin,  penicillin and cefazolin, but may be resistant to  erythromycin and clindamycin.  Recommendations for intrapartum prophylaxis for Group B  streptococci are penicillin or ampicillin.  --  --      .Blood Blood-Peripheral  06-15-23   No Growth Final  --  --      .Blood Blood-Peripheral  06-15-23   No Growth Final  --  --    C-Reactive Protein, Serum: 202 (06-21)      SARS-CoV-2: NotDetec (15 Cam 2023 02:00)    RADIOLOGY:     MARIANA WRIGHT  MRN-[id number]    Follow Up:  foot infection, uti    Interval History: the pt was seen and examined earlier, no acute distress, pt does not speak much, does not answer my questions. pt is afebrile, RA.     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      History of PTCA  7/09, stents to Cx and RCA      Prostate CA      Type 2 diabetes mellitus      HTN (hypertension)      BPH without urinary obstruction      High cholesterol      Stage 3 chronic kidney disease      Inferior MI      Diabetes Mellitus      CAD (Coronary Artery Disease)      S/P TURP (status post transurethral resection of prostate)      No significant past surgical history          ROS:    [x ] Unobtainable because: confused   [ ] All other systems negative    Constitutional: no fever, no chills  Head: no trauma  Eyes: no vision changes, no eye pain  ENT:  no sore throat, no rhinorrhea  Cardiovascular:  no chest pain, no palpitation  Respiratory:  no SOB, no cough  GI:  no abd pain, no vomiting, no diarrhea  urinary: no dysuria, no hematuria, no flank pain  musculoskeletal:  no joint pain, no joint swelling  skin:  no rash  neurology:  no headache, no seizure, no change in mental status  psych: no anxiety, no depression         Allergies  No Known Allergies        ANTIMICROBIALS:  piperacillin/tazobactam IVPB.. 3.375 every 12 hours      OTHER MEDS:  acetaminophen     Tablet .. 650 milliGRAM(s) Oral every 6 hours PRN  aluminum hydroxide/magnesium hydroxide/simethicone Suspension 30 milliLiter(s) Oral every 4 hours PRN  atorvastatin 80 milliGRAM(s) Oral at bedtime  cilostazol 100 milliGRAM(s) Oral two times a day  dextrose 5%. 1000 milliLiter(s) IV Continuous <Continuous>  dextrose 5%. 1000 milliLiter(s) IV Continuous <Continuous>  dextrose 50% Injectable 25 Gram(s) IV Push once  dextrose 50% Injectable 12.5 Gram(s) IV Push once  dextrose 50% Injectable 25 Gram(s) IV Push once  dextrose Oral Gel 15 Gram(s) Oral once PRN  epoetin shai-epbx (RETACRIT) Injectable 65567 Unit(s) SubCutaneous every 7 days  finasteride 5 milliGRAM(s) Oral daily  glucagon  Injectable 1 milliGRAM(s) IntraMuscular once  insulin lispro (ADMELOG) corrective regimen sliding scale   SubCutaneous three times a day before meals  insulin lispro (ADMELOG) corrective regimen sliding scale   SubCutaneous at bedtime  latanoprost 0.005% Ophthalmic Solution 1 Drop(s) Both EYES at bedtime  levothyroxine 75 MICROGram(s) Oral daily  melatonin 3 milliGRAM(s) Oral at bedtime PRN  metoprolol tartrate 25 milliGRAM(s) Oral two times a day  ondansetron Injectable 4 milliGRAM(s) IV Push every 8 hours PRN  pantoprazole    Tablet 40 milliGRAM(s) Oral before breakfast  sodium bicarbonate 650 milliGRAM(s) Oral three times a day  tamsulosin 0.4 milliGRAM(s) Oral at bedtime      Vital Signs Last 24 Hrs  T(C): 36.6 (22 Jun 2023 15:42), Max: 37.6 (21 Jun 2023 23:27)  T(F): 97.8 (22 Jun 2023 15:42), Max: 99.6 (21 Jun 2023 23:27)  HR: 83 (22 Jun 2023 15:42) (81 - 86)  BP: 181/79 (22 Jun 2023 15:42) (145/77 - 181/79)  BP(mean): --  RR: 18 (22 Jun 2023 15:42) (18 - 18)  SpO2: 97% (22 Jun 2023 15:42) (95% - 97%)    Parameters below as of 22 Jun 2023 14:00  Patient On (Oxygen Delivery Method): room air        Physical Exam:  Constitutional: non-toxic, no distress  HEAD/EYES: anicteric, no conjunctival injection  ENT:  supple, no thrush  Cardiovascular:   normal S1, S2, no murmur, no edema  Respiratory:  clear BS bilaterally, no wheezes, no rales  GI:  soft, non-tender, normal bowel sounds, obese   :  no hardin, no CVA tenderness  Musculoskeletal:  no synovitis, normal ROM  Neurologic: awake and alertx1-2,   Skin:  right heel dressed c/d/i, no phlebitis   Heme/Onc: no lymphadenopathy   Psychiatric:  awake, alert, appropriate mood    WBC Count: 8.40 K/uL (06-22 @ 06:20)  WBC Count: 7.76 K/uL (06-21 @ 07:22)  WBC Count: 8.61 K/uL (06-20 @ 08:00)  WBC Count: 8.55 K/uL (06-19 @ 06:10)  WBC Count: 9.09 K/uL (06-18 @ 05:30)  WBC Count: 7.92 K/uL (06-17 @ 05:56)  WBC Count: 7.71 K/uL (06-16 @ 05:58)                            8.7    8.40  )-----------( 161      ( 22 Jun 2023 06:20 )             26.9       06-22    143  |  119<H>  |  36<H>  ----------------------------<  153<H>  3.8   |  20<L>  |  2.80<H>    Ca    8.1<L>      22 Jun 2023 06:20    TPro  5.7<L>  /  Alb  1.7<L>  /  TBili  0.7  /  DBili  x   /  AST  31  /  ALT  19  /  AlkPhos  83  06-22      Urinalysis Basic - ( 22 Jun 2023 06:20 )    Color: x / Appearance: x / SG: x / pH: x  Gluc: 153 mg/dL / Ketone: x  / Bili: x / Urobili: x   Blood: x / Protein: x / Nitrite: x   Leuk Esterase: x / RBC: x / WBC x   Sq Epi: x / Non Sq Epi: x / Bacteria: x        Creatinine Trend: 2.80<--, 3.25<--, 3.84<--, 3.84<--, 4.00<--, 3.21<--      MICROBIOLOGY:  v  Clean Catch Clean Catch (Midstream)  06-17-23   >100,000 CFU/ml Streptococcus agalactiae (Group B) isolated  Group B streptococci are susceptible to ampicillin,  penicillin and cefazolin, but may be resistant to  erythromycin and clindamycin.  Recommendations for intrapartum prophylaxis for Group B  streptococci are penicillin or ampicillin.  --  --      .Blood Blood-Peripheral  06-15-23   No Growth Final  --  --      .Blood Blood-Peripheral  06-15-23   No Growth Final  --  --    C-Reactive Protein, Serum: 202 (06-21)      SARS-CoV-2: NotDetec (15 Cam 2023 02:00)    RADIOLOGY:

## 2023-06-22 NOTE — PROGRESS NOTE ADULT - SUBJECTIVE AND OBJECTIVE BOX
Vascular Surgery Progress Note   Date of Service: 06-22-23 @ 09:59    Subjective/24hour Events: Denies pain.     Vital Signs:  Vital Signs Last 24 Hrs  T(C): 36.7 (22 Jun 2023 04:46), Max: 37.6 (21 Jun 2023 23:27)  T(F): 98.1 (22 Jun 2023 04:46), Max: 99.6 (21 Jun 2023 23:27)  HR: 81 (22 Jun 2023 04:46) (72 - 94)  BP: 145/77 (22 Jun 2023 04:46) (117/66 - 164/84)  BP(mean): --  RR: 18 (22 Jun 2023 04:46) (18 - 18)  SpO2: 97% (22 Jun 2023 04:46) (95% - 97%)    Parameters below as of 21 Jun 2023 16:23  Patient On (Oxygen Delivery Method): room air        CAPILLARY BLOOD GLUCOSE      POCT Blood Glucose.: 174 mg/dL (22 Jun 2023 07:33)  POCT Blood Glucose.: 172 mg/dL (21 Jun 2023 21:06)  POCT Blood Glucose.: 249 mg/dL (21 Jun 2023 16:31)  POCT Blood Glucose.: 230 mg/dL (21 Jun 2023 13:58)      I&O's Detail    21 Jun 2023 07:01  -  22 Jun 2023 07:00  --------------------------------------------------------  IN:    Oral Fluid: 120 mL  Total IN: 120 mL    OUT:  Total OUT: 0 mL    Total NET: 120 mL          MEDICATIONS  (STANDING):  atorvastatin 80 milliGRAM(s) Oral at bedtime  cilostazol 100 milliGRAM(s) Oral two times a day  dextrose 5%. 1000 milliLiter(s) (50 mL/Hr) IV Continuous <Continuous>  dextrose 5%. 1000 milliLiter(s) (100 mL/Hr) IV Continuous <Continuous>  dextrose 50% Injectable 25 Gram(s) IV Push once  dextrose 50% Injectable 25 Gram(s) IV Push once  dextrose 50% Injectable 12.5 Gram(s) IV Push once  epoetin shai-epbx (RETACRIT) Injectable 32396 Unit(s) SubCutaneous every 7 days  finasteride 5 milliGRAM(s) Oral daily  glucagon  Injectable 1 milliGRAM(s) IntraMuscular once  insulin lispro (ADMELOG) corrective regimen sliding scale   SubCutaneous three times a day before meals  insulin lispro (ADMELOG) corrective regimen sliding scale   SubCutaneous at bedtime  latanoprost 0.005% Ophthalmic Solution 1 Drop(s) Both EYES at bedtime  levothyroxine 75 MICROGram(s) Oral daily  metoprolol tartrate 25 milliGRAM(s) Oral two times a day  pantoprazole    Tablet 40 milliGRAM(s) Oral before breakfast  piperacillin/tazobactam IVPB.. 3.375 Gram(s) IV Intermittent every 12 hours  sodium bicarbonate 650 milliGRAM(s) Oral three times a day  tamsulosin 0.4 milliGRAM(s) Oral at bedtime    MEDICATIONS  (PRN):  acetaminophen     Tablet .. 650 milliGRAM(s) Oral every 6 hours PRN Temp greater or equal to 38C (100.4F), Mild Pain (1 - 3)  aluminum hydroxide/magnesium hydroxide/simethicone Suspension 30 milliLiter(s) Oral every 4 hours PRN Dyspepsia  dextrose Oral Gel 15 Gram(s) Oral once PRN Blood Glucose LESS THAN 70 milliGRAM(s)/deciliter  melatonin 3 milliGRAM(s) Oral at bedtime PRN Insomnia  ondansetron Injectable 4 milliGRAM(s) IV Push every 8 hours PRN Nausea and/or Vomiting      Physical Exam:  Gen: NAD.  Lungs: Non labored breathing.   Ab: Soft, nontender, nondistended.   Ext: Bilateral legs appear contracted at knees. Patient not able to follow commands to move legs.   RIght heel with gangrene. Mild odor. No purulent drainage. Bilateral anterior calves with wounds. Nonpalpable pedal pulses.     Labs:    06-22    143  |  119<H>  |  36<H>  ----------------------------<  153<H>  3.8   |  20<L>  |  2.80<H>    Ca    8.1<L>      22 Jun 2023 06:20    TPro  5.7<L>  /  Alb  1.7<L>  /  TBili  0.7  /  DBili  x   /  AST  31  /  ALT  19  /  AlkPhos  83  06-22    LIVER FUNCTIONS - ( 22 Jun 2023 06:20 )  Alb: 1.7 g/dL / Pro: 5.7 gm/dL / ALK PHOS: 83 U/L / ALT: 19 U/L / AST: 31 U/L / GGT: x                                 8.7    8.40  )-----------( 161      ( 22 Jun 2023 06:20 )             26.9         IMAGING:   < from: VA Physiol Extremity Lower 3+ Level, BI (06.21.23 @ 13:34) >    ACC: [id number] EXAM:  US PHYSIOL LWR EXT 3+ LEV BI   ORDERED BY: ESTELA DEJESUS     PROCEDURE DATE:  06/21/2023          INTERPRETATION:  Clinical Information: Peripheral vascular disease. Right   heel wound    Technique: Bilateral lower extremityABIs/PVR    Comparison: Same day arterial Doppler    Findings/  Impression:  Right lower extremity: The ankle brachial index is 0.52. The pulse   waveforms are diminished in amplitude below the knee. 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 The pulse   waveforms are diminished in amplitude throughout. 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 Jun 21 2023 11:33PM    < end of copied text >  < from: US Duplex Arterial Lower Ext Compl, Bilateral (06.21.23 @ 12:07) >  ACC: [id number] EXAM:  US DPLX LWR EXT ARTS COMPL BI   ORDERED BY: ESTELA DEJESUS     PROCEDURE DATE:  06/21/2023          INTERPRETATION:  US LOWER EXTREMITY ARTERIAL DUPLEX COMPLETE BILATERAL    CLINICAL INDICATION: Peripheral artery disease witha Right heel wound    COMPARISON: None    Real-time sonography of the bilateral lower extremity arterial system was   performed using a high-resolution linear array transducer and including   color and spectral Doppler.    Bilateral severe plaque. Nosoft tissue abnormalities are demonstrated in   either leg. Flow phase patterns and peak systolic velocity measurements   (in cm/s) were observed as follows:    RIGHT:  CFA: Monophasic; 282  Proximal SFA: Biphasic; 68  Mid SFA: Monophasic; 37  DistalSFA: Monophasic;  33  Popliteal: Tardus parvus;  31, 30  Anterior tibial: Tardus parvus; 67, 29, 25  Posterior tibial: Tardus parvus; 13, 8, 7  Peroneal: Tardus parvus;  27  Dorsalis pedis: Tardus parvus;  8    LEFT:  CFA: Biphasic; 110  Proximal SFA: Monophasic; 207  Mid SFA: Monophasic; 65  Distal SFA: Monophasic; 105  Popliteal: Monophasic;  81, 113, 69  Anterior tibial: Monophasic; 111, 30, 24  Posterior tibial: Monophasic; 31, 33, 28  Peroneal: Monophasic; 65  Dorsalis pedis: Monophasic;  33    IMPRESSION:  *  50-75% stenosis at the right common femoral artery with low velocity   monophasic and tardus parvus flow throughout the remainder of the right   leg.  *  Patent left femoral artery stent with mildly elevated velocities at   the proximal SFA suggestive of in-stent stenosis.  *  Monophasic and tardus parvus flow at and below the left knee   suggestive of significant disease.    --- End of Report ---            CHADWICK REILLY MD; Attending Radiologist    < end of copied text >

## 2023-06-22 NOTE — PROGRESS NOTE ADULT - ASSESSMENT
89 year old man with multiple comorbidities admitted w/ hematemesis.   Pertinent history include PAD w/ history of leg stents previously, recent CVA in 1/2023, which left patient nonambulatory but according to daughters, patient does transfer to wheelchair.     - Patient is not a candidate for revascularization given poor baseline functional status, recent GIB, CKD.   - Extensive discussion w/ patient's daughters over telephone regarding possible management options which include continued local wound care with likely worsening gangrene and possibly worsening infection vs. primary amputation. Patient's daughters state that patient would not want an amputation even if life-saving and desire continued local wound care.  - Continue local wound care per podiatry.  - Patients daughters would also like to meet w/ palliative care to discuss goals of care as primary objective is to avoid invasive procedures and keep patient comfortable.

## 2023-06-23 DIAGNOSIS — R53.81 OTHER MALAISE: ICD-10-CM

## 2023-06-23 DIAGNOSIS — I73.9 PERIPHERAL VASCULAR DISEASE, UNSPECIFIED: ICD-10-CM

## 2023-06-23 DIAGNOSIS — F03.C0 UNSPECIFIED DEMENTIA, SEVERE, WITHOUT BEHAVIORAL DISTURBANCE, PSYCHOTIC DISTURBANCE, MOOD DISTURBANCE, AND ANXIETY: ICD-10-CM

## 2023-06-23 DIAGNOSIS — E43 UNSPECIFIED SEVERE PROTEIN-CALORIE MALNUTRITION: ICD-10-CM

## 2023-06-23 DIAGNOSIS — N17.9 ACUTE KIDNEY FAILURE, UNSPECIFIED: ICD-10-CM

## 2023-06-23 DIAGNOSIS — Z51.5 ENCOUNTER FOR PALLIATIVE CARE: ICD-10-CM

## 2023-06-23 LAB
ALBUMIN SERPL ELPH-MCNC: 1.8 G/DL — LOW (ref 3.3–5)
ALP SERPL-CCNC: 77 U/L — SIGNIFICANT CHANGE UP (ref 40–120)
ALT FLD-CCNC: 21 U/L — SIGNIFICANT CHANGE UP (ref 12–78)
ANION GAP SERPL CALC-SCNC: 4 MMOL/L — LOW (ref 5–17)
AST SERPL-CCNC: 28 U/L — SIGNIFICANT CHANGE UP (ref 15–37)
BILIRUB SERPL-MCNC: 1 MG/DL — SIGNIFICANT CHANGE UP (ref 0.2–1.2)
BUN SERPL-MCNC: 30 MG/DL — HIGH (ref 7–23)
CALCIUM SERPL-MCNC: 8 MG/DL — LOW (ref 8.5–10.1)
CHLORIDE SERPL-SCNC: 118 MMOL/L — HIGH (ref 96–108)
CO2 SERPL-SCNC: 21 MMOL/L — LOW (ref 22–31)
CREAT SERPL-MCNC: 2.33 MG/DL — HIGH (ref 0.5–1.3)
EGFR: 26 ML/MIN/1.73M2 — LOW
GLUCOSE BLDC GLUCOMTR-MCNC: 151 MG/DL — HIGH (ref 70–99)
GLUCOSE BLDC GLUCOMTR-MCNC: 171 MG/DL — HIGH (ref 70–99)
GLUCOSE BLDC GLUCOMTR-MCNC: 175 MG/DL — HIGH (ref 70–99)
GLUCOSE BLDC GLUCOMTR-MCNC: 183 MG/DL — HIGH (ref 70–99)
GLUCOSE SERPL-MCNC: 164 MG/DL — HIGH (ref 70–99)
HCT VFR BLD CALC: 29.4 % — LOW (ref 39–50)
HGB BLD-MCNC: 9.6 G/DL — LOW (ref 13–17)
MCHC RBC-ENTMCNC: 28.6 PG — SIGNIFICANT CHANGE UP (ref 27–34)
MCHC RBC-ENTMCNC: 32.7 G/DL — SIGNIFICANT CHANGE UP (ref 32–36)
MCV RBC AUTO: 87.5 FL — SIGNIFICANT CHANGE UP (ref 80–100)
NRBC # BLD: 0 /100 WBCS — SIGNIFICANT CHANGE UP (ref 0–0)
PLATELET # BLD AUTO: 183 K/UL — SIGNIFICANT CHANGE UP (ref 150–400)
POTASSIUM SERPL-MCNC: 3.6 MMOL/L — SIGNIFICANT CHANGE UP (ref 3.5–5.3)
POTASSIUM SERPL-SCNC: 3.6 MMOL/L — SIGNIFICANT CHANGE UP (ref 3.5–5.3)
PROT SERPL-MCNC: 6 GM/DL — SIGNIFICANT CHANGE UP (ref 6–8.3)
RBC # BLD: 3.36 M/UL — LOW (ref 4.2–5.8)
RBC # FLD: 17.3 % — HIGH (ref 10.3–14.5)
SODIUM SERPL-SCNC: 143 MMOL/L — SIGNIFICANT CHANGE UP (ref 135–145)
WBC # BLD: 8.36 K/UL — SIGNIFICANT CHANGE UP (ref 3.8–10.5)
WBC # FLD AUTO: 8.36 K/UL — SIGNIFICANT CHANGE UP (ref 3.8–10.5)

## 2023-06-23 PROCEDURE — 76937 US GUIDE VASCULAR ACCESS: CPT | Mod: 26,59

## 2023-06-23 PROCEDURE — 99233 SBSQ HOSP IP/OBS HIGH 50: CPT

## 2023-06-23 PROCEDURE — 36569 INSJ PICC 5 YR+ W/O IMAGING: CPT

## 2023-06-23 RX ADMIN — Medication 650 MILLIGRAM(S): at 05:14

## 2023-06-23 RX ADMIN — Medication 25 MILLIGRAM(S): at 17:34

## 2023-06-23 RX ADMIN — Medication 25 MILLIGRAM(S): at 05:15

## 2023-06-23 RX ADMIN — ATORVASTATIN CALCIUM 80 MILLIGRAM(S): 80 TABLET, FILM COATED ORAL at 21:35

## 2023-06-23 RX ADMIN — Medication 1: at 07:38

## 2023-06-23 RX ADMIN — Medication 1: at 11:19

## 2023-06-23 RX ADMIN — PIPERACILLIN AND TAZOBACTAM 25 GRAM(S): 4; .5 INJECTION, POWDER, LYOPHILIZED, FOR SOLUTION INTRAVENOUS at 11:20

## 2023-06-23 RX ADMIN — TAMSULOSIN HYDROCHLORIDE 0.4 MILLIGRAM(S): 0.4 CAPSULE ORAL at 21:39

## 2023-06-23 RX ADMIN — Medication 75 MICROGRAM(S): at 05:15

## 2023-06-23 RX ADMIN — CILOSTAZOL 100 MILLIGRAM(S): 100 TABLET ORAL at 17:34

## 2023-06-23 RX ADMIN — Medication 650 MILLIGRAM(S): at 21:39

## 2023-06-23 RX ADMIN — PIPERACILLIN AND TAZOBACTAM 25 GRAM(S): 4; .5 INJECTION, POWDER, LYOPHILIZED, FOR SOLUTION INTRAVENOUS at 01:00

## 2023-06-23 RX ADMIN — CILOSTAZOL 100 MILLIGRAM(S): 100 TABLET ORAL at 05:15

## 2023-06-23 RX ADMIN — FINASTERIDE 5 MILLIGRAM(S): 5 TABLET, FILM COATED ORAL at 11:20

## 2023-06-23 RX ADMIN — PANTOPRAZOLE SODIUM 40 MILLIGRAM(S): 20 TABLET, DELAYED RELEASE ORAL at 05:15

## 2023-06-23 RX ADMIN — LATANOPROST 1 DROP(S): 0.05 SOLUTION/ DROPS OPHTHALMIC; TOPICAL at 21:40

## 2023-06-23 RX ADMIN — Medication 650 MILLIGRAM(S): at 13:09

## 2023-06-23 NOTE — CONSULT NOTE ADULT - PROBLEM SELECTOR RECOMMENDATION 5
Pt is bedbound. Total care.  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  High risk for further skin failure.  Local wound care  Supportive care  Freq positioning    Comfort only

## 2023-06-23 NOTE — CONSULT NOTE ADULT - SUBJECTIVE AND OBJECTIVE BOX
[facility]  INFECTIOUS DISEASES   [address] [place] [address]  Tel: [phone]     Fax: [phone]  ======================================================  Wheatley MD Jonathan Garellek, DO Марина Del Angel, HILDA   ======================================================    MARIANA WRIGHT  MRN-[id number]  Male  89y        Patient is a 89y old  Male who presents with a chief complaint of Hematemesis (21 Jun 2023 14:49)      HPI:  Chief Complaint: vomiting blood.    The patient is a 89y Male with significant PMH of HTN, CAD, s/p stent in 2009, HPL, DM-2, CKD-stage III, CVA with muscle wasting, H/o prostate CA s/p TURP, Glaucoma, B/L muscle atrophy and others was BIBA in ED from home with coffee ground emesis x 2.  Pt. was found to be hypotensive at 83/49 and unresponsive per EMS.  Pt. woke up after fluids started (NS) by EMS and Zofran given for nausea.  Patient is a very poor historian. I was not able to get any information from the patient. Most of the information has been obtained from EMR documentation and in discussion with ED provider. Patient is on ASA. No melena or hemoptysis or hematuria reported. He is not in any acute distress. No family members at bedside.   Vitals stable.  Hb is 9.2 (with baseline Hb 10.0-11.3), BUN 42, Cr 2.50, , LA 3.0->2.3. Lipase normal.    CT abd/pelvis: No CT evidence of acute GI bleeding at the time scanning. Constipation. There is no associated obstruction. Thick-walled appearance of the urinary bladder which may be related to sequelae of chronic outlet obstruction from prostate enlargement or cystitis.  Diffuse esophageal wall thickening suggestive of esophagitis. There is also mild gastric wall thickening which may be related to under distention or gastritis.    CT Head: No acute intracranial hemorrhage, mass effect, or midline shift.    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 3.375 every 12 hours      MEDICATIONS  (STANDING):  cefTRIAXone   IVPB   100 mL/Hr IV Intermittent (06-20-23 @ 15:05)   100 mL/Hr IV Intermittent (06-19-23 @ 14:30)    cefTRIAXone   IVPB   1000 milliGRAM(s) IV Intermittent (06-18-23 @ 15:50)    piperacillin/tazobactam IVPB.   200 mL/Hr IV Intermittent (06-20-23 @ 20:03)    piperacillin/tazobactam IVPB.-   25 mL/Hr IV Intermittent (06-21-23 @ 05:17)        OTHER MEDS: MEDICATIONS  (STANDING):  acetaminophen     Tablet .. 650 every 6 hours PRN  aluminum hydroxide/magnesium hydroxide/simethicone Suspension 30 every 4 hours PRN  atorvastatin 80 at bedtime  cilostazol 100 two times a day  dextrose 50% Injectable 25 once  dextrose 50% Injectable 25 once  dextrose 50% Injectable 12.5 once  dextrose Oral Gel 15 once PRN  epoetin shai-epbx (RETACRIT) Injectable 80327 every 7 days  finasteride 5 daily  glucagon  Injectable 1 once  insulin lispro (ADMELOG) corrective regimen sliding scale  at bedtime  insulin lispro (ADMELOG) corrective regimen sliding scale  three times a day before meals  levothyroxine 75 daily  melatonin 3 at bedtime PRN  metoprolol tartrate 25 two times a day  ondansetron Injectable 4 every 8 hours PRN  pantoprazole    Tablet 40 before breakfast  tamsulosin 0.4 at bedtime      SOCIAL HISTORY:     denies    FAMILY HISTORY:  FH: HTN (hypertension)        REVIEW OF SYSTEMS  [  ] ROS unobtainable because:    [X  ] All other systems negative except as noted below:	    Constitutional:  [ ] fever [ ] chills  [ ] weight loss  [ ] weakness  Skin:  [ ] rash [ ] phlebitis	  Eyes: [ ] icterus [ ] pain  [ ] discharge	  ENMT: [ ] sore throat  [ ] thrush [ ] ulcers [ ] exudates  Respiratory: [ ] dyspnea [ ] hemoptysis [ ] cough [ ] sputum	  Cardiovascular:  [ ] chest pain [ ] palpitations [ ] edema	  Gastrointestinal:  [ ] nausea [ ] vomiting [ ] diarrhea [ ] constipation [ ] pain	  Genitourinary:  [ ] dysuria [ ] frequency [ ] hematuria [ ] discharge [ ] flank pain  [ ] incontinence  Musculoskeletal:  [ ] myalgias [ ] arthralgias [ ] arthritis  [ x] foot pain  Neurological:  [ ] headache [ ] seizures  [ ] confusion/altered mental status  Psychiatric:  [ ] anxiety [ ] depression	  Hematology/Lymphatics:  [ ] lymphadenopathy  Endocrine:  [ ] adrenal [ ] thyroid  Allergic/Immunologic:	 [ ] transplant [ ] seasonal    Vital Signs Last 24 Hrs  T(F): 98.9 (06-21-23 @ 10:19), Max: 100.8 (06-19-23 @ 23:54)    Vital Signs Last 24 Hrs  HR: 72 (06-21-23 @ 10:19) (72 - 93)  BP: 117/66 (06-21-23 @ 10:19) (117/66 - 139/68)  RR: 18 (06-21-23 @ 10:19)  SpO2: 96% (06-21-23 @ 10:19) (95% - 96%)  Wt(kg): --    PHYSICAL EXAM:  Constitutional: non-toxic, no distress  HEAD/EYES: anicteric, no conjunctival injection  ENT:  supple, no thrush  Cardiovascular:   normal S1, S2, no murmur, no edema  Respiratory:  clear BS bilaterally, no wheezes, no rales  GI:  soft, non-tender, normal bowel sounds, obese   :  no hardin, no CVA tenderness  Musculoskeletal:  no synovitis, normal ROM  Neurologic: awake and alert, normal strength, no focal findings  Skin:  right heel with black eschar, blood under the skin, with some surrounding erythema, tender   Heme/Onc: no lymphadenopathy   Psychiatric:  awake, alert, appropriate mood          WBC Count: 7.76 K/uL (06-21 @ 07:22)  WBC Count: 8.61 K/uL (06-20 @ 08:00)  WBC Count: 8.55 K/uL (06-19 @ 06:10)  WBC Count: 9.09 K/uL (06-18 @ 05:30)  WBC Count: 7.92 K/uL (06-17 @ 05:56)  WBC Count: 7.71 K/uL (06-16 @ 05:58)  WBC Count: 8.01 K/uL (06-15 @ 02:00)      Hemoglobin: 8.7 g/dL (06-21 @ 07:22)  Hemoglobin: 9.5 g/dL (06-20 @ 08:00)  Hemoglobin: 9.4 g/dL (06-19 @ 06:10)  Hemoglobin: 9.3 g/dL (06-18 @ 05:30)  Hemoglobin: 8.5 g/dL (06-17 @ 05:56)  Hemoglobin: 7.6 g/dL (06-16 @ 05:58)  Hemoglobin: 7.9 g/dL (06-15 @ 13:52)                            8.7    7.76  )-----------( 157      ( 21 Jun 2023 07:22 )             26.6       06-21    141  |  117<H>  |  45<H>  ----------------------------<  266<H>  3.4<L>   |  18<L>  |  3.25<H>    Ca    7.8<L>      21 Jun 2023 07:22    TPro  5.5<L>  /  Alb  1.8<L>  /  TBili  0.8  /  DBili  x   /  AST  25  /  ALT  16  /  AlkPhos  74  06-21      Creatinine Trend: 3.25<--, 3.84<--, 3.84<--, 4.00<--, 3.21<--, 2.60<--    Urinalysis Basic - ( 21 Jun 2023 07:22 )    Color: x / Appearance: x / SG: x / pH: x  Gluc: 266 mg/dL / Ketone: x  / Bili: x / Urobili: x   Blood: x / Protein: x / Nitrite: x   Leuk Esterase: x / RBC: x / WBC x   Sq Epi: x / Non Sq Epi: x / Bacteria: x            MICROBIOLOGY:  Clean Catch Clean Catch (Midstream)  06-17-23   >100,000 CFU/ml Streptococcus agalactiae (Group B) isolated  Group B streptococci are susceptible to ampicillin,  penicillin and cefazolin, but may be resistant to  erythromycin and clindamycin.  Recommendations for intrapartum prophylaxis for Group B  streptococci are penicillin or ampicillin.  --  --      .Blood Blood-Peripheral  06-15-23   No Growth Final  --  --      .Blood Blood-Peripheral  06-15-23   No Growth Final  --  --        SARS-CoV-2: NotDetec (15 Cam 2023 02:00)    Rapid RVP Result: NotDetec (06-15 @ 02:00)    C-Reactive Protein, Serum: 202 (06-21)      RADIOLOGY:    I have personally reviewed the above imaging 
Patient is a 89y old  Male who presents with a chief complaint of Hematemesis (20 Jun 2023 18:52)      HPI:  Chief Complaint: vomiting blood.    The patient is a 89y Male with significant PMH of HTN, CAD, s/p stent in 2009, HPL, DM-2, CKD-stage III, CVA with muscle wasting, H/o prostate CA s/p TURP, Glaucoma, B/L muscle atrophy and others was BIBA in ED from home with coffee ground emesis x 2.  Pt. was found to be hypotensive at 83/49 and unresponsive per EMS.  Pt. woke up after fluids started (NS) by EMS and Zofran given for nausea.  Patient is a very poor historian. I was not able to get any information from the patient. Most of the information has been obtained from EMR documentation and in discussion with ED provider. Patient is on ASA. No melena or hemoptysis or hematuria reported. He is not in any acute distress. No family members at bedside.   Vitals stable.  Hb is 9.2 (with baseline Hb 10.0-11.3), BUN 42, Cr 2.50, , LA 3.0->2.3. Lipase normal.    CT abd/pelvis: No CT evidence of acute GI bleeding at the time scanning. Constipation. There is no associated obstruction. Thick-walled appearance of the urinary bladder which may be related to sequelae of chronic outlet obstruction from prostate enlargement or cystitis.  Diffuse esophageal wall thickening suggestive of esophagitis. There is also mild gastric wall thickening which may be related to under distention or gastritis.    CT Head: No acute intracranial hemorrhage, mass effect, or midline shift.    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 1000 milliLiter(s) (50 mL/Hr) IV Continuous <Continuous>  dextrose 5%. 1000 milliLiter(s) (100 mL/Hr) IV Continuous <Continuous>  dextrose 50% Injectable 25 Gram(s) IV Push once  dextrose 50% Injectable 25 Gram(s) IV Push once  dextrose 50% Injectable 12.5 Gram(s) IV Push once  epoetin shai-epbx (RETACRIT) Injectable 02020 Unit(s) SubCutaneous every 7 days  finasteride 5 milliGRAM(s) Oral daily  glucagon  Injectable 1 milliGRAM(s) IntraMuscular once  insulin lispro (ADMELOG) corrective regimen sliding scale   SubCutaneous three times a day before meals  insulin lispro (ADMELOG) corrective regimen sliding scale   SubCutaneous at bedtime  latanoprost 0.005% Ophthalmic Solution 1 Drop(s) Both EYES at bedtime  levothyroxine 75 MICROGram(s) Oral daily  metoprolol tartrate 25 milliGRAM(s) Oral two times a day  pantoprazole    Tablet 40 milliGRAM(s) Oral before breakfast  sodium bicarbonate 650 milliGRAM(s) Oral three times a day  tamsulosin 0.4 milliGRAM(s) Oral at bedtime    MEDICATIONS  (PRN):  acetaminophen     Tablet .. 650 milliGRAM(s) Oral every 6 hours PRN Temp greater or equal to 38C (100.4F), Mild Pain (1 - 3)  aluminum hydroxide/magnesium hydroxide/simethicone Suspension 30 milliLiter(s) Oral every 4 hours PRN Dyspepsia  dextrose Oral Gel 15 Gram(s) Oral once PRN Blood Glucose LESS THAN 70 milliGRAM(s)/deciliter  melatonin 3 milliGRAM(s) Oral at bedtime PRN Insomnia  ondansetron Injectable 4 milliGRAM(s) IV Push every 8 hours PRN Nausea and/or Vomiting      Allergies    No Known Allergies    Intolerances        VITALS:    Vital Signs Last 24 Hrs  T(C): 36.9 (20 Jun 2023 15:52), Max: 38.2 (19 Jun 2023 23:54)  T(F): 98.4 (20 Jun 2023 15:52), Max: 100.8 (19 Jun 2023 23:54)  HR: 81 (20 Jun 2023 15:52) (81 - 99)  BP: 139/68 (20 Jun 2023 15:52) (107/63 - 141/71)  BP(mean): --  RR: 18 (20 Jun 2023 15:52) (18 - 18)  SpO2: 96% (20 Jun 2023 15:52) (95% - 99%)    Parameters below as of 20 Jun 2023 15:52  Patient On (Oxygen Delivery Method): room air        LABS:                          9.5    8.61  )-----------( 143      ( 20 Jun 2023 08:00 )             28.4       06-20    142  |  121<H>  |  52<H>  ----------------------------<  153<H>  3.8   |  15<L>  |  3.84<H>    Ca    8.9      20 Jun 2023 08:00  Mg     1.6     06-19    TPro  5.6<L>  /  Alb  2.0<L>  /  TBili  0.9  /  DBili  x   /  AST  26  /  ALT  19  /  AlkPhos  74  06-20      CAPILLARY BLOOD GLUCOSE      POCT Blood Glucose.: 236 mg/dL (20 Jun 2023 20:38)  POCT Blood Glucose.: 194 mg/dL (20 Jun 2023 16:50)  POCT Blood Glucose.: 153 mg/dL (20 Jun 2023 11:39)  POCT Blood Glucose.: 162 mg/dL (20 Jun 2023 07:44)  POCT Blood Glucose.: 156 mg/dL (19 Jun 2023 20:56)          LOWER EXTREMITY PHYSICAL EXAM:    Vascular: DP/PT 0/4, B/L, CFT <3 seconds B/L, Temperature gradient warm to cool, B/L.   Neuro: Unable to determine BL epicritic sensation 2/2 non-verbal status during consult.  Musculoskeletal/Ortho: Unremarkable.  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 Left heel deep tissue injury with no acute signs of infection.      RADIOLOGY & ADDITIONAL STUDIES:    
Patient seen and examined at bedside with Dr. Gutierrez. Called to evaluate patient for RLE heel wound. Patient has history of CVA (2023), patient had bilateral lower extremity angios, with stents in 2019. Patient confused at baseline.      HPI:  Chief Complaint: vomiting blood.    The patient is a 89y Male with significant PMH of HTN, CAD, s/p stent in , HPL, DM-2, CKD-stage III, CVA with muscle wasting, H/o prostate CA s/p TURP, Glaucoma, B/L muscle atrophy and others was BIBA in ED from home with coffee ground emesis x 2.  Pt. was found to be hypotensive at 83/49 and unresponsive per EMS.  Pt. woke up after fluids started (NS) by EMS and Zofran given for nausea.  Patient is a very poor historian. I was not able to get any information from the patient. Most of the information has been obtained from EMR documentation and in discussion with ED provider. Patient is on ASA. No melena or hemoptysis or hematuria reported. He is not in any acute distress. No family members at bedside.   Vitals stable.  Hb is 9.2 (with baseline Hb 10.0-11.3), BUN 42, Cr 2.50, , LA 3.0->2.3. Lipase normal.    CT abd/pelvis: No CT evidence of acute GI bleeding at the time scanning. Constipation. There is no associated obstruction. Thick-walled appearance of the urinary bladder which may be related to sequelae of chronic outlet obstruction from prostate enlargement or cystitis.  Diffuse esophageal wall thickening suggestive of esophagitis. There is also mild gastric wall thickening which may be related to under distention or gastritis.    CT Head: No acute intracranial hemorrhage, mass effect, or midline shift.    Protonix drip started in ED after bolus dose,          (15 Cam 2023 05:08)      PMH/PSH:PAST MEDICAL & SURGICAL HISTORY:  DM Type 2 (Diabetes Mellitus,      Personal History of Hypertensi      Hypercholesteremia      PC (Prostate Cancer)  treated surgically      CAD (Coronary Artery Disease)      Old MI (Myocardial Infarction)        History of PTCA  , stents to Cx and RCA      Prostate CA      Type 2 diabetes mellitus      HTN (hypertension)      BPH without urinary obstruction      High cholesterol      Stage 3 chronic kidney disease      Inferior MI      Diabetes Mellitus      CAD (Coronary Artery Disease)      S/P TURP (status post transurethral resection of prostate)      No significant past surgical history          Allergies:  No Known Allergies      Medications:  acetaminophen     Tablet .. 650 milliGRAM(s) Oral every 6 hours PRN  aluminum hydroxide/magnesium hydroxide/simethicone Suspension 30 milliLiter(s) Oral every 4 hours PRN  atorvastatin 80 milliGRAM(s) Oral at bedtime  cefTRIAXone   IVPB 1000 milliGRAM(s) IV Intermittent every 24 hours  cefTRIAXone   IVPB      cilostazol 100 milliGRAM(s) Oral two times a day  dextrose 5%. 1000 milliLiter(s) IV Continuous <Continuous>  dextrose 5%. 1000 milliLiter(s) IV Continuous <Continuous>  dextrose 50% Injectable 25 Gram(s) IV Push once  dextrose 50% Injectable 12.5 Gram(s) IV Push once  dextrose 50% Injectable 25 Gram(s) IV Push once  dextrose Oral Gel 15 Gram(s) Oral once PRN  epoetin shai-epbx (RETACRIT) Injectable 92115 Unit(s) SubCutaneous every 7 days  finasteride 5 milliGRAM(s) Oral daily  glucagon  Injectable 1 milliGRAM(s) IntraMuscular once  insulin lispro (ADMELOG) corrective regimen sliding scale   SubCutaneous three times a day before meals  insulin lispro (ADMELOG) corrective regimen sliding scale   SubCutaneous at bedtime  latanoprost 0.005% Ophthalmic Solution 1 Drop(s) Both EYES at bedtime  levothyroxine 75 MICROGram(s) Oral daily  melatonin 3 milliGRAM(s) Oral at bedtime PRN  metoprolol tartrate 25 milliGRAM(s) Oral two times a day  ondansetron Injectable 4 milliGRAM(s) IV Push every 8 hours PRN  pantoprazole    Tablet 40 milliGRAM(s) Oral before breakfast  sodium bicarbonate 650 milliGRAM(s) Oral three times a day  tamsulosin 0.4 milliGRAM(s) Oral at bedtime      REVIEW OF SYSTEMS:  All other review of systems is negative unless indicated above.    Relevant Family History:   FAMILY HISTORY:  No pertinent family history in first degree relatives    FH: HTN (hypertension)        Physical Exam:    Vital Signs:  Vital Signs Last 24 Hrs  T(C): 36.9 (2023 15:52), Max: 38.2 (2023 23:54)  T(F): 98.4 (2023 15:52), Max: 100.8 (2023 23:54)  HR: 81 (2023 15:52) (81 - 99)  BP: 139/68 (2023 15:52) (107/63 - 141/71)  RR: 18 (2023 15:52) (18 - 18)  SpO2: 96% (2023 15:52) (95% - 99%)    Parameters below as of 2023 15:52  Patient On (Oxygen Delivery Method): room air      Daily     Daily Weight in k.8 (2023 04:56)      PHYSICAL EXAM:  General: NAD  Neuro:  Alert & oriented x 1  HEENT: NCAT, EOMI, conjunctiva clear  CV: +S1+S2  Lung: Respirations nonlabored, good inspiratory effort  Abdomen: soft, NTND. Normoactive BS  Extremities: RLE heel wound, boggy, no active draining    Laboratory:                          9.5    8.61  )-----------( 143      ( 2023 08:00 )             28.4     06-20    142  |  121<H>  |  52<H>  ----------------------------<  153<H>  3.8   |  15<L>  |  3.84<H>    Ca    8.9      2023 08:00  Mg     1.6     06-19    TPro  5.6<L>  /  Alb  2.0<L>  /  TBili  0.9  /  DBili  x   /  AST  26  /  ALT  19  /  AlkPhos  74  06-20    LIVER FUNCTIONS - ( 2023 08:00 )  Alb: 2.0 g/dL / Pro: 5.6 gm/dL / ALK PHOS: 74 U/L / ALT: 19 U/L / AST: 26 U/L / GGT: x               
[facility], [facility]  NEPHROLOGY AND HYPERTENSION  [address]  [address]  [place] [address]  [phone]    MD SELENE BRUSH MD YELENA ROSENBERG, MD BINNY KOSHY, MD CHRISTOPHER CAPUTO, MD EDWARD BOVER, MD      Information from chart:  "Patient is a 89y old  Male who presents with a chief complaint of Hematemesis (2023 14:03)    HPI:  Chief Complaint: vomiting blood.    The patient is a 89y Male with significant PMH of HTN, CAD, s/p stent in , HPL, DM-2, CKD-stage III, CVA with muscle wasting, H/o prostate CA s/p TURP, Glaucoma, B/L muscle atrophy and others was BIBA in ED from home with coffee ground emesis x 2.  Pt. was found to be hypotensive at 83/49 and unresponsive per EMS.  Pt. woke up after fluids started (NS) by EMS and Zofran given for nausea.  Patient is a very poor historian. I was not able to get any information from the patient. Most of the information has been obtained from EMR documentation and in discussion with ED provider. Patient is on ASA. No melena or hemoptysis or hematuria reported. He is not in any acute distress. No family members at bedside.   Vitals stable.  Hb is 9.2 (with baseline Hb 10.0-11.3), BUN 42, Cr 2.50, , LA 3.0->2.3. Lipase normal.    CT abd/pelvis: No CT evidence of acute GI bleeding at the time scanning. Constipation. There is no associated obstruction. Thick-walled appearance of the urinary bladder which may be related to sequelae of chronic outlet obstruction from prostate enlargement or cystitis.  Diffuse esophageal wall thickening suggestive of esophagitis. There is also mild gastric wall thickening which may be related to under distention or gastritis.    CT Head: No acute intracranial hemorrhage, mass effect, or midline shift.    Protonix drip started in ED after bolus dose,    Patient appears comfortable no distress        (15 Cam 2023 05:08)   "    PAST MEDICAL & SURGICAL HISTORY:  DM Type 2 (Diabetes Mellitus,      Personal History of Hypertensi      Hypercholesteremia      PC (Prostate Cancer)  treated surgically      CAD (Coronary Artery Disease)      Old MI (Myocardial Infarction)        History of PTCA  , stents to Cx and RCA      Prostate CA      Type 2 diabetes mellitus      HTN (hypertension)      BPH without urinary obstruction      High cholesterol      Stage 3 chronic kidney disease      Inferior MI      Diabetes Mellitus      CAD (Coronary Artery Disease)      S/P TURP (status post transurethral resection of prostate)      No significant past surgical history        FAMILY HISTORY:  No pertinent family history in first degree relatives    FH: HTN (hypertension)      Allergies    No Known Allergies    Intolerances      Home Medications:  aspirin 81 mg oral delayed release tablet: 1 tab(s) orally once a day (15 Cam 2023 14:53)  atorvastatin 80 mg oral tablet: 1 tab(s) orally once a day (at bedtime) (15 Cam 2023 14:53)  cilostazol 100 mg oral tablet: 1 tab(s) orally 2 times a day (15 Cam 2023 14:53)  finasteride 5 mg oral tablet: 1 tab(s) orally once a day (15 Cam 2023 14:53)  insulin lispro 100 units/mL injectable solution: 1 dose(s) injectable 3 times a day before meals and at bedtime    151-200 give 2 units subcut  201-250 give 4 units subcut  251-300 give 6 units subcut  301-350 give 8 units subcut  351-400 give 10 units subcut  &gt; 400 give 12 units subcut.  (15 Cam 2023 14:53)  latanoprost 0.005% ophthalmic solution: 1 drop(s) in each eye once a day (in the evening) (15 Cam 2023 14:50)  levothyroxine 75 mcg (0.075 mg) oral tablet: 1 tab(s) orally once a day (15 Cam 2023 14:53)  losartan 50 mg oral tablet: 1 tab(s) orally once a day (15 Cam 2023 14:53)  metoprolol tartrate 25 mg oral tablet: 1 tab(s) orally 2 times a day (15 Cam 2023 14:53)  omeprazole 20 mg oral delayed release capsule: 1 cap(s) orally once a day (15 Cam 2023 14:53)  tamsulosin 0.4 mg oral capsule: 1 cap(s) orally once a day (at bedtime) (15 Cam 2023 14:53)    MEDICATIONS  (STANDING):  atorvastatin 80 milliGRAM(s) Oral at bedtime  cilostazol 100 milliGRAM(s) Oral two times a day  dextrose 5%. 1000 milliLiter(s) (50 mL/Hr) IV Continuous <Continuous>  dextrose 5%. 1000 milliLiter(s) (100 mL/Hr) IV Continuous <Continuous>  dextrose 50% Injectable 25 Gram(s) IV Push once  dextrose 50% Injectable 12.5 Gram(s) IV Push once  dextrose 50% Injectable 25 Gram(s) IV Push once  finasteride 5 milliGRAM(s) Oral daily  glucagon  Injectable 1 milliGRAM(s) IntraMuscular once  insulin lispro (ADMELOG) corrective regimen sliding scale   SubCutaneous three times a day before meals  insulin lispro (ADMELOG) corrective regimen sliding scale   SubCutaneous at bedtime  latanoprost 0.005% Ophthalmic Solution 1 Drop(s) Both EYES at bedtime  levothyroxine 75 MICROGram(s) Oral daily  losartan 50 milliGRAM(s) Oral daily  metoprolol tartrate 25 milliGRAM(s) Oral two times a day  pantoprazole Infusion 8 mG/Hr (10 mL/Hr) IV Continuous <Continuous>  sodium chloride 0.9%. 1000 milliLiter(s) (100 mL/Hr) IV Continuous <Continuous>  tamsulosin 0.4 milliGRAM(s) Oral at bedtime    MEDICATIONS  (PRN):  acetaminophen     Tablet .. 650 milliGRAM(s) Oral every 6 hours PRN Temp greater or equal to 38C (100.4F), Mild Pain (1 - 3)  aluminum hydroxide/magnesium hydroxide/simethicone Suspension 30 milliLiter(s) Oral every 4 hours PRN Dyspepsia  dextrose Oral Gel 15 Gram(s) Oral once PRN Blood Glucose LESS THAN 70 milliGRAM(s)/deciliter  melatonin 3 milliGRAM(s) Oral at bedtime PRN Insomnia  ondansetron Injectable 4 milliGRAM(s) IV Push every 8 hours PRN Nausea and/or Vomiting    Vital Signs Last 24 Hrs  T(C): 36.8 (2023 14:51), Max: 37.6 (15 Cam 2023 23:38)  T(F): 98.2 (2023 14:51), Max: 99.7 (15 Cam 2023 23:38)  HR: 101 (2023 14:51) (76 - 110)  BP: 101/54 (2023 14:51) (91/56 - 101/61)  BP(mean): --  RR: 17 (2023 14:51) (16 - 18)  SpO2: 97% (2023 14:51) (96% - 97%)    Parameters below as of 2023 14:51  Patient On (Oxygen Delivery Method): nasal cannula  O2 Flow (L/min): 2      Daily     Daily Weight in k (2023 04:39)    CAPILLARY BLOOD GLUCOSE      POCT Blood Glucose.: 258 mg/dL (2023 11:35)  POCT Blood Glucose.: 140 mg/dL (2023 07:47)  POCT Blood Glucose.: 329 mg/dL (15 Cam 2023 21:15)  POCT Blood Glucose.: 269 mg/dL (15 Cam 2023 17:02)    PHYSICAL EXAM:      T(C): 36.8 (23 @ 14:51), Max: 37.6 (06-15-23 @ 23:38)  HR: 101 (23 @ 14:51) (76 - 110)  BP: 101/54 (23 @ 14:51) (91/56 - 101/61)  RR: 17 (23 @ 14:51) (16 - 18)  SpO2: 97% (23 @ 14:51) (96% - 97%)  Wt(kg): --  Lungs clear  Heart S1S2  Abd soft NT ND  Extremities:   tr edema                  144  |  119<H>  |  37<H>  ----------------------------<  126<H>  3.8   |  18<L>  |  2.60<H>    Ca    7.8<L>      2023 05:58    TPro  5.8<L>  /  Alb  2.0<L>  /  TBili  0.7  /  DBili  x   /  AST  41<H>  /  ALT  18  /  AlkPhos  74                            7.6    7.71  )-----------( 142      ( 2023 05:58 )             23.8     Creatinine Trend: 2.60<--, 2.58<--          Assessment   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
Full consult dictated    Plan: 89yo M with multiple medical problems including HTN, CAD, s/p stent in 2009, HPL, DM-2, CKD-stage III, CVA with muscle wasting, H/o prostate CA s/p TURP, Glaucoma, B/L muscle atrophy. Pt admitted after coffee ground emesis x 2.  Pt. was hypotensive at 83/49 and unresponsive per EMS.  Pt. responded to IV fluids.  +ASA. No melena or hemoptysis or hematuria reported. Pt is presently comfortable.  Pt with possible gastritis vs ulcer disease.  Pt admitted - started on IV protonix and clear liquids. Repeat CBC in AM.  
    [facility] Geriatric and Palliative Consult Service:  Can contact any Palliative Team member via Microsoft Teams for consults and questions      HPI:  Chief Complaint: vomiting blood.    The patient is a 89y Male with significant PMH of HTN, CAD, s/p stent in 2009, HPL, DM-2, CKD-stage III, CVA with muscle wasting, H/o prostate CA s/p TURP, Glaucoma, B/L muscle atrophy and others was BIBA in ED from home with coffee ground emesis x 2.  Pt. was found to be hypotensive at 83/49 and unresponsive per EMS.  Pt. woke up after fluids started (NS) by EMS and Zofran given for nausea.  Patient is a very poor historian. I was not able to get any information from the patient. Most of the information has been obtained from EMR documentation and in discussion with ED provider. Patient is on ASA. No melena or hemoptysis or hematuria reported. He is not in any acute distress. No family members at bedside.   Vitals stable.  Hb is 9.2 (with baseline Hb 10.0-11.3), BUN 42, Cr 2.50, , LA 3.0->2.3. Lipase normal.    CT abd/pelvis: No CT evidence of acute GI bleeding at the time scanning. Constipation. There is no associated obstruction. Thick-walled appearance of the urinary bladder which may be related to sequelae of chronic outlet obstruction from prostate enlargement or cystitis.  Diffuse esophageal wall thickening suggestive of esophagitis. There is also mild gastric wall thickening which may be related to under distention or gastritis.    CT Head: No acute intracranial hemorrhage, mass effect, or midline shift.    Protonix drip started in ED after bolus dose,         Interval hx: Pt aox1, confused.  Poor po intake noted w pocketing food.        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      History of PTCA  7/09, stents to Cx and RCA      Prostate CA      Type 2 diabetes mellitus      HTN (hypertension)      BPH without urinary obstruction      High cholesterol      Stage 3 chronic kidney disease      Inferior MI      Diabetes Mellitus      CAD (Coronary Artery Disease)      S/P TURP (status post transurethral resection of prostate)      No significant past surgical history          SOCIAL HISTORY:    Admitted from:  home with his family, daughter is HHA via CDPAP 4 hours daily  Pt is , his wife and children makes medical decision as a family   [ none ] Substance abuse, [ none ] Tobacco hx, [ none ] Alcohol hx, [ none ] Home Opioid Hx    [employer]:  [employer]    Shavonnemainor Stratton  (dtr/surrogate)   Phone# [phone]  Deya Stratton      (dtr/surrogate)   Phone# [phone]     FAMILY HISTORY:  No pertinent family history in first degree relatives    FH: HTN (hypertension)     unable to obtain from patient due to poor mentation, family unable to give information, see H&P for history  Baseline ADLs (prior to admission): bedbound total care.     Allergies    No Known Allergies    Intolerances      Present Symptoms: Mild, Moderate, Severe   Unable to obtain due to poor mentation]    CPOT:    https://[email or web address]/Critical-Care-Pain-Observation-Tool-(CPOT)  PAIN AD Score:   [email or web address].pdf (press ctrl +  left click to view)      MEDICATIONS  (STANDING):  atorvastatin 80 milliGRAM(s) Oral at bedtime  cilostazol 100 milliGRAM(s) Oral two times a day  dextrose 5%. 1000 milliLiter(s) (50 mL/Hr) IV Continuous <Continuous>  dextrose 5%. 1000 milliLiter(s) (100 mL/Hr) IV Continuous <Continuous>  dextrose 50% Injectable 25 Gram(s) IV Push once  dextrose 50% Injectable 12.5 Gram(s) IV Push once  dextrose 50% Injectable 25 Gram(s) IV Push once  epoetin shai-epbx (RETACRIT) Injectable 52064 Unit(s) SubCutaneous every 7 days  finasteride 5 milliGRAM(s) Oral daily  glucagon  Injectable 1 milliGRAM(s) IntraMuscular once  insulin lispro (ADMELOG) corrective regimen sliding scale   SubCutaneous at bedtime  insulin lispro (ADMELOG) corrective regimen sliding scale   SubCutaneous three times a day before meals  latanoprost 0.005% Ophthalmic Solution 1 Drop(s) Both EYES at bedtime  levothyroxine 75 MICROGram(s) Oral daily  metoprolol tartrate 25 milliGRAM(s) Oral two times a day  pantoprazole    Tablet 40 milliGRAM(s) Oral before breakfast  piperacillin/tazobactam IVPB.. 3.375 Gram(s) IV Intermittent every 12 hours  sodium bicarbonate 650 milliGRAM(s) Oral three times a day  tamsulosin 0.4 milliGRAM(s) Oral at bedtime    MEDICATIONS  (PRN):  acetaminophen     Tablet .. 650 milliGRAM(s) Oral every 6 hours PRN Temp greater or equal to 38C (100.4F), Mild Pain (1 - 3)  aluminum hydroxide/magnesium hydroxide/simethicone Suspension 30 milliLiter(s) Oral every 4 hours PRN Dyspepsia  dextrose Oral Gel 15 Gram(s) Oral once PRN Blood Glucose LESS THAN 70 milliGRAM(s)/deciliter  melatonin 3 milliGRAM(s) Oral at bedtime PRN Insomnia  ondansetron Injectable 4 milliGRAM(s) IV Push every 8 hours PRN Nausea and/or Vomiting      PHYSICAL EXAM:  Vital Signs Last 24 Hrs  T(C): 36.4 (23 Jun 2023 11:01), Max: 37.1 (22 Jun 2023 23:37)  T(F): 97.6 (23 Jun 2023 11:01), Max: 98.8 (22 Jun 2023 23:37)  HR: 85 (23 Jun 2023 11:01) (83 - 90)  BP: 165/91 (23 Jun 2023 11:01) (151/84 - 181/79)  BP(mean): --  RR: 18 (23 Jun 2023 11:01) (18 - 18)  SpO2: 94% (23 Jun 2023 11:01) (94% - 97%)    Parameters below as of 23 Jun 2023 11:01  Patient On (Oxygen Delivery Method): room air        General: Chronically ill appearing man, AOX1-2.  NAD    Palliative Performance Scale/Karnofsky Score: 30%  http://npcrc.org/files/news/palliative_performance_scale_ppsv2.pdf    HEENT: temporal wasting, debris on tongue, neck supple  Lungs: unlabord on RA  CV: RRR, S1S2  GI: soft non distended non tender  incontinent  : incontinent   Musculoskeletal: weakness x4, bedbound, b/l knees contracted, no edema  Skin: unstageable to right heel dsg c/d/i,  healed scars on b/l LE  Neuro: able to follow simple commands  Oral intake ability: poor po intake    LABS:                        9.6    8.36  )-----------( 183      ( 23 Jun 2023 06:02 )             29.4     06-23    143  |  118<H>  |  30<H>  ----------------------------<  164<H>  3.6   |  21<L>  |  2.33<H>    Ca    8.0<L>      23 Jun 2023 06:02    TPro  6.0  /  Alb  1.8<L>  /  TBili  1.0  /  DBili  x   /  AST  28  /  ALT  21  /  AlkPhos  77  06-23    Urinalysis Basic - ( 23 Jun 2023 06:02 )    Color: x / Appearance: x / SG: x / pH: x  Gluc: 164 mg/dL / Ketone: x  / Bili: x / Urobili: x   Blood: x / Protein: x / Nitrite: x   Leuk Esterase: x / RBC: x / WBC x   Sq Epi: x / Non Sq Epi: x / Bacteria: x    < from: CT Angio Abdomen and Pelvis w/ IV Cont (06.15.23 @ 03:15) >  ACC: [id number] EXAM:  CT ANGIO ABD PELV (W)AW IC   ORDERED BY: CATREINA ARTIS     PROCEDURE DATE:  06/15/2023          INTERPRETATION:  CLINICAL INFORMATION: Abdominal pain. Coffee-ground   emesis. Hypotensive.    COMPARISON: 8/27/2015.    CONTRAST/COMPLICATIONS:  IV Contrast: Omnipaque 350  90 cc administered   10 cc discarded  Oral Contrast: NONE  Complications: None reported at time of study completion    PROCEDURE:  CT of the Abdomen and Pelvis was performed.  Precontrast, Arterial and Delayed phases were performed.  Sagittal and coronal reformats were performed.    FINDINGS:  LOWER CHEST: Bronchiectasis is noted in association with right posterior   medial osteophyte lung. Dependent changes are seen posteriorly. Heart is   enlarged. Coronary, valvular and aortic calcifications are noted..    LIVER: Calcified granuloma of the liver is present..  BILE DUCTS: Normal caliber.  GALLBLADDER: Question mild diffuse wall thickening..  SPLEEN: Within normal limits.  PANCREAS: Atrophic. Pancreatichead calcification is likely sequelae of   previous pancreatitis.  ADRENALS: Within normal limits.  KIDNEYS/URETERS: There are bilateral renal cysts. Subcentimeter   indeterminate density lesion of the left anterior mid to lower kidney   does not demonstrate enhancement on pre and postcontrast imaging likely   represents a complicated cyst. An additional subcentimeter posterior left   lower pole lesion remains indeterminate on the basis of this scan. There   is no urinary tract obstruction.    BLADDER: Diffusely thick-walled.  REPRODUCTIVE ORGANS: The prostate gland is enlarged. Central defect is   likely related to previous TURP.    BOWEL: Diffuse esophageal wall thickening. The stomach may also be   diffusely thick-walled. A large amount ofstool seen throughout the   entire colon, compatible constipation. The appendix cannot be visualized.   Crossing of mesenteric vasculature is seen within the right lower   abdomen, with cluster small bowel loops seen anterior and lateral to the   right colon. This is suggestive of an internal hernia. There is no   associated obstruction. There is no CT evidence of acute GI bleeding at   the time scanning.  PERITONEUM: No large volume ascites.  VESSELS: Atherosclerotic changes.  RETROPERITONEUM/LYMPH NODES: No lymphadenopathy.  ABDOMINAL WALL: Postsurgical changes and generalized soft tissue edema..  BONES: Degenerative changes.    IMPRESSION:  No CT evidence of acute GI bleeding at the time scanning.    Constipation.    Configuration of mesenteric vasculature and lateralization of right-sided   small bowel loops suggestive of an internal hernia. There is no   associated obstruction.    Thick-walled appearance of the urinary bladder which may be related to   sequelae of chronic outlet obstruction from prostate enlargement or   cystitis. Correlate with urinalysis.    Bilateral renal cysts and indeterminate lesion as above. MRI can be   obtained as deemed clinically indicated.     Diffuse esophageal wall thickening suggestive of esophagitis. There is   also mild gastric wall thickening which may be related to under   distention or gastritis.    Question mild diffuse wall thickening of the gallbladder.      < end of copied text >  < from: CT Head No Cont (06.15.23 @ 03:02) >    ACC: [id number] EXAM:  CT BRAIN   ORDERED BY: CATERINA ARTIS     PROCEDURE DATE:  06/15/2023          INTERPRETATION:  CLINICAL INDICATION: Unresponsive.    TECHNIQUE:  Noncontrast CT of the head was performed.  Sagittal and coronal reformats were created.    COMPARISON STUDY: 1/3/2023 MRI brain. Head CT 1/2/2023.    FINDINGS:    PARENCHYMA AND VENTRICLES: No acute intracranial hemorrhage, mass effect,   or midline shift. No hydrocephalus. Prominence of the sulci and   ventricles, consistent with age-related parenchymal volume loss. Chronic   right pontomedullary infarct. Chronic left thalamus infarct. Small vessel   white matter changes.  EXTRA-AXIAL: No abnormal extraaxial collection.  PARANASAL SINUSES: Mucosal thickening.  TYMPANOMASTOID CAVITIES: Within normal limits.  ORBITS: Bilateral cataract surgery.  CALVARIUM: Within normal limits.  MISCELLANEOUS: Intracranial atherosclerosis    IMPRESSION:  No acute intracranial hemorrhage, mass effect, or midline shift.    < end of copied text >      RADIOLOGY & ADDITIONAL STUDIES: reviewed  ADVANCED DIRECTIVS: MOLST: DNR/DNI/no feeding tube/trial of IVF/DNH/comfort measures only/no labs

## 2023-06-23 NOTE — PROGRESS NOTE ADULT - SUBJECTIVE AND OBJECTIVE BOX
Patient is a 89y old  Male who presents with a chief complaint of Hematemesis (23 Jun 2023 13:23)    INTERVAL HPI/OVERNIGHT EVENTS: No acute events overnight. HD stable.     MEDICATIONS  (STANDING):  atorvastatin 80 milliGRAM(s) Oral at bedtime  cilostazol 100 milliGRAM(s) Oral two times a day  dextrose 5%. 1000 milliLiter(s) (50 mL/Hr) IV Continuous <Continuous>  dextrose 5%. 1000 milliLiter(s) (100 mL/Hr) IV Continuous <Continuous>  dextrose 50% Injectable 25 Gram(s) IV Push once  dextrose 50% Injectable 25 Gram(s) IV Push once  dextrose 50% Injectable 12.5 Gram(s) IV Push once  epoetin shai-epbx (RETACRIT) Injectable 77732 Unit(s) SubCutaneous every 7 days  finasteride 5 milliGRAM(s) Oral daily  glucagon  Injectable 1 milliGRAM(s) IntraMuscular once  insulin lispro (ADMELOG) corrective regimen sliding scale   SubCutaneous three times a day before meals  insulin lispro (ADMELOG) corrective regimen sliding scale   SubCutaneous at bedtime  latanoprost 0.005% Ophthalmic Solution 1 Drop(s) Both EYES at bedtime  levothyroxine 75 MICROGram(s) Oral daily  metoprolol tartrate 25 milliGRAM(s) Oral two times a day  pantoprazole    Tablet 40 milliGRAM(s) Oral before breakfast  piperacillin/tazobactam IVPB.. 3.375 Gram(s) IV Intermittent every 12 hours  sodium bicarbonate 650 milliGRAM(s) Oral three times a day  tamsulosin 0.4 milliGRAM(s) Oral at bedtime    MEDICATIONS  (PRN):  acetaminophen     Tablet .. 650 milliGRAM(s) Oral every 6 hours PRN Temp greater or equal to 38C (100.4F), Mild Pain (1 - 3)  aluminum hydroxide/magnesium hydroxide/simethicone Suspension 30 milliLiter(s) Oral every 4 hours PRN Dyspepsia  dextrose Oral Gel 15 Gram(s) Oral once PRN Blood Glucose LESS THAN 70 milliGRAM(s)/deciliter  melatonin 3 milliGRAM(s) Oral at bedtime PRN Insomnia  ondansetron Injectable 4 milliGRAM(s) IV Push every 8 hours PRN Nausea and/or Vomiting      Allergies    No Known Allergies    Intolerances        REVIEW OF SYSTEMS: all negative with exception of above    Vital Signs Last 24 Hrs  T(C): 36.4 (23 Jun 2023 11:01), Max: 37.1 (22 Jun 2023 23:37)  T(F): 97.6 (23 Jun 2023 11:01), Max: 98.8 (22 Jun 2023 23:37)  HR: 90 (23 Jun 2023 14:00) (83 - 90)  BP: 180/90 (23 Jun 2023 14:00) (151/84 - 181/79)  BP(mean): --  RR: 18 (23 Jun 2023 14:00) (18 - 18)  SpO2: 98% (23 Jun 2023 14:00) (94% - 98%)    Parameters below as of 23 Jun 2023 14:00  Patient On (Oxygen Delivery Method): room air        PHYSICAL EXAM:  GENERAL: NAD, well-groomed  NERVOUS SYSTEM:  Alert & Oriented X3, Good concentration; Motor Strength 5/5 B/L upper and lower extremities; DTRs 2+ intact and symmetric  CHEST/LUNG: Clear to percussion bilaterally; No rales, rhonchi, wheezing, or rubs  HEART: Regular rate and rhythm; No murmurs, rubs, or gallops  ABDOMEN: Soft, Nontender, Nondistended; Bowel sounds present  EXTREMITIES:  2+ Peripheral Pulses, No clubbing, cyanosis, or edema    LABS:                        9.6    8.36  )-----------( 183      ( 23 Jun 2023 06:02 )             29.4     06-23    143  |  118<H>  |  30<H>  ----------------------------<  164<H>  3.6   |  21<L>  |  2.33<H>    Ca    8.0<L>      23 Jun 2023 06:02    TPro  6.0  /  Alb  1.8<L>  /  TBili  1.0  /  DBili  x   /  AST  28  /  ALT  21  /  AlkPhos  77  06-23      Urinalysis Basic - ( 23 Jun 2023 06:02 )    Color: x / Appearance: x / SG: x / pH: x  Gluc: 164 mg/dL / Ketone: x  / Bili: x / Urobili: x   Blood: x / Protein: x / Nitrite: x   Leuk Esterase: x / RBC: x / WBC x   Sq Epi: x / Non Sq Epi: x / Bacteria: x      CAPILLARY BLOOD GLUCOSE      POCT Blood Glucose.: 183 mg/dL (23 Jun 2023 11:13)  POCT Blood Glucose.: 175 mg/dL (23 Jun 2023 07:23)  POCT Blood Glucose.: 155 mg/dL (22 Jun 2023 21:00)  POCT Blood Glucose.: 208 mg/dL (22 Jun 2023 16:18)      RADIOLOGY & ADDITIONAL TESTS:    Imaging Personally Reviewed:  [ ] YES  [ ] NO    Consultant(s) Notes Reviewed:  [ ] YES  [ ] NO    Care Discussed with Consultants/Other Providers [ ] YES  [ ] NO

## 2023-06-23 NOTE — PROGRESS NOTE ADULT - SUBJECTIVE AND OBJECTIVE BOX
NEPHROLOGY PROGRESS NOTE    CHIEF COMPLAINT:  MARILYN/CKD    HPI:  Renal function improving daily.    EXAM:  T(F): 97.6 (06-23-23 @ 11:01)  HR: 85 (06-23-23 @ 11:01)  BP: 165/91 (06-23-23 @ 11:01)  RR: 18 (06-23-23 @ 11:01)  SpO2: 94% (06-23-23 @ 11:01)    Conversant, in no apparent distress  Normal respiratory effort, lungs clear bilaterally  Heart RRR with no murmur, no peripheral edema         LABS                             9.6    8.36  )-----------( 183      ( 23 Jun 2023 06:02 )             29.4          06-23    143  |  118<H>  |  30<H>  ----------------------------<  164<H>  3.6   |  21<L>  |  2.33<H>    Ca    8.0<L>      23 Jun 2023 06:02    TPro  6.0  /  Alb  1.8<L>  /  TBili  1.0  /  DBili  x   /  AST  28  /  ALT  21  /  AlkPhos  77  06-23    Urinalysis Basic - ( 23 Jun 2023 06:02 )  Color: x / Appearance: x / SG: x / pH: x  Gluc: 164 mg/dL / Ketone: x  / Bili: x / Urobili: x   Blood: x / Protein: x / Nitrite: x   Leuk Esterase: x / RBC: x / WBC x   Sq Epi: x / Non Sq Epi: x / Bacteria: x      Assessment   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

## 2023-06-23 NOTE — CONSULT NOTE ADULT - CONVERSATION DETAILS
Spoke with the pt's wife and  daughter's Frances via phone, discussed his current clinical condition and goals of care. They endorsed the pt has been bedbound, also noted with pocketing food prior to admission.    Explained that swallowing difficulties is common in advancing dementia. The person may have a weak swallow or lose the ability to swallow safely.  Pt with advanced dementia food and fluid intake tends to decrease slowly over time. Discussed the risk/ benefits of artificial nutrition.  PEG does not optimize pt's life but rather prolongs suffering.     Discussed risks/benefits of LST such as CPR/ intubation, artificial nutrition and PEG in the context of advanced dementia and debility. Family aware these invasive measures will not optimize the pt's life but may cause more stress and pain. They do not want CPR/intubation or feeding tube.  They want the primary focus to be on comfort measures only.    Explained given advanced dementia with severe PCM and debility he is appropriate for hospice.  Educated them about hospice philosophy, services provided and locations of care.  Family agreed for LTC with hospice, preferably at [facility].  SW referral made.   All questions answered.  Emotional support provided.  Goal is comfort.    d/w Primary team and ID

## 2023-06-23 NOTE — CONSULT NOTE ADULT - PROBLEM SELECTOR RECOMMENDATION 9
hx of CVA.  Pt is Aox1-2, confused.  Bedbound. Total care.  No behavior issues.  FAST 7c.  Hospice appropriate.  Discussed dementia trajectory and provided anticipatory guidance   Educated family about hospice philosophy, services and locations of care.    Pt is for LTC with hospice  CHINO drafted: DNR/DNI/comfort measures only

## 2023-06-23 NOTE — PROGRESS NOTE ADULT - ASSESSMENT
The patient is a 89y Male with significant PMH of HTN, CAD, s/p stent in 2009, HPL, DM-2, CKD-stage III, CVA with muscle wasting, H/o prostate CA s/p TURP, Glaucoma, B/L muscle atrophy and others was BIBA in ED from home with coffee ground emesis x 2.  Pt. was found to be hypotensive at 83/49 and unresponsive per EMS.  Pt. woke up after fluids started (NS) by EMS and Zofran given for nausea.  Patient is a very poor historian. I was not able to get any information from the patient. Most of the information has been obtained from EMR documentation and in discussion with ED provider. Patient is on ASA. No melena or hemoptysis or hematuria reported. He is not in any acute distress. No family members at bedside.     #RT heel gangrene  - Appreciate vascular and podiatry recs  - Will c/w Zosyn  - ID recs appreciated    #UTI  - U/A positive, s/p CTX    # Acute worsening of CKD stage III- likely pre-renal  Cr 2.54 ( was 1.44 in 01/2023).  Adequate hydration.  - nephro following  - s/p albumin x1    # Acute blood loss anemia with hematemesis with possible PUD, erossive gastritis/duodenitis/esophagitis or AVMs.  Hb is 9.2 (with baseline Hb 10.0-11.3).  Admit to telemetry.  Maintenance IV fluids.  Monitor H/H q6h, and transfuse PRBC if HB <8.0  Hold his ASA for now.  Appreciate GI recs- will c/w CLD  - Switched to IV protonix    # Uncontrolled DM-2 with hyperglycemia.  .  A1c level- 13.2  ISS with coverage.    # HTN, CAD, s/p stent and HPL.  Stable and no acute issue.  Continue his Metoprolol, Losartan and Atorvastatin.    # H/o Prostate cancer and TURP.  On Flomax and Finasteride.    # Hypothyroidism   On Levothyroxine.    # DVT prophylaxis:   SCDs for now.    CODE STATUS- DNR/DNI. Comfort measures.   Plan for discharge LTC w/ hospice    Palliative c/s placed for management of gangrene

## 2023-06-23 NOTE — PROGRESS NOTE ADULT - SUBJECTIVE AND OBJECTIVE BOX
Patient is a 89y old  Male who presents with a chief complaint of Hematemesis (23 Jun 2023 12:33)       INTERVAL HPI/OVERNIGHT EVENTS:  Patient seen and evaluated at bedside.  Pt is resting comfortable in NAD. Denies N/V/F/C.  Pain rated at X/10    Allergies    No Known Allergies    Intolerances        Vital Signs Last 24 Hrs  T(C): 36.4 (23 Jun 2023 11:01), Max: 37.1 (22 Jun 2023 23:37)  T(F): 97.6 (23 Jun 2023 11:01), Max: 98.8 (22 Jun 2023 23:37)  HR: 85 (23 Jun 2023 11:01) (83 - 90)  BP: 165/91 (23 Jun 2023 11:01) (151/84 - 181/79)  BP(mean): --  RR: 18 (23 Jun 2023 11:01) (18 - 18)  SpO2: 94% (23 Jun 2023 11:01) (94% - 97%)    Parameters below as of 23 Jun 2023 11:01  Patient On (Oxygen Delivery Method): room air        LABS:                        9.6    8.36  )-----------( 183      ( 23 Jun 2023 06:02 )             29.4     06-23    143  |  118<H>  |  30<H>  ----------------------------<  164<H>  3.6   |  21<L>  |  2.33<H>    Ca    8.0<L>      23 Jun 2023 06:02    TPro  6.0  /  Alb  1.8<L>  /  TBili  1.0  /  DBili  x   /  AST  28  /  ALT  21  /  AlkPhos  77  06-23      Urinalysis Basic - ( 23 Jun 2023 06:02 )    Color: x / Appearance: x / SG: x / pH: x  Gluc: 164 mg/dL / Ketone: x  / Bili: x / Urobili: x   Blood: x / Protein: x / Nitrite: x   Leuk Esterase: x / RBC: x / WBC x   Sq Epi: x / Non Sq Epi: x / Bacteria: x      CAPILLARY BLOOD GLUCOSE      POCT Blood Glucose.: 183 mg/dL (23 Jun 2023 11:13)  POCT Blood Glucose.: 175 mg/dL (23 Jun 2023 07:23)  POCT Blood Glucose.: 155 mg/dL (22 Jun 2023 21:00)  POCT Blood Glucose.: 208 mg/dL (22 Jun 2023 16:18)      Lower Extremity Physical Exam:  Vascular: DP/PT 0/4, B/L, CFT <3 seconds B/L, Temperature gradient warm to cool, B/L.   Neuro: Unable to determine BL epicritic sensation 2/2 non-verbal status during consult.  Musculoskeletal/Ortho: Unremarkable.  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 Left heel deep tissue injury with no acute signs of infection.    RADIOLOGY & ADDITIONAL TESTS:

## 2023-06-23 NOTE — CONSULT NOTE ADULT - CONSULT REASON
Discuss complex medical decision making related to goals of care Discuss complex medical decision making related to goals of care advanced dementia

## 2023-06-23 NOTE — CONSULT NOTE ADULT - PROBLEM SELECTOR RECOMMENDATION 2
PAD w/ history of leg stents.  Vascular and ID following.  C/w antibiotics.  Right heel with gangrene.  Nonpalpable pedal pulses.    Patient is not a candidate for revascularization      Pt is for LTC w hospice.  Continue w antibiotics until discharge.  DNR/DNI/comfort only

## 2023-06-23 NOTE — PROGRESS NOTE ADULT - SUBJECTIVE AND OBJECTIVE BOX
Pt on IV zosyn for bad foot infection  continues on protonix  No bleeding - H/H rising slowly      MEDICATIONS  (STANDING):  atorvastatin 80 milliGRAM(s) Oral at bedtime  cilostazol 100 milliGRAM(s) Oral two times a day  dextrose 5%. 1000 milliLiter(s) (50 mL/Hr) IV Continuous <Continuous>  dextrose 5%. 1000 milliLiter(s) (100 mL/Hr) IV Continuous <Continuous>  dextrose 50% Injectable 25 Gram(s) IV Push once  dextrose 50% Injectable 12.5 Gram(s) IV Push once  dextrose 50% Injectable 25 Gram(s) IV Push once  epoetin shai-epbx (RETACRIT) Injectable 52536 Unit(s) SubCutaneous every 7 days  finasteride 5 milliGRAM(s) Oral daily  glucagon  Injectable 1 milliGRAM(s) IntraMuscular once  insulin lispro (ADMELOG) corrective regimen sliding scale   SubCutaneous three times a day before meals  insulin lispro (ADMELOG) corrective regimen sliding scale   SubCutaneous at bedtime  latanoprost 0.005% Ophthalmic Solution 1 Drop(s) Both EYES at bedtime  levothyroxine 75 MICROGram(s) Oral daily  metoprolol tartrate 25 milliGRAM(s) Oral two times a day  pantoprazole    Tablet 40 milliGRAM(s) Oral before breakfast  piperacillin/tazobactam IVPB.. 3.375 Gram(s) IV Intermittent every 12 hours  sodium bicarbonate 650 milliGRAM(s) Oral three times a day  tamsulosin 0.4 milliGRAM(s) Oral at bedtime    MEDICATIONS  (PRN):  acetaminophen     Tablet .. 650 milliGRAM(s) Oral every 6 hours PRN Temp greater or equal to 38C (100.4F), Mild Pain (1 - 3)  aluminum hydroxide/magnesium hydroxide/simethicone Suspension 30 milliLiter(s) Oral every 4 hours PRN Dyspepsia  dextrose Oral Gel 15 Gram(s) Oral once PRN Blood Glucose LESS THAN 70 milliGRAM(s)/deciliter  melatonin 3 milliGRAM(s) Oral at bedtime PRN Insomnia  ondansetron Injectable 4 milliGRAM(s) IV Push every 8 hours PRN Nausea and/or Vomiting      Allergies    No Known Allergies    Intolerances        Vital Signs Last 24 Hrs  T(C): 36.8 (23 Jun 2023 04:26), Max: 37.1 (22 Jun 2023 23:37)  T(F): 98.2 (23 Jun 2023 04:26), Max: 98.8 (22 Jun 2023 23:37)  HR: 90 (23 Jun 2023 04:26) (83 - 90)  BP: 167/84 (23 Jun 2023 04:26) (151/84 - 181/79)  BP(mean): --  RR: 18 (23 Jun 2023 04:26) (18 - 18)  SpO2: 96% (23 Jun 2023 04:26) (95% - 97%)    Parameters below as of 23 Jun 2023 04:26  Patient On (Oxygen Delivery Method): room air        PHYSICAL EXAM:  General: NAD.  CVS: S1, S2  Chest: air entry bilaterally present  Abd: BS present, soft, non-tender      LABS:                        9.6    8.36  )-----------( 183      ( 23 Jun 2023 06:02 )             29.4     06-23    143  |  118<H>  |  30<H>  ----------------------------<  164<H>  3.6   |  21<L>  |  2.33<H>    Ca    8.0<L>      23 Jun 2023 06:02    TPro  6.0  /  Alb  1.8<L>  /  TBili  1.0  /  DBili  x   /  AST  28  /  ALT  21  /  AlkPhos  77  06-23        same meds   follow CBC  as per ID

## 2023-06-23 NOTE — PROGRESS NOTE ADULT - ASSESSMENT
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  - Pt seen and evaluated.  - Afebrile, WBC 8.61, ESR/CRP ordered.  - Right heel posterior and lateral full thickness dry gangrene with plantar medial ischemic changes, no areas of fluctuance, no drainage, no purulence, no erythema, no edema, no acute signs of infection. Left heel deep tissue injury with no acute signs of infection.  - Right Foot X-Rays: Pending.  - No right foot wound culture taken 2/2 no acute signs of infection and no drainage therefore culture would likely yield skin contaminant.   - Recommend right heel wound care with betadine and dry sterile dressing.  - Recommend BL z-flows to be worn at all times while in bed to offload BL heels and prevent further breakdown 2/2 pressure.  - Terell gonzalez, appreciated.  - Pod Plan: Local wound care.  - Wound care instructions and followup information listed in discharge provider note.  - Will follow

## 2023-06-23 NOTE — CONSULT NOTE ADULT - PROBLEM SELECTOR RECOMMENDATION 4
Clinical evidence indicates that the patient has Severe protein calorie malnutrition/ 3rd degree.  Albumin 1.8.  Poor po intake prior to admission, noter w pocketing     In context of  Chronic Illness (>1 month)    Energy/Food intake <50% of estimated energy requirement >5 days  Weight loss: Moderate - severe (lbs lost recently)  Body Fat loss: Severe   (Cachexia, temporal wasting, contracted, muscle atrophy)  Muscle mass loss: Severe  (Skin failure/pressure ulcers)  Fluid Accumulation: Severe (Fluid overload, ascites, pleural effusions)   Strength: weakened severe (bedbound)    Recommend:   pleasure feeds as tolerated - aspiration precautions, careful hand-feeding, teaching to caregivers  nutritional supplements as tolerated, nutrition consult    No feeding tube

## 2023-06-24 LAB
ALBUMIN SERPL ELPH-MCNC: 1.6 G/DL — LOW (ref 3.3–5)
ALP SERPL-CCNC: 71 U/L — SIGNIFICANT CHANGE UP (ref 40–120)
ALT FLD-CCNC: 16 U/L — SIGNIFICANT CHANGE UP (ref 12–78)
ANION GAP SERPL CALC-SCNC: 5 MMOL/L — SIGNIFICANT CHANGE UP (ref 5–17)
AST SERPL-CCNC: 27 U/L — SIGNIFICANT CHANGE UP (ref 15–37)
BILIRUB SERPL-MCNC: 1 MG/DL — SIGNIFICANT CHANGE UP (ref 0.2–1.2)
BUN SERPL-MCNC: 29 MG/DL — HIGH (ref 7–23)
CALCIUM SERPL-MCNC: 7.9 MG/DL — LOW (ref 8.5–10.1)
CHLORIDE SERPL-SCNC: 118 MMOL/L — HIGH (ref 96–108)
CO2 SERPL-SCNC: 21 MMOL/L — LOW (ref 22–31)
CREAT SERPL-MCNC: 2.16 MG/DL — HIGH (ref 0.5–1.3)
EGFR: 28 ML/MIN/1.73M2 — LOW
GLUCOSE BLDC GLUCOMTR-MCNC: 180 MG/DL — HIGH (ref 70–99)
GLUCOSE BLDC GLUCOMTR-MCNC: 185 MG/DL — HIGH (ref 70–99)
GLUCOSE BLDC GLUCOMTR-MCNC: 188 MG/DL — HIGH (ref 70–99)
GLUCOSE BLDC GLUCOMTR-MCNC: 217 MG/DL — HIGH (ref 70–99)
GLUCOSE SERPL-MCNC: 175 MG/DL — HIGH (ref 70–99)
HCT VFR BLD CALC: 29.5 % — LOW (ref 39–50)
HGB BLD-MCNC: 9.7 G/DL — LOW (ref 13–17)
MCHC RBC-ENTMCNC: 29.1 PG — SIGNIFICANT CHANGE UP (ref 27–34)
MCHC RBC-ENTMCNC: 32.9 G/DL — SIGNIFICANT CHANGE UP (ref 32–36)
MCV RBC AUTO: 88.6 FL — SIGNIFICANT CHANGE UP (ref 80–100)
NRBC # BLD: 0 /100 WBCS — SIGNIFICANT CHANGE UP (ref 0–0)
PLATELET # BLD AUTO: 190 K/UL — SIGNIFICANT CHANGE UP (ref 150–400)
POTASSIUM SERPL-MCNC: 3.6 MMOL/L — SIGNIFICANT CHANGE UP (ref 3.5–5.3)
POTASSIUM SERPL-SCNC: 3.6 MMOL/L — SIGNIFICANT CHANGE UP (ref 3.5–5.3)
PROT SERPL-MCNC: 5.9 GM/DL — LOW (ref 6–8.3)
RBC # BLD: 3.33 M/UL — LOW (ref 4.2–5.8)
RBC # FLD: 17.4 % — HIGH (ref 10.3–14.5)
SODIUM SERPL-SCNC: 144 MMOL/L — SIGNIFICANT CHANGE UP (ref 135–145)
WBC # BLD: 8.65 K/UL — SIGNIFICANT CHANGE UP (ref 3.8–10.5)
WBC # FLD AUTO: 8.65 K/UL — SIGNIFICANT CHANGE UP (ref 3.8–10.5)

## 2023-06-24 PROCEDURE — 99232 SBSQ HOSP IP/OBS MODERATE 35: CPT

## 2023-06-24 RX ADMIN — Medication 1: at 07:51

## 2023-06-24 RX ADMIN — Medication 650 MILLIGRAM(S): at 22:17

## 2023-06-24 RX ADMIN — Medication 75 MICROGRAM(S): at 05:46

## 2023-06-24 RX ADMIN — Medication 25 MILLIGRAM(S): at 05:45

## 2023-06-24 RX ADMIN — TAMSULOSIN HYDROCHLORIDE 0.4 MILLIGRAM(S): 0.4 CAPSULE ORAL at 22:17

## 2023-06-24 RX ADMIN — ATORVASTATIN CALCIUM 80 MILLIGRAM(S): 80 TABLET, FILM COATED ORAL at 22:17

## 2023-06-24 RX ADMIN — PIPERACILLIN AND TAZOBACTAM 25 GRAM(S): 4; .5 INJECTION, POWDER, LYOPHILIZED, FOR SOLUTION INTRAVENOUS at 01:20

## 2023-06-24 RX ADMIN — Medication 650 MILLIGRAM(S): at 05:45

## 2023-06-24 RX ADMIN — Medication 25 MILLIGRAM(S): at 17:12

## 2023-06-24 RX ADMIN — Medication 650 MILLIGRAM(S): at 13:19

## 2023-06-24 RX ADMIN — LATANOPROST 1 DROP(S): 0.05 SOLUTION/ DROPS OPHTHALMIC; TOPICAL at 22:18

## 2023-06-24 RX ADMIN — CILOSTAZOL 100 MILLIGRAM(S): 100 TABLET ORAL at 05:45

## 2023-06-24 RX ADMIN — PIPERACILLIN AND TAZOBACTAM 25 GRAM(S): 4; .5 INJECTION, POWDER, LYOPHILIZED, FOR SOLUTION INTRAVENOUS at 13:19

## 2023-06-24 RX ADMIN — Medication 1: at 11:13

## 2023-06-24 RX ADMIN — PANTOPRAZOLE SODIUM 40 MILLIGRAM(S): 20 TABLET, DELAYED RELEASE ORAL at 05:50

## 2023-06-24 RX ADMIN — Medication 1: at 17:11

## 2023-06-24 RX ADMIN — CILOSTAZOL 100 MILLIGRAM(S): 100 TABLET ORAL at 17:12

## 2023-06-24 RX ADMIN — FINASTERIDE 5 MILLIGRAM(S): 5 TABLET, FILM COATED ORAL at 13:20

## 2023-06-24 NOTE — CHART NOTE - NSCHARTNOTEFT_GEN_A_CORE
Per chart pt with PMH: HTN, CAD, HLD, T2DM, CKD3, CVA, prostate CA s/p TURP, presents with coffee ground emesis x 2, found with acute blood loss anemia likely secondary to GI bleed. Course complicated by right heel gangrene. s/p swallow evaluation with recommendations for minced and moist with thin liquids (06/19). Palliative following for GOC; DNR/DNI/comfort measures only as per MOLST. Pt for possible inpatient hospice. Palliative recommends pleasure feeds as tolerated.     Factors impacting intake: [ ] none [ ] nausea  [ ] vomiting [ ] diarrhea [ ] constipation  [ ]chewing problems [ ] swallowing issues  [x] other: acute illness    Diet Prescription: Diet, Consistent Carbohydrate w/Evening Snack:   Minced and Moist (MINCEDMOIST) (06-19-23 @ 19:10)    Intake: 0-50% as per flow sheets    Current Weight: (06/22) 61.3kg (06/16) 57kg indicating weight gain of 4.3kg  % Weight Change: 7.5% weight gain x 6 days    Edema: nonpitting edema of left arm as per flow sheets    Physical Appearance: Unable to conduct nutrition focused physical exam as pt being bathed at time of visit    Pertinent Medications: MEDICATIONS  (STANDING):  atorvastatin 80 milliGRAM(s) Oral at bedtime  cilostazol 100 milliGRAM(s) Oral two times a day  dextrose 5%. 1000 milliLiter(s) (50 mL/Hr) IV Continuous <Continuous>  dextrose 5%. 1000 milliLiter(s) (100 mL/Hr) IV Continuous <Continuous>  dextrose 50% Injectable 25 Gram(s) IV Push once  dextrose 50% Injectable 12.5 Gram(s) IV Push once  dextrose 50% Injectable 25 Gram(s) IV Push once  epoetin shai-epbx (RETACRIT) Injectable 28111 Unit(s) SubCutaneous every 7 days  finasteride 5 milliGRAM(s) Oral daily  glucagon  Injectable 1 milliGRAM(s) IntraMuscular once  insulin lispro (ADMELOG) corrective regimen sliding scale   SubCutaneous at bedtime  insulin lispro (ADMELOG) corrective regimen sliding scale   SubCutaneous three times a day before meals  latanoprost 0.005% Ophthalmic Solution 1 Drop(s) Both EYES at bedtime  levothyroxine 75 MICROGram(s) Oral daily  metoprolol tartrate 25 milliGRAM(s) Oral two times a day  pantoprazole    Tablet 40 milliGRAM(s) Oral before breakfast  piperacillin/tazobactam IVPB.. 3.375 Gram(s) IV Intermittent every 12 hours  sodium bicarbonate 650 milliGRAM(s) Oral three times a day  tamsulosin 0.4 milliGRAM(s) Oral at bedtime    MEDICATIONS  (PRN):  acetaminophen     Tablet .. 650 milliGRAM(s) Oral every 6 hours PRN Temp greater or equal to 38C (100.4F), Mild Pain (1 - 3)  aluminum hydroxide/magnesium hydroxide/simethicone Suspension 30 milliLiter(s) Oral every 4 hours PRN Dyspepsia  dextrose Oral Gel 15 Gram(s) Oral once PRN Blood Glucose LESS THAN 70 milliGRAM(s)/deciliter  melatonin 3 milliGRAM(s) Oral at bedtime PRN Insomnia  ondansetron Injectable 4 milliGRAM(s) IV Push every 8 hours PRN Nausea and/or Vomiting    Pertinent Labs: 06-24 Na144 mmol/L Glu 175 mg/dL<H> K+ 3.6 mmol/L Cr  2.16 mg/dL<H> BUN 29 mg/dL<H> 06-24 Alb 1.6 g/dL<L>    06-16-23 @ 05:58  A1C 13.2     CAPILLARY BLOOD GLUCOSE  POCT Blood Glucose.: 188 mg/dL (24 Jun 2023 10:47)  POCT Blood Glucose.: 180 mg/dL (24 Jun 2023 07:30)  POCT Blood Glucose.: 171 mg/dL (23 Jun 2023 20:44)  POCT Blood Glucose.: 151 mg/dL (23 Jun 2023 16:34)      Skin: Pressure injury of right heel- unstageable as per flow sheets    Estimated Needs:   [x] no change since previous assessment: 06/17  [ ] recalculated:     Previous Nutrition Diagnosis:   [x] Malnutrition	Moderate Malnutrition in the context of Chronic Illness  Etiology (addendum 06/24/23)	Inadequate energy intake and Increased protein needs related to Cardiac hx, CKD stage 3, Uncontrolled DM ( hgbA1c = 13.2 %)  Signs/Symptoms	Physical findings: Mild muscle loss ; Mild and moderate fat loss as noted 06/17  Goal/Expected Outcome	Pt will Meet calorie and protein needs > 50 % via diet advancement during Length Of Stay (not met)      Nutrition Diagnosis is [x] ongoing  [ ] resolved [ ] not applicable     New Nutrition Diagnosis: [x] not applicable      Interventions:   Recommend  [x] Continue current diet as ordered  [ ] Change Diet To:  [x] Nutrition Supplement: Add Glucerna x 2/day (provides 440 kcal, 20 g protein) + NSA Magic Cup x 2/day (provides 520 kcal, 18 g protein)   [ ] Nutrition Support  [x] Other: Continue to provide assistance and encouragement with PO intake     Monitoring and Evaluation:   [x] PO intake [ x ] Tolerance to diet prescription [ x ] weights [ x ] labs[ x ] follow up per protocol  [ ] other: Per chart pt with PMH: HTN, CAD, HLD, T2DM, CKD3, CVA, prostate CA s/p TURP, presents with coffee ground emesis x 2, found with acute blood loss anemia likely secondary to GI bleed. Course complicated by right heel gangrene. s/p swallow evaluation with recommendations for minced and moist with thin liquids (06/19). Palliative following for GOC; DNR/DNI/comfort measures only as per MOLST. Pt for possible inpatient hospice. Palliative recommends pleasure feeds as tolerated.     Factors impacting intake: [ ] none [ ] nausea  [ ] vomiting [ ] diarrhea [ ] constipation  [ ]chewing problems [ ] swallowing issues  [x] other: acute illness    Diet Prescription: Diet, Consistent Carbohydrate w/Evening Snack:   Minced and Moist (MINCEDMOIST) (06-19-23 @ 19:10)    Intake: 0-50% as per flow sheets    Current Weight: (06/22) 61.3kg (06/16) 57kg indicating weight gain of 4.3kg  % Weight Change: 7.5% weight gain x 6 days    Edema: nonpitting edema of left arm as per flow sheets    Physical Appearance: Unable to conduct nutrition focused physical exam as pt being bathed at time of visit    Pertinent Medications: MEDICATIONS  (STANDING):  atorvastatin 80 milliGRAM(s) Oral at bedtime  cilostazol 100 milliGRAM(s) Oral two times a day  dextrose 5%. 1000 milliLiter(s) (50 mL/Hr) IV Continuous <Continuous>  dextrose 5%. 1000 milliLiter(s) (100 mL/Hr) IV Continuous <Continuous>  dextrose 50% Injectable 25 Gram(s) IV Push once  dextrose 50% Injectable 12.5 Gram(s) IV Push once  dextrose 50% Injectable 25 Gram(s) IV Push once  epoetin shai-epbx (RETACRIT) Injectable 81356 Unit(s) SubCutaneous every 7 days  finasteride 5 milliGRAM(s) Oral daily  glucagon  Injectable 1 milliGRAM(s) IntraMuscular once  insulin lispro (ADMELOG) corrective regimen sliding scale   SubCutaneous at bedtime  insulin lispro (ADMELOG) corrective regimen sliding scale   SubCutaneous three times a day before meals  latanoprost 0.005% Ophthalmic Solution 1 Drop(s) Both EYES at bedtime  levothyroxine 75 MICROGram(s) Oral daily  metoprolol tartrate 25 milliGRAM(s) Oral two times a day  pantoprazole    Tablet 40 milliGRAM(s) Oral before breakfast  piperacillin/tazobactam IVPB.. 3.375 Gram(s) IV Intermittent every 12 hours  sodium bicarbonate 650 milliGRAM(s) Oral three times a day  tamsulosin 0.4 milliGRAM(s) Oral at bedtime    MEDICATIONS  (PRN):  acetaminophen     Tablet .. 650 milliGRAM(s) Oral every 6 hours PRN Temp greater or equal to 38C (100.4F), Mild Pain (1 - 3)  aluminum hydroxide/magnesium hydroxide/simethicone Suspension 30 milliLiter(s) Oral every 4 hours PRN Dyspepsia  dextrose Oral Gel 15 Gram(s) Oral once PRN Blood Glucose LESS THAN 70 milliGRAM(s)/deciliter  melatonin 3 milliGRAM(s) Oral at bedtime PRN Insomnia  ondansetron Injectable 4 milliGRAM(s) IV Push every 8 hours PRN Nausea and/or Vomiting    Pertinent Labs: 06-24 Na144 mmol/L Glu 175 mg/dL<H> K+ 3.6 mmol/L Cr  2.16 mg/dL<H> BUN 29 mg/dL<H> 06-24 Alb 1.6 g/dL<L>    06-16-23 @ 05:58  A1C 13.2     CAPILLARY BLOOD GLUCOSE  POCT Blood Glucose.: 188 mg/dL (24 Jun 2023 10:47)  POCT Blood Glucose.: 180 mg/dL (24 Jun 2023 07:30)  POCT Blood Glucose.: 171 mg/dL (23 Jun 2023 20:44)  POCT Blood Glucose.: 151 mg/dL (23 Jun 2023 16:34)      Skin: Pressure injury of right heel- unstageable as per flow sheets    Estimated Needs:   [x] no change since previous assessment: 06/17  [ ] recalculated:     Previous Nutrition Diagnosis:   [x] Malnutrition	Moderate Malnutrition in the context of Chronic Illness  Etiology (addendum 06/24/23)	Inadequate energy intake and Increased protein needs related to Cardiac hx, CKD stage 3, Uncontrolled DM ( hgbA1c = 13.2 %)  Signs/Symptoms	Physical findings: Mild muscle loss ; Mild and moderate fat loss as noted 06/17  Goal/Expected Outcome	Pt will Meet calorie and protein needs > 50 % via diet advancement during Length Of Stay (not met)  NEW goal (06/24/23): nutrition/hydration as per family's/pt's wishes as medically feasible       Nutrition Diagnosis is [x] ongoing  [ ] resolved [ ] not applicable     New Nutrition Diagnosis: [x] not applicable      Interventions:   Recommend  [x] Continue current diet as ordered  [ ] Change Diet To:  [x] Nutrition Supplement: Add Glucerna x 2/day (provides 440 kcal, 20 g protein) + NSA Magic Cup x 2/day (provides 520 kcal, 18 g protein)   [ ] Nutrition Support  [x] Other: nutrition/hydration as per family's/pt's wishes as medically feasible     Monitoring and Evaluation:   [x] PO intake [ x ] Tolerance to diet prescription [ x ] weights [ x ] labs[ x ] follow up per protocol  [ ] other:

## 2023-06-24 NOTE — PROGRESS NOTE ADULT - ASSESSMENT
89y Male with significant PMH of HTN, CAD, s/p stent in 2009, HPL, DM-2, CKD-stage III, CVA with muscle wasting, H/o prostate CA s/p TURP, Glaucoma, B/L muscle atrophy and others was BIBA in ED from home with coffee ground emesis x 2.  Pt. was found to be hypotensive at 83/49 and unresponsive per EMS.  Pt. woke up after fluids started (NS) by EMS and Zofran given for nausea.  Patient is a very poor historian. I was not able to get any information from the patient. Most of the information has been obtained from EMR documentation and in discussion with ED provider. Patient is on ASA. No melena or hemoptysis or hematuria reported. He is not in any acute distress. No family members at bedside.     #RT heel gangrene  - Appreciate vascular and podiatry recs  - Will c/w Zosyn  - ID recs appreciated    #UTI  - U/A positive, s/p CTX    # Acute worsening of CKD stage III- likely pre-renal  Cr 2.54 ( was 1.44 in 01/2023).  Cr appears to be improving.   Adequate hydration.  - nephro following  - s/p albumin x1    # Acute blood loss anemia with hematemesis with possible PUD, erossive gastritis/duodenitis/esophagitis or AVMs.  Hb is 9.2 (with baseline Hb 10.0-11.3), stable.  Diet advanced.  Seen by GI.  IV protonix.    # Uncontrolled DM-2 with hyperglycemia.  .  A1c level- 13.2  ISS with coverage.    # HTN, CAD, s/p stent and HPL.  Stable and no acute issue.  Continue his Metoprolol, Losartan and Atorvastatin.    # H/o Prostate cancer and TURP.  On Flomax and Finasteride.    # Hypothyroidism   On Levothyroxine.    # DVT prophylaxis:   SCDs for now.    CODE STATUS- DNR/DNI. Comfort measures.   Plan for discharge LTC w/ hospice    Palliative c/s placed for management of gangrene

## 2023-06-24 NOTE — PROGRESS NOTE ADULT - SUBJECTIVE AND OBJECTIVE BOX
Patient: MARIANA WRIGHT [id number] 89y Male                            Hospitalist Attending Note    Unable to offer complaints.  Pt seen and examined earlier today at approximately: 4pm    ____________________PHYSICAL EXAM:  GENERAL:  NAD, arousable, confused  HEENT: NCAT  CARDIOVASCULAR:  S1, S2  LUNGS: CTAB  ABDOMEN:  soft, (-) tenderness, (-) distension, (+) bowel sounds, (-) guarding, (-) rebound (-) rigidity  EXTREMITIES:  no cyanosis / clubbing / edema.  R foot dressing inplace.   ____________________     VITALS:  Vital Signs Last 24 Hrs  T(C): 36.8 (24 Jun 2023 16:35), Max: 36.8 (23 Jun 2023 23:30)  T(F): 98.2 (24 Jun 2023 16:35), Max: 98.3 (23 Jun 2023 23:30)  HR: 85 (24 Jun 2023 18:37) (75 - 91)  BP: 149/87 (24 Jun 2023 18:37) (127/75 - 155/93)  BP(mean): 108 (24 Jun 2023 18:37) (108 - 114)  RR: 16 (24 Jun 2023 18:37) (16 - 19)  SpO2: 99% (24 Jun 2023 18:37) (96% - 99%)    Parameters below as of 24 Jun 2023 18:37  Patient On (Oxygen Delivery Method): room air     Daily     Daily   CAPILLARY BLOOD GLUCOSE      POCT Blood Glucose.: 217 mg/dL (24 Jun 2023 21:40)  POCT Blood Glucose.: 185 mg/dL (24 Jun 2023 16:42)  POCT Blood Glucose.: 188 mg/dL (24 Jun 2023 10:47)  POCT Blood Glucose.: 180 mg/dL (24 Jun 2023 07:30)    I&O's Summary    23 Jun 2023 07:01  -  24 Jun 2023 07:00  --------------------------------------------------------  IN: 560 mL / OUT: 300 mL / NET: 260 mL    24 Jun 2023 07:01  -  24 Jun 2023 23:01  --------------------------------------------------------  IN: 170 mL / OUT: 0 mL / NET: 170 mL        HISTORY:  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      History of PTCA  7/09, stents to Cx and RCA      Prostate CA      Type 2 diabetes mellitus      HTN (hypertension)      BPH without urinary obstruction      High cholesterol      Stage 3 chronic kidney disease      Inferior MI      Diabetes Mellitus      CAD (Coronary Artery Disease)      S/P TURP (status post transurethral resection of prostate)      No significant past surgical history      Allergies    No Known Allergies    Intolerances       LABS:                        9.7    8.65  )-----------( 190      ( 24 Jun 2023 08:17 )             29.5     06-24    144  |  118<H>  |  29<H>  ----------------------------<  175<H>  3.6   |  21<L>  |  2.16<H>    Ca    7.9<L>      24 Jun 2023 08:17    TPro  5.9<L>  /  Alb  1.6<L>  /  TBili  1.0  /  DBili  x   /  AST  27  /  ALT  16  /  AlkPhos  71  06-24      LIVER FUNCTIONS - ( 24 Jun 2023 08:17 )  Alb: 1.6 g/dL / Pro: 5.9 gm/dL / ALK PHOS: 71 U/L / ALT: 16 U/L / AST: 27 U/L / GGT: x           Urinalysis Basic - ( 24 Jun 2023 08:17 )    Color: x / Appearance: x / SG: x / pH: x  Gluc: 175 mg/dL / Ketone: x  / Bili: x / Urobili: x   Blood: x / Protein: x / Nitrite: x   Leuk Esterase: x / RBC: x / WBC x   Sq Epi: x / Non Sq Epi: x / Bacteria: x              MEDICATIONS:  MEDICATIONS  (STANDING):  atorvastatin 80 milliGRAM(s) Oral at bedtime  cilostazol 100 milliGRAM(s) Oral two times a day  dextrose 5%. 1000 milliLiter(s) (50 mL/Hr) IV Continuous <Continuous>  dextrose 5%. 1000 milliLiter(s) (100 mL/Hr) IV Continuous <Continuous>  dextrose 50% Injectable 25 Gram(s) IV Push once  dextrose 50% Injectable 12.5 Gram(s) IV Push once  dextrose 50% Injectable 25 Gram(s) IV Push once  epoetin shai-epbx (RETACRIT) Injectable 44613 Unit(s) SubCutaneous every 7 days  finasteride 5 milliGRAM(s) Oral daily  glucagon  Injectable 1 milliGRAM(s) IntraMuscular once  insulin lispro (ADMELOG) corrective regimen sliding scale   SubCutaneous at bedtime  insulin lispro (ADMELOG) corrective regimen sliding scale   SubCutaneous three times a day before meals  latanoprost 0.005% Ophthalmic Solution 1 Drop(s) Both EYES at bedtime  levothyroxine 75 MICROGram(s) Oral daily  metoprolol tartrate 25 milliGRAM(s) Oral two times a day  pantoprazole    Tablet 40 milliGRAM(s) Oral before breakfast  piperacillin/tazobactam IVPB.. 3.375 Gram(s) IV Intermittent every 12 hours  sodium bicarbonate 650 milliGRAM(s) Oral three times a day  tamsulosin 0.4 milliGRAM(s) Oral at bedtime    MEDICATIONS  (PRN):  acetaminophen     Tablet .. 650 milliGRAM(s) Oral every 6 hours PRN Temp greater or equal to 38C (100.4F), Mild Pain (1 - 3)  aluminum hydroxide/magnesium hydroxide/simethicone Suspension 30 milliLiter(s) Oral every 4 hours PRN Dyspepsia  dextrose Oral Gel 15 Gram(s) Oral once PRN Blood Glucose LESS THAN 70 milliGRAM(s)/deciliter  melatonin 3 milliGRAM(s) Oral at bedtime PRN Insomnia  ondansetron Injectable 4 milliGRAM(s) IV Push every 8 hours PRN Nausea and/or Vomiting

## 2023-06-25 LAB
ANION GAP SERPL CALC-SCNC: 8 MMOL/L — SIGNIFICANT CHANGE UP (ref 5–17)
BUN SERPL-MCNC: 26 MG/DL — HIGH (ref 7–23)
CALCIUM SERPL-MCNC: 7.9 MG/DL — LOW (ref 8.5–10.1)
CHLORIDE SERPL-SCNC: 116 MMOL/L — HIGH (ref 96–108)
CO2 SERPL-SCNC: 21 MMOL/L — LOW (ref 22–31)
CREAT SERPL-MCNC: 2.04 MG/DL — HIGH (ref 0.5–1.3)
EGFR: 30 ML/MIN/1.73M2 — LOW
GLUCOSE BLDC GLUCOMTR-MCNC: 168 MG/DL — HIGH (ref 70–99)
GLUCOSE BLDC GLUCOMTR-MCNC: 169 MG/DL — HIGH (ref 70–99)
GLUCOSE BLDC GLUCOMTR-MCNC: 207 MG/DL — HIGH (ref 70–99)
GLUCOSE BLDC GLUCOMTR-MCNC: 214 MG/DL — HIGH (ref 70–99)
GLUCOSE SERPL-MCNC: 197 MG/DL — HIGH (ref 70–99)
HCT VFR BLD CALC: 32.3 % — LOW (ref 39–50)
HGB BLD-MCNC: 10.3 G/DL — LOW (ref 13–17)
MCHC RBC-ENTMCNC: 28.5 PG — SIGNIFICANT CHANGE UP (ref 27–34)
MCHC RBC-ENTMCNC: 31.9 G/DL — LOW (ref 32–36)
MCV RBC AUTO: 89.2 FL — SIGNIFICANT CHANGE UP (ref 80–100)
NRBC # BLD: 0 /100 WBCS — SIGNIFICANT CHANGE UP (ref 0–0)
PLATELET # BLD AUTO: 197 K/UL — SIGNIFICANT CHANGE UP (ref 150–400)
POTASSIUM SERPL-MCNC: 3.5 MMOL/L — SIGNIFICANT CHANGE UP (ref 3.5–5.3)
POTASSIUM SERPL-SCNC: 3.5 MMOL/L — SIGNIFICANT CHANGE UP (ref 3.5–5.3)
RBC # BLD: 3.62 M/UL — LOW (ref 4.2–5.8)
RBC # FLD: 17.7 % — HIGH (ref 10.3–14.5)
SODIUM SERPL-SCNC: 145 MMOL/L — SIGNIFICANT CHANGE UP (ref 135–145)
WBC # BLD: 8.3 K/UL — SIGNIFICANT CHANGE UP (ref 3.8–10.5)
WBC # FLD AUTO: 8.3 K/UL — SIGNIFICANT CHANGE UP (ref 3.8–10.5)

## 2023-06-25 PROCEDURE — 99232 SBSQ HOSP IP/OBS MODERATE 35: CPT

## 2023-06-25 RX ORDER — MIRTAZAPINE 45 MG/1
7.5 TABLET, ORALLY DISINTEGRATING ORAL AT BEDTIME
Refills: 0 | Status: DISCONTINUED | OUTPATIENT
Start: 2023-06-25 | End: 2023-07-03

## 2023-06-25 RX ADMIN — PANTOPRAZOLE SODIUM 40 MILLIGRAM(S): 20 TABLET, DELAYED RELEASE ORAL at 05:42

## 2023-06-25 RX ADMIN — CILOSTAZOL 100 MILLIGRAM(S): 100 TABLET ORAL at 05:40

## 2023-06-25 RX ADMIN — Medication 75 MICROGRAM(S): at 05:40

## 2023-06-25 RX ADMIN — TAMSULOSIN HYDROCHLORIDE 0.4 MILLIGRAM(S): 0.4 CAPSULE ORAL at 22:56

## 2023-06-25 RX ADMIN — MIRTAZAPINE 7.5 MILLIGRAM(S): 45 TABLET, ORALLY DISINTEGRATING ORAL at 22:45

## 2023-06-25 RX ADMIN — Medication 650 MILLIGRAM(S): at 05:41

## 2023-06-25 RX ADMIN — Medication 2: at 07:50

## 2023-06-25 RX ADMIN — FINASTERIDE 5 MILLIGRAM(S): 5 TABLET, FILM COATED ORAL at 11:32

## 2023-06-25 RX ADMIN — PIPERACILLIN AND TAZOBACTAM 25 GRAM(S): 4; .5 INJECTION, POWDER, LYOPHILIZED, FOR SOLUTION INTRAVENOUS at 00:35

## 2023-06-25 RX ADMIN — Medication 1: at 17:19

## 2023-06-25 RX ADMIN — LATANOPROST 1 DROP(S): 0.05 SOLUTION/ DROPS OPHTHALMIC; TOPICAL at 22:50

## 2023-06-25 RX ADMIN — Medication 650 MILLIGRAM(S): at 11:32

## 2023-06-25 RX ADMIN — PIPERACILLIN AND TAZOBACTAM 25 GRAM(S): 4; .5 INJECTION, POWDER, LYOPHILIZED, FOR SOLUTION INTRAVENOUS at 11:32

## 2023-06-25 RX ADMIN — Medication 25 MILLIGRAM(S): at 17:21

## 2023-06-25 RX ADMIN — Medication 25 MILLIGRAM(S): at 05:40

## 2023-06-25 RX ADMIN — CILOSTAZOL 100 MILLIGRAM(S): 100 TABLET ORAL at 17:19

## 2023-06-25 NOTE — PROGRESS NOTE ADULT - ASSESSMENT
89y Male with significant PMH of HTN, CAD, s/p stent in 2009, HPL, DM-2, CKD-stage III, CVA with muscle wasting, H/o prostate CA s/p TURP, Glaucoma, B/L muscle atrophy and others was BIBA in ED from home with coffee ground emesis x 2. Treated for R foot gangrene and UTI.        #RT heel gangrene  - Appreciate vascular and podiatry recs  - Will c/w Zosyn  - ID recs appreciated    # Poor appetite - pt's poor appetite, progressive decline d/w daughter.  She states pt and family opt against PEG placement.  Agrees with trial of Remeron.      #UTI  - U/A positive, s/p CTX    # Acute worsening of CKD stage III- likely pre-renal  Cr 2.54 ( was 1.44 in 01/2023).  Cr appears to be improving.   Adequate hydration.  - nephro following  - s/p albumin x1    # Acute blood loss anemia with hematemesis with possible PUD, erossive gastritis/duodenitis/esophagitis or AVMs.  Hb is 9.2 (with baseline Hb 10.0-11.3), stable.  Diet advanced.  Seen by GI.  IV protonix.    # Uncontrolled DM-2 with hyperglycemia.  .  A1c level- 13.2  ISS with coverage.    # HTN, CAD, s/p stent and HPL.  Stable and no acute issue.  Continue his Metoprolol, Losartan and Atorvastatin.    # H/o Prostate cancer and TURP.  On Flomax and Finasteride.    # Hypothyroidism   On Levothyroxine.    #Functional Quadriplegia - supportive care.     # DVT prophylaxis: SCDs for now.    CODE STATUS- DNR/DNI. Comfort measures.   Plan of care d/w daughter Shavonne [phone] in agreement   Confirms DNR / DNI Hospice measures.

## 2023-06-25 NOTE — PROGRESS NOTE ADULT - SUBJECTIVE AND OBJECTIVE BOX
Patient: MARIANA WRIGHT [id number] 89y Male                            Hospitalist Attending Note    Unable to offer complaints.  Seen with daughter Shavonne present via phone at [phone].    ____________________PHYSICAL EXAM:  GENERAL:  NAD, arousable, confused  HEENT: NCAT  CARDIOVASCULAR:  S1, S2  LUNGS: CTAB  ABDOMEN:  soft, (-) tenderness, (-) distension, (+) bowel sounds, (-) guarding, (-) rebound (-) rigidity  EXTREMITIES:  no cyanosis / clubbing / edema.  R foot dressing in place.   ____________________    VITALS:  Vital Signs Last 24 Hrs  T(C): 36.8 (2023 16:46), Max: 36.8 (2023 16:46)  T(F): 98.2 (2023 16:46), Max: 98.2 (2023 16:46)  HR: 88 (2023 16:46) (78 - 88)  BP: 160/82 (2023 16:46) (137/81 - 160/82)  BP(mean): 108 (2023 18:37) (108 - 108)  RR: 18 (2023 16:46) (16 - 18)  SpO2: 97% (2023 16:46) (94% - 99%)    Parameters below as of 2023 18:37  Patient On (Oxygen Delivery Method): room air     Daily     Daily Weight in k.5 (2023 04:47)  CAPILLARY BLOOD GLUCOSE      POCT Blood Glucose.: 169 mg/dL (2023 16:58)  POCT Blood Glucose.: 207 mg/dL (2023 11:10)  POCT Blood Glucose.: 214 mg/dL (2023 07:41)  POCT Blood Glucose.: 217 mg/dL (2023 21:40)    I&O's Summary    2023 07:01  -  2023 07:00  --------------------------------------------------------  IN: 170 mL / OUT: 0 mL / NET: 170 mL        LABS:                        10.3   8.30  )-----------( 197      ( 2023 05:55 )             32.3         145  |  116<H>  |  26<H>  ----------------------------<  197<H>  3.5   |  21<L>  |  2.04<H>    Ca    7.9<L>      2023 05:55    TPro  5.9<L>  /  Alb  1.6<L>  /  TBili  1.0  /  DBili  x   /  AST  27  /  ALT  16  /  AlkPhos  71        LIVER FUNCTIONS - ( 2023 08:17 )  Alb: 1.6 g/dL / Pro: 5.9 gm/dL / ALK PHOS: 71 U/L / ALT: 16 U/L / AST: 27 U/L / GGT: x           Urinalysis Basic - ( 2023 05:55 )    Color: x / Appearance: x / SG: x / pH: x  Gluc: 197 mg/dL / Ketone: x  / Bili: x / Urobili: x   Blood: x / Protein: x / Nitrite: x   Leuk Esterase: x / RBC: x / WBC x   Sq Epi: x / Non Sq Epi: x / Bacteria: x              MEDICATIONS:  acetaminophen     Tablet .. 650 milliGRAM(s) Oral every 6 hours PRN  aluminum hydroxide/magnesium hydroxide/simethicone Suspension 30 milliLiter(s) Oral every 4 hours PRN  atorvastatin 80 milliGRAM(s) Oral at bedtime  cilostazol 100 milliGRAM(s) Oral two times a day  dextrose 5%. 1000 milliLiter(s) IV Continuous <Continuous>  dextrose 5%. 1000 milliLiter(s) IV Continuous <Continuous>  dextrose 50% Injectable 25 Gram(s) IV Push once  dextrose 50% Injectable 25 Gram(s) IV Push once  dextrose 50% Injectable 12.5 Gram(s) IV Push once  dextrose Oral Gel 15 Gram(s) Oral once PRN  epoetin shai-epbx (RETACRIT) Injectable 53910 Unit(s) SubCutaneous every 7 days  finasteride 5 milliGRAM(s) Oral daily  glucagon  Injectable 1 milliGRAM(s) IntraMuscular once  insulin lispro (ADMELOG) corrective regimen sliding scale   SubCutaneous at bedtime  insulin lispro (ADMELOG) corrective regimen sliding scale   SubCutaneous three times a day before meals  latanoprost 0.005% Ophthalmic Solution 1 Drop(s) Both EYES at bedtime  levothyroxine 75 MICROGram(s) Oral daily  melatonin 3 milliGRAM(s) Oral at bedtime PRN  metoprolol tartrate 25 milliGRAM(s) Oral two times a day  ondansetron Injectable 4 milliGRAM(s) IV Push every 8 hours PRN  pantoprazole    Tablet 40 milliGRAM(s) Oral before breakfast  piperacillin/tazobactam IVPB.. 3.375 Gram(s) IV Intermittent every 12 hours  sodium bicarbonate 650 milliGRAM(s) Oral three times a day  tamsulosin 0.4 milliGRAM(s) Oral at bedtime

## 2023-06-26 LAB
ANION GAP SERPL CALC-SCNC: 8 MMOL/L — SIGNIFICANT CHANGE UP (ref 5–17)
BUN SERPL-MCNC: 27 MG/DL — HIGH (ref 7–23)
CALCIUM SERPL-MCNC: 8.3 MG/DL — LOW (ref 8.5–10.1)
CHLORIDE SERPL-SCNC: 120 MMOL/L — HIGH (ref 96–108)
CO2 SERPL-SCNC: 22 MMOL/L — SIGNIFICANT CHANGE UP (ref 22–31)
CREAT SERPL-MCNC: 2 MG/DL — HIGH (ref 0.5–1.3)
EGFR: 31 ML/MIN/1.73M2 — LOW
GLUCOSE BLDC GLUCOMTR-MCNC: 128 MG/DL — HIGH (ref 70–99)
GLUCOSE BLDC GLUCOMTR-MCNC: 159 MG/DL — HIGH (ref 70–99)
GLUCOSE BLDC GLUCOMTR-MCNC: 171 MG/DL — HIGH (ref 70–99)
GLUCOSE BLDC GLUCOMTR-MCNC: 178 MG/DL — HIGH (ref 70–99)
GLUCOSE SERPL-MCNC: 157 MG/DL — HIGH (ref 70–99)
HCT VFR BLD CALC: 33 % — LOW (ref 39–50)
HGB BLD-MCNC: 10.4 G/DL — LOW (ref 13–17)
MCHC RBC-ENTMCNC: 28.7 PG — SIGNIFICANT CHANGE UP (ref 27–34)
MCHC RBC-ENTMCNC: 31.5 G/DL — LOW (ref 32–36)
MCV RBC AUTO: 90.9 FL — SIGNIFICANT CHANGE UP (ref 80–100)
NRBC # BLD: 0 /100 WBCS — SIGNIFICANT CHANGE UP (ref 0–0)
PLATELET # BLD AUTO: 206 K/UL — SIGNIFICANT CHANGE UP (ref 150–400)
POTASSIUM SERPL-MCNC: 3.5 MMOL/L — SIGNIFICANT CHANGE UP (ref 3.5–5.3)
POTASSIUM SERPL-SCNC: 3.5 MMOL/L — SIGNIFICANT CHANGE UP (ref 3.5–5.3)
RBC # BLD: 3.63 M/UL — LOW (ref 4.2–5.8)
RBC # FLD: 18.1 % — HIGH (ref 10.3–14.5)
SODIUM SERPL-SCNC: 150 MMOL/L — HIGH (ref 135–145)
WBC # BLD: 8.02 K/UL — SIGNIFICANT CHANGE UP (ref 3.8–10.5)
WBC # FLD AUTO: 8.02 K/UL — SIGNIFICANT CHANGE UP (ref 3.8–10.5)

## 2023-06-26 PROCEDURE — 99232 SBSQ HOSP IP/OBS MODERATE 35: CPT

## 2023-06-26 RX ORDER — SODIUM CHLORIDE 9 MG/ML
1000 INJECTION, SOLUTION INTRAVENOUS
Refills: 0 | Status: DISCONTINUED | OUTPATIENT
Start: 2023-06-26 | End: 2023-06-30

## 2023-06-26 RX ADMIN — FINASTERIDE 5 MILLIGRAM(S): 5 TABLET, FILM COATED ORAL at 11:54

## 2023-06-26 RX ADMIN — PIPERACILLIN AND TAZOBACTAM 25 GRAM(S): 4; .5 INJECTION, POWDER, LYOPHILIZED, FOR SOLUTION INTRAVENOUS at 12:00

## 2023-06-26 RX ADMIN — Medication 1: at 17:29

## 2023-06-26 RX ADMIN — PIPERACILLIN AND TAZOBACTAM 25 GRAM(S): 4; .5 INJECTION, POWDER, LYOPHILIZED, FOR SOLUTION INTRAVENOUS at 23:32

## 2023-06-26 RX ADMIN — Medication 650 MILLIGRAM(S): at 13:18

## 2023-06-26 RX ADMIN — Medication 650 MILLIGRAM(S): at 21:47

## 2023-06-26 RX ADMIN — PIPERACILLIN AND TAZOBACTAM 25 GRAM(S): 4; .5 INJECTION, POWDER, LYOPHILIZED, FOR SOLUTION INTRAVENOUS at 01:54

## 2023-06-26 RX ADMIN — MIRTAZAPINE 7.5 MILLIGRAM(S): 45 TABLET, ORALLY DISINTEGRATING ORAL at 21:53

## 2023-06-26 RX ADMIN — Medication 1: at 08:51

## 2023-06-26 RX ADMIN — LATANOPROST 1 DROP(S): 0.05 SOLUTION/ DROPS OPHTHALMIC; TOPICAL at 21:54

## 2023-06-26 RX ADMIN — SODIUM CHLORIDE 100 MILLILITER(S): 9 INJECTION, SOLUTION INTRAVENOUS at 12:00

## 2023-06-26 RX ADMIN — ATORVASTATIN CALCIUM 80 MILLIGRAM(S): 80 TABLET, FILM COATED ORAL at 21:48

## 2023-06-26 RX ADMIN — ERYTHROPOIETIN 10000 UNIT(S): 10000 INJECTION, SOLUTION INTRAVENOUS; SUBCUTANEOUS at 13:07

## 2023-06-26 RX ADMIN — TAMSULOSIN HYDROCHLORIDE 0.4 MILLIGRAM(S): 0.4 CAPSULE ORAL at 21:47

## 2023-06-26 NOTE — PROGRESS NOTE ADULT - SUBJECTIVE AND OBJECTIVE BOX
Pt stable; no bleeding  on IV zosyn  Hgb slowly improving      MEDICATIONS  (STANDING):  atorvastatin 80 milliGRAM(s) Oral at bedtime  cilostazol 100 milliGRAM(s) Oral two times a day  dextrose 5% + sodium chloride 0.45%. 1000 milliLiter(s) (100 mL/Hr) IV Continuous <Continuous>  dextrose 5%. 1000 milliLiter(s) (50 mL/Hr) IV Continuous <Continuous>  dextrose 5%. 1000 milliLiter(s) (100 mL/Hr) IV Continuous <Continuous>  dextrose 50% Injectable 25 Gram(s) IV Push once  dextrose 50% Injectable 25 Gram(s) IV Push once  dextrose 50% Injectable 12.5 Gram(s) IV Push once  epoetin shai-epbx (RETACRIT) Injectable 37191 Unit(s) SubCutaneous every 7 days  finasteride 5 milliGRAM(s) Oral daily  glucagon  Injectable 1 milliGRAM(s) IntraMuscular once  insulin lispro (ADMELOG) corrective regimen sliding scale   SubCutaneous three times a day before meals  insulin lispro (ADMELOG) corrective regimen sliding scale   SubCutaneous at bedtime  latanoprost 0.005% Ophthalmic Solution 1 Drop(s) Both EYES at bedtime  levothyroxine 75 MICROGram(s) Oral daily  metoprolol tartrate 25 milliGRAM(s) Oral two times a day  mirtazapine 7.5 milliGRAM(s) Oral at bedtime  pantoprazole    Tablet 40 milliGRAM(s) Oral before breakfast  piperacillin/tazobactam IVPB.. 3.375 Gram(s) IV Intermittent every 12 hours  sodium bicarbonate 650 milliGRAM(s) Oral three times a day  tamsulosin 0.4 milliGRAM(s) Oral at bedtime    MEDICATIONS  (PRN):  acetaminophen     Tablet .. 650 milliGRAM(s) Oral every 6 hours PRN Temp greater or equal to 38C (100.4F), Mild Pain (1 - 3)  aluminum hydroxide/magnesium hydroxide/simethicone Suspension 30 milliLiter(s) Oral every 4 hours PRN Dyspepsia  dextrose Oral Gel 15 Gram(s) Oral once PRN Blood Glucose LESS THAN 70 milliGRAM(s)/deciliter  melatonin 3 milliGRAM(s) Oral at bedtime PRN Insomnia  ondansetron Injectable 4 milliGRAM(s) IV Push every 8 hours PRN Nausea and/or Vomiting      Allergies    No Known Allergies    Intolerances        Vital Signs Last 24 Hrs  T(C): 36.3 (26 Jun 2023 10:43), Max: 36.8 (25 Jun 2023 16:46)  T(F): 97.4 (26 Jun 2023 10:43), Max: 98.2 (25 Jun 2023 16:46)  HR: 93 (26 Jun 2023 10:43) (76 - 93)  BP: 138/83 (26 Jun 2023 10:43) (121/82 - 160/82)  BP(mean): 108 (25 Jun 2023 17:20) (108 - 108)  RR: 18 (26 Jun 2023 10:43) (17 - 18)  SpO2: 93% (26 Jun 2023 10:43) (93% - 97%)    Parameters below as of 26 Jun 2023 10:43  Patient On (Oxygen Delivery Method): room air        PHYSICAL EXAM:  General: NAD.  CVS: S1, S2  Chest: air entry bilaterally present  Abd: BS present, soft, non-tender      LABS:                        10.4   8.02  )-----------( 206      ( 26 Jun 2023 08:18 )             33.0     06-26    150<H>  |  120<H>  |  27<H>  ----------------------------<  157<H>  3.5   |  22  |  2.00<H>    Ca    8.3<L>      26 Jun 2023 08:18          continue protonix  diet as tolerated

## 2023-06-26 NOTE — PROGRESS NOTE ADULT - SUBJECTIVE AND OBJECTIVE BOX
Patient: MARIANA WRIGHT [id number] 89y Male                            Hospitalist Attending Note    Unable to offer complaints.    ____________________PHYSICAL EXAM:  GENERAL:  NAD, arousable, confused  HEENT: NCAT  CARDIOVASCULAR:  S1, S2  LUNGS: CTAB  ABDOMEN:  soft, (-) tenderness, (-) distension, (+) bowel sounds, (-) guarding, (-) rebound (-) rigidity  EXTREMITIES:  no cyanosis / clubbing / edema.  R foot dressing in place.   ____________________      VITALS:  Vital Signs Last 24 Hrs  T(C): 36.4 (26 Jun 2023 16:03), Max: 36.7 (26 Jun 2023 00:03)  T(F): 97.6 (26 Jun 2023 16:03), Max: 98 (26 Jun 2023 00:03)  HR: 100 (26 Jun 2023 16:03) (76 - 100)  BP: 133/68 (26 Jun 2023 16:03) (121/82 - 148/83)  BP(mean): --  RR: 17 (26 Jun 2023 16:03) (17 - 18)  SpO2: 94% (26 Jun 2023 16:03) (93% - 96%)    Parameters below as of 26 Jun 2023 16:03  Patient On (Oxygen Delivery Method): room air     Daily     Daily   CAPILLARY BLOOD GLUCOSE      POCT Blood Glucose.: 178 mg/dL (26 Jun 2023 16:37)  POCT Blood Glucose.: 128 mg/dL (26 Jun 2023 11:05)  POCT Blood Glucose.: 159 mg/dL (26 Jun 2023 07:58)  POCT Blood Glucose.: 168 mg/dL (25 Jun 2023 21:09)    I&O's Summary    26 Jun 2023 07:01  -  26 Jun 2023 18:51  --------------------------------------------------------  IN: 50 mL / OUT: 0 mL / NET: 50 mL        LABS:                        10.4   8.02  )-----------( 206      ( 26 Jun 2023 08:18 )             33.0     06-26    150<H>  |  120<H>  |  27<H>  ----------------------------<  157<H>  3.5   |  22  |  2.00<H>    Ca    8.3<L>      26 Jun 2023 08:18          Urinalysis Basic - ( 26 Jun 2023 08:18 )    Color: x / Appearance: x / SG: x / pH: x  Gluc: 157 mg/dL / Ketone: x  / Bili: x / Urobili: x   Blood: x / Protein: x / Nitrite: x   Leuk Esterase: x / RBC: x / WBC x   Sq Epi: x / Non Sq Epi: x / Bacteria: x              MEDICATIONS:  acetaminophen     Tablet .. 650 milliGRAM(s) Oral every 6 hours PRN  aluminum hydroxide/magnesium hydroxide/simethicone Suspension 30 milliLiter(s) Oral every 4 hours PRN  atorvastatin 80 milliGRAM(s) Oral at bedtime  cilostazol 100 milliGRAM(s) Oral two times a day  dextrose 5% + sodium chloride 0.45%. 1000 milliLiter(s) IV Continuous <Continuous>  dextrose 5%. 1000 milliLiter(s) IV Continuous <Continuous>  dextrose 5%. 1000 milliLiter(s) IV Continuous <Continuous>  dextrose 50% Injectable 25 Gram(s) IV Push once  dextrose 50% Injectable 25 Gram(s) IV Push once  dextrose 50% Injectable 12.5 Gram(s) IV Push once  dextrose Oral Gel 15 Gram(s) Oral once PRN  epoetin shai-epbx (RETACRIT) Injectable 38011 Unit(s) SubCutaneous every 7 days  finasteride 5 milliGRAM(s) Oral daily  glucagon  Injectable 1 milliGRAM(s) IntraMuscular once  insulin lispro (ADMELOG) corrective regimen sliding scale   SubCutaneous at bedtime  insulin lispro (ADMELOG) corrective regimen sliding scale   SubCutaneous three times a day before meals  latanoprost 0.005% Ophthalmic Solution 1 Drop(s) Both EYES at bedtime  levothyroxine 75 MICROGram(s) Oral daily  melatonin 3 milliGRAM(s) Oral at bedtime PRN  metoprolol tartrate 25 milliGRAM(s) Oral two times a day  mirtazapine 7.5 milliGRAM(s) Oral at bedtime  ondansetron Injectable 4 milliGRAM(s) IV Push every 8 hours PRN  pantoprazole    Tablet 40 milliGRAM(s) Oral before breakfast  piperacillin/tazobactam IVPB.. 3.375 Gram(s) IV Intermittent every 12 hours  sodium bicarbonate 650 milliGRAM(s) Oral three times a day  tamsulosin 0.4 milliGRAM(s) Oral at bedtime                               Patient: MARIANA WRIGHT [id number] 89y Male                            Hospitalist Attending Note    Unable to offer complaints.    ____________________PHYSICAL EXAM:  GENERAL:  NAD, arousable, verbally responsive.   HEENT: NCAT  CARDIOVASCULAR:  S1, S2  LUNGS: CTAB  ABDOMEN:  soft, (-) tenderness, (-) distension, (+) bowel sounds, (-) guarding, (-) rebound (-) rigidity  EXTREMITIES:  no cyanosis / clubbing / edema.  R foot dressing in place.   ____________________      VITALS:  Vital Signs Last 24 Hrs  T(C): 36.4 (26 Jun 2023 16:03), Max: 36.7 (26 Jun 2023 00:03)  T(F): 97.6 (26 Jun 2023 16:03), Max: 98 (26 Jun 2023 00:03)  HR: 100 (26 Jun 2023 16:03) (76 - 100)  BP: 133/68 (26 Jun 2023 16:03) (121/82 - 148/83)  BP(mean): --  RR: 17 (26 Jun 2023 16:03) (17 - 18)  SpO2: 94% (26 Jun 2023 16:03) (93% - 96%)    Parameters below as of 26 Jun 2023 16:03  Patient On (Oxygen Delivery Method): room air     Daily     Daily   CAPILLARY BLOOD GLUCOSE      POCT Blood Glucose.: 178 mg/dL (26 Jun 2023 16:37)  POCT Blood Glucose.: 128 mg/dL (26 Jun 2023 11:05)  POCT Blood Glucose.: 159 mg/dL (26 Jun 2023 07:58)  POCT Blood Glucose.: 168 mg/dL (25 Jun 2023 21:09)    I&O's Summary    26 Jun 2023 07:01  -  26 Jun 2023 18:51  --------------------------------------------------------  IN: 50 mL / OUT: 0 mL / NET: 50 mL        LABS:                        10.4   8.02  )-----------( 206      ( 26 Jun 2023 08:18 )             33.0     06-26    150<H>  |  120<H>  |  27<H>  ----------------------------<  157<H>  3.5   |  22  |  2.00<H>    Ca    8.3<L>      26 Jun 2023 08:18          Urinalysis Basic - ( 26 Jun 2023 08:18 )    Color: x / Appearance: x / SG: x / pH: x  Gluc: 157 mg/dL / Ketone: x  / Bili: x / Urobili: x   Blood: x / Protein: x / Nitrite: x   Leuk Esterase: x / RBC: x / WBC x   Sq Epi: x / Non Sq Epi: x / Bacteria: x              MEDICATIONS:  acetaminophen     Tablet .. 650 milliGRAM(s) Oral every 6 hours PRN  aluminum hydroxide/magnesium hydroxide/simethicone Suspension 30 milliLiter(s) Oral every 4 hours PRN  atorvastatin 80 milliGRAM(s) Oral at bedtime  cilostazol 100 milliGRAM(s) Oral two times a day  dextrose 5% + sodium chloride 0.45%. 1000 milliLiter(s) IV Continuous <Continuous>  dextrose 5%. 1000 milliLiter(s) IV Continuous <Continuous>  dextrose 5%. 1000 milliLiter(s) IV Continuous <Continuous>  dextrose 50% Injectable 25 Gram(s) IV Push once  dextrose 50% Injectable 25 Gram(s) IV Push once  dextrose 50% Injectable 12.5 Gram(s) IV Push once  dextrose Oral Gel 15 Gram(s) Oral once PRN  epoetin shai-epbx (RETACRIT) Injectable 84746 Unit(s) SubCutaneous every 7 days  finasteride 5 milliGRAM(s) Oral daily  glucagon  Injectable 1 milliGRAM(s) IntraMuscular once  insulin lispro (ADMELOG) corrective regimen sliding scale   SubCutaneous at bedtime  insulin lispro (ADMELOG) corrective regimen sliding scale   SubCutaneous three times a day before meals  latanoprost 0.005% Ophthalmic Solution 1 Drop(s) Both EYES at bedtime  levothyroxine 75 MICROGram(s) Oral daily  melatonin 3 milliGRAM(s) Oral at bedtime PRN  metoprolol tartrate 25 milliGRAM(s) Oral two times a day  mirtazapine 7.5 milliGRAM(s) Oral at bedtime  ondansetron Injectable 4 milliGRAM(s) IV Push every 8 hours PRN  pantoprazole    Tablet 40 milliGRAM(s) Oral before breakfast  piperacillin/tazobactam IVPB.. 3.375 Gram(s) IV Intermittent every 12 hours  sodium bicarbonate 650 milliGRAM(s) Oral three times a day  tamsulosin 0.4 milliGRAM(s) Oral at bedtime

## 2023-06-26 NOTE — PROGRESS NOTE ADULT - SUBJECTIVE AND OBJECTIVE BOX
[facility] NEPHROLOGY SERVICES, [facility]  NEPHROLOGY AND HYPERTENSION  [address]  [address]  [place] [address]  [phone]    MD SELENE BRUSH MD YELENA ROSENBERG, MD BINNY KOSHY, MD CHRISTOPHER CAPUTO, MD EDWARD BOVER, MD          Patient events noted    MEDICATIONS  (STANDING):  atorvastatin 80 milliGRAM(s) Oral at bedtime  cilostazol 100 milliGRAM(s) Oral two times a day  dextrose 5% + sodium chloride 0.45%. 1000 milliLiter(s) (100 mL/Hr) IV Continuous <Continuous>  dextrose 5%. 1000 milliLiter(s) (100 mL/Hr) IV Continuous <Continuous>  dextrose 5%. 1000 milliLiter(s) (50 mL/Hr) IV Continuous <Continuous>  dextrose 50% Injectable 12.5 Gram(s) IV Push once  dextrose 50% Injectable 25 Gram(s) IV Push once  dextrose 50% Injectable 25 Gram(s) IV Push once  epoetin shai-epbx (RETACRIT) Injectable 39018 Unit(s) SubCutaneous every 7 days  finasteride 5 milliGRAM(s) Oral daily  glucagon  Injectable 1 milliGRAM(s) IntraMuscular once  insulin lispro (ADMELOG) corrective regimen sliding scale   SubCutaneous three times a day before meals  insulin lispro (ADMELOG) corrective regimen sliding scale   SubCutaneous at bedtime  latanoprost 0.005% Ophthalmic Solution 1 Drop(s) Both EYES at bedtime  levothyroxine 75 MICROGram(s) Oral daily  metoprolol tartrate 25 milliGRAM(s) Oral two times a day  mirtazapine 7.5 milliGRAM(s) Oral at bedtime  pantoprazole    Tablet 40 milliGRAM(s) Oral before breakfast  piperacillin/tazobactam IVPB.. 3.375 Gram(s) IV Intermittent every 12 hours  sodium bicarbonate 650 milliGRAM(s) Oral three times a day  tamsulosin 0.4 milliGRAM(s) Oral at bedtime    MEDICATIONS  (PRN):  acetaminophen     Tablet .. 650 milliGRAM(s) Oral every 6 hours PRN Temp greater or equal to 38C (100.4F), Mild Pain (1 - 3)  aluminum hydroxide/magnesium hydroxide/simethicone Suspension 30 milliLiter(s) Oral every 4 hours PRN Dyspepsia  dextrose Oral Gel 15 Gram(s) Oral once PRN Blood Glucose LESS THAN 70 milliGRAM(s)/deciliter  melatonin 3 milliGRAM(s) Oral at bedtime PRN Insomnia  ondansetron Injectable 4 milliGRAM(s) IV Push every 8 hours PRN Nausea and/or Vomiting      06-26-23 @ 07:01  -  06-26-23 @ 22:47  --------------------------------------------------------  IN: 50 mL / OUT: 0 mL / NET: 50 mL      PHYSICAL EXAM:      T(C): 36.4 (06-26-23 @ 16:03), Max: 36.7 (06-26-23 @ 00:03)  HR: 100 (06-26-23 @ 16:03) (76 - 100)  BP: 133/68 (06-26-23 @ 16:03) (121/82 - 148/83)  RR: 17 (06-26-23 @ 16:03) (17 - 18)  SpO2: 94% (06-26-23 @ 16:03) (93% - 96%)  Wt(kg): --  Lungs clear  Heart S1S2  Abd soft NT ND  Extremities:   tr edema                                    10.4   8.02  )-----------( 206      ( 26 Jun 2023 08:18 )             33.0     06-26    150<H>  |  120<H>  |  27<H>  ----------------------------<  157<H>  3.5   |  22  |  2.00<H>    Ca    8.3<L>      26 Jun 2023 08:18          Creatinine Trend: 2.00<--, 2.04<--, 2.16<--, 2.33<--, 2.80<--, 3.25<--      Assessment   MARILYN pre renal azotemia; hypernatremia     Plan:  Continue hypotonic IVF   Will follow course;       Eris Navarro MD

## 2023-06-27 LAB
ANION GAP SERPL CALC-SCNC: 6 MMOL/L — SIGNIFICANT CHANGE UP (ref 5–17)
BUN SERPL-MCNC: 26 MG/DL — HIGH (ref 7–23)
CALCIUM SERPL-MCNC: 7.8 MG/DL — LOW (ref 8.5–10.1)
CHLORIDE SERPL-SCNC: 118 MMOL/L — HIGH (ref 96–108)
CO2 SERPL-SCNC: 24 MMOL/L — SIGNIFICANT CHANGE UP (ref 22–31)
CREAT SERPL-MCNC: 2.1 MG/DL — HIGH (ref 0.5–1.3)
EGFR: 29 ML/MIN/1.73M2 — LOW
GLUCOSE BLDC GLUCOMTR-MCNC: 142 MG/DL — HIGH (ref 70–99)
GLUCOSE BLDC GLUCOMTR-MCNC: 156 MG/DL — HIGH (ref 70–99)
GLUCOSE BLDC GLUCOMTR-MCNC: 171 MG/DL — HIGH (ref 70–99)
GLUCOSE BLDC GLUCOMTR-MCNC: 218 MG/DL — HIGH (ref 70–99)
GLUCOSE BLDC GLUCOMTR-MCNC: 247 MG/DL — HIGH (ref 70–99)
GLUCOSE SERPL-MCNC: 233 MG/DL — HIGH (ref 70–99)
HCT VFR BLD CALC: 31.1 % — LOW (ref 39–50)
HGB BLD-MCNC: 9.8 G/DL — LOW (ref 13–17)
MCHC RBC-ENTMCNC: 28.3 PG — SIGNIFICANT CHANGE UP (ref 27–34)
MCHC RBC-ENTMCNC: 31.5 G/DL — LOW (ref 32–36)
MCV RBC AUTO: 89.9 FL — SIGNIFICANT CHANGE UP (ref 80–100)
NRBC # BLD: 0 /100 WBCS — SIGNIFICANT CHANGE UP (ref 0–0)
PLATELET # BLD AUTO: 184 K/UL — SIGNIFICANT CHANGE UP (ref 150–400)
POTASSIUM SERPL-MCNC: 3.2 MMOL/L — LOW (ref 3.5–5.3)
POTASSIUM SERPL-SCNC: 3.2 MMOL/L — LOW (ref 3.5–5.3)
RBC # BLD: 3.46 M/UL — LOW (ref 4.2–5.8)
RBC # FLD: 18.6 % — HIGH (ref 10.3–14.5)
SODIUM SERPL-SCNC: 148 MMOL/L — HIGH (ref 135–145)
WBC # BLD: 7.1 K/UL — SIGNIFICANT CHANGE UP (ref 3.8–10.5)
WBC # FLD AUTO: 7.1 K/UL — SIGNIFICANT CHANGE UP (ref 3.8–10.5)

## 2023-06-27 PROCEDURE — 99232 SBSQ HOSP IP/OBS MODERATE 35: CPT

## 2023-06-27 RX ORDER — POTASSIUM CHLORIDE 20 MEQ
10 PACKET (EA) ORAL
Refills: 0 | Status: COMPLETED | OUTPATIENT
Start: 2023-06-27 | End: 2023-06-27

## 2023-06-27 RX ADMIN — TAMSULOSIN HYDROCHLORIDE 0.4 MILLIGRAM(S): 0.4 CAPSULE ORAL at 22:20

## 2023-06-27 RX ADMIN — Medication 1: at 12:09

## 2023-06-27 RX ADMIN — CILOSTAZOL 100 MILLIGRAM(S): 100 TABLET ORAL at 05:26

## 2023-06-27 RX ADMIN — SODIUM CHLORIDE 100 MILLILITER(S): 9 INJECTION, SOLUTION INTRAVENOUS at 04:54

## 2023-06-27 RX ADMIN — Medication 650 MILLIGRAM(S): at 05:26

## 2023-06-27 RX ADMIN — Medication 25 MILLIGRAM(S): at 19:02

## 2023-06-27 RX ADMIN — Medication 650 MILLIGRAM(S): at 22:20

## 2023-06-27 RX ADMIN — Medication 1: at 18:26

## 2023-06-27 RX ADMIN — Medication 25 MILLIGRAM(S): at 05:26

## 2023-06-27 RX ADMIN — MIRTAZAPINE 7.5 MILLIGRAM(S): 45 TABLET, ORALLY DISINTEGRATING ORAL at 22:20

## 2023-06-27 RX ADMIN — PANTOPRAZOLE SODIUM 40 MILLIGRAM(S): 20 TABLET, DELAYED RELEASE ORAL at 05:36

## 2023-06-27 RX ADMIN — Medication 75 MICROGRAM(S): at 05:26

## 2023-06-27 RX ADMIN — Medication 100 MILLIEQUIVALENT(S): at 21:44

## 2023-06-27 RX ADMIN — LATANOPROST 1 DROP(S): 0.05 SOLUTION/ DROPS OPHTHALMIC; TOPICAL at 22:50

## 2023-06-27 RX ADMIN — Medication 100 MILLIEQUIVALENT(S): at 23:00

## 2023-06-27 RX ADMIN — Medication 2: at 08:15

## 2023-06-27 RX ADMIN — ATORVASTATIN CALCIUM 80 MILLIGRAM(S): 80 TABLET, FILM COATED ORAL at 22:36

## 2023-06-27 RX ADMIN — CILOSTAZOL 100 MILLIGRAM(S): 100 TABLET ORAL at 19:00

## 2023-06-27 RX ADMIN — SODIUM CHLORIDE 100 MILLILITER(S): 9 INJECTION, SOLUTION INTRAVENOUS at 18:12

## 2023-06-27 NOTE — PROGRESS NOTE ADULT - SUBJECTIVE AND OBJECTIVE BOX
Pt with R heel gangrene; poor appetite - d/w daughter.  She states pt and family are against PEG placement.   on IV zosyn      MEDICATIONS  (STANDING):  atorvastatin 80 milliGRAM(s) Oral at bedtime  cilostazol 100 milliGRAM(s) Oral two times a day  dextrose 5% + sodium chloride 0.45%. 1000 milliLiter(s) (100 mL/Hr) IV Continuous <Continuous>  dextrose 5%. 1000 milliLiter(s) (50 mL/Hr) IV Continuous <Continuous>  dextrose 5%. 1000 milliLiter(s) (100 mL/Hr) IV Continuous <Continuous>  dextrose 50% Injectable 25 Gram(s) IV Push once  dextrose 50% Injectable 12.5 Gram(s) IV Push once  dextrose 50% Injectable 25 Gram(s) IV Push once  epoetin shai-epbx (RETACRIT) Injectable 10374 Unit(s) SubCutaneous every 7 days  finasteride 5 milliGRAM(s) Oral daily  glucagon  Injectable 1 milliGRAM(s) IntraMuscular once  insulin lispro (ADMELOG) corrective regimen sliding scale   SubCutaneous at bedtime  insulin lispro (ADMELOG) corrective regimen sliding scale   SubCutaneous three times a day before meals  latanoprost 0.005% Ophthalmic Solution 1 Drop(s) Both EYES at bedtime  levothyroxine 75 MICROGram(s) Oral daily  metoprolol tartrate 25 milliGRAM(s) Oral two times a day  mirtazapine 7.5 milliGRAM(s) Oral at bedtime  pantoprazole    Tablet 40 milliGRAM(s) Oral before breakfast  potassium chloride  10 mEq/100 mL IVPB 10 milliEquivalent(s) IV Intermittent every 1 hour  sodium bicarbonate 650 milliGRAM(s) Oral three times a day  tamsulosin 0.4 milliGRAM(s) Oral at bedtime    MEDICATIONS  (PRN):  acetaminophen     Tablet .. 650 milliGRAM(s) Oral every 6 hours PRN Temp greater or equal to 38C (100.4F), Mild Pain (1 - 3)  aluminum hydroxide/magnesium hydroxide/simethicone Suspension 30 milliLiter(s) Oral every 4 hours PRN Dyspepsia  dextrose Oral Gel 15 Gram(s) Oral once PRN Blood Glucose LESS THAN 70 milliGRAM(s)/deciliter  melatonin 3 milliGRAM(s) Oral at bedtime PRN Insomnia  ondansetron Injectable 4 milliGRAM(s) IV Push every 8 hours PRN Nausea and/or Vomiting      Allergies    No Known Allergies    Intolerances        Vital Signs Last 24 Hrs  T(C): 36.7 (27 Jun 2023 16:20), Max: 36.7 (27 Jun 2023 04:40)  T(F): 98.1 (27 Jun 2023 16:20), Max: 98.1 (27 Jun 2023 04:40)  HR: 91 (27 Jun 2023 19:06) (79 - 96)  BP: 147/85 (27 Jun 2023 19:06) (128/62 - 155/66)  BP(mean): --  RR: 18 (27 Jun 2023 16:20) (18 - 18)  SpO2: 97% (27 Jun 2023 16:20) (94% - 97%)    Parameters below as of 27 Jun 2023 16:20  Patient On (Oxygen Delivery Method): room air        PHYSICAL EXAM:  General: NAD.  CVS: S1, S2  Chest: air entry bilaterally present  Abd: BS present, soft, non-tender      LABS:                        9.8    7.10  )-----------( 184      ( 27 Jun 2023 07:40 )             31.1     06-27    148<H>  |  118<H>  |  26<H>  ----------------------------<  233<H>  3.2<L>   |  24  |  2.10<H>    Ca    7.8<L>      27 Jun 2023 07:40        as per ID  encourage PO intake

## 2023-06-27 NOTE — PROGRESS NOTE ADULT - ASSESSMENT
89y Male with significant PMH of HTN, CAD, s/p stent in 2009, HPL, DM-2, CKD-stage III, CVA with muscle wasting, H/o prostate CA s/p TURP, Glaucoma, B/L muscle atrophy and others was BIBA in ED from home with coffee ground emesis x 2. Treated for R foot gangrene and UTI.      # Hypernatremia / Severe Protein Calorie Malnutrition- encourage po intake.  IVF.  Repeat BMP in am.     #RT heel gangrene  - Appreciate vascular and podiatry recs  - Will c/w Zosyn  - ID recs appreciated    # Poor appetite - pt's poor appetite, progressive decline d/w daughter.  She states pt and family opt against PEG placement.  Agrees with trial of Remeron.      #UTI  - U/A positive, s/p CTX    # Acute worsening of CKD stage III- likely pre-renal  Cr 2.54 ( was 1.44 in 01/2023).  Cr appears to be improving.   Adequate hydration.  - nephro following  - s/p albumin x1    # Acute blood loss anemia with hematemesis with possible PUD, erossive gastritis/duodenitis/esophagitis or AVMs.  Hb is 9.2 (with baseline Hb 10.0-11.3), stable.  Diet advanced.  Seen by GI.  IV protonix.    # Uncontrolled DM-2 with hyperglycemia.  .  A1c level- 13.2  ISS with coverage.    # HTN, CAD, s/p stent and HPL.  Stable and no acute issue.  Continue his Metoprolol, Losartan and Atorvastatin.    # H/o Prostate cancer and TURP.  On Flomax and Finasteride.    # Hypothyroidism   On Levothyroxine.    #Functional Quadriplegia - supportive care.     # DVT prophylaxis: SCDs for now.    CODE STATUS- DNR/DNI. Comfort measures.   Plan of care d/w daughter Shavonne [phone] on 6/25

## 2023-06-27 NOTE — PROGRESS NOTE ADULT - SUBJECTIVE AND OBJECTIVE BOX
Patient: MARIANA WRIGHT [id number] 89y Male                            Hospitalist Attending Note    Unable to offer complaints.    ____________________PHYSICAL EXAM:  GENERAL:  NAD, arousable, verbally responsive.   HEENT: NCAT  CARDIOVASCULAR:  S1, S2  LUNGS: CTAB  ABDOMEN:  soft, (-) tenderness, (-) distension, (+) bowel sounds, (-) guarding, (-) rebound (-) rigidity  EXTREMITIES:  no cyanosis / clubbing / edema.  R foot dressing in place.   ____________________      VITALS:  Vital Signs Last 24 Hrs  T(C): 36.7 (2023 16:20), Max: 36.7 (2023 04:40)  T(F): 98.1 (2023 16:20), Max: 98.1 (2023 04:40)  HR: 91 (2023 19:06) (79 - 96)  BP: 147/85 (2023 19:06) (128/62 - 155/66)  BP(mean): --  RR: 18 (2023 16:20) (18 - 18)  SpO2: 97% (2023 16:20) (94% - 97%)    Parameters below as of 2023 16:20  Patient On (Oxygen Delivery Method): room air     Daily     Daily Weight in k.7 (2023 04:40)  CAPILLARY BLOOD GLUCOSE      POCT Blood Glucose.: 156 mg/dL (2023 17:45)  POCT Blood Glucose.: 171 mg/dL (2023 12:07)  POCT Blood Glucose.: 218 mg/dL (2023 10:43)  POCT Blood Glucose.: 247 mg/dL (2023 07:55)  POCT Blood Glucose.: 171 mg/dL (2023 20:48)    I&O's Summary    2023 07:01  -  2023 07:00  --------------------------------------------------------  IN: 50 mL / OUT: 0 mL / NET: 50 mL    2023 07:01  -  2023 19:41  --------------------------------------------------------  IN: 0 mL / OUT: 1 mL / NET: -1 mL        LABS:                        9.8    7.10  )-----------( 184      ( 2023 07:40 )             31.1     06-    148<H>  |  118<H>  |  26<H>  ----------------------------<  233<H>  3.2<L>   |  24  |  2.10<H>    Ca    7.8<L>      2023 07:40          Urinalysis Basic - ( 2023 07:40 )    Color: x / Appearance: x / SG: x / pH: x  Gluc: 233 mg/dL / Ketone: x  / Bili: x / Urobili: x   Blood: x / Protein: x / Nitrite: x   Leuk Esterase: x / RBC: x / WBC x   Sq Epi: x / Non Sq Epi: x / Bacteria: x              MEDICATIONS:  acetaminophen     Tablet .. 650 milliGRAM(s) Oral every 6 hours PRN  aluminum hydroxide/magnesium hydroxide/simethicone Suspension 30 milliLiter(s) Oral every 4 hours PRN  atorvastatin 80 milliGRAM(s) Oral at bedtime  cilostazol 100 milliGRAM(s) Oral two times a day  dextrose 5% + sodium chloride 0.45%. 1000 milliLiter(s) IV Continuous <Continuous>  dextrose 5%. 1000 milliLiter(s) IV Continuous <Continuous>  dextrose 5%. 1000 milliLiter(s) IV Continuous <Continuous>  dextrose 50% Injectable 25 Gram(s) IV Push once  dextrose 50% Injectable 12.5 Gram(s) IV Push once  dextrose 50% Injectable 25 Gram(s) IV Push once  dextrose Oral Gel 15 Gram(s) Oral once PRN  epoetin shai-epbx (RETACRIT) Injectable 48511 Unit(s) SubCutaneous every 7 days  finasteride 5 milliGRAM(s) Oral daily  glucagon  Injectable 1 milliGRAM(s) IntraMuscular once  insulin lispro (ADMELOG) corrective regimen sliding scale   SubCutaneous at bedtime  insulin lispro (ADMELOG) corrective regimen sliding scale   SubCutaneous three times a day before meals  latanoprost 0.005% Ophthalmic Solution 1 Drop(s) Both EYES at bedtime  levothyroxine 75 MICROGram(s) Oral daily  melatonin 3 milliGRAM(s) Oral at bedtime PRN  metoprolol tartrate 25 milliGRAM(s) Oral two times a day  mirtazapine 7.5 milliGRAM(s) Oral at bedtime  ondansetron Injectable 4 milliGRAM(s) IV Push every 8 hours PRN  pantoprazole    Tablet 40 milliGRAM(s) Oral before breakfast  sodium bicarbonate 650 milliGRAM(s) Oral three times a day  tamsulosin 0.4 milliGRAM(s) Oral at bedtime

## 2023-06-27 NOTE — PROGRESS NOTE ADULT - SUBJECTIVE AND OBJECTIVE BOX
[facility] NEPHROLOGY SERVICES, [facility]  NEPHROLOGY AND HYPERTENSION  [address]  [address]  [place] [address]  [phone]    MD SELENE BRUSH MD YELENA ROSENBERG, MD BINNY KOSHY, MD CHRISTOPHER CAPUTO, MD EDWARD BOVER, MD          Patient events noted    MEDICATIONS  (STANDING):  atorvastatin 80 milliGRAM(s) Oral at bedtime  cilostazol 100 milliGRAM(s) Oral two times a day  dextrose 5% + sodium chloride 0.45%. 1000 milliLiter(s) (100 mL/Hr) IV Continuous <Continuous>  dextrose 5%. 1000 milliLiter(s) (50 mL/Hr) IV Continuous <Continuous>  dextrose 5%. 1000 milliLiter(s) (100 mL/Hr) IV Continuous <Continuous>  dextrose 50% Injectable 25 Gram(s) IV Push once  dextrose 50% Injectable 25 Gram(s) IV Push once  dextrose 50% Injectable 12.5 Gram(s) IV Push once  epoetin shai-epbx (RETACRIT) Injectable 35504 Unit(s) SubCutaneous every 7 days  finasteride 5 milliGRAM(s) Oral daily  glucagon  Injectable 1 milliGRAM(s) IntraMuscular once  insulin lispro (ADMELOG) corrective regimen sliding scale   SubCutaneous at bedtime  insulin lispro (ADMELOG) corrective regimen sliding scale   SubCutaneous three times a day before meals  latanoprost 0.005% Ophthalmic Solution 1 Drop(s) Both EYES at bedtime  levothyroxine 75 MICROGram(s) Oral daily  metoprolol tartrate 25 milliGRAM(s) Oral two times a day  mirtazapine 7.5 milliGRAM(s) Oral at bedtime  pantoprazole    Tablet 40 milliGRAM(s) Oral before breakfast  potassium chloride  10 mEq/100 mL IVPB 10 milliEquivalent(s) IV Intermittent every 1 hour  sodium bicarbonate 650 milliGRAM(s) Oral three times a day  tamsulosin 0.4 milliGRAM(s) Oral at bedtime    MEDICATIONS  (PRN):  acetaminophen     Tablet .. 650 milliGRAM(s) Oral every 6 hours PRN Temp greater or equal to 38C (100.4F), Mild Pain (1 - 3)  aluminum hydroxide/magnesium hydroxide/simethicone Suspension 30 milliLiter(s) Oral every 4 hours PRN Dyspepsia  dextrose Oral Gel 15 Gram(s) Oral once PRN Blood Glucose LESS THAN 70 milliGRAM(s)/deciliter  melatonin 3 milliGRAM(s) Oral at bedtime PRN Insomnia  ondansetron Injectable 4 milliGRAM(s) IV Push every 8 hours PRN Nausea and/or Vomiting      06-26-23 @ 07:01  -  06-27-23 @ 07:00  --------------------------------------------------------  IN: 50 mL / OUT: 0 mL / NET: 50 mL    06-27-23 @ 07:01  -  06-27-23 @ 20:48  --------------------------------------------------------  IN: 0 mL / OUT: 1 mL / NET: -1 mL      PHYSICAL EXAM:      T(C): 36.7 (06-27-23 @ 16:20), Max: 36.7 (06-27-23 @ 04:40)  HR: 91 (06-27-23 @ 19:06) (79 - 96)  BP: 147/85 (06-27-23 @ 19:06) (128/62 - 155/66)  RR: 18 (06-27-23 @ 16:20) (18 - 18)  SpO2: 97% (06-27-23 @ 16:20) (94% - 97%)  Wt(kg): --  Lungs clear  Heart S1S2  Abd soft NT ND  Extremities:   tr edema                                    9.8    7.10  )-----------( 184      ( 27 Jun 2023 07:40 )             31.1     06-27    148<H>  |  118<H>  |  26<H>  ----------------------------<  233<H>  3.2<L>   |  24  |  2.10<H>    Ca    7.8<L>      27 Jun 2023 07:40          Creatinine Trend: 2.10<--, 2.00<--, 2.04<--, 2.16<--, 2.33<--, 2.80<--          Assessment   MARILYN pre renal azotemia; hypernatremia   Hypokalemia     Plan:  Continue hypotonic IVF, replete K  Follow MG  Encourage PO fluid intake   Will follow course;     Eris Navarro MD

## 2023-06-28 LAB
ANION GAP SERPL CALC-SCNC: 7 MMOL/L — SIGNIFICANT CHANGE UP (ref 5–17)
BUN SERPL-MCNC: 23 MG/DL — SIGNIFICANT CHANGE UP (ref 7–23)
CALCIUM SERPL-MCNC: 7.7 MG/DL — LOW (ref 8.5–10.1)
CHLORIDE SERPL-SCNC: 118 MMOL/L — HIGH (ref 96–108)
CO2 SERPL-SCNC: 21 MMOL/L — LOW (ref 22–31)
CREAT SERPL-MCNC: 1.92 MG/DL — HIGH (ref 0.5–1.3)
EGFR: 33 ML/MIN/1.73M2 — LOW
GLUCOSE BLDC GLUCOMTR-MCNC: 190 MG/DL — HIGH (ref 70–99)
GLUCOSE BLDC GLUCOMTR-MCNC: 226 MG/DL — HIGH (ref 70–99)
GLUCOSE BLDC GLUCOMTR-MCNC: 256 MG/DL — HIGH (ref 70–99)
GLUCOSE BLDC GLUCOMTR-MCNC: 268 MG/DL — HIGH (ref 70–99)
GLUCOSE SERPL-MCNC: 181 MG/DL — HIGH (ref 70–99)
HCT VFR BLD CALC: 33.8 % — LOW (ref 39–50)
HGB BLD-MCNC: 10.5 G/DL — LOW (ref 13–17)
MAGNESIUM SERPL-MCNC: 1.5 MG/DL — LOW (ref 1.6–2.6)
MCHC RBC-ENTMCNC: 28.4 PG — SIGNIFICANT CHANGE UP (ref 27–34)
MCHC RBC-ENTMCNC: 31.1 G/DL — LOW (ref 32–36)
MCV RBC AUTO: 91.4 FL — SIGNIFICANT CHANGE UP (ref 80–100)
NRBC # BLD: 0 /100 WBCS — SIGNIFICANT CHANGE UP (ref 0–0)
PLATELET # BLD AUTO: 184 K/UL — SIGNIFICANT CHANGE UP (ref 150–400)
POTASSIUM SERPL-MCNC: 3.4 MMOL/L — LOW (ref 3.5–5.3)
POTASSIUM SERPL-SCNC: 3.4 MMOL/L — LOW (ref 3.5–5.3)
RBC # BLD: 3.7 M/UL — LOW (ref 4.2–5.8)
RBC # FLD: 18.3 % — HIGH (ref 10.3–14.5)
SODIUM SERPL-SCNC: 146 MMOL/L — HIGH (ref 135–145)
WBC # BLD: 8.49 K/UL — SIGNIFICANT CHANGE UP (ref 3.8–10.5)
WBC # FLD AUTO: 8.49 K/UL — SIGNIFICANT CHANGE UP (ref 3.8–10.5)

## 2023-06-28 PROCEDURE — 99232 SBSQ HOSP IP/OBS MODERATE 35: CPT

## 2023-06-28 RX ORDER — MAGNESIUM SULFATE 500 MG/ML
1 VIAL (ML) INJECTION ONCE
Refills: 0 | Status: COMPLETED | OUTPATIENT
Start: 2023-06-28 | End: 2023-06-28

## 2023-06-28 RX ORDER — POTASSIUM CHLORIDE 20 MEQ
20 PACKET (EA) ORAL
Refills: 0 | Status: COMPLETED | OUTPATIENT
Start: 2023-06-28 | End: 2023-06-28

## 2023-06-28 RX ADMIN — Medication 1: at 08:41

## 2023-06-28 RX ADMIN — MIRTAZAPINE 7.5 MILLIGRAM(S): 45 TABLET, ORALLY DISINTEGRATING ORAL at 21:37

## 2023-06-28 RX ADMIN — PANTOPRAZOLE SODIUM 40 MILLIGRAM(S): 20 TABLET, DELAYED RELEASE ORAL at 08:41

## 2023-06-28 RX ADMIN — CILOSTAZOL 100 MILLIGRAM(S): 100 TABLET ORAL at 05:58

## 2023-06-28 RX ADMIN — Medication 650 MILLIGRAM(S): at 14:12

## 2023-06-28 RX ADMIN — Medication 3: at 17:09

## 2023-06-28 RX ADMIN — FINASTERIDE 5 MILLIGRAM(S): 5 TABLET, FILM COATED ORAL at 12:12

## 2023-06-28 RX ADMIN — Medication 20 MILLIEQUIVALENT(S): at 10:18

## 2023-06-28 RX ADMIN — Medication 3 MILLIGRAM(S): at 21:38

## 2023-06-28 RX ADMIN — LATANOPROST 1 DROP(S): 0.05 SOLUTION/ DROPS OPHTHALMIC; TOPICAL at 21:38

## 2023-06-28 RX ADMIN — Medication 100 GRAM(S): at 14:12

## 2023-06-28 RX ADMIN — SODIUM CHLORIDE 100 MILLILITER(S): 9 INJECTION, SOLUTION INTRAVENOUS at 08:49

## 2023-06-28 RX ADMIN — Medication 2: at 12:12

## 2023-06-28 RX ADMIN — Medication 25 MILLIGRAM(S): at 17:09

## 2023-06-28 RX ADMIN — CILOSTAZOL 100 MILLIGRAM(S): 100 TABLET ORAL at 17:08

## 2023-06-28 RX ADMIN — Medication 1: at 21:39

## 2023-06-28 RX ADMIN — Medication 650 MILLIGRAM(S): at 14:11

## 2023-06-28 RX ADMIN — Medication 100 MILLIEQUIVALENT(S): at 01:00

## 2023-06-28 RX ADMIN — Medication 25 MILLIGRAM(S): at 05:58

## 2023-06-28 RX ADMIN — Medication 650 MILLIGRAM(S): at 05:58

## 2023-06-28 RX ADMIN — Medication 20 MILLIEQUIVALENT(S): at 12:12

## 2023-06-28 RX ADMIN — Medication 75 MICROGRAM(S): at 05:58

## 2023-06-28 RX ADMIN — Medication 650 MILLIGRAM(S): at 21:37

## 2023-06-28 RX ADMIN — Medication 650 MILLIGRAM(S): at 15:11

## 2023-06-28 RX ADMIN — ATORVASTATIN CALCIUM 80 MILLIGRAM(S): 80 TABLET, FILM COATED ORAL at 21:38

## 2023-06-28 NOTE — PROGRESS NOTE ADULT - ASSESSMENT
89y Male with significant PMH of HTN, CAD, s/p stent in 2009, HPL, DM-2, CKD-stage III, CVA with muscle wasting, H/o prostate CA s/p TURP, Glaucoma, B/L muscle atrophy and others was BIBA in ED from home with coffee ground emesis x 2. Treated for R foot gangrene and UTI.      # Hypernatremia / Severe Protein Calorie Malnutrition- encourage po intake.  on IVF. sodium today 146.     #RT heel gangrene  - Appreciate vascular and podiatry recs  - ID following. iv zosyn auto-completed. Updated ID about it. Follow further recs.     # Poor appetite - pt's poor appetite, progressive decline d/w daughter.  She states pt and family opt against PEG placement.  Agrees with trial of Remeron.      #UTI  - U/A positive, s/p CTX    # Acute worsening of CKD stage III- likely pre-renal  Cr 2.54 ( was 1.44 in 01/2023).  creatinine stable.     # Acute blood loss anemia with hematemesis with possible PUD, erossive gastritis/duodenitis/esophagitis or AVMs.  Hb stable (with baseline Hb 10.0-11.3). GI recs reviewed.     # DM-2 with hyperglycemia.  controlled.  Follow finger sticks     # HTN, CAD, s/p stent and HPL.  Stable and no acute issue.  Continue his Metoprolol, Losartan and Atorvastatin.    # H/o Prostate cancer and TURP.  On Flomax and Finasteride.    # Hypothyroidism   On Levothyroxine.    #Functional Quadriplegia and severe PCM- supportive care.     # DVT prophylaxis: SCDs for now.    CODE STATUS- DNR/DNI. Comfort measures.   Plan of care d/w daughter Shavonne [phone] on 6/28

## 2023-06-28 NOTE — PROGRESS NOTE ADULT - SUBJECTIVE AND OBJECTIVE BOX
Pt stable - no active bleeding  appreciate renal note  H/H improving      MEDICATIONS  (STANDING):  atorvastatin 80 milliGRAM(s) Oral at bedtime  cilostazol 100 milliGRAM(s) Oral two times a day  dextrose 5% + sodium chloride 0.45%. 1000 milliLiter(s) (100 mL/Hr) IV Continuous <Continuous>  dextrose 5%. 1000 milliLiter(s) (100 mL/Hr) IV Continuous <Continuous>  dextrose 5%. 1000 milliLiter(s) (50 mL/Hr) IV Continuous <Continuous>  dextrose 50% Injectable 25 Gram(s) IV Push once  dextrose 50% Injectable 12.5 Gram(s) IV Push once  dextrose 50% Injectable 25 Gram(s) IV Push once  epoetin shai-epbx (RETACRIT) Injectable 39234 Unit(s) SubCutaneous every 7 days  finasteride 5 milliGRAM(s) Oral daily  glucagon  Injectable 1 milliGRAM(s) IntraMuscular once  insulin lispro (ADMELOG) corrective regimen sliding scale   SubCutaneous three times a day before meals  insulin lispro (ADMELOG) corrective regimen sliding scale   SubCutaneous at bedtime  latanoprost 0.005% Ophthalmic Solution 1 Drop(s) Both EYES at bedtime  levothyroxine 75 MICROGram(s) Oral daily  magnesium sulfate  IVPB 1 Gram(s) IV Intermittent once  metoprolol tartrate 25 milliGRAM(s) Oral two times a day  mirtazapine 7.5 milliGRAM(s) Oral at bedtime  pantoprazole    Tablet 40 milliGRAM(s) Oral before breakfast  sodium bicarbonate 650 milliGRAM(s) Oral three times a day  tamsulosin 0.4 milliGRAM(s) Oral at bedtime    MEDICATIONS  (PRN):  acetaminophen     Tablet .. 650 milliGRAM(s) Oral every 6 hours PRN Temp greater or equal to 38C (100.4F), Mild Pain (1 - 3)  aluminum hydroxide/magnesium hydroxide/simethicone Suspension 30 milliLiter(s) Oral every 4 hours PRN Dyspepsia  dextrose Oral Gel 15 Gram(s) Oral once PRN Blood Glucose LESS THAN 70 milliGRAM(s)/deciliter  melatonin 3 milliGRAM(s) Oral at bedtime PRN Insomnia  ondansetron Injectable 4 milliGRAM(s) IV Push every 8 hours PRN Nausea and/or Vomiting      Allergies    No Known Allergies    Intolerances        Vital Signs Last 24 Hrs  T(C): 36.6 (28 Jun 2023 11:45), Max: 36.8 (27 Jun 2023 23:55)  T(F): 97.9 (28 Jun 2023 11:45), Max: 98.2 (27 Jun 2023 23:55)  HR: 80 (28 Jun 2023 11:45) (80 - 92)  BP: 130/69 (28 Jun 2023 11:45) (128/67 - 150/83)  BP(mean): --  RR: 18 (28 Jun 2023 11:45) (17 - 18)  SpO2: 97% (28 Jun 2023 11:45) (94% - 97%)    Parameters below as of 28 Jun 2023 11:45  Patient On (Oxygen Delivery Method): room air        PHYSICAL EXAM:  General: NAD.  CVS: S1, S2  Chest: air entry bilaterally present  Abd: BS present, soft, non-tender      LABS:                        10.5   8.49  )-----------( 184      ( 28 Jun 2023 05:11 )             33.8     06-28    146<H>  |  118<H>  |  23  ----------------------------<  181<H>  3.4<L>   |  21<L>  |  1.92<H>    Ca    7.7<L>      28 Jun 2023 05:11  Mg     1.5     06-28        No further bleeding  stable from GI standpoint

## 2023-06-28 NOTE — PROGRESS NOTE ADULT - SUBJECTIVE AND OBJECTIVE BOX
CHIEF COMPLAINT: Follow up of right foot gangrene, malnutrition, anemia, Diabetes Mellitus Type 2  no fever  comfortable  no nausea or vomiting  per Shavonne>> patient wants more salt in his food    PHYSICAL EXAM:    GENERAL: Malnourished, no acute distress   CHEST/LUNG:  No wheezing, no crackles   HEART: Regular rate and rhythm; +SM  ABDOMEN: Soft, Nontender, Nondistended; Bowel sounds present  EXTREMITIES:  No clubbing, cyanosis, or edema. Right foot dressing   Psychiatry: SWETHA reyes his name only.        OBJECTIVE DATA:   Vital Signs Last 24 Hrs  T(C): 36.6 (28 Jun 2023 11:45), Max: 36.8 (27 Jun 2023 23:55)  T(F): 97.9 (28 Jun 2023 11:45), Max: 98.2 (27 Jun 2023 23:55)  HR: 80 (28 Jun 2023 11:45) (80 - 92)  BP: 130/69 (28 Jun 2023 11:45) (128/67 - 150/83)  BP(mean): --  RR: 18 (28 Jun 2023 11:45) (17 - 18)  SpO2: 97% (28 Jun 2023 11:45) (94% - 97%)    Parameters below as of 28 Jun 2023 11:45  Patient On (Oxygen Delivery Method): room air               Daily     Daily   LABS:                        10.5   8.49  )-----------( 184      ( 28 Jun 2023 05:11 )             33.8             06-28    146<H>  |  118<H>  |  23  ----------------------------<  181<H>  3.4<L>   |  21<L>  |  1.92<H>    Ca    7.7<L>      28 Jun 2023 05:11  Mg     1.5     06-28                  Urinalysis Basic - ( 28 Jun 2023 05:11 )    Color: x / Appearance: x / SG: x / pH: x  Gluc: 181 mg/dL / Ketone: x  / Bili: x / Urobili: x   Blood: x / Protein: x / Nitrite: x   Leuk Esterase: x / RBC: x / WBC x   Sq Epi: x / Non Sq Epi: x / Bacteria: x            CAPILLARY BLOOD GLUCOSE      POCT Blood Glucose.: 226 mg/dL (28 Jun 2023 11:55)        MEDICATIONS  (STANDING):  atorvastatin 80 milliGRAM(s) Oral at bedtime  cilostazol 100 milliGRAM(s) Oral two times a day  dextrose 5% + sodium chloride 0.45%. 1000 milliLiter(s) (100 mL/Hr) IV Continuous <Continuous>  dextrose 5%. 1000 milliLiter(s) (100 mL/Hr) IV Continuous <Continuous>  dextrose 5%. 1000 milliLiter(s) (50 mL/Hr) IV Continuous <Continuous>  dextrose 50% Injectable 25 Gram(s) IV Push once  dextrose 50% Injectable 12.5 Gram(s) IV Push once  dextrose 50% Injectable 25 Gram(s) IV Push once  epoetin shai-epbx (RETACRIT) Injectable 78406 Unit(s) SubCutaneous every 7 days  finasteride 5 milliGRAM(s) Oral daily  glucagon  Injectable 1 milliGRAM(s) IntraMuscular once  insulin lispro (ADMELOG) corrective regimen sliding scale   SubCutaneous three times a day before meals  insulin lispro (ADMELOG) corrective regimen sliding scale   SubCutaneous at bedtime  latanoprost 0.005% Ophthalmic Solution 1 Drop(s) Both EYES at bedtime  levothyroxine 75 MICROGram(s) Oral daily  magnesium sulfate  IVPB 1 Gram(s) IV Intermittent once  metoprolol tartrate 25 milliGRAM(s) Oral two times a day  mirtazapine 7.5 milliGRAM(s) Oral at bedtime  pantoprazole    Tablet 40 milliGRAM(s) Oral before breakfast  sodium bicarbonate 650 milliGRAM(s) Oral three times a day  tamsulosin 0.4 milliGRAM(s) Oral at bedtime    MEDICATIONS  (PRN):  acetaminophen     Tablet .. 650 milliGRAM(s) Oral every 6 hours PRN Temp greater or equal to 38C (100.4F), Mild Pain (1 - 3)  aluminum hydroxide/magnesium hydroxide/simethicone Suspension 30 milliLiter(s) Oral every 4 hours PRN Dyspepsia  dextrose Oral Gel 15 Gram(s) Oral once PRN Blood Glucose LESS THAN 70 milliGRAM(s)/deciliter  melatonin 3 milliGRAM(s) Oral at bedtime PRN Insomnia  ondansetron Injectable 4 milliGRAM(s) IV Push every 8 hours PRN Nausea and/or Vomiting

## 2023-06-29 LAB
ANION GAP SERPL CALC-SCNC: 7 MMOL/L — SIGNIFICANT CHANGE UP (ref 5–17)
BUN SERPL-MCNC: 21 MG/DL — SIGNIFICANT CHANGE UP (ref 7–23)
CALCIUM SERPL-MCNC: 7.7 MG/DL — LOW (ref 8.5–10.1)
CHLORIDE SERPL-SCNC: 118 MMOL/L — HIGH (ref 96–108)
CO2 SERPL-SCNC: 20 MMOL/L — LOW (ref 22–31)
CREAT SERPL-MCNC: 1.91 MG/DL — HIGH (ref 0.5–1.3)
EGFR: 33 ML/MIN/1.73M2 — LOW
GLUCOSE BLDC GLUCOMTR-MCNC: 222 MG/DL — HIGH (ref 70–99)
GLUCOSE BLDC GLUCOMTR-MCNC: 240 MG/DL — HIGH (ref 70–99)
GLUCOSE BLDC GLUCOMTR-MCNC: 267 MG/DL — HIGH (ref 70–99)
GLUCOSE BLDC GLUCOMTR-MCNC: 305 MG/DL — HIGH (ref 70–99)
GLUCOSE SERPL-MCNC: 221 MG/DL — HIGH (ref 70–99)
HCT VFR BLD CALC: 34.4 % — LOW (ref 39–50)
HGB BLD-MCNC: 10.5 G/DL — LOW (ref 13–17)
MAGNESIUM SERPL-MCNC: 1.8 MG/DL — SIGNIFICANT CHANGE UP (ref 1.6–2.6)
MCHC RBC-ENTMCNC: 28.5 PG — SIGNIFICANT CHANGE UP (ref 27–34)
MCHC RBC-ENTMCNC: 30.5 G/DL — LOW (ref 32–36)
MCV RBC AUTO: 93.2 FL — SIGNIFICANT CHANGE UP (ref 80–100)
NRBC # BLD: 0 /100 WBCS — SIGNIFICANT CHANGE UP (ref 0–0)
PLATELET # BLD AUTO: 192 K/UL — SIGNIFICANT CHANGE UP (ref 150–400)
POTASSIUM SERPL-MCNC: 3.9 MMOL/L — SIGNIFICANT CHANGE UP (ref 3.5–5.3)
POTASSIUM SERPL-SCNC: 3.9 MMOL/L — SIGNIFICANT CHANGE UP (ref 3.5–5.3)
RBC # BLD: 3.69 M/UL — LOW (ref 4.2–5.8)
RBC # FLD: 18.3 % — HIGH (ref 10.3–14.5)
SODIUM SERPL-SCNC: 145 MMOL/L — SIGNIFICANT CHANGE UP (ref 135–145)
WBC # BLD: 7.68 K/UL — SIGNIFICANT CHANGE UP (ref 3.8–10.5)
WBC # FLD AUTO: 7.68 K/UL — SIGNIFICANT CHANGE UP (ref 3.8–10.5)

## 2023-06-29 PROCEDURE — 99232 SBSQ HOSP IP/OBS MODERATE 35: CPT

## 2023-06-29 RX ADMIN — Medication 650 MILLIGRAM(S): at 13:02

## 2023-06-29 RX ADMIN — Medication 3: at 17:03

## 2023-06-29 RX ADMIN — LATANOPROST 1 DROP(S): 0.05 SOLUTION/ DROPS OPHTHALMIC; TOPICAL at 21:40

## 2023-06-29 RX ADMIN — Medication 25 MILLIGRAM(S): at 17:28

## 2023-06-29 RX ADMIN — Medication 75 MICROGRAM(S): at 05:42

## 2023-06-29 RX ADMIN — Medication 650 MILLIGRAM(S): at 21:41

## 2023-06-29 RX ADMIN — MIRTAZAPINE 7.5 MILLIGRAM(S): 45 TABLET, ORALLY DISINTEGRATING ORAL at 21:41

## 2023-06-29 RX ADMIN — CILOSTAZOL 100 MILLIGRAM(S): 100 TABLET ORAL at 05:42

## 2023-06-29 RX ADMIN — Medication 4: at 13:02

## 2023-06-29 RX ADMIN — CILOSTAZOL 100 MILLIGRAM(S): 100 TABLET ORAL at 17:29

## 2023-06-29 RX ADMIN — Medication 650 MILLIGRAM(S): at 05:42

## 2023-06-29 RX ADMIN — FINASTERIDE 5 MILLIGRAM(S): 5 TABLET, FILM COATED ORAL at 13:02

## 2023-06-29 RX ADMIN — PANTOPRAZOLE SODIUM 40 MILLIGRAM(S): 20 TABLET, DELAYED RELEASE ORAL at 08:36

## 2023-06-29 RX ADMIN — ATORVASTATIN CALCIUM 80 MILLIGRAM(S): 80 TABLET, FILM COATED ORAL at 21:41

## 2023-06-29 RX ADMIN — Medication 25 MILLIGRAM(S): at 05:42

## 2023-06-29 RX ADMIN — Medication 3 MILLIGRAM(S): at 21:41

## 2023-06-29 RX ADMIN — Medication 2: at 08:36

## 2023-06-29 NOTE — PROGRESS NOTE ADULT - SUBJECTIVE AND OBJECTIVE BOX
Pt comfortable   tolerating diet      MEDICATIONS  (STANDING):  atorvastatin 80 milliGRAM(s) Oral at bedtime  cilostazol 100 milliGRAM(s) Oral two times a day  dextrose 5% + sodium chloride 0.45%. 1000 milliLiter(s) (100 mL/Hr) IV Continuous <Continuous>  dextrose 5%. 1000 milliLiter(s) (50 mL/Hr) IV Continuous <Continuous>  dextrose 5%. 1000 milliLiter(s) (100 mL/Hr) IV Continuous <Continuous>  dextrose 50% Injectable 25 Gram(s) IV Push once  dextrose 50% Injectable 12.5 Gram(s) IV Push once  dextrose 50% Injectable 25 Gram(s) IV Push once  epoetin shai-epbx (RETACRIT) Injectable 57906 Unit(s) SubCutaneous every 7 days  finasteride 5 milliGRAM(s) Oral daily  glucagon  Injectable 1 milliGRAM(s) IntraMuscular once  insulin lispro (ADMELOG) corrective regimen sliding scale   SubCutaneous three times a day before meals  insulin lispro (ADMELOG) corrective regimen sliding scale   SubCutaneous at bedtime  latanoprost 0.005% Ophthalmic Solution 1 Drop(s) Both EYES at bedtime  levothyroxine 75 MICROGram(s) Oral daily  metoprolol tartrate 25 milliGRAM(s) Oral two times a day  mirtazapine 7.5 milliGRAM(s) Oral at bedtime  pantoprazole    Tablet 40 milliGRAM(s) Oral before breakfast  sodium bicarbonate 650 milliGRAM(s) Oral three times a day  tamsulosin 0.4 milliGRAM(s) Oral at bedtime    MEDICATIONS  (PRN):  acetaminophen     Tablet .. 650 milliGRAM(s) Oral every 6 hours PRN Temp greater or equal to 38C (100.4F), Mild Pain (1 - 3)  aluminum hydroxide/magnesium hydroxide/simethicone Suspension 30 milliLiter(s) Oral every 4 hours PRN Dyspepsia  dextrose Oral Gel 15 Gram(s) Oral once PRN Blood Glucose LESS THAN 70 milliGRAM(s)/deciliter  melatonin 3 milliGRAM(s) Oral at bedtime PRN Insomnia  ondansetron Injectable 4 milliGRAM(s) IV Push every 8 hours PRN Nausea and/or Vomiting      Allergies    No Known Allergies    Intolerances        Vital Signs Last 24 Hrs  T(C): 36.6 (29 Jun 2023 11:46), Max: 36.7 (28 Jun 2023 17:44)  T(F): 97.9 (29 Jun 2023 11:46), Max: 98 (28 Jun 2023 17:44)  HR: 76 (29 Jun 2023 11:46) (72 - 96)  BP: 125/75 (29 Jun 2023 11:46) (112/72 - 176/84)  BP(mean): --  RR: 19 (29 Jun 2023 11:46) (18 - 20)  SpO2: 96% (29 Jun 2023 11:46) (95% - 98%)    Parameters below as of 29 Jun 2023 11:46  Patient On (Oxygen Delivery Method): room air        PHYSICAL EXAM:  General: NAD.  CVS: S1, S2  Chest: air entry bilaterally present  Abd: BS present, soft, non-tender      LABS:                        10.5   7.68  )-----------( 192      ( 29 Jun 2023 05:25 )             34.4     06-29    145  |  118<H>  |  21  ----------------------------<  221<H>  3.9   |  20<L>  |  1.91<H>    Ca    7.7<L>      29 Jun 2023 05:25  Mg     1.8     06-29        CBC stable

## 2023-06-29 NOTE — CHART NOTE - NSCHARTNOTEFT_GEN_A_CORE
Goals remain clear at this time, DNR/DNI/DNH, comfort measures only. Patient asymptomatic. Awaiting placement w hospice care. Palliative will sign off, please reconsult as needed.

## 2023-06-29 NOTE — PROGRESS NOTE ADULT - SUBJECTIVE AND OBJECTIVE BOX
CHIEF COMPLAINT: Follow up of right foot gangrene, malnutrition, anemia, Diabetes Mellitus Type 2  no fever  comfortable  no nausea or vomiting  no new overnight events reported     PHYSICAL EXAM:    GENERAL: Malnourished, no acute distress   CHEST/LUNG:  No wheezing, no crackles   HEART: Regular rate and rhythm; +SM  ABDOMEN: Soft, Nontender, Nondistended; Bowel sounds present  EXTREMITIES:  No clubbing, cyanosis, or edema. Right foot dressing   Psychiatry: SWETHA reyes his name only.        OBJECTIVE DATA:     Vital Signs Last 24 Hrs  T(C): 36.4 (29 Jun 2023 04:55), Max: 36.7 (28 Jun 2023 17:44)  T(F): 97.6 (29 Jun 2023 04:55), Max: 98 (28 Jun 2023 17:44)  HR: 96 (29 Jun 2023 04:55) (72 - 96)  BP: 176/84 (29 Jun 2023 04:55) (112/72 - 176/84)  BP(mean): --  RR: 20 (29 Jun 2023 04:55) (18 - 20)  SpO2: 98% (29 Jun 2023 04:55) (95% - 98%)    Parameters below as of 29 Jun 2023 04:55  Patient On (Oxygen Delivery Method): room air               Daily     Daily   LABS:                        10.5   7.68  )-----------( 192      ( 29 Jun 2023 05:25 )             34.4             06-29    145  |  118<H>  |  21  ----------------------------<  221<H>  3.9   |  20<L>  |  1.91<H>    Ca    7.7<L>      29 Jun 2023 05:25  Mg     1.8     06-29                  Urinalysis Basic - ( 29 Jun 2023 05:25 )    Color: x / Appearance: x / SG: x / pH: x  Gluc: 221 mg/dL / Ketone: x  / Bili: x / Urobili: x   Blood: x / Protein: x / Nitrite: x   Leuk Esterase: x / RBC: x / WBC x   Sq Epi: x / Non Sq Epi: x / Bacteria: x           CAPILLARY BLOOD GLUCOSE      POCT Blood Glucose.: 240 mg/dL (29 Jun 2023 08:04)          MEDICATIONS  (STANDING):  atorvastatin 80 milliGRAM(s) Oral at bedtime  cilostazol 100 milliGRAM(s) Oral two times a day  dextrose 5% + sodium chloride 0.45%. 1000 milliLiter(s) (100 mL/Hr) IV Continuous <Continuous>  dextrose 5%. 1000 milliLiter(s) (50 mL/Hr) IV Continuous <Continuous>  dextrose 5%. 1000 milliLiter(s) (100 mL/Hr) IV Continuous <Continuous>  dextrose 50% Injectable 25 Gram(s) IV Push once  dextrose 50% Injectable 12.5 Gram(s) IV Push once  dextrose 50% Injectable 25 Gram(s) IV Push once  epoetin shai-epbx (RETACRIT) Injectable 93302 Unit(s) SubCutaneous every 7 days  finasteride 5 milliGRAM(s) Oral daily  glucagon  Injectable 1 milliGRAM(s) IntraMuscular once  insulin lispro (ADMELOG) corrective regimen sliding scale   SubCutaneous three times a day before meals  insulin lispro (ADMELOG) corrective regimen sliding scale   SubCutaneous at bedtime  latanoprost 0.005% Ophthalmic Solution 1 Drop(s) Both EYES at bedtime  levothyroxine 75 MICROGram(s) Oral daily  metoprolol tartrate 25 milliGRAM(s) Oral two times a day  mirtazapine 7.5 milliGRAM(s) Oral at bedtime  pantoprazole    Tablet 40 milliGRAM(s) Oral before breakfast  sodium bicarbonate 650 milliGRAM(s) Oral three times a day  tamsulosin 0.4 milliGRAM(s) Oral at bedtime    MEDICATIONS  (PRN):  acetaminophen     Tablet .. 650 milliGRAM(s) Oral every 6 hours PRN Temp greater or equal to 38C (100.4F), Mild Pain (1 - 3)  aluminum hydroxide/magnesium hydroxide/simethicone Suspension 30 milliLiter(s) Oral every 4 hours PRN Dyspepsia  dextrose Oral Gel 15 Gram(s) Oral once PRN Blood Glucose LESS THAN 70 milliGRAM(s)/deciliter  melatonin 3 milliGRAM(s) Oral at bedtime PRN Insomnia  ondansetron Injectable 4 milliGRAM(s) IV Push every 8 hours PRN Nausea and/or Vomiting

## 2023-06-29 NOTE — PROGRESS NOTE ADULT - ASSESSMENT
89y Male with significant PMH of HTN, CAD, s/p stent in 2009, HPL, DM-2, CKD-stage III, CVA with muscle wasting, H/o prostate CA s/p TURP, Glaucoma, B/L muscle atrophy and others was BIBA in ED from home with coffee ground emesis x 2. Treated for R foot gangrene and UTI.      # Hypernatremia / Severe Protein Calorie Malnutrition- encourage po intake.  improved. on IVF.     #RT heel gangrene  - Appreciate vascular and podiatry recs  - discussed with ID>> no more antibiotic. patient is palliative care.     # Poor appetite - pt's poor appetite, progressive decline d/w daughter.  She states pt and family opt against PEG placement.  Agreed with trial of Remeron.      #UTI  - U/A positive, s/p CTX    # Acute worsening of CKD stage III- likely pre-renal  Cr 2.54 ( was 1.44 in 01/2023).  creatinine no significant change. no more labs per daughter.     # Acute blood loss anemia with hematemesis with possible PUD, erossive gastritis/duodenitis/esophagitis or AVMs.  Hb stable (with baseline Hb 10.0-11.3). GI recs reviewed.     # DM-2 with hyperglycemia.  controlled.  Follow finger sticks     # HTN, CAD, s/p stent and HPL.  Stable and no acute issue.  Continue his Metoprolol, Losartan and Atorvastatin.    # H/o Prostate cancer and TURP.  On Flomax and Finasteride.    # Hypothyroidism   On Levothyroxine.    #Functional Quadriplegia and severe PCM- supportive care.     # DVT prophylaxis: SCDs for now.    CODE STATUS- DNR/DNI. Comfort measures only.   Plan of care d/w daughter Shavonne [phone]   Dispo: Placement with palliative care.

## 2023-06-30 LAB
FLUAV AG NPH QL: SIGNIFICANT CHANGE UP
FLUBV AG NPH QL: SIGNIFICANT CHANGE UP
GLUCOSE BLDC GLUCOMTR-MCNC: 200 MG/DL — HIGH (ref 70–99)
GLUCOSE BLDC GLUCOMTR-MCNC: 205 MG/DL — HIGH (ref 70–99)
GLUCOSE BLDC GLUCOMTR-MCNC: 214 MG/DL — HIGH (ref 70–99)
GLUCOSE BLDC GLUCOMTR-MCNC: 226 MG/DL — HIGH (ref 70–99)
GLUCOSE BLDC GLUCOMTR-MCNC: 243 MG/DL — HIGH (ref 70–99)
SARS-COV-2 RNA SPEC QL NAA+PROBE: SIGNIFICANT CHANGE UP

## 2023-06-30 PROCEDURE — 99232 SBSQ HOSP IP/OBS MODERATE 35: CPT

## 2023-06-30 RX ORDER — SODIUM CHLORIDE 9 MG/ML
1000 INJECTION, SOLUTION INTRAVENOUS
Refills: 0 | Status: DISCONTINUED | OUTPATIENT
Start: 2023-06-30 | End: 2023-07-03

## 2023-06-30 RX ORDER — INSULIN LISPRO 100/ML
VIAL (ML) SUBCUTANEOUS
Refills: 0 | Status: DISCONTINUED | OUTPATIENT
Start: 2023-06-30 | End: 2023-07-03

## 2023-06-30 RX ADMIN — Medication 650 MILLIGRAM(S): at 14:05

## 2023-06-30 RX ADMIN — Medication 650 MILLIGRAM(S): at 22:32

## 2023-06-30 RX ADMIN — SODIUM CHLORIDE 50 MILLILITER(S): 9 INJECTION, SOLUTION INTRAVENOUS at 22:33

## 2023-06-30 RX ADMIN — LATANOPROST 1 DROP(S): 0.05 SOLUTION/ DROPS OPHTHALMIC; TOPICAL at 22:34

## 2023-06-30 RX ADMIN — Medication 650 MILLIGRAM(S): at 06:06

## 2023-06-30 RX ADMIN — SODIUM CHLORIDE 50 MILLILITER(S): 9 INJECTION, SOLUTION INTRAVENOUS at 09:08

## 2023-06-30 RX ADMIN — Medication 2: at 17:31

## 2023-06-30 RX ADMIN — Medication 25 MILLIGRAM(S): at 17:32

## 2023-06-30 RX ADMIN — CILOSTAZOL 100 MILLIGRAM(S): 100 TABLET ORAL at 06:06

## 2023-06-30 RX ADMIN — FINASTERIDE 5 MILLIGRAM(S): 5 TABLET, FILM COATED ORAL at 12:35

## 2023-06-30 RX ADMIN — Medication 25 MILLIGRAM(S): at 06:07

## 2023-06-30 RX ADMIN — ATORVASTATIN CALCIUM 80 MILLIGRAM(S): 80 TABLET, FILM COATED ORAL at 22:32

## 2023-06-30 RX ADMIN — Medication 75 MICROGRAM(S): at 06:06

## 2023-06-30 RX ADMIN — CILOSTAZOL 100 MILLIGRAM(S): 100 TABLET ORAL at 17:32

## 2023-06-30 RX ADMIN — MIRTAZAPINE 7.5 MILLIGRAM(S): 45 TABLET, ORALLY DISINTEGRATING ORAL at 22:32

## 2023-06-30 RX ADMIN — Medication 2: at 08:27

## 2023-06-30 RX ADMIN — TAMSULOSIN HYDROCHLORIDE 0.4 MILLIGRAM(S): 0.4 CAPSULE ORAL at 22:32

## 2023-06-30 NOTE — PROGRESS NOTE ADULT - SUBJECTIVE AND OBJECTIVE BOX
No changes - no bleeding  minimally responsive  Pt is DNR  awaiting hospice      MEDICATIONS  (STANDING):  atorvastatin 80 milliGRAM(s) Oral at bedtime  cilostazol 100 milliGRAM(s) Oral two times a day  dextrose 5% + sodium chloride 0.45%. 1000 milliLiter(s) (50 mL/Hr) IV Continuous <Continuous>  dextrose 5%. 1000 milliLiter(s) (100 mL/Hr) IV Continuous <Continuous>  dextrose 5%. 1000 milliLiter(s) (50 mL/Hr) IV Continuous <Continuous>  dextrose 50% Injectable 25 Gram(s) IV Push once  dextrose 50% Injectable 12.5 Gram(s) IV Push once  dextrose 50% Injectable 25 Gram(s) IV Push once  epoetin shai-epbx (RETACRIT) Injectable 99454 Unit(s) SubCutaneous every 7 days  finasteride 5 milliGRAM(s) Oral daily  glucagon  Injectable 1 milliGRAM(s) IntraMuscular once  insulin lispro (ADMELOG) corrective regimen sliding scale   SubCutaneous two times a day before meals  latanoprost 0.005% Ophthalmic Solution 1 Drop(s) Both EYES at bedtime  levothyroxine 75 MICROGram(s) Oral daily  metoprolol tartrate 25 milliGRAM(s) Oral two times a day  mirtazapine 7.5 milliGRAM(s) Oral at bedtime  pantoprazole    Tablet 40 milliGRAM(s) Oral before breakfast  sodium bicarbonate 650 milliGRAM(s) Oral three times a day  tamsulosin 0.4 milliGRAM(s) Oral at bedtime    MEDICATIONS  (PRN):  acetaminophen     Tablet .. 650 milliGRAM(s) Oral every 6 hours PRN Temp greater or equal to 38C (100.4F), Mild Pain (1 - 3)  aluminum hydroxide/magnesium hydroxide/simethicone Suspension 30 milliLiter(s) Oral every 4 hours PRN Dyspepsia  dextrose Oral Gel 15 Gram(s) Oral once PRN Blood Glucose LESS THAN 70 milliGRAM(s)/deciliter  melatonin 3 milliGRAM(s) Oral at bedtime PRN Insomnia  ondansetron Injectable 4 milliGRAM(s) IV Push every 8 hours PRN Nausea and/or Vomiting      Allergies    No Known Allergies    Intolerances        Vital Signs Last 24 Hrs  T(C): 36.4 (30 Jun 2023 11:36), Max: 37.6 (30 Jun 2023 05:37)  T(F): 97.5 (30 Jun 2023 11:36), Max: 99.7 (30 Jun 2023 05:37)  HR: 74 (30 Jun 2023 11:36) (74 - 94)  BP: 128/76 (30 Jun 2023 11:36) (124/73 - 155/82)  BP(mean): --  RR: 19 (30 Jun 2023 11:36) (18 - 19)  SpO2: 94% (30 Jun 2023 11:36) (94% - 96%)    Parameters below as of 30 Jun 2023 11:36  Patient On (Oxygen Delivery Method): room air        PHYSICAL EXAM:  General: NAD.  CVS: S1, S2  Chest: air entry bilaterally present  Abd: BS present, soft, non-tender      LABS:                        10.5   7.68  )-----------( 192      ( 29 Jun 2023 05:25 )             34.4     06-29    145  |  118<H>  |  21  ----------------------------<  221<H>  3.9   |  20<L>  |  1.91<H>    Ca    7.7<L>      29 Jun 2023 05:25  Mg     1.8     06-29        No acute GI problem at this time  Awaiting hospice  Please re-consult GI if needed

## 2023-06-30 NOTE — PROGRESS NOTE ADULT - ASSESSMENT
89y Male with significant PMH of HTN, CAD, s/p stent in 2009, HPL, DM-2, CKD-stage III, CVA with muscle wasting, H/o prostate CA s/p TURP, Glaucoma, B/L muscle atrophy and others was BIBA in ED from home with coffee ground emesis x 2. Treated for R foot gangrene and UTI.      # Hypernatremia / Severe Protein Calorie Malnutrition- encourage po intake.  improved. Decrease IVF to 50 ml/hr.     #RT heel gangrene  - Appreciate vascular and podiatry recs  - discussed with ID>> no more antibiotic. patient is palliative care.     # Poor appetite - pt's poor appetite, progressive decline d/w daughter.  She states pt and family opt against PEG placement.  Agreed with trial of Remeron.      #UTI  - U/A positive, s/p CTX    # Acute worsening of CKD stage III- likely pre-renal  Cr 2.54 ( was 1.44 in 01/2023).  creatinine no significant change. no more labs per daughter.     # Acute blood loss anemia with hematemesis with possible PUD, erossive gastritis/duodenitis/esophagitis or AVMs.  Hb stable (with baseline Hb 10.0-11.3). GI recs reviewed.     # DM-2 with hyperglycemia.  controlled.  Decrease finger sticks to BID.     # HTN, CAD, s/p stent and HPL.  Stable and no acute issue.  Continue his Metoprolol, Losartan and Atorvastatin.    # H/o Prostate cancer and TURP.  On Flomax and Finasteride.    # Hypothyroidism   On Levothyroxine.    #Functional Quadriplegia and severe PCM- supportive care.     # Moderate PCM: Cont current diet.     # DVT prophylaxis: SCDs for now.    CODE STATUS- DNR/DNI. Comfort measures only.   Plan of care d/w daughter Shavonne [phone] on 6/30  Dispo: Placement with palliative care.

## 2023-06-30 NOTE — PROGRESS NOTE ADULT - SUBJECTIVE AND OBJECTIVE BOX
CHIEF COMPLAINT: Follow up of right foot gangrene, malnutrition, anemia, Diabetes Mellitus Type 2  no fever  comfortable  no nausea or vomiting    PHYSICAL EXAM:    GENERAL: Malnourished, no acute distress   CHEST/LUNG:  No wheezing, no crackles   HEART: Regular rate and rhythm; +SM  ABDOMEN: Soft, Nontender, Nondistended; Bowel sounds present  EXTREMITIES:  No clubbing, cyanosis, or edema. Right foot dressing   Psychiatry: SWETHA reyes his name only.        OBJECTIVE DATA:     Vital Signs Last 24 Hrs  T(C): 36.4 (30 Jun 2023 11:36), Max: 37.6 (30 Jun 2023 05:37)  T(F): 97.5 (30 Jun 2023 11:36), Max: 99.7 (30 Jun 2023 05:37)  HR: 74 (30 Jun 2023 11:36) (74 - 94)  BP: 128/76 (30 Jun 2023 11:36) (124/73 - 155/82)  BP(mean): --  RR: 19 (30 Jun 2023 11:36) (18 - 19)  SpO2: 94% (30 Jun 2023 11:36) (94% - 96%)    Parameters below as of 30 Jun 2023 11:36  Patient On (Oxygen Delivery Method): room air               Daily     Daily   LABS:                        10.5   7.68  )-----------( 192      ( 29 Jun 2023 05:25 )             34.4             06-29    145  |  118<H>  |  21  ----------------------------<  221<H>  3.9   |  20<L>  |  1.91<H>    Ca    7.7<L>      29 Jun 2023 05:25  Mg     1.8     06-29                  Urinalysis Basic - ( 29 Jun 2023 05:25 )    Color: x / Appearance: x / SG: x / pH: x  Gluc: 221 mg/dL / Ketone: x  / Bili: x / Urobili: x   Blood: x / Protein: x / Nitrite: x   Leuk Esterase: x / RBC: x / WBC x   Sq Epi: x / Non Sq Epi: x / Bacteria: x           CAPILLARY BLOOD GLUCOSE      POCT Blood Glucose.: 200 mg/dL (30 Jun 2023 11:53)         MEDICATIONS  (STANDING):  atorvastatin 80 milliGRAM(s) Oral at bedtime  cilostazol 100 milliGRAM(s) Oral two times a day  dextrose 5% + sodium chloride 0.45%. 1000 milliLiter(s) (50 mL/Hr) IV Continuous <Continuous>  dextrose 5%. 1000 milliLiter(s) (100 mL/Hr) IV Continuous <Continuous>  dextrose 5%. 1000 milliLiter(s) (50 mL/Hr) IV Continuous <Continuous>  dextrose 50% Injectable 25 Gram(s) IV Push once  dextrose 50% Injectable 12.5 Gram(s) IV Push once  dextrose 50% Injectable 25 Gram(s) IV Push once  epoetin shai-epbx (RETACRIT) Injectable 22136 Unit(s) SubCutaneous every 7 days  finasteride 5 milliGRAM(s) Oral daily  glucagon  Injectable 1 milliGRAM(s) IntraMuscular once  insulin lispro (ADMELOG) corrective regimen sliding scale   SubCutaneous two times a day before meals  latanoprost 0.005% Ophthalmic Solution 1 Drop(s) Both EYES at bedtime  levothyroxine 75 MICROGram(s) Oral daily  metoprolol tartrate 25 milliGRAM(s) Oral two times a day  mirtazapine 7.5 milliGRAM(s) Oral at bedtime  pantoprazole    Tablet 40 milliGRAM(s) Oral before breakfast  sodium bicarbonate 650 milliGRAM(s) Oral three times a day  tamsulosin 0.4 milliGRAM(s) Oral at bedtime    MEDICATIONS  (PRN):  acetaminophen     Tablet .. 650 milliGRAM(s) Oral every 6 hours PRN Temp greater or equal to 38C (100.4F), Mild Pain (1 - 3)  aluminum hydroxide/magnesium hydroxide/simethicone Suspension 30 milliLiter(s) Oral every 4 hours PRN Dyspepsia  dextrose Oral Gel 15 Gram(s) Oral once PRN Blood Glucose LESS THAN 70 milliGRAM(s)/deciliter  melatonin 3 milliGRAM(s) Oral at bedtime PRN Insomnia  ondansetron Injectable 4 milliGRAM(s) IV Push every 8 hours PRN Nausea and/or Vomiting

## 2023-07-01 LAB
GLUCOSE BLDC GLUCOMTR-MCNC: 194 MG/DL — HIGH (ref 70–99)
GLUCOSE BLDC GLUCOMTR-MCNC: 231 MG/DL — HIGH (ref 70–99)

## 2023-07-01 PROCEDURE — 99232 SBSQ HOSP IP/OBS MODERATE 35: CPT

## 2023-07-01 RX ADMIN — SODIUM CHLORIDE 50 MILLILITER(S): 9 INJECTION, SOLUTION INTRAVENOUS at 06:16

## 2023-07-01 RX ADMIN — FINASTERIDE 5 MILLIGRAM(S): 5 TABLET, FILM COATED ORAL at 14:01

## 2023-07-01 RX ADMIN — Medication 2: at 16:36

## 2023-07-01 RX ADMIN — CILOSTAZOL 100 MILLIGRAM(S): 100 TABLET ORAL at 06:22

## 2023-07-01 RX ADMIN — SODIUM CHLORIDE 50 MILLILITER(S): 9 INJECTION, SOLUTION INTRAVENOUS at 17:32

## 2023-07-01 RX ADMIN — Medication 650 MILLIGRAM(S): at 21:22

## 2023-07-01 RX ADMIN — Medication 650 MILLIGRAM(S): at 06:23

## 2023-07-01 RX ADMIN — MIRTAZAPINE 7.5 MILLIGRAM(S): 45 TABLET, ORALLY DISINTEGRATING ORAL at 21:22

## 2023-07-01 RX ADMIN — ATORVASTATIN CALCIUM 80 MILLIGRAM(S): 80 TABLET, FILM COATED ORAL at 21:22

## 2023-07-01 RX ADMIN — TAMSULOSIN HYDROCHLORIDE 0.4 MILLIGRAM(S): 0.4 CAPSULE ORAL at 21:22

## 2023-07-01 RX ADMIN — LATANOPROST 1 DROP(S): 0.05 SOLUTION/ DROPS OPHTHALMIC; TOPICAL at 21:23

## 2023-07-01 RX ADMIN — PANTOPRAZOLE SODIUM 40 MILLIGRAM(S): 20 TABLET, DELAYED RELEASE ORAL at 06:27

## 2023-07-01 RX ADMIN — Medication 25 MILLIGRAM(S): at 06:23

## 2023-07-01 RX ADMIN — CILOSTAZOL 100 MILLIGRAM(S): 100 TABLET ORAL at 17:32

## 2023-07-01 RX ADMIN — Medication 1: at 08:24

## 2023-07-01 RX ADMIN — SODIUM CHLORIDE 50 MILLILITER(S): 9 INJECTION, SOLUTION INTRAVENOUS at 01:40

## 2023-07-01 RX ADMIN — Medication 75 MICROGRAM(S): at 06:22

## 2023-07-01 RX ADMIN — Medication 25 MILLIGRAM(S): at 17:32

## 2023-07-01 RX ADMIN — Medication 650 MILLIGRAM(S): at 14:01

## 2023-07-01 NOTE — PROGRESS NOTE ADULT - SUBJECTIVE AND OBJECTIVE BOX
CHIEF COMPLAINT: Follow up of right foot gangrene, malnutrition, anemia, Diabetes Mellitus Type 2  no fever  no nausea or vomiting  no new overnight events     PHYSICAL EXAM:    GENERAL: Malnourished, no acute distress   CHEST/LUNG:  No wheezing, no crackles   HEART: Regular rate and rhythm; +SM  ABDOMEN: Soft, Nontender, Nondistended; Bowel sounds present  EXTREMITIES:  No clubbing, cyanosis, or edema. Right foot dressing   Psychiatry: AAO x his name only.        OBJECTIVE DATA:       Vital Signs Last 24 Hrs  T(C): 37.4 (01 Jul 2023 05:15), Max: 37.4 (30 Jun 2023 23:22)  T(F): 99.3 (01 Jul 2023 05:15), Max: 99.4 (30 Jun 2023 23:22)  HR: 89 (01 Jul 2023 05:15) (74 - 98)  BP: 142/91 (01 Jul 2023 05:15) (128/76 - 148/88)  BP(mean): --  RR: 19 (01 Jul 2023 05:15) (18 - 19)  SpO2: 97% (01 Jul 2023 05:15) (94% - 98%)    Parameters below as of 30 Jun 2023 17:21  Patient On (Oxygen Delivery Method): room air               Daily     Daily   LABS:                                     CAPILLARY BLOOD GLUCOSE      POCT Blood Glucose.: 194 mg/dL (01 Jul 2023 07:55)         MEDICATIONS  (STANDING):  atorvastatin 80 milliGRAM(s) Oral at bedtime  cilostazol 100 milliGRAM(s) Oral two times a day  dextrose 5% + sodium chloride 0.45%. 1000 milliLiter(s) (50 mL/Hr) IV Continuous <Continuous>  dextrose 5%. 1000 milliLiter(s) (100 mL/Hr) IV Continuous <Continuous>  dextrose 5%. 1000 milliLiter(s) (50 mL/Hr) IV Continuous <Continuous>  dextrose 50% Injectable 25 Gram(s) IV Push once  dextrose 50% Injectable 12.5 Gram(s) IV Push once  dextrose 50% Injectable 25 Gram(s) IV Push once  epoetin shai-epbx (RETACRIT) Injectable 23871 Unit(s) SubCutaneous every 7 days  finasteride 5 milliGRAM(s) Oral daily  glucagon  Injectable 1 milliGRAM(s) IntraMuscular once  insulin lispro (ADMELOG) corrective regimen sliding scale   SubCutaneous two times a day before meals  latanoprost 0.005% Ophthalmic Solution 1 Drop(s) Both EYES at bedtime  levothyroxine 75 MICROGram(s) Oral daily  metoprolol tartrate 25 milliGRAM(s) Oral two times a day  mirtazapine 7.5 milliGRAM(s) Oral at bedtime  pantoprazole    Tablet 40 milliGRAM(s) Oral before breakfast  sodium bicarbonate 650 milliGRAM(s) Oral three times a day  tamsulosin 0.4 milliGRAM(s) Oral at bedtime    MEDICATIONS  (PRN):  acetaminophen     Tablet .. 650 milliGRAM(s) Oral every 6 hours PRN Temp greater or equal to 38C (100.4F), Mild Pain (1 - 3)  aluminum hydroxide/magnesium hydroxide/simethicone Suspension 30 milliLiter(s) Oral every 4 hours PRN Dyspepsia  dextrose Oral Gel 15 Gram(s) Oral once PRN Blood Glucose LESS THAN 70 milliGRAM(s)/deciliter  melatonin 3 milliGRAM(s) Oral at bedtime PRN Insomnia  ondansetron Injectable 4 milliGRAM(s) IV Push every 8 hours PRN Nausea and/or Vomiting

## 2023-07-01 NOTE — PROGRESS NOTE ADULT - ASSESSMENT
89y Male with significant PMH of HTN, CAD, s/p stent in 2009, HPL, DM-2, CKD-stage III, CVA with muscle wasting, H/o prostate CA s/p TURP, Glaucoma, B/L muscle atrophy and others was BIBA in ED from home with coffee ground emesis x 2. Treated for R foot gangrene and UTI.      # Hypernatremia / Severe Protein Calorie Malnutrition- encourage po intake.  improved. Cont IVF at 50 ml/hr.     #RT heel gangrene  - Appreciate vascular and podiatry recs  - discussed with ID>> no more antibiotic. patient is palliative care.     # Poor appetite - pt's poor appetite, progressive decline d/w daughter.  She states pt and family opt against PEG placement.  Agreed with trial of Remeron.      #UTI  - U/A positive, s/p CTX    # Acute worsening of CKD stage III- likely pre-renal  Cr 2.54 ( was 1.44 in 01/2023).  creatinine no significant change. no more labs per daughter.     # Acute blood loss anemia with hematemesis with possible PUD, erossive gastritis/duodenitis/esophagitis or AVMs.  Hb stable (with baseline Hb 10.0-11.3). GI recs reviewed.     # DM-2 with hyperglycemia.  controlled.  Decrease finger sticks to BID.     # HTN, CAD, s/p stent and HPL.  Stable and no acute issue.  Continue his Metoprolol, Losartan and Atorvastatin.    # H/o Prostate cancer and TURP.  cont current meds.     # Hypothyroidism   cont current medication.     #Functional Quadriplegia and severe PCM- supportive care.     # Moderate PCM: Cont current diet.     # DVT prophylaxis: SCDs for now.    CODE STATUS- DNR/DNI. Comfort measures only.   Plan of care d/w daughter Shavonne [phone].  Dispo: Placement with palliative care. Pending peer to peer.

## 2023-07-02 LAB
GLUCOSE BLDC GLUCOMTR-MCNC: 172 MG/DL — HIGH (ref 70–99)
GLUCOSE BLDC GLUCOMTR-MCNC: 179 MG/DL — HIGH (ref 70–99)

## 2023-07-02 PROCEDURE — 99232 SBSQ HOSP IP/OBS MODERATE 35: CPT

## 2023-07-02 RX ADMIN — FINASTERIDE 5 MILLIGRAM(S): 5 TABLET, FILM COATED ORAL at 13:39

## 2023-07-02 RX ADMIN — Medication 650 MILLIGRAM(S): at 13:39

## 2023-07-02 RX ADMIN — Medication 25 MILLIGRAM(S): at 05:39

## 2023-07-02 RX ADMIN — ATORVASTATIN CALCIUM 80 MILLIGRAM(S): 80 TABLET, FILM COATED ORAL at 21:59

## 2023-07-02 RX ADMIN — Medication 1: at 16:55

## 2023-07-02 RX ADMIN — CILOSTAZOL 100 MILLIGRAM(S): 100 TABLET ORAL at 05:39

## 2023-07-02 RX ADMIN — SODIUM CHLORIDE 50 MILLILITER(S): 9 INJECTION, SOLUTION INTRAVENOUS at 13:38

## 2023-07-02 RX ADMIN — CILOSTAZOL 100 MILLIGRAM(S): 100 TABLET ORAL at 17:00

## 2023-07-02 RX ADMIN — LATANOPROST 1 DROP(S): 0.05 SOLUTION/ DROPS OPHTHALMIC; TOPICAL at 21:59

## 2023-07-02 RX ADMIN — Medication 650 MILLIGRAM(S): at 21:59

## 2023-07-02 RX ADMIN — PANTOPRAZOLE SODIUM 40 MILLIGRAM(S): 20 TABLET, DELAYED RELEASE ORAL at 08:37

## 2023-07-02 RX ADMIN — MIRTAZAPINE 7.5 MILLIGRAM(S): 45 TABLET, ORALLY DISINTEGRATING ORAL at 21:58

## 2023-07-02 RX ADMIN — Medication 25 MILLIGRAM(S): at 17:00

## 2023-07-02 RX ADMIN — TAMSULOSIN HYDROCHLORIDE 0.4 MILLIGRAM(S): 0.4 CAPSULE ORAL at 21:59

## 2023-07-02 RX ADMIN — Medication 650 MILLIGRAM(S): at 05:39

## 2023-07-02 RX ADMIN — Medication 1: at 08:37

## 2023-07-02 RX ADMIN — Medication 75 MICROGRAM(S): at 05:39

## 2023-07-02 NOTE — PROGRESS NOTE ADULT - NUTRITIONAL ASSESSMENT
This patient has been assessed with a concern for Malnutrition and has been determined to have a diagnosis/diagnoses of Moderate protein-calorie malnutrition.    This patient is being managed with:   Diet Consistent Carbohydrate w/Evening Snack-  Minced and Moist (MINCEDMOIST)  Supplement Feeding Modality:  Oral  Glucerna Shake Cans or Servings Per Day:  3       Frequency:  Three Times a day  Entered: Jun 25 2023  5:13PM  
This patient has been assessed with a concern for Malnutrition and has been determined to have a diagnosis/diagnoses of Severe protein-calorie malnutrition and Underweight (BMI < 19).    This patient is being managed with:   Diet Minced and Moist-  Consistent Carbohydrate {Evening Snack}  Mildly Thick Liquids (MILDTHICKLIQS)  Low Sodium  Entered: Al 10 2023 11:51AM    
This patient has been assessed with a concern for Malnutrition and has been determined to have a diagnosis/diagnoses of Moderate protein-calorie malnutrition.    This patient is being managed with:   Diet Clear Liquid-  Entered: Cam 15 2023  5:25AM  
This patient has been assessed with a concern for Malnutrition and has been determined to have a diagnosis/diagnoses of Moderate protein-calorie malnutrition.    This patient is being managed with:   Diet Consistent Carbohydrate w/Evening Snack-  Minced and Moist (MINCEDMOIST)  Entered: Jun 19 2023  7:09PM  
This patient has been assessed with a concern for Malnutrition and has been determined to have a diagnosis/diagnoses of Moderate protein-calorie malnutrition.    This patient is being managed with:   Diet Consistent Carbohydrate w/Evening Snack-  Minced and Moist (MINCEDMOIST)  Supplement Feeding Modality:  Oral  Glucerna Shake Cans or Servings Per Day:  3       Frequency:  Three Times a day  Entered: Jun 25 2023  5:13PM  
This patient has been assessed with a concern for Malnutrition and has been determined to have a diagnosis/diagnoses of Severe protein-calorie malnutrition and Underweight (BMI < 19).    This patient is being managed with:   Diet Minced and Moist-  Consistent Carbohydrate {Evening Snack}  Mildly Thick Liquids (MILDTHICKLIQS)  Low Sodium  Entered: Al 10 2023 11:51AM    
This patient has been assessed with a concern for Malnutrition and has been determined to have a diagnosis/diagnoses of Moderate protein-calorie malnutrition.    This patient is being managed with:   Diet Consistent Carbohydrate w/Evening Snack-  Minced and Moist (MINCEDMOIST)  Entered: Jun 19 2023  7:09PM  
This patient has been assessed with a concern for Malnutrition and has been determined to have a diagnosis/diagnoses of Moderate protein-calorie malnutrition.    This patient is being managed with:   Diet Consistent Carbohydrate w/Evening Snack-  Minced and Moist (MINCEDMOIST)  Supplement Feeding Modality:  Oral  Glucerna Shake Cans or Servings Per Day:  3       Frequency:  Three Times a day  Entered: Jun 25 2023  5:13PM  
This patient has been assessed with a concern for Malnutrition and has been determined to have a diagnosis/diagnoses of Severe protein-calorie malnutrition and Underweight (BMI < 19).    This patient is being managed with:   Diet Minced and Moist-  Consistent Carbohydrate {Evening Snack}  Mildly Thick Liquids (MILDTHICKLIQS)  Low Sodium  Entered: Al 10 2023 11:51AM    
This patient has been assessed with a concern for Malnutrition and has been determined to have a diagnosis/diagnoses of Moderate protein-calorie malnutrition.    This patient is being managed with:   Diet Consistent Carbohydrate w/Evening Snack-  Minced and Moist (MINCEDMOIST)  Entered: Jun 19 2023  7:09PM  
This patient has been assessed with a concern for Malnutrition and has been determined to have a diagnosis/diagnoses of Moderate protein-calorie malnutrition.    This patient is being managed with:   Diet Consistent Carbohydrate w/Evening Snack-  Minced and Moist (MINCEDMOIST)  Supplement Feeding Modality:  Oral  Glucerna Shake Cans or Servings Per Day:  3       Frequency:  Three Times a day  Entered: Jun 25 2023  5:13PM  
This patient has been assessed with a concern for Malnutrition and has been determined to have a diagnosis/diagnoses of Moderate protein-calorie malnutrition.    This patient is being managed with:   Diet Consistent Carbohydrate w/Evening Snack-  Minced and Moist (MINCEDMOIST)  Entered: Jun 19 2023  7:09PM  
This patient has been assessed with a concern for Malnutrition and has been determined to have a diagnosis/diagnoses of Moderate protein-calorie malnutrition.    This patient is being managed with:   Diet Consistent Carbohydrate w/Evening Snack-  Minced and Moist (MINCEDMOIST)  Supplement Feeding Modality:  Oral  Glucerna Shake Cans or Servings Per Day:  3       Frequency:  Three Times a day  Entered: Jun 25 2023  5:13PM  
This patient has been assessed with a concern for Malnutrition and has been determined to have a diagnosis/diagnoses of Moderate protein-calorie malnutrition.    This patient is being managed with:   Diet Soft and Bite Sized-  Consistent Carbohydrate {Evening Snack}  Entered: Jun 19 2023  8:44AM

## 2023-07-02 NOTE — PROGRESS NOTE ADULT - ASSESSMENT
89y Male with significant PMH of HTN, CAD, s/p stent in 2009, HPL, DM-2, CKD-stage III, CVA with muscle wasting, H/o prostate CA s/p TURP, Glaucoma, B/L muscle atrophy and others was BIBA in ED from home with coffee ground emesis x 2. Treated for R foot gangrene and UTI.      # Hypernatremia / Severe Protein Calorie Malnutrition- encourage po intake.  improved. Cont IVF at 50 ml/hr.     #RT heel gangrene  - Appreciate vascular and podiatry recs  - discussed with ID>> no more antibiotic. Patient is palliative care.     # Poor appetite - pt's poor appetite, progressive decline d/w daughter.  She states pt and family opt against PEG placement.  Cont Remeron.      #UTI  - U/A positive, s/p CTX    # Acute worsening of CKD stage III- likely pre-renal  Cr 2.54 ( was 1.44 in 01/2023).  creatinine no significant change. no more labs per daughter.     # Acute blood loss anemia with hematemesis with possible PUD, erossive gastritis/duodenitis/esophagitis or AVMs.  Hb stable (with baseline Hb 10.0-11.3). GI recs reviewed.     # DM-2 with hyperglycemia.  controlled. Cont finger sticks BID.     # HTN, CAD, s/p stent and HPL.  Stable and no acute issue.  Continue his Metoprolol, Losartan and Atorvastatin.    # H/o Prostate cancer and TURP.  cont current meds.     # Hypothyroidism   cont current medication.     #Functional Quadriplegia and severe PCM- supportive care.     # Moderate PCM: Cont current diet.     # DVT prophylaxis: SCDs for now.    CODE STATUS- DNR/DNI. Comfort measures only.   Plan of care d/w daughter Shavonne [phone].  Dispo: Placement with palliative care. Pending peer to peer.

## 2023-07-02 NOTE — PROGRESS NOTE ADULT - REASON FOR ADMISSION
Hematemesis

## 2023-07-02 NOTE — PROGRESS NOTE ADULT - SUBJECTIVE AND OBJECTIVE BOX
CHIEF COMPLAINT: Follow up of right foot gangrene, malnutrition, anemia, Diabetes Mellitus Type 2  no fever  no nausea or vomiting    PHYSICAL EXAM:  GENERAL: Malnourished, no acute distress   CHEST/LUNG:  No wheezing, no crackles   HEART: Regular rate and rhythm; +SM  ABDOMEN: Soft, Nontender, Nondistended; Bowel sounds present  EXTREMITIES:  No clubbing, cyanosis, or edema. Right foot dressing   Psychiatry: SWETHA reyes his name only.        OBJECTIVE DATA:     Vital Signs Last 24 Hrs  T(C): 36.8 (02 Jul 2023 11:48), Max: 37.5 (01 Jul 2023 16:54)  T(F): 98.3 (02 Jul 2023 11:48), Max: 99.5 (01 Jul 2023 16:54)  HR: 83 (02 Jul 2023 11:48) (81 - 94)  BP: 131/75 (02 Jul 2023 11:48) (131/75 - 159/81)  BP(mean): --  RR: 17 (02 Jul 2023 11:48) (17 - 18)  SpO2: 97% (02 Jul 2023 11:48) (95% - 97%)    Parameters below as of 01 Jul 2023 16:54  Patient On (Oxygen Delivery Method): room air               Daily     Daily   LABS:                                     CAPILLARY BLOOD GLUCOSE      POCT Blood Glucose.: 172 mg/dL (02 Jul 2023 07:48)      MEDICATIONS  (STANDING):  atorvastatin 80 milliGRAM(s) Oral at bedtime  cilostazol 100 milliGRAM(s) Oral two times a day  dextrose 5% + sodium chloride 0.45%. 1000 milliLiter(s) (50 mL/Hr) IV Continuous <Continuous>  dextrose 5%. 1000 milliLiter(s) (100 mL/Hr) IV Continuous <Continuous>  dextrose 5%. 1000 milliLiter(s) (50 mL/Hr) IV Continuous <Continuous>  dextrose 50% Injectable 25 Gram(s) IV Push once  dextrose 50% Injectable 25 Gram(s) IV Push once  dextrose 50% Injectable 12.5 Gram(s) IV Push once  epoetin shai-epbx (RETACRIT) Injectable 08886 Unit(s) SubCutaneous every 7 days  finasteride 5 milliGRAM(s) Oral daily  glucagon  Injectable 1 milliGRAM(s) IntraMuscular once  insulin lispro (ADMELOG) corrective regimen sliding scale   SubCutaneous two times a day before meals  latanoprost 0.005% Ophthalmic Solution 1 Drop(s) Both EYES at bedtime  levothyroxine 75 MICROGram(s) Oral daily  metoprolol tartrate 25 milliGRAM(s) Oral two times a day  mirtazapine 7.5 milliGRAM(s) Oral at bedtime  pantoprazole    Tablet 40 milliGRAM(s) Oral before breakfast  sodium bicarbonate 650 milliGRAM(s) Oral three times a day  tamsulosin 0.4 milliGRAM(s) Oral at bedtime    MEDICATIONS  (PRN):  acetaminophen     Tablet .. 650 milliGRAM(s) Oral every 6 hours PRN Temp greater or equal to 38C (100.4F), Mild Pain (1 - 3)  aluminum hydroxide/magnesium hydroxide/simethicone Suspension 30 milliLiter(s) Oral every 4 hours PRN Dyspepsia  dextrose Oral Gel 15 Gram(s) Oral once PRN Blood Glucose LESS THAN 70 milliGRAM(s)/deciliter  melatonin 3 milliGRAM(s) Oral at bedtime PRN Insomnia  ondansetron Injectable 4 milliGRAM(s) IV Push every 8 hours PRN Nausea and/or Vomiting

## 2023-07-02 NOTE — PROGRESS NOTE ADULT - PROVIDER SPECIALTY LIST ADULT
Gastroenterology
Internal Medicine
Nephrology
Gastroenterology
Hospitalist
Internal Medicine
Internal Medicine
Nephrology
Gastroenterology
Hospitalist
Internal Medicine
Internal Medicine
Nephrology
Podiatry
Hospitalist
Infectious Disease
Internal Medicine
Vascular Surgery
Hospitalist
Internal Medicine
Internal Medicine

## 2023-07-03 ENCOUNTER — TRANSCRIPTION ENCOUNTER (OUTPATIENT)
Age: 88
End: 2023-07-03

## 2023-07-03 VITALS
RESPIRATION RATE: 17 BRPM | DIASTOLIC BLOOD PRESSURE: 81 MMHG | OXYGEN SATURATION: 96 % | SYSTOLIC BLOOD PRESSURE: 160 MMHG | HEART RATE: 79 BPM | TEMPERATURE: 98 F

## 2023-07-03 LAB
FLUAV AG NPH QL: SIGNIFICANT CHANGE UP
FLUBV AG NPH QL: SIGNIFICANT CHANGE UP
GLUCOSE BLDC GLUCOMTR-MCNC: 181 MG/DL — HIGH (ref 70–99)
GLUCOSE BLDC GLUCOMTR-MCNC: 187 MG/DL — HIGH (ref 70–99)
SARS-COV-2 RNA SPEC QL NAA+PROBE: SIGNIFICANT CHANGE UP

## 2023-07-03 PROCEDURE — 99239 HOSP IP/OBS DSCHRG MGMT >30: CPT

## 2023-07-03 RX ORDER — SODIUM BICARBONATE 1 MEQ/ML
1 SYRINGE (ML) INTRAVENOUS
Qty: 0 | Refills: 0 | DISCHARGE
Start: 2023-07-03

## 2023-07-03 RX ORDER — LANOLIN ALCOHOL/MO/W.PET/CERES
1 CREAM (GRAM) TOPICAL
Qty: 0 | Refills: 0 | DISCHARGE
Start: 2023-07-03

## 2023-07-03 RX ORDER — MIRTAZAPINE 45 MG/1
1 TABLET, ORALLY DISINTEGRATING ORAL
Qty: 0 | Refills: 0 | DISCHARGE
Start: 2023-07-03

## 2023-07-03 RX ORDER — ACETAMINOPHEN 500 MG
2 TABLET ORAL
Qty: 0 | Refills: 0 | DISCHARGE
Start: 2023-07-03

## 2023-07-03 RX ADMIN — Medication 75 MICROGRAM(S): at 05:22

## 2023-07-03 RX ADMIN — Medication 650 MILLIGRAM(S): at 05:22

## 2023-07-03 RX ADMIN — ERYTHROPOIETIN 10000 UNIT(S): 10000 INJECTION, SOLUTION INTRAVENOUS; SUBCUTANEOUS at 13:04

## 2023-07-03 RX ADMIN — Medication 25 MILLIGRAM(S): at 05:22

## 2023-07-03 RX ADMIN — Medication 1: at 07:53

## 2023-07-03 RX ADMIN — PANTOPRAZOLE SODIUM 40 MILLIGRAM(S): 20 TABLET, DELAYED RELEASE ORAL at 07:57

## 2023-07-03 RX ADMIN — CILOSTAZOL 100 MILLIGRAM(S): 100 TABLET ORAL at 05:23

## 2023-07-03 RX ADMIN — SODIUM CHLORIDE 50 MILLILITER(S): 9 INJECTION, SOLUTION INTRAVENOUS at 05:22

## 2023-07-03 NOTE — DISCHARGE NOTE NURSING/CASE MANAGEMENT/SOCIAL WORK - NSDCPEFALRISK_GEN_ALL_CORE
For information on Fall & Injury Prevention, visit: [email or web address] OR  [email or web address] OR  https://www.cdc.gov/steadi/patient.html

## 2023-07-03 NOTE — DISCHARGE NOTE NURSING/CASE MANAGEMENT/SOCIAL WORK - PATIENT PORTAL LINK FT
You can access the FollowMyHealth Patient Portal offered by [facility] by registering at the following website: [email or web address]. By joining [employer]’s FollowMyHealth portal, you will also be able to view your health information using other applications (apps) compatible with our system.

## 2023-07-03 NOTE — DISCHARGE NOTE NURSING/CASE MANAGEMENT/SOCIAL WORK - NSDCFUADDAPPT_GEN_ALL_CORE_FT
Podiatry Discharge Instructions:  Follow up: Please follow up with Dr. Doll within 1 week of discharge from the hospital, please call [phone] for appointment and discuss that you recently were seen in the hospital.  Wound Care: Please apply betadine directly to the right heel wound daily followed by 4x4 gauze and georgina.   Weight bearing: Please weight bear as tolerated in a surgical shoe to the right foot and wear z-flows to both feet at all times while in bed.  Antibiotics: Please continue as instructed.

## 2023-07-03 NOTE — CHART NOTE - NSCHARTNOTEFT_GEN_A_CORE
Pt seen on medical floor for follow-up. Per chart pt with PMH: HTN, CAD, HLD, T2DM, CKD3, CVA, prostate CA s/p TURP, presents with coffee ground emesis x 2, found with acute blood loss anemia likely secondary to GI bleed. Course complicated by right heel gangrene. s/p swallow evaluation with recommendations for minced and moist with thin liquids (06/19). Palliative following for GOC; DNR/DNI/comfort measures only as per [facility]. Pt for possible inpatient hospice. Palliative recommends pleasure feeds as tolerated.   low-up, adm w/ R foot gangrene; anemia; DM2; Moderate Malnutrition in chronic illness.     Factors impacting intake: [ ] none [ ] nausea  [ ] vomiting [ ] diarrhea [ ] constipation  [ ]chewing problems [ ] swallowing issues  [x ] other: confused    6/25 - Diet Prescription: Diet, Consistent Carbohydrate w/Evening Snack:   Minced and Moist (MINCEDMOIST)  Supplement Feeding Modality:  Oral  Glucerna Shake Cans or Servings Per Day:  3       Frequency:  Three Times a day (06-25-23 @ 17:13)    Intake: < 50 %meals and Glucerna Shake 8 oz 3 times per day (660 lópez, 30 gm pro)     Current Weight:   % Weight Change      Pertinent Medications: MEDICATIONS  (STANDING):  atorvastatin 80 milliGRAM(s) Oral at bedtime  cilostazol 100 milliGRAM(s) Oral two times a dayPer chart pt with PMH: HTN, CAD, HLD, T2DM, CKD3, CVA, prostate CA s/p TURP, presents with coffee ground emesis x 2, found with acute blood loss anemia likely secondary to GI bleed. Course complicated by right heel gangrene. s/p swallow evaluation with recommendations for minced and moist with thin liquids (06/19). Palliative following for GOC; DNR/DNI/comfort measures only as per [facility]. Pt for possible inpatient hospice. Palliative recommends pleasure feeds as tolerated.     dextrose 5% + sodium chloride 0.45%. 1000 milliLiter(s) (50 mL/Hr) IV Continuous <Continuous>  dextrose 5%. 1000 milliLiter(s) (50 mL/Hr) IV Continuous <Continuous>  dextrose 5%. 1000 milliLiter(s) (100 mL/Hr) IV Continuous <Continuous>  dextrose 50% Injectable 25 Gram(s) IV Push once  dextrose 50% Injectable 25 Gram(s) IV Push once  dextrose 50% Injectable 12.5 Gram(s) IV Push once  epoetin shai-epbx (RETACRIT) Injectable 70151 Unit(s) SubCutaneous every 7 days  finasteride 5 milliGRAM(s) Oral daily  glucagon  Injectable 1 milliGRAM(s) IntraMuscular once  insulin lispro (ADMELOG) corrective regimen sliding scale   SubCutaneous two times a day before meals  latanoprost 0.005% Ophthalmic Solution 1 Drop(s) Both EYES at bedtime  levothyroxine 75 MICROGram(s) Oral daily  metoprolol tartrate 25 milliGRAM(s) Oral two times a day  mirtazapine 7.5 milliGRAM(s) Oral at bedtime  pantoprazole    Tablet 40 milliGRAM(s) Oral before breakfast  sodium bicarbonate 650 milliGRAM(s) Oral three times a day  tamsulosin 0.4 milliGRAM(s) Oral at bedtime    MEDICATIONS  (PRN):  acetaminophen     Tablet .. 650 milliGRAM(s) Oral every 6 hours PRN Temp greater or equal to 38C (100.4F), Mild Pain (1 - 3)  aluminum hydroxide/magnesium hydroxide/simethicone Suspension 30 milliLiter(s) Oral every 4 hours PRN Dyspepsia  dextrose Oral Gel 15 Gram(s) Oral once PRN Blood Glucose LESS THAN 70 milliGRAM(s)/deciliter  melatonin 3 milliGRAM(s) Oral at bedtime PRN Insomnia  ondansetron Injectable 4 milliGRAM(s) IV Push every 8 hours PRN Nausea and/or Vomiting    Pertinent Labs: 06-16-23 @ 05:58 A1C 13.2       CAPILLARY BLOOD GLUCOSE      POCT Blood Glucose.: 181 mg/dL (03 Jul 2023 07:47)  POCT Blood Glucose.: 179 mg/dL (02 Jul 2023 16:53)    Skin:     Estimated Needs:   [ ] no change since previous assessment (  )  [ ] recalculated:     Previous Nutrition Diagnosis:   [ ] Inadequate Energy Intake [ ]Inadequate Oral Intake [ ] Excessive Energy Intake   [ ] Underweight [ ] Increased Nutrient Needs [ ] Overweight/Obesity   [ ] Altered GI Function [ ] Unintended Weight Loss [ ] Food & Nutrition Related Knowledge Deficit [ ] Malnutrition     Previous Goal:    Nutrition Diagnosis is [ ] ongoing  [ ] resolved [ ] not applicable     New Nutrition Diagnosis: [ ] not applicable       Interventions:   Recommend  [ ] Change Diet To:  [ ] Nutrition Supplement  [ ] Nutrition Support  [ ] Other:     Monitoring and Evaluation:   [ ] PO intake [ x ] Tolerance to diet prescription [ x ] weights [ x ] labs[ x ] follow up per protocol  [ ] other: Pt seen on medical floor for follow-up. Per chart pt with PMH: HTN, CAD, HLD, T2DM, CKD3, CVA, prostate CA s/p TURP, presents with coffee ground emesis x 2, found with acute blood loss anemia likely secondary to GI bleed. Course complicated by right heel gangrene. s/p swallow evaluation with recommendations for minced and moist with thin liquids (06/19). Palliative following for GOC; DNR/DNI/comfort measures only as per [facility]. Pt for possible inpatient hospice. Palliative recommends pleasure feeds as tolerated.   low-up, adm w/ R foot gangrene; anemia; DM2; Moderate Malnutrition in chronic illness.     Factors impacting intake: [ ] none [ ] nausea  [ ] vomiting [ ] diarrhea [ ] constipation  [ ]chewing problems [ ] swallowing issues  [x ] other: confused    6/25 - Diet Prescription: Diet, Consistent Carbohydrate w/Evening Snack:   Minced and Moist (MINCEDMOIST)  Supplement Feeding Modality:  Oral  Glucerna Shake Cans or Servings Per Day:  3       Frequency:  Three Times a day (06-25-23 @ 17:13)    Intake: < 50 %meals and Glucerna Shake 8 oz 3 times per day (660 lópez, 30 gm pro) . Pt and family opt against PEG.    Current Weight: 6/27 - 127.2 (57.7 kg) 6/22 - 135.1 (61.3 kg)  % Weight Change - 5.8 % /3.6 kg loss x 5 days.     Physical Appearance - non pitting edema  perineum, (L & R) Arm    Nutrition focused physical exam conducted ;   Subcutaneous fat loss: [mild ] Orbital fat pads region, [x ]Buccal fat region, [moderate ]Triceps region,  [x ]Ribs region.  Muscle wasting: [ moderate]Temples region, [ mild]Clavicle region, [mild ]Shoulder region, [ x]Scapula region, x[ ]Interosseous region,  [moderate ]thigh region, [ moderate]Calf region      Pertinent Medications: MEDICATIONS  (STANDING):  atorvastatin 80 milliGRAM(s) Oral at bedtime  cilostazol 100 milliGRAM(s) Oral two times a dayPer chart pt with PMH: HTN, CAD, HLD, T2DM, CKD3, CVA, prostate CA s/p TURP, presents with coffee ground emesis x 2, found with acute blood loss anemia likely secondary to GI bleed. Course complicated by right heel gangrene. s/p swallow evaluation with recommendations for minced and moist with thin liquids (06/19). Palliative following for GOC; DNR/DNI/comfort measures only as per [facility]. Pt for possible inpatient hospice. Palliative recommends pleasure feeds as tolerated.     dextrose 5% + sodium chloride 0.45%. 1000 milliLiter(s) (50 mL/Hr) IV Continuous <Continuous>  dextrose 5%. 1000 milliLiter(s) (50 mL/Hr) IV Continuous <Continuous>  dextrose 5%. 1000 milliLiter(s) (100 mL/Hr) IV Continuous <Continuous>  dextrose 50% Injectable 25 Gram(s) IV Push once  dextrose 50% Injectable 25 Gram(s) IV Push once  dextrose 50% Injectable 12.5 Gram(s) IV Push once  epoetin shai-epbx (RETACRIT) Injectable 05222 Unit(s) SubCutaneous every 7 days  finasteride 5 milliGRAM(s) Oral daily  glucagon  Injectable 1 milliGRAM(s) IntraMuscular once  insulin lispro (ADMELOG) corrective regimen sliding scale   SubCutaneous two times a day before meals  latanoprost 0.005% Ophthalmic Solution 1 Drop(s) Both EYES at bedtime  levothyroxine 75 MICROGram(s) Oral daily  metoprolol tartrate 25 milliGRAM(s) Oral two times a day  mirtazapine 7.5 milliGRAM(s) Oral at bedtime  pantoprazole    Tablet 40 milliGRAM(s) Oral before breakfast  sodium bicarbonate 650 milliGRAM(s) Oral three times a day  tamsulosin 0.4 milliGRAM(s) Oral at bedtime    MEDICATIONS  (PRN):  acetaminophen     Tablet .. 650 milliGRAM(s) Oral every 6 hours PRN Temp greater or equal to 38C (100.4F), Mild Pain (1 - 3)  aluminum hydroxide/magnesium hydroxide/simethicone Suspension 30 milliLiter(s) Oral every 4 hours PRN Dyspepsia  dextrose Oral Gel 15 Gram(s) Oral once PRN Blood Glucose LESS THAN 70 milliGRAM(s)/deciliter  melatonin 3 milliGRAM(s) Oral at bedtime PRN Insomnia  ondansetron Injectable 4 milliGRAM(s) IV Push every 8 hours PRN Nausea and/or Vomiting    Pertinent Labs: 06-16-23 @ 05:58 A1C 13.2       CAPILLARY BLOOD GLUCOSE      POCT Blood Glucose.: 181 mg/dL (03 Jul 2023 07:47)  POCT Blood Glucose.: 179 mg/dL (02 Jul 2023 16:53)    Skin:   L lower back stage 2  L heel suspected DTI  R heel suspected DTI    Estimated Needs:   [x ] no change since previous assessment (6/17 )  [ ] recalculated:     Previous Nutrition Diagnosis:   Previous Nutrition Diagnosis:   [x] Malnutrition	Moderate Malnutrition in the context of Chronic Illness  Etiology (addendum 06/24/23)	Inadequate energy intake and Increased protein needs related to Cardiac hx, CKD stage 3, Uncontrolled DM ( hgbA1c = 13.2 %)  Signs/Symptoms	Physical findings: Mild muscle loss ; Mild and moderate fat loss as noted 06/17  Goal/Expected Outcome	Pt will Meet calorie and protein needs > 50 % via diet advancement during Length Of Stay (not met)  NEW goal (06/24/23): nutrition/hydration as per family's/pt's wishes as medically feasible       Nutrition Diagnosis is [ ] ongoing  [ ] resolved [ ] not applicable     New Nutrition Diagnosis: [ ] not applicable       Interventions:   Recommend -> Continue current diet as Rx'd  [ ] Change Diet To:  [ ] Nutrition Supplement  [ ] Nutrition Support  [x ] Other: nutrition / hydration as per family's / pt wishes as medically feasible.     Monitoring and Evaluation:   [x ] PO intake [ x ] Tolerance to diet prescription [ x ] weights [ x ] labs[ x ] follow up per protocol  [ ] other:

## 2023-07-11 DIAGNOSIS — E11.22 TYPE 2 DIABETES MELLITUS WITH DIABETIC CHRONIC KIDNEY DISEASE: ICD-10-CM

## 2023-07-11 DIAGNOSIS — I95.9 HYPOTENSION, UNSPECIFIED: ICD-10-CM

## 2023-07-11 DIAGNOSIS — R53.2 FUNCTIONAL QUADRIPLEGIA: ICD-10-CM

## 2023-07-11 DIAGNOSIS — E78.5 HYPERLIPIDEMIA, UNSPECIFIED: ICD-10-CM

## 2023-07-11 DIAGNOSIS — Z95.828 PRESENCE OF OTHER VASCULAR IMPLANTS AND GRAFTS: ICD-10-CM

## 2023-07-11 DIAGNOSIS — F03.90 UNSPECIFIED DEMENTIA, UNSPECIFIED SEVERITY, WITHOUT BEHAVIORAL DISTURBANCE, PSYCHOTIC DISTURBANCE, MOOD DISTURBANCE, AND ANXIETY: ICD-10-CM

## 2023-07-11 DIAGNOSIS — K27.4 CHRONIC OR UNSPECIFIED PEPTIC ULCER, SITE UNSPECIFIED, WITH HEMORRHAGE: ICD-10-CM

## 2023-07-11 DIAGNOSIS — D62 ACUTE POSTHEMORRHAGIC ANEMIA: ICD-10-CM

## 2023-07-11 DIAGNOSIS — I70.261 ATHEROSCLEROSIS OF NATIVE ARTERIES OF EXTREMITIES WITH GANGRENE, RIGHT LEG: ICD-10-CM

## 2023-07-11 DIAGNOSIS — E11.65 TYPE 2 DIABETES MELLITUS WITH HYPERGLYCEMIA: ICD-10-CM

## 2023-07-11 DIAGNOSIS — Z85.46 PERSONAL HISTORY OF MALIGNANT NEOPLASM OF PROSTATE: ICD-10-CM

## 2023-07-11 DIAGNOSIS — E11.52 TYPE 2 DIABETES MELLITUS WITH DIABETIC PERIPHERAL ANGIOPATHY WITH GANGRENE: ICD-10-CM

## 2023-07-11 DIAGNOSIS — Z79.4 LONG TERM (CURRENT) USE OF INSULIN: ICD-10-CM

## 2023-07-11 DIAGNOSIS — Z74.01 BED CONFINEMENT STATUS: ICD-10-CM

## 2023-07-11 DIAGNOSIS — I25.2 OLD MYOCARDIAL INFARCTION: ICD-10-CM

## 2023-07-11 DIAGNOSIS — N39.0 URINARY TRACT INFECTION, SITE NOT SPECIFIED: ICD-10-CM

## 2023-07-11 DIAGNOSIS — R53.81 OTHER MALAISE: ICD-10-CM

## 2023-07-11 DIAGNOSIS — N17.9 ACUTE KIDNEY FAILURE, UNSPECIFIED: ICD-10-CM

## 2023-07-11 DIAGNOSIS — N18.30 CHRONIC KIDNEY DISEASE, STAGE 3 UNSPECIFIED: ICD-10-CM

## 2023-07-11 DIAGNOSIS — H40.9 UNSPECIFIED GLAUCOMA: ICD-10-CM

## 2023-07-11 DIAGNOSIS — E43 UNSPECIFIED SEVERE PROTEIN-CALORIE MALNUTRITION: ICD-10-CM

## 2023-07-11 DIAGNOSIS — Z51.5 ENCOUNTER FOR PALLIATIVE CARE: ICD-10-CM

## 2023-07-11 DIAGNOSIS — Z95.5 PRESENCE OF CORONARY ANGIOPLASTY IMPLANT AND GRAFT: ICD-10-CM

## 2023-07-11 DIAGNOSIS — E87.0 HYPEROSMOLALITY AND HYPERNATREMIA: ICD-10-CM

## 2023-07-11 DIAGNOSIS — K20.91 ESOPHAGITIS, UNSPECIFIED WITH BLEEDING: ICD-10-CM

## 2023-07-11 DIAGNOSIS — L97.414 NON-PRESSURE CHRONIC ULCER OF RIGHT HEEL AND MIDFOOT WITH NECROSIS OF BONE: ICD-10-CM

## 2023-07-11 DIAGNOSIS — Z79.890 HORMONE REPLACEMENT THERAPY: ICD-10-CM

## 2023-07-11 DIAGNOSIS — K92.0 HEMATEMESIS: ICD-10-CM

## 2023-07-11 DIAGNOSIS — M62.50 MUSCLE WASTING AND ATROPHY, NOT ELSEWHERE CLASSIFIED, UNSPECIFIED SITE: ICD-10-CM

## 2023-07-11 DIAGNOSIS — Z53.29 PROCEDURE AND TREATMENT NOT CARRIED OUT BECAUSE OF PATIENT'S DECISION FOR OTHER REASONS: ICD-10-CM

## 2023-07-11 DIAGNOSIS — E03.9 HYPOTHYROIDISM, UNSPECIFIED: ICD-10-CM

## 2023-07-11 DIAGNOSIS — I69.398 OTHER SEQUELAE OF CEREBRAL INFARCTION: ICD-10-CM

## 2023-07-11 DIAGNOSIS — K29.01 ACUTE GASTRITIS WITH BLEEDING: ICD-10-CM

## 2023-07-11 DIAGNOSIS — I12.9 HYPERTENSIVE CHRONIC KIDNEY DISEASE WITH STAGE 1 THROUGH STAGE 4 CHRONIC KIDNEY DISEASE, OR UNSPECIFIED CHRONIC KIDNEY DISEASE: ICD-10-CM

## 2023-07-11 DIAGNOSIS — Z79.82 LONG TERM (CURRENT) USE OF ASPIRIN: ICD-10-CM

## 2023-07-11 DIAGNOSIS — L89.626 PRESSURE-INDUCED DEEP TISSUE DAMAGE OF LEFT HEEL: ICD-10-CM

## 2023-07-11 DIAGNOSIS — I25.10 ATHEROSCLEROTIC HEART DISEASE OF NATIVE CORONARY ARTERY WITHOUT ANGINA PECTORIS: ICD-10-CM

## 2023-07-11 DIAGNOSIS — K29.81 DUODENITIS WITH BLEEDING: ICD-10-CM

## 2023-07-11 DIAGNOSIS — Z90.79 ACQUIRED ABSENCE OF OTHER GENITAL ORGAN(S): ICD-10-CM

## 2023-07-11 DIAGNOSIS — K55.21 ANGIODYSPLASIA OF COLON WITH HEMORRHAGE: ICD-10-CM

## 2023-07-11 DIAGNOSIS — Z66 DO NOT RESUSCITATE: ICD-10-CM

## 2023-07-11 DIAGNOSIS — E87.6 HYPOKALEMIA: ICD-10-CM

## 2024-05-30 NOTE — PATIENT PROFILE ADULT - CAREGIVER OBTAIN DETAILS
Pt seen in office 5/20/2024 with Lila Brice PA-C.  At that visit, signed orders had been sent back along with patient in envelope to give to nursing home facility:  =BP/P twice daily x 1 week - our fax # was provided on order  =Start potassium 20 meq once daily  =BMP and NTproBNP in week - our fax # was provided on order    5/30/2024 writer received blood pressures only.  No HR's.  No lab results.  Call made to [facility], spoke with nurse Bliss, who states labs were completed.  He will fax over the HR's and lab results.  Will wait for those and document everything once received.    2.   5/30/2024 10:41am received HR's and lab results.  Call made back to facility, spoke with nurse Stroud.  Writer wanted to confirm with staff that pt started the potassium.  Looking at lab results his potassium is even lower than prior.    3.   Per nurse Stroud, Dr. Joya has made multiple changes as followed:  =Potassium discontinued 5/23/2024 due to patient complaints of \"feeling a fullness in his throat\"  =Spironolactone 25 mg once daily was started 5/23/2024  =Metoprolol decreased to 25 mg po BID 5/24/2024  =Metolazone discontinued 5/29/2024  =Metoprolol decreased again to 12.5 mg once daily (even though med list shows 12.5 mg po BID) - Luanne confirmed pt is only being given it once daily  =Lasix decreased to 40 mg once daily 5/29/2024  =TODAY 5/30/2024 pt is holding metoprolol, lasix, AND spironolactone    BP's from 5/21/24 - 5/28/24  108/68, 102/64, 110/68, 98/64, 104/64, 98/62, 98/66, 98/70, 100/60, 98/64, 88/58, 90/56, 90/60, 104/66, 96/66, 110/72, 95/57, 102/60, 96/64    HR's from 5/21/24 - 5/30/24  72, 74, 64, 90, 78, 78, 100, 87, 80, 78, 70, 60, 94, 54, 56, 66, 68, 64, 70, 94, 66, 87, 66    Labs - obtained at facility 5/28/2024  SODIUM 131    (previously 139 on 5/20/24)  (previously 142 on 5/9/24)   POTASSIUM 3.1  (previously 3.3 on 5/20/24)  (previously 3.3 on 5/9/24)   CHLORIDE 90  (previously 98 on  5/20/24)  (previously 102 on 5/9/24)   CO2 32.4  (previously 33.5 on 5/20/24)  (previously 31 on 5/9/24)   ANION GAP 8.6  (previously 7.5 on 5/20/24)  (previously 12 on 5/9/24)   BUN 37  (previously 31 on 5/20/24)  (previously 28 on 5/9/24)   CREATININE 1.26  (previously 1.26 on 5/20/24)  (previously 1.09 on 5/9/24)   BUN/CR RATIO 29.4  (previously 24.6 on 5/20/24)   EGFR 58  (previously 58 on 5/20/24)   GLUCOSE 204  (previously 187 on 5/20/24)  (previously 122 on 5/9/24)   CALCIUM 9.9  (previously 10.3 on 5/20/24)      Due to cognitive impairment

## 2024-10-03 NOTE — ED CDU PROVIDER NOTE - CPE EDP ENMT NORM
stats abdominal pain and bloody stools starting last night  hx of GI bleeds and mult transfusions normal...

## 2024-10-22 NOTE — PATIENT PROFILE ADULT - FALL HARM RISK - ATTEMPT OOB
What Type Of Note Output Would You Prefer (Optional)?: Bullet Format Has Your Skin Lesion Been Treated?: not been treated Is This A New Presentation, Or A Follow-Up?: Moles Additional History: LOWER EXTREMITY EXAM No